# Patient Record
Sex: FEMALE | Race: BLACK OR AFRICAN AMERICAN | Employment: FULL TIME | URBAN - METROPOLITAN AREA
[De-identification: names, ages, dates, MRNs, and addresses within clinical notes are randomized per-mention and may not be internally consistent; named-entity substitution may affect disease eponyms.]

---

## 2021-11-22 ENCOUNTER — HOSPITAL ENCOUNTER (OUTPATIENT)
Dept: LAB | Age: 51
Discharge: HOME | End: 2021-11-22
Payer: COMMERCIAL

## 2021-11-22 ENCOUNTER — PATIENT OUTREACH (OUTPATIENT)
Dept: CASE MANAGEMENT | Age: 51
End: 2021-11-22

## 2021-11-22 DIAGNOSIS — C90.00 MULTIPLE MYELOMA NOT HAVING ACHIEVED REMISSION (HCC): ICD-10-CM

## 2021-11-22 LAB
ALBUMIN SERPL-MCNC: 2.9 G/DL (ref 3.5–5)
ALBUMIN/GLOB SERPL: 0.6 {RATIO} (ref 1.2–3.5)
ALP SERPL-CCNC: 92 U/L (ref 50–136)
ALT SERPL-CCNC: 21 U/L (ref 12–65)
ANION GAP SERPL CALC-SCNC: 4 MMOL/L (ref 7–16)
AST SERPL-CCNC: 14 U/L (ref 15–37)
BASOPHILS # BLD: 0 K/UL (ref 0–0.2)
BASOPHILS NFR BLD: 0 % (ref 0–2)
BILIRUB SERPL-MCNC: 0.3 MG/DL (ref 0.2–1.1)
BUN SERPL-MCNC: 8 MG/DL (ref 6–23)
CALCIUM SERPL-MCNC: 9.5 MG/DL (ref 8.3–10.4)
CHLORIDE SERPL-SCNC: 105 MMOL/L (ref 98–107)
CO2 SERPL-SCNC: 31 MMOL/L (ref 21–32)
CREAT SERPL-MCNC: 0.7 MG/DL (ref 0.6–1)
DIFFERENTIAL METHOD BLD: ABNORMAL
EOSINOPHIL # BLD: 0.1 K/UL (ref 0–0.8)
EOSINOPHIL NFR BLD: 2 % (ref 0.5–7.8)
ERYTHROCYTE [DISTWIDTH] IN BLOOD BY AUTOMATED COUNT: 15.1 % (ref 11.9–14.6)
GLOBULIN SER CALC-MCNC: 4.5 G/DL (ref 2.3–3.5)
GLUCOSE SERPL-MCNC: 93 MG/DL (ref 65–100)
HCT VFR BLD AUTO: 30.4 % (ref 35.8–46.3)
HGB BLD-MCNC: 9.3 G/DL (ref 11.7–15.4)
IMM GRANULOCYTES # BLD AUTO: 0.3 K/UL (ref 0–0.5)
IMM GRANULOCYTES NFR BLD AUTO: 5 % (ref 0–5)
LYMPHOCYTES # BLD: 2.2 K/UL (ref 0.5–4.6)
LYMPHOCYTES NFR BLD: 29 % (ref 13–44)
MAGNESIUM SERPL-MCNC: 2.3 MG/DL (ref 1.8–2.4)
MCH RBC QN AUTO: 29.3 PG (ref 26.1–32.9)
MCHC RBC AUTO-ENTMCNC: 30.6 G/DL (ref 31.4–35)
MCV RBC AUTO: 95.9 FL (ref 79.6–97.8)
MONOCYTES # BLD: 0.7 K/UL (ref 0.1–1.3)
MONOCYTES NFR BLD: 9 % (ref 4–12)
NEUTS SEG # BLD: 4.2 K/UL (ref 1.7–8.2)
NEUTS SEG NFR BLD: 56 % (ref 43–78)
NRBC # BLD: 0.13 K/UL (ref 0–0.2)
PLATELET # BLD AUTO: 304 K/UL (ref 150–450)
PMV BLD AUTO: 9.3 FL (ref 9.4–12.3)
POTASSIUM SERPL-SCNC: 4 MMOL/L (ref 3.5–5.1)
PROT SERPL-MCNC: 7.4 G/DL (ref 6.3–8.2)
RBC # BLD AUTO: 3.17 M/UL (ref 4.05–5.2)
SODIUM SERPL-SCNC: 140 MMOL/L (ref 136–145)
URATE SERPL-MCNC: 5.1 MG/DL (ref 2.6–6)
WBC # BLD AUTO: 7.6 K/UL (ref 4.3–11.1)

## 2021-11-22 PROCEDURE — 86334 IMMUNOFIX E-PHORESIS SERUM: CPT

## 2021-11-22 PROCEDURE — 80053 COMPREHEN METABOLIC PANEL: CPT

## 2021-11-22 PROCEDURE — 85810 BLOOD VISCOSITY EXAMINATION: CPT

## 2021-11-22 PROCEDURE — 83883 ASSAY NEPHELOMETRY NOT SPEC: CPT

## 2021-11-22 PROCEDURE — 82784 ASSAY IGA/IGD/IGG/IGM EACH: CPT

## 2021-11-22 PROCEDURE — 84550 ASSAY OF BLOOD/URIC ACID: CPT

## 2021-11-22 PROCEDURE — 85025 COMPLETE CBC W/AUTO DIFF WBC: CPT

## 2021-11-22 PROCEDURE — 36415 COLL VENOUS BLD VENIPUNCTURE: CPT

## 2021-11-22 PROCEDURE — 83735 ASSAY OF MAGNESIUM: CPT

## 2021-11-23 LAB
KAPPA LC FREE SER-MCNC: 9.54 MG/L (ref 3.3–19.4)
KAPPA LC FREE/LAMBDA FREE SER: 0 {RATIO} (ref 0.26–1.65)
LAMBDA LC FREE SERPL-MCNC: ABNORMAL MG/L (ref 5.71–26.3)

## 2021-11-23 NOTE — PROGRESS NOTES
11/22/21 - Patient here for New Patient visit and transplant discussion with Dr. Pauline Fragoso. Patient set to begin chemotherapy for MM in [place] on 11/23 and will return in 3 months to discuss planning transplant. Patient glad to have this discussion and will let us know if she has any questions prior to next visit. No other needs noted.

## 2021-11-24 LAB
ALBUMIN SERPL ELPH-MCNC: 3.02 G/DL (ref 3.2–5.6)
ALBUMIN/GLOB SERPL: 0.8 {RATIO} (ref 1.2–3.5)
ALPHA1 GLOB SERPL ELPH-MCNC: 0.29 G/DL (ref 0.1–0.4)
ALPHA2 GLOB SERPL ELPH-MCNC: 1.13 G/DL (ref 0.4–1.2)
B-GLOBULIN SERPL QL ELPH: 1.82 G/DL (ref 0.6–1.3)
GAMMA GLOB MFR SERPL ELPH: 0.65 G/DL (ref 0.5–1.6)
IGA SERPL-MCNC: 56 MG/DL (ref 85–499)
IGG SERPL-MCNC: 516 MG/DL (ref 610–1616)
IGM SERPL-MCNC: 35 MG/DL (ref 35–242)
M PROTEIN SERPL ELPH-MCNC: 0.94 G/DL
PROT PATTERN SERPL ELPH-IMP: ABNORMAL
PROT PATTERN SPEC IFE-IMP: ABNORMAL
PROT SERPL-MCNC: 6.9 G/DL (ref 6.3–8.2)
VISC SER: 1.6 REL.SALINE (ref 1.4–2)

## 2022-02-25 ENCOUNTER — HOSPITAL ENCOUNTER (OUTPATIENT)
Dept: LAB | Age: 52
Discharge: HOME | End: 2022-02-25
Payer: COMMERCIAL

## 2022-02-25 DIAGNOSIS — C90.00 MULTIPLE MYELOMA NOT HAVING ACHIEVED REMISSION (HCC): ICD-10-CM

## 2022-02-25 LAB
ALBUMIN SERPL-MCNC: 3.6 G/DL (ref 3.5–5)
ALBUMIN/GLOB SERPL: 1.4 {RATIO} (ref 1.2–3.5)
ALP SERPL-CCNC: 53 U/L (ref 50–136)
ALT SERPL-CCNC: 24 U/L (ref 12–65)
ANION GAP SERPL CALC-SCNC: 6 MMOL/L (ref 7–16)
AST SERPL-CCNC: 34 U/L (ref 15–37)
BASOPHILS # BLD: 0 K/UL (ref 0–0.2)
BASOPHILS NFR BLD: 0 % (ref 0–2)
BILIRUB SERPL-MCNC: 0.5 MG/DL (ref 0.2–1.1)
BUN SERPL-MCNC: 4 MG/DL (ref 6–23)
CALCIUM SERPL-MCNC: 8.5 MG/DL (ref 8.3–10.4)
CHLORIDE SERPL-SCNC: 105 MMOL/L (ref 98–107)
CO2 SERPL-SCNC: 30 MMOL/L (ref 21–32)
CREAT SERPL-MCNC: 0.6 MG/DL (ref 0.6–1)
DIFFERENTIAL METHOD BLD: ABNORMAL
EOSINOPHIL # BLD: 0.1 K/UL (ref 0–0.8)
EOSINOPHIL NFR BLD: 2 % (ref 0.5–7.8)
ERYTHROCYTE [DISTWIDTH] IN BLOOD BY AUTOMATED COUNT: 17.6 % (ref 11.9–14.6)
GLOBULIN SER CALC-MCNC: 2.6 G/DL (ref 2.3–3.5)
GLUCOSE SERPL-MCNC: 99 MG/DL (ref 65–100)
HCT VFR BLD AUTO: 37.9 % (ref 35.8–46.3)
HGB BLD-MCNC: 11.2 G/DL (ref 11.7–15.4)
IMM GRANULOCYTES # BLD AUTO: 0 K/UL (ref 0–0.5)
IMM GRANULOCYTES NFR BLD AUTO: 0 % (ref 0–5)
LYMPHOCYTES # BLD: 1.6 K/UL (ref 0.5–4.6)
LYMPHOCYTES NFR BLD: 41 % (ref 13–44)
MAGNESIUM SERPL-MCNC: 2.3 MG/DL (ref 1.8–2.4)
MCH RBC QN AUTO: 24.1 PG (ref 26.1–32.9)
MCHC RBC AUTO-ENTMCNC: 29.6 G/DL (ref 31.4–35)
MCV RBC AUTO: 81.7 FL (ref 79.6–97.8)
MONOCYTES # BLD: 0.6 K/UL (ref 0.1–1.3)
MONOCYTES NFR BLD: 15 % (ref 4–12)
NEUTS SEG # BLD: 1.7 K/UL (ref 1.7–8.2)
NEUTS SEG NFR BLD: 42 % (ref 43–78)
NRBC # BLD: 0 K/UL (ref 0–0.2)
PLATELET # BLD AUTO: 280 K/UL (ref 150–450)
PMV BLD AUTO: 10 FL (ref 9.4–12.3)
POTASSIUM SERPL-SCNC: 3.1 MMOL/L (ref 3.5–5.1)
PROT SERPL-MCNC: 6.2 G/DL (ref 6.3–8.2)
RBC # BLD AUTO: 4.64 M/UL (ref 4.05–5.2)
SODIUM SERPL-SCNC: 141 MMOL/L (ref 136–145)
WBC # BLD AUTO: 4 K/UL (ref 4.3–11.1)

## 2022-02-25 PROCEDURE — 83521 IG LIGHT CHAINS FREE EACH: CPT

## 2022-02-25 PROCEDURE — 82784 ASSAY IGA/IGD/IGG/IGM EACH: CPT

## 2022-02-25 PROCEDURE — 83735 ASSAY OF MAGNESIUM: CPT

## 2022-02-25 PROCEDURE — 85025 COMPLETE CBC W/AUTO DIFF WBC: CPT

## 2022-02-25 PROCEDURE — 36415 COLL VENOUS BLD VENIPUNCTURE: CPT

## 2022-02-25 PROCEDURE — 80053 COMPREHEN METABOLIC PANEL: CPT

## 2022-02-28 LAB
ALBUMIN SERPL ELPH-MCNC: 3.7 G/DL (ref 2.9–4.4)
ALBUMIN/GLOB SERPL: 1.8 {RATIO} (ref 0.7–1.7)
ALPHA1 GLOB SERPL ELPH-MCNC: 0.2 G/DL (ref 0–0.4)
ALPHA2 GLOB SERPL ELPH-MCNC: 0.6 G/DL (ref 0.4–1)
B-GLOBULIN SERPL ELPH-MCNC: 0.8 G/DL (ref 0.7–1.3)
GAMMA GLOB SERPL ELPH-MCNC: 0.4 G/DL (ref 0.4–1.8)
GLOBULIN SER-MCNC: 2.1 G/DL (ref 2.2–3.9)
IGA SERPL-MCNC: 32 MG/DL (ref 87–352)
IGG SERPL-MCNC: 548 MG/DL (ref 586–1602)
IGM SERPL-MCNC: 28 MG/DL (ref 26–217)
INTERPRETATION SERPL IEP-IMP: ABNORMAL
KAPPA LC FREE SER-MCNC: 6.2 MG/L (ref 3.3–19.4)
KAPPA LC FREE/LAMBDA FREE SER: 0.23 {RATIO} (ref 0.26–1.65)
LAMBDA LC FREE SERPL-MCNC: 26.9 MG/L (ref 5.7–26.3)
M PROTEIN SERPL ELPH-MCNC: 0.2 G/DL
PROT SERPL-MCNC: 5.8 G/DL (ref 6–8.5)

## 2022-04-18 ENCOUNTER — DOCUMENTATION ONLY (OUTPATIENT)
Dept: HEMATOLOGY | Age: 52
End: 2022-04-18

## 2022-04-18 ENCOUNTER — HOSPITAL ENCOUNTER (OUTPATIENT)
Dept: LAB | Age: 52
Discharge: HOME | End: 2022-04-18
Payer: COMMERCIAL

## 2022-04-18 ENCOUNTER — HOSPITAL ENCOUNTER (OUTPATIENT)
Dept: INFUSION THERAPY | Age: 52
Discharge: HOME | End: 2022-04-18
Payer: COMMERCIAL

## 2022-04-18 ENCOUNTER — PATIENT OUTREACH (OUTPATIENT)
Dept: CASE MANAGEMENT | Age: 52
End: 2022-04-18

## 2022-04-18 DIAGNOSIS — Z76.82 BONE MARROW TRANSPLANT CANDIDATE: ICD-10-CM

## 2022-04-18 LAB
ABO + RH BLD: NORMAL
ALBUMIN SERPL-MCNC: 3.1 G/DL (ref 3.5–5)
ALBUMIN/GLOB SERPL: 1 {RATIO} (ref 1.2–3.5)
ALP SERPL-CCNC: 70 U/L (ref 50–136)
ALT SERPL-CCNC: 12 U/L (ref 12–65)
ANION GAP SERPL CALC-SCNC: 4 MMOL/L (ref 7–16)
AST SERPL-CCNC: 17 U/L (ref 15–37)
BASOPHILS # BLD: 0 K/UL (ref 0–0.2)
BASOPHILS NFR BLD: 1 % (ref 0–2)
BILIRUB SERPL-MCNC: 0.3 MG/DL (ref 0.2–1.1)
BUN SERPL-MCNC: 6 MG/DL (ref 6–23)
CALCIUM SERPL-MCNC: 8.8 MG/DL (ref 8.3–10.4)
CHLORIDE SERPL-SCNC: 105 MMOL/L (ref 98–107)
CO2 SERPL-SCNC: 31 MMOL/L (ref 21–32)
CREAT SERPL-MCNC: 0.6 MG/DL (ref 0.6–1)
DIFFERENTIAL METHOD BLD: ABNORMAL
EOSINOPHIL # BLD: 0.1 K/UL (ref 0–0.8)
EOSINOPHIL NFR BLD: 2 % (ref 0.5–7.8)
ERYTHROCYTE [DISTWIDTH] IN BLOOD BY AUTOMATED COUNT: 17.1 % (ref 11.9–14.6)
FERRITIN SERPL-MCNC: 20 NG/ML (ref 8–388)
GLOBULIN SER CALC-MCNC: 3.2 G/DL (ref 2.3–3.5)
GLUCOSE SERPL-MCNC: 91 MG/DL (ref 65–100)
HBV SURFACE AB SERPL IA-ACNC: <3.1 MIU/ML
HCT VFR BLD AUTO: 39.9 % (ref 35.8–46.3)
HGB BLD-MCNC: 11.8 G/DL (ref 11.7–15.4)
IMM GRANULOCYTES # BLD AUTO: 0 K/UL (ref 0–0.5)
IMM GRANULOCYTES NFR BLD AUTO: 0 % (ref 0–5)
LYMPHOCYTES # BLD: 1.9 K/UL (ref 0.5–4.6)
LYMPHOCYTES NFR BLD: 32 % (ref 13–44)
MAGNESIUM SERPL-MCNC: 1.9 MG/DL (ref 1.8–2.4)
MCH RBC QN AUTO: 24.8 PG (ref 26.1–32.9)
MCHC RBC AUTO-ENTMCNC: 29.6 G/DL (ref 31.4–35)
MCV RBC AUTO: 84 FL (ref 79.6–97.8)
MONOCYTES # BLD: 0.8 K/UL (ref 0.1–1.3)
MONOCYTES NFR BLD: 13 % (ref 4–12)
NEUTS SEG # BLD: 3.2 K/UL (ref 1.7–8.2)
NEUTS SEG NFR BLD: 53 % (ref 43–78)
NRBC # BLD: 0 K/UL (ref 0–0.2)
PLATELET # BLD AUTO: 335 K/UL (ref 150–450)
PMV BLD AUTO: 10.3 FL (ref 9.4–12.3)
POTASSIUM SERPL-SCNC: 3.5 MMOL/L (ref 3.5–5.1)
PROT SERPL-MCNC: 6.3 G/DL (ref 6.3–8.2)
RBC # BLD AUTO: 4.75 M/UL (ref 4.05–5.2)
SODIUM SERPL-SCNC: 140 MMOL/L (ref 136–145)
WBC # BLD AUTO: 6 K/UL (ref 4.3–11.1)

## 2022-04-18 PROCEDURE — 80053 COMPREHEN METABOLIC PANEL: CPT

## 2022-04-18 PROCEDURE — 82728 ASSAY OF FERRITIN: CPT

## 2022-04-18 PROCEDURE — 86787 VARICELLA-ZOSTER ANTIBODY: CPT

## 2022-04-18 PROCEDURE — 86900 BLOOD TYPING SEROLOGIC ABO: CPT

## 2022-04-18 PROCEDURE — 83521 IG LIGHT CHAINS FREE EACH: CPT

## 2022-04-18 PROCEDURE — 83735 ASSAY OF MAGNESIUM: CPT

## 2022-04-18 PROCEDURE — 36415 COLL VENOUS BLD VENIPUNCTURE: CPT

## 2022-04-18 PROCEDURE — 83020 HEMOGLOBIN ELECTROPHORESIS: CPT

## 2022-04-18 PROCEDURE — 86790 VIRUS ANTIBODY NOS: CPT

## 2022-04-18 PROCEDURE — 86694 HERPES SIMPLEX NES ANTBDY: CPT

## 2022-04-18 PROCEDURE — 99212 OFFICE O/P EST SF 10 MIN: CPT

## 2022-04-18 PROCEDURE — 84165 PROTEIN E-PHORESIS SERUM: CPT

## 2022-04-18 PROCEDURE — 85025 COMPLETE CBC W/AUTO DIFF WBC: CPT

## 2022-04-18 PROCEDURE — 86706 HEP B SURFACE ANTIBODY: CPT

## 2022-04-18 PROCEDURE — 86592 SYPHILIS TEST NON-TREP QUAL: CPT

## 2022-04-18 PROCEDURE — 86665 EPSTEIN-BARR CAPSID VCA: CPT

## 2022-04-18 NOTE — PROGRESS NOTES
4/18/22 - Patient here for follow up. Will proceed with transplant at this time. Collection set for 5/10. All patient questions answered at this time. BMT Education: Navigator met with patient and sister for BMT education. Patient given BMT education notebook. All sections discussed. Patient encouraged to write down any questions or concerns. Patient is aware of opportunity to meet again for any final questions. Content gone over verbally and given in writing including drug education sheets. Granix side effects, and administration guidelines discussed. Patient aware to report any left side pain during mobilization for the possibility of spleen enlargement or rupture from Granix. Patient aware splenic rupture is extremely rare. Patient stated understanding. The possible use of Mozobil injections was also discussed. Patient aware that mozobil may  started on Monday evening before collection begins. Patient educated on administration of mozobil and the rationale for use during mobilization process. Patient is aware of being NPO stating midnight on Sunday for tunnelled apheresis CVC placement on Monday 5/9 and stated understanding. Apheresis consult completed today by Dinora Rios. Patient aware of the possibility of collection being Tuesday through Friday. Mobilzation schedule given and gone over. Patient aware tunnelled CVC will  be removed after engraftment. Patient stated understanding. High dose education gone over and written information given on Melphalan. Patient aware of possible side effects from chemo including but not limited oral mucositis, diarrhea, n/v, and hair loss. Patient aware of stem cell reinfusion will occur at least 24 hours after the chemo was completed (the next day). Patient aware to prepare for daily visits at least 3 weeks during high dose and the possibility of a 4th week. Patient aware of the need for a 24 hr caregiver during high dose through recovery.  A copy of treatment consents given to patient for review. Patient is aware to review consent and sign prior to high dose therapy being initiated. Patient aware of the risks involved with BMT including but not limited to infection, bleeding, engraftment failure, and death. Questions were answered and again encouraged patient to read material and write down any questions.  Patient aware of next appointment with Dr. Arreola Drafts for pre-study results and mobilization eval.

## 2022-04-18 NOTE — PROGRESS NOTES
Pt arrived ambulating accompanied by sister. Apheresis consult completed. 30 minutes spent on education. Verbal and written information was given on process of stem cell collection, including potential side effects from process as well as medicines such as Mozobil and Granix. Instructed pt to take po Claritin for pain prevention. Handouts given on Claritin for pain, Central line Care, and hand hygiene. Donor questionnaire was completed. Any questions answered yes beyond reading material:yes, pt taking prophylactic Bactrim (no/yes). Physician was notified of any yes responses yes. All questions were answered, pt verbalized understanding.

## 2022-04-18 NOTE — PROGRESS NOTES
I spoke with Bette Schuster regarding her current [employer], potential oral medication authorizations, enrollment in the [employer] ([employer]), and assistance organization resource sheet. The patient will review the cancer programs. Next, I spoke with Bette Schuster regarding her insurance questions. I answered questions about medical treatments and services. Next, I spoke with Bette Schuster regarding the financial assistance application. Next, I spoke with Bette Schuster about deductibles, copayments, and out of pocket maximums regarding her [employer]. Next, I spoke with Bette Schuster regarding the Limited Power of [employer] document. After reading the form, Bette Mariely signed the [employer]. Next, the patient was approved for the Patient Aid and Urgent Needs [place] through the [facility]. Next, I spoke with Bette Schuster regarding potential oral medication authorizations. I told her that if she ever had any problems getting her oral medications filled to give the dedicated [facility]  a call. Most of the time, it is simply an authorization that needs to be done with the insurance company. Lastly, I gave Bette Schuster a form with various resource organizations that could assist with specific needs (example:  transportation, lodging, preparing meals, home cleaning)     Bette Schuster expressed understanding of the information above and all questions were answered to her satisfaction.

## 2022-04-19 LAB
ALBUMIN SERPL ELPH-MCNC: 3.2 G/DL (ref 2.9–4.4)
ALBUMIN/GLOB SERPL: 1.3 {RATIO} (ref 0.7–1.7)
ALPHA1 GLOB SERPL ELPH-MCNC: 0.2 G/DL (ref 0–0.4)
ALPHA2 GLOB SERPL ELPH-MCNC: 0.8 G/DL (ref 0.4–1)
B-GLOBULIN SERPL ELPH-MCNC: 0.9 G/DL (ref 0.7–1.3)
EBV NA IGG SER-ACNC: <18 U/ML (ref 0–17.9)
EBV VCA IGG SER-ACNC: 515 U/ML (ref 0–17.9)
EBV VCA IGM SER-ACNC: <36 U/ML (ref 0–35.9)
GAMMA GLOB SERPL ELPH-MCNC: 0.4 G/DL (ref 0.4–1.8)
GLOBULIN SER CALC-MCNC: 2.4 G/DL (ref 2.2–3.9)
HSV1+2 IGM SER IA-ACNC: <0.91 RATIO (ref 0–0.9)
INTERPRETATION, 169995: ABNORMAL
KAPPA LC FREE SER-MCNC: 6.8 MG/L (ref 3.3–19.4)
KAPPA LC FREE/LAMBDA FREE SER: 0.25 {RATIO} (ref 0.26–1.65)
LAMBDA LC FREE SERPL-MCNC: 26.7 MG/L (ref 5.7–26.3)
M PROTEIN SERPL ELPH-MCNC: 0.2 G/DL
PROT SERPL-MCNC: 5.6 G/DL (ref 6–8.5)
VZV IGG SER IA-ACNC: 324 INDEX
VZV IGM SER IA-ACNC: <0.91 INDEX (ref 0–0.9)

## 2022-04-19 NOTE — PROGRESS NOTES
Problem: Infection - Risk of, Central Venous Catheter-Associated Bloodstream Infection  Goal: *Absence of infection signs and symptoms  Outcome: Progressing Towards Goal     Problem: Pain  Goal: *Control of Pain  Outcome: Progressing Towards Goal     Problem: Anemia Care Plan (Adult and Pediatric)  Goal: *Labs within defined limits  Outcome: Progressing Towards Goal  Goal: *Tolerates increased activity  Outcome: Progressing Towards Goal     Problem: Anemia Care Plan (Adult and Pediatric)  Goal: *Tolerates increased activity  Outcome: Progressing Towards Goal     Problem: Diarrhea (Adult and Pediatrics)  Goal: *Absence of diarrhea  Outcome: Progressing Towards Goal     Problem: Knowledge Deficit  Goal: *Verbalizes understanding of procedures and medications  Outcome: Progressing Towards Goal     Problem: Patient Education:  Go to Education Activity  Goal: Patient/Family Education  Outcome: Progressing Towards Goal

## 2022-04-20 ENCOUNTER — HOSPITAL ENCOUNTER (OUTPATIENT)
Dept: NON INVASIVE DIAGNOSTICS | Age: 52
Discharge: HOME | End: 2022-04-20
Attending: INTERNAL MEDICINE
Payer: COMMERCIAL

## 2022-04-20 ENCOUNTER — APPOINTMENT (OUTPATIENT)
Dept: NON INVASIVE DIAGNOSTICS | Age: 52
End: 2022-04-20
Attending: INTERNAL MEDICINE
Payer: COMMERCIAL

## 2022-04-20 ENCOUNTER — HOSPITAL ENCOUNTER (OUTPATIENT)
Dept: PET IMAGING | Age: 52
Discharge: HOME | End: 2022-04-20
Payer: COMMERCIAL

## 2022-04-20 ENCOUNTER — HOSPITAL ENCOUNTER (OUTPATIENT)
Dept: LAB | Age: 52
Discharge: HOME | End: 2022-04-20
Payer: COMMERCIAL

## 2022-04-20 ENCOUNTER — HOSPITAL ENCOUNTER (OUTPATIENT)
Dept: GENERAL RADIOLOGY | Age: 52
Discharge: HOME | End: 2022-04-20
Attending: INTERNAL MEDICINE
Payer: COMMERCIAL

## 2022-04-20 VITALS — HEIGHT: 63 IN | WEIGHT: 228 LBS | BODY MASS INDEX: 40.4 KG/M2

## 2022-04-20 DIAGNOSIS — Z76.82 BONE MARROW TRANSPLANT CANDIDATE: ICD-10-CM

## 2022-04-20 DIAGNOSIS — C90.00 MULTIPLE MYELOMA NOT HAVING ACHIEVED REMISSION (HCC): ICD-10-CM

## 2022-04-20 LAB
ATRIAL RATE: 96 BPM
CALCULATED P AXIS, ECG09: 53 DEGREES
CALCULATED R AXIS, ECG10: 41 DEGREES
CALCULATED T AXIS, ECG11: 59 DEGREES
DIAGNOSIS, 93000: NORMAL
ECHO AO ASC DIAM: 3 CM
ECHO AO ASCENDING AORTA INDEX: 1.48 CM/M2
ECHO AO ROOT DIAM: 2.6 CM
ECHO AO ROOT INDEX: 1.28 CM/M2
ECHO AV AREA PEAK VELOCITY: 2 CM2
ECHO AV AREA VTI: 2.1 CM2
ECHO AV AREA/BSA PEAK VELOCITY: 1 CM2/M2
ECHO AV AREA/BSA VTI: 1 CM2/M2
ECHO AV MEAN GRADIENT: 6 MMHG
ECHO AV MEAN VELOCITY: 1.1 M/S
ECHO AV PEAK GRADIENT: 11 MMHG
ECHO AV PEAK VELOCITY: 1.7 M/S
ECHO AV VELOCITY RATIO: 0.65
ECHO AV VTI: 31.1 CM
ECHO LA AREA 2C: 13.5 CM2
ECHO LA AREA 4C: 15.6 CM2
ECHO LA DIAMETER INDEX: 1.67 CM/M2
ECHO LA DIAMETER: 3.4 CM
ECHO LA MAJOR AXIS: 5 CM
ECHO LA MINOR AXIS: 5.4 CM
ECHO LA TO AORTIC ROOT RATIO: 1.31
ECHO LA VOL BP: 33 ML (ref 22–52)
ECHO LA VOL/BSA BIPLANE: 16 ML/M2 (ref 16–34)
ECHO LV E' LATERAL VELOCITY: 10 CM/S
ECHO LV E' SEPTAL VELOCITY: 7 CM/S
ECHO LV EDV A2C: 37 ML
ECHO LV EDV A4C: 84 ML
ECHO LV EDV INDEX A4C: 41 ML/M2
ECHO LV EDV NDEX A2C: 18 ML/M2
ECHO LV EJECTION FRACTION A2C: 60 %
ECHO LV EJECTION FRACTION A4C: 65 %
ECHO LV EJECTION FRACTION BIPLANE: 64 % (ref 55–100)
ECHO LV ESV A2C: 15 ML
ECHO LV ESV A4C: 30 ML
ECHO LV ESV INDEX A2C: 7 ML/M2
ECHO LV ESV INDEX A4C: 15 ML/M2
ECHO LV FRACTIONAL SHORTENING: 35 % (ref 28–44)
ECHO LV INTERNAL DIMENSION DIASTOLE INDEX: 1.82 CM/M2
ECHO LV INTERNAL DIMENSION DIASTOLIC: 3.7 CM (ref 3.9–5.3)
ECHO LV INTERNAL DIMENSION SYSTOLIC INDEX: 1.18 CM/M2
ECHO LV INTERNAL DIMENSION SYSTOLIC: 2.4 CM
ECHO LV IVSD: 1 CM (ref 0.6–0.9)
ECHO LV MASS 2D: 129.3 G (ref 67–162)
ECHO LV MASS INDEX 2D: 63.7 G/M2 (ref 43–95)
ECHO LV POSTERIOR WALL DIASTOLIC: 1.2 CM (ref 0.6–0.9)
ECHO LV RELATIVE WALL THICKNESS RATIO: 0.65
ECHO LVOT AREA: 3.1 CM2
ECHO LVOT AV VTI INDEX: 0.67
ECHO LVOT DIAM: 2 CM
ECHO LVOT MEAN GRADIENT: 2 MMHG
ECHO LVOT PEAK GRADIENT: 4 MMHG
ECHO LVOT PEAK VELOCITY: 1.1 M/S
ECHO LVOT STROKE VOLUME INDEX: 32.2 ML/M2
ECHO LVOT SV: 65.3 ML
ECHO LVOT VTI: 20.8 CM
ECHO MV A VELOCITY: 0.94 M/S
ECHO MV E DECELERATION TIME (DT): 190 MS
ECHO MV E VELOCITY: 0.89 M/S
ECHO MV E/A RATIO: 0.95
ECHO MV E/E' LATERAL: 8.9
ECHO MV E/E' RATIO (AVERAGED): 10.81
ECHO MV E/E' SEPTAL: 12.71
ECHO PV ACCELERATION TIME (AT): 129 MS
ECHO PV MAX VELOCITY: 1.5 M/S
ECHO PV PEAK GRADIENT: 9 MMHG
ECHO RV BASAL DIMENSION: 3.6 CM
ECHO RV INTERNAL DIMENSION: 3 CM
ECHO RV TAPSE: 2.4 CM (ref 1.7–?)
GLUCOSE BLD STRIP.AUTO-MCNC: 96 MG/DL (ref 65–100)
HGB A MFR BLD: 97.6 % (ref 96.4–98.8)
HGB A2 MFR BLD COLUMN CHROM: 2.4 % (ref 1.8–3.2)
HGB F MFR BLD: 0 % (ref 0–2)
HGB FRACT BLD-IMP: NORMAL
HGB S MFR BLD: 0 %
HSV1 IGG SER QL: NORMAL
P-R INTERVAL, ECG05: 154 MS
Q-T INTERVAL, ECG07: 358 MS
QRS DURATION, ECG06: 80 MS
QTC CALCULATION (BEZET), ECG08: 452 MS
SERVICE CMNT-IMP: NORMAL
VENTRICULAR RATE, ECG03: 96 BPM

## 2022-04-20 PROCEDURE — 78815 PET IMAGE W/CT SKULL-THIGH: CPT

## 2022-04-20 PROCEDURE — 93306 TTE W/DOPPLER COMPLETE: CPT

## 2022-04-20 PROCEDURE — 77074 RADEX OSSEOUS SURVEY LMTD: CPT

## 2022-04-20 PROCEDURE — 71046 X-RAY EXAM CHEST 2 VIEWS: CPT

## 2022-04-20 PROCEDURE — 82962 GLUCOSE BLOOD TEST: CPT

## 2022-04-20 PROCEDURE — 93005 ELECTROCARDIOGRAM TRACING: CPT | Performed by: INTERNAL MEDICINE

## 2022-04-20 PROCEDURE — 93010 ELECTROCARDIOGRAM REPORT: CPT | Performed by: INTERNAL MEDICINE

## 2022-04-20 PROCEDURE — 86335 IMMUNFIX E-PHORSIS/URINE/CSF: CPT

## 2022-04-20 PROCEDURE — 74011000636 HC RX REV CODE- 636: Performed by: INTERNAL MEDICINE

## 2022-04-20 RX ORDER — FLUDEOXYGLUCOSE F-18 200 MCI/ML
10 INJECTION INTRAVENOUS ONCE
Status: COMPLETED | OUTPATIENT
Start: 2022-04-20 | End: 2022-04-20

## 2022-04-20 RX ORDER — SODIUM CHLORIDE 0.9 % (FLUSH) 0.9 %
10 SYRINGE (ML) INJECTION
Status: COMPLETED | OUTPATIENT
Start: 2022-04-20 | End: 2022-04-20

## 2022-04-20 RX ADMIN — DIATRIZOATE MEGLUMINE AND DIATRIZOATE SODIUM 10 ML: 660; 100 LIQUID ORAL; RECTAL at 08:41

## 2022-04-20 RX ADMIN — FLUDEOXYGLUCOSE F-18 11.16 MILLICURIE: 200 INJECTION INTRAVENOUS at 08:41

## 2022-04-20 RX ADMIN — Medication 10 ML: at 08:41

## 2022-04-21 ENCOUNTER — HOSPITAL ENCOUNTER (OUTPATIENT)
Dept: CT IMAGING | Age: 52
Discharge: HOME | End: 2022-04-21
Attending: INTERNAL MEDICINE
Payer: COMMERCIAL

## 2022-04-21 ENCOUNTER — HOSPITAL ENCOUNTER (OUTPATIENT)
Dept: ULTRASOUND IMAGING | Age: 52
Discharge: HOME | End: 2022-04-21
Attending: INTERNAL MEDICINE
Payer: COMMERCIAL

## 2022-04-21 ENCOUNTER — HOSPITAL ENCOUNTER (OUTPATIENT)
Dept: RESPIRATORY THERAPY | Age: 52
Discharge: HOME | End: 2022-04-21
Attending: INTERNAL MEDICINE
Payer: COMMERCIAL

## 2022-04-21 VITALS
TEMPERATURE: 98 F | SYSTOLIC BLOOD PRESSURE: 132 MMHG | OXYGEN SATURATION: 95 % | DIASTOLIC BLOOD PRESSURE: 64 MMHG | WEIGHT: 220 LBS | RESPIRATION RATE: 16 BRPM | BODY MASS INDEX: 40.48 KG/M2 | HEART RATE: 79 BPM | HEIGHT: 62 IN

## 2022-04-21 DIAGNOSIS — C90.00 MULTIPLE MYELOMA NOT HAVING ACHIEVED REMISSION (HCC): ICD-10-CM

## 2022-04-21 DIAGNOSIS — E04.1 THYROID NODULE GREATER THAN OR EQUAL TO 1 CM IN DIAMETER INCIDENTALLY NOTED ON IMAGING STUDY: ICD-10-CM

## 2022-04-21 DIAGNOSIS — Z76.82 BONE MARROW TRANSPLANT CANDIDATE: ICD-10-CM

## 2022-04-21 LAB
BASOPHILS # BLD: 0 K/UL (ref 0–0.2)
BASOPHILS NFR BLD: 1 % (ref 0–2)
BONE MARROW PREP & W,BMA: NORMAL
DIFFERENTIAL METHOD BLD: ABNORMAL
EOSINOPHIL # BLD: 0.1 K/UL (ref 0–0.8)
EOSINOPHIL NFR BLD: 1 % (ref 0.5–7.8)
ERYTHROCYTE [DISTWIDTH] IN BLOOD BY AUTOMATED COUNT: 17.2 % (ref 11.9–14.6)
HCT VFR BLD AUTO: 36.8 % (ref 35.8–46.3)
HGB BLD-MCNC: 11.1 G/DL (ref 11.7–15.4)
IMM GRANULOCYTES # BLD AUTO: 0 K/UL (ref 0–0.5)
IMM GRANULOCYTES NFR BLD AUTO: 0 % (ref 0–5)
LYMPHOCYTES # BLD: 2.5 K/UL (ref 0.5–4.6)
LYMPHOCYTES NFR BLD: 40 % (ref 13–44)
MCH RBC QN AUTO: 24.8 PG (ref 26.1–32.9)
MCHC RBC AUTO-ENTMCNC: 30.2 G/DL (ref 31.4–35)
MCV RBC AUTO: 82.1 FL (ref 79.6–97.8)
MONOCYTES # BLD: 0.7 K/UL (ref 0.1–1.3)
MONOCYTES NFR BLD: 11 % (ref 4–12)
NEUTS SEG # BLD: 3 K/UL (ref 1.7–8.2)
NEUTS SEG NFR BLD: 48 % (ref 43–78)
NRBC # BLD: 0 K/UL (ref 0–0.2)
PLATELET # BLD AUTO: 367 K/UL (ref 150–450)
PMV BLD AUTO: 10.5 FL (ref 9.4–12.3)
RBC # BLD AUTO: 4.48 M/UL (ref 4.05–5.2)
WBC # BLD AUTO: 6.3 K/UL (ref 4.3–11.1)

## 2022-04-21 PROCEDURE — 99153 MOD SED SAME PHYS/QHP EA: CPT

## 2022-04-21 PROCEDURE — 74011250636 HC RX REV CODE- 250/636: Performed by: RADIOLOGY

## 2022-04-21 PROCEDURE — 94010 BREATHING CAPACITY TEST: CPT

## 2022-04-21 PROCEDURE — 88305 TISSUE EXAM BY PATHOLOGIST: CPT

## 2022-04-21 PROCEDURE — 99152 MOD SED SAME PHYS/QHP 5/>YRS: CPT

## 2022-04-21 PROCEDURE — 76536 US EXAM OF HEAD AND NECK: CPT

## 2022-04-21 PROCEDURE — 88311 DECALCIFY TISSUE: CPT

## 2022-04-21 PROCEDURE — 74011000250 HC RX REV CODE- 250: Performed by: RADIOLOGY

## 2022-04-21 PROCEDURE — 77012 CT SCAN FOR NEEDLE BIOPSY: CPT

## 2022-04-21 PROCEDURE — 88313 SPECIAL STAINS GROUP 2: CPT

## 2022-04-21 PROCEDURE — 85025 COMPLETE CBC W/AUTO DIFF WBC: CPT

## 2022-04-21 PROCEDURE — 94729 DIFFUSING CAPACITY: CPT

## 2022-04-21 PROCEDURE — 94727 GAS DIL/WSHOT DETER LNG VOL: CPT

## 2022-04-21 RX ORDER — SODIUM CHLORIDE 9 MG/ML
25 INJECTION, SOLUTION INTRAVENOUS CONTINUOUS
Status: DISCONTINUED | OUTPATIENT
Start: 2022-04-21 | End: 2022-04-21

## 2022-04-21 RX ORDER — MIDAZOLAM HYDROCHLORIDE 1 MG/ML
.5-2 INJECTION, SOLUTION INTRAMUSCULAR; INTRAVENOUS
Status: DISCONTINUED | OUTPATIENT
Start: 2022-04-21 | End: 2022-04-21

## 2022-04-21 RX ORDER — LIDOCAINE HYDROCHLORIDE 20 MG/ML
2-20 INJECTION, SOLUTION INFILTRATION; PERINEURAL ONCE
Status: COMPLETED | OUTPATIENT
Start: 2022-04-21 | End: 2022-04-21

## 2022-04-21 RX ORDER — FENTANYL CITRATE 50 UG/ML
25-50 INJECTION, SOLUTION INTRAMUSCULAR; INTRAVENOUS
Status: DISCONTINUED | OUTPATIENT
Start: 2022-04-21 | End: 2022-04-21

## 2022-04-21 RX ADMIN — MIDAZOLAM 1 MG: 1 INJECTION INTRAMUSCULAR; INTRAVENOUS at 14:39

## 2022-04-21 RX ADMIN — MIDAZOLAM 1 MG: 1 INJECTION INTRAMUSCULAR; INTRAVENOUS at 14:34

## 2022-04-21 RX ADMIN — FENTANYL CITRATE 50 MCG: 50 INJECTION, SOLUTION INTRAMUSCULAR; INTRAVENOUS at 14:34

## 2022-04-21 RX ADMIN — FENTANYL CITRATE 25 MCG: 50 INJECTION, SOLUTION INTRAMUSCULAR; INTRAVENOUS at 14:48

## 2022-04-21 RX ADMIN — SODIUM CHLORIDE 25 ML/HR: 900 INJECTION, SOLUTION INTRAVENOUS at 14:30

## 2022-04-21 RX ADMIN — FENTANYL CITRATE 50 MCG: 50 INJECTION, SOLUTION INTRAMUSCULAR; INTRAVENOUS at 14:39

## 2022-04-21 RX ADMIN — MIDAZOLAM 0.5 MG: 1 INJECTION INTRAMUSCULAR; INTRAVENOUS at 14:48

## 2022-04-21 RX ADMIN — LIDOCAINE HYDROCHLORIDE 200 MG: 20 INJECTION, SOLUTION INFILTRATION; PERINEURAL at 14:49

## 2022-04-21 NOTE — PROCEDURES
Department of Interventional Radiology  [phone]        Interventional Radiology Brief Procedure Note    Patient: Britt Alvarez MRN: [id number]  SSN: [id number]    YOB: 1970  Age: 54 y.o. Sex: female      Date of Procedure: 4/21/2022    Pre-Procedure Diagnosis: multiple myeloma    Post-Procedure Diagnosis: SAME    Procedure(s): Image Guided Biopsy    Brief Description of Procedure: CT guided BMBX    Performed By: Tima Cordova PA-C     Assistants: None    Anesthesia:Moderate Sedation per LIYA Zamora MD    Estimated Blood Loss: Less than 10ml    Specimens:  aspirate and core    Implants:  None    Findings: no post bx bleeding    Complications: None    Recommendations: routine wound care     Follow Up: prn    Signed By: Tima Cordova PA-C     April 21, 2022

## 2022-04-21 NOTE — H&P
Department of Interventional Radiology  [phone]    History and Physical    Patient:  Jose Manuel Winters MRN:  [id number]  SSN:  [id number]    YOB: 1970  Age:  54 y.o. Sex:  female      Primary Care Provider:  Francisca Escobar MD  Referring Physician:  Darren Galvan MD    Subjective:     Chief Complaint: biopsy    History of the Present Illness: The patient is a 54 y.o. femalewith multiple myeloma who presents for bone marrow biopsy. NPO. Past Medical History:   Diagnosis Date    Obesity     Prolactin increased      Past Surgical History:   Procedure Laterality Date    IR BX BONE MARROW DIAGNOSTIC          Review of Systems:    Pertinent items are noted in the History of Present Illness. Prior to Admission medications    Medication Sig Start Date End Date Taking? Authorizing Provider   DULoxetine (CYMBALTA) 30 mg capsule Take 30 mg by mouth daily. 3/28/22   Provider, Historical   gabapentin (NEURONTIN) 300 mg capsule Take 300 mg by mouth. 1/31/22   Provider, Historical   lenalidomide 25 mg cap Take 25 mg by mouth daily. Patient not taking: Reported on 4/18/2022 2/21/22   Provider, Historical   aspirin delayed-release 325 mg tablet Take 325 mg by mouth daily. 11/23/21   Provider, Historical   dicyclomine (BENTYL) 20 mg tablet Take 20 mg by mouth. 2/14/22   Provider, Historical   acyclovir (ZOVIRAX) 400 mg tablet Take 400 mg by mouth two (2) times a day. 11/16/21   Provider, Historical   HYDROcodone-acetaminophen (NORCO) 7.5-325 mg per tablet Take 1 Tablet by mouth every six (6) hours as needed for Pain. Provider, Historical   HYDROcodone-acetaminophen (NORCO) 5-325 mg per tablet Take 1 Tablet by mouth every four (4) hours as needed for Pain. Patient not taking: Reported on 2/25/2022    Provider, Historical   cyclobenzaprine (FLEXERIL) 5 mg tablet Take 5 mg by mouth three (3) times daily as needed for Muscle Spasm(s).   Patient not taking: Reported on 2/25/2022 Provider, Historical   pregabalin (Lyrica) 75 mg capsule Take 75 mg by mouth three (3) times daily. Patient not taking: Reported on 2/25/2022    Provider, Historical        Allergies   Allergen Reactions    Amoxicillin-Pot Clavulanate Other (comments)     Yeast infection  Yeast infection         History reviewed. No pertinent family history.   Social History     Tobacco Use    Smoking status: Never Smoker    Smokeless tobacco: Never Used   Substance Use Topics    Alcohol use: Never        Objective:       Physical Examination:    Vitals:    04/21/22 1339   BP: (!) 138/59   Pulse: 91   Resp: 18   Temp: 98 °F (36.7 °C)   SpO2: 100%   Weight: 99.8 kg (220 lb)   Height: 5' 2\" (1.575 m)       Pain Assessment  Pain Intensity 1: 0 (04/21/22 1339)               HEART: regular rate and rhythm  LUNG: clear to auscultation bilaterally  ABDOMEN: normal findings: soft, non-tender  EXTREMITIES: warm, no edema    Laboratory:     Lab Results   Component Value Date/Time    Sodium 140 04/18/2022 11:29 AM    Sodium 141 02/25/2022 09:52 AM    Potassium 3.5 04/18/2022 11:29 AM    Potassium 3.1 (L) 02/25/2022 09:52 AM    Chloride 105 04/18/2022 11:29 AM    Chloride 105 02/25/2022 09:52 AM    CO2 31 04/18/2022 11:29 AM    CO2 30 02/25/2022 09:52 AM    Anion gap 4 (L) 04/18/2022 11:29 AM    Anion gap 6 (L) 02/25/2022 09:52 AM    Glucose 91 04/18/2022 11:29 AM    Glucose 99 02/25/2022 09:52 AM    BUN 6 04/18/2022 11:29 AM    BUN 4 (L) 02/25/2022 09:52 AM    Creatinine 0.60 04/18/2022 11:29 AM    Creatinine 0.60 02/25/2022 09:52 AM    GFR est AA >60 04/18/2022 11:29 AM    GFR est AA >60 02/25/2022 09:52 AM    GFR est non-AA >60 04/18/2022 11:29 AM    GFR est non-AA >60 02/25/2022 09:52 AM    Calcium 8.8 04/18/2022 11:29 AM    Calcium 8.5 02/25/2022 09:52 AM    Magnesium 1.9 04/18/2022 11:29 AM    Magnesium 2.3 02/25/2022 09:52 AM    Albumin 3.1 (L) 04/18/2022 11:29 AM    Albumin 3.6 02/25/2022 09:52 AM    Protein, total 6.3 04/18/2022 11:29 AM    Protein, total 5.6 (L) 04/18/2022 11:29 AM    Globulin 3.2 04/18/2022 11:29 AM    Globulin 2.6 02/25/2022 09:52 AM    A-G Ratio 1.0 (L) 04/18/2022 11:29 AM    A-G Ratio 1.4 02/25/2022 09:52 AM    ALT (SGPT) 12 04/18/2022 11:29 AM    ALT (SGPT) 24 02/25/2022 09:52 AM     Lab Results   Component Value Date/Time    WBC 6.3 04/21/2022 01:48 PM    WBC 6.0 04/18/2022 11:29 AM    HGB 11.1 (L) 04/21/2022 01:48 PM    HGB 11.8 04/18/2022 11:29 AM    HCT 36.8 04/21/2022 01:48 PM    HCT 39.9 04/18/2022 11:29 AM    PLATELET 828 65/49/1841 01:48 PM    PLATELET 741 07/93/7430 11:29 AM     No results found for: APTT, PTP, INR, INREXT    Assessment:     Multiple myeloma        Plan:     Planned Procedure:  Bone marrow biopsy    Risks, benefits, and alternatives reviewed with patient and she agrees to proceed with the procedure.       Signed By: Esme Sales PA-C     April 21, 2022

## 2022-04-21 NOTE — DISCHARGE INSTRUCTIONS
[address]  Department of Interventional Radiology  [facility]  [phone] Office  [phone] Fax    BIOPSY DISCHARGE INSTRUCTIONS    General Instructions:     A biopsy is the removal of a small piece of tissue for microscopic examination or testing. Healthy tissue can be obtained for the purpose of tissue-type matching for transplants. Unhealthy tissues are more commonly biopsied to diagnose disease. Lung Biopsy:     A needle lung biopsy is performed when there is a mass discovered in the lung area. The most serious risk is infection, bleeding, and pneumothorax (a collapsed lung). Signs of pneumothorax include shortness of breath, rapid heart rate, and blueness of the skin. If any of these occur, call 911. Liver Biopsy: This test helps detect cancer, infections, and the cause of an enlargement of the liver or elevated liver enzymes. It also helps to diagnose a number of liver diseases. The pain associated with the procedure may be felt in the shoulder. The risks include internal bleeding, pneumothorax, and injury to the surrounding organs. Renal Biopsy: This procedure is sometimes done to evaluate a transplanted kidney. It is also used to evaluate unexplained decrease in kidney function, blood, or protein in the urine. You may see bright red blood in the urine the first 24 hours after the test. If the bleeding lasts longer, you need to call your doctor. There is a risk of infection and bleeding into the muscle, which may cause soreness. Spinal Biopsy: This test is sometimes done in conjunction with other procedures. Your back will be sore, as we are taking a small sample of bone, which is slightly more difficult to sample than tissue. General Biopsy:     A mass can grow in any area of the body, and we are taking a specimen as ordered by your doctor. The risks are the same.  They include bleeding, pain, and infection. Home Care Instructions: You may resume your regular diet and medication regimen. Do not drink alcohol, drive, or make any important legal decisions in the next 24 hours. Do not lift anything heavier than a gallon of milk until the soreness goes away. You may use over the counter acetaminophen or ibuprofen for the soreness. You may apply an ice pack to the affected area for 20-30 minutes at time for the first 24 hours. After that, you may apply a heat pack. Call If: You should call your Physician and/or the Radiology Nurse if you have any questions or concerns about the biopsy site. Call if you should have increased pain, fever, redness, drainage, or bleeding more than a small spot on the bandage. Follow-Up Instructions: Please see your ordering doctor as he/she has requested. To Reach Us: If you have any questions about your procedure, please call the Interventional Radiology department at [phone]. After business hours (5pm) and weekends, call the answering service at [phone] and ask for the Radiologist on call to be paged. Si tiene Preguntas acerca del procedimiento, por favor llame al departamento de Radiología Intervencional al [phone]. Después de horas de oficina (5 pm) y los fines de [place], llamar al Johnathon Cardona al [phone] y pregunte por el Radiologo de [facility]. Interventional Radiology General Nurse Discharge    After general anesthesia or intravenous sedation, for 24 hours or while taking prescription Narcotics:  · Limit your activities  · Do not drive and operate hazardous machinery  · Do not make important personal or business decisions  · Do  not drink alcoholic beverages  · If you have not urinated within 8 hours after discharge, please contact your surgeon on call. * Please give a list of your current medications to your Primary Care Provider.   * Please update this list whenever your medications are discontinued, doses are changed, or new medications (including over-the-counter products) are added. * Please carry medication information at all times in case of emergency situations. These are general instructions for a healthy lifestyle:    No smoking/ No tobacco products/ Avoid exposure to second hand smoke  Surgeon General's Warning:  Quitting smoking now greatly reduces serious risk to your health. Obesity, smoking, and sedentary lifestyle greatly increases your risk for illness  A healthy diet, regular physical exercise & weight monitoring are important for maintaining a healthy lifestyle    You may be retaining fluid if you have a history of heart failure or if you experience any of the following symptoms:  Weight gain of 3 pounds or more overnight or 5 pounds in a week, increased swelling in our hands or feet or shortness of breath while lying flat in bed. Please call your doctor as soon as you notice any of these symptoms; do not wait until your next office visit. Recognize signs and symptoms of STROKE:  F-face looks uneven    A-arms unable to move or move unevenly    S-speech slurred or non-existent    T-time-call 911 as soon as signs and symptoms begin-DO NOT go       Back to bed or wait to see if you get better-TIME IS BRAIN.

## 2022-04-21 NOTE — PROGRESS NOTES
TRANSFER - OUT REPORT:    Verbal report given to Franck Dhaliwal on [employer]  being transferred to IR prep room 5 for routine post - op       Report consisted of patients Situation, Background, Assessment and   Recommendations(SBAR). Information from the following report(s) SBAR, Kardex, Procedure Summary and MAR was reviewed with the receiving nurse. Lines:   Saline Lock 04/21/22 Right Antecubital (Active)        Opportunity for questions and clarification was provided.

## 2022-04-22 LAB
ALBUMIN 24H MFR UR ELPH: 40 %
ALPHA1 GLOB 24H MFR UR ELPH: 6 %
ALPHA2 GLOB 24H MFR UR ELPH: 14.6 %
B-GLOBULIN MFR UR ELPH: 27.1 %
COLLECT DURATION TIME UR: NORMAL HR
GAMMA GLOB 24H MFR UR ELPH: 12.3 %
INTERPRETATION UR IFE-IMP: NORMAL
M PROTEIN 24H MFR UR ELPH: NORMAL %
NOTE, 149533: NORMAL
PROT 24H UR-MRATE: 94 MG/24 HR (ref 30–150)
PROT UR-MCNC: 11.8 MG/DL
SPECIMEN VOL ?TM UR: NORMAL ML

## 2022-04-27 LAB — STEM CELL PANEL, XSTEMT: NORMAL

## 2022-04-29 LAB
FLOW CYTOMETRY, FBTC1: NORMAL
SPECIMEN SOURCE: NORMAL
TEST ORDERED:: NORMAL

## 2022-05-05 ENCOUNTER — HOSPITAL ENCOUNTER (OUTPATIENT)
Dept: LAB | Age: 52
Discharge: HOME | End: 2022-05-05
Payer: COMMERCIAL

## 2022-05-05 DIAGNOSIS — C90.00 MULTIPLE MYELOMA NOT HAVING ACHIEVED REMISSION (HCC): ICD-10-CM

## 2022-05-05 LAB
ALBUMIN SERPL-MCNC: 3.5 G/DL (ref 3.5–5)
ALBUMIN/GLOB SERPL: 1.1 {RATIO} (ref 1.2–3.5)
ALP SERPL-CCNC: 76 U/L (ref 50–136)
ALT SERPL-CCNC: 14 U/L (ref 12–65)
ANION GAP SERPL CALC-SCNC: 4 MMOL/L (ref 7–16)
AST SERPL-CCNC: 19 U/L (ref 15–37)
BASOPHILS # BLD: 0 K/UL (ref 0–0.2)
BASOPHILS NFR BLD: 1 % (ref 0–2)
BILIRUB SERPL-MCNC: 0.2 MG/DL (ref 0.2–1.1)
BUN SERPL-MCNC: 6 MG/DL (ref 6–23)
CALCIUM SERPL-MCNC: 9.4 MG/DL (ref 8.3–10.4)
CHLORIDE SERPL-SCNC: 105 MMOL/L (ref 98–107)
CO2 SERPL-SCNC: 31 MMOL/L (ref 21–32)
CREAT SERPL-MCNC: 0.7 MG/DL (ref 0.6–1)
DIFFERENTIAL METHOD BLD: ABNORMAL
EOSINOPHIL # BLD: 0.1 K/UL (ref 0–0.8)
EOSINOPHIL NFR BLD: 1 % (ref 0.5–7.8)
ERYTHROCYTE [DISTWIDTH] IN BLOOD BY AUTOMATED COUNT: 16.5 % (ref 11.9–14.6)
GLOBULIN SER CALC-MCNC: 3.1 G/DL (ref 2.3–3.5)
GLUCOSE SERPL-MCNC: 81 MG/DL (ref 65–100)
HCG SERPL QL: NEGATIVE
HCT VFR BLD AUTO: 39.3 % (ref 35.8–46.3)
HGB BLD-MCNC: 12 G/DL (ref 11.7–15.4)
IMM GRANULOCYTES # BLD AUTO: 0 K/UL (ref 0–0.5)
IMM GRANULOCYTES NFR BLD AUTO: 0 % (ref 0–5)
LYMPHOCYTES # BLD: 3.2 K/UL (ref 0.5–4.6)
LYMPHOCYTES NFR BLD: 42 % (ref 13–44)
MAGNESIUM SERPL-MCNC: 2 MG/DL (ref 1.8–2.4)
MCH RBC QN AUTO: 25.4 PG (ref 26.1–32.9)
MCHC RBC AUTO-ENTMCNC: 30.5 G/DL (ref 31.4–35)
MCV RBC AUTO: 83.3 FL (ref 79.6–97.8)
MONOCYTES # BLD: 0.7 K/UL (ref 0.1–1.3)
MONOCYTES NFR BLD: 9 % (ref 4–12)
NEUTS SEG # BLD: 3.7 K/UL (ref 1.7–8.2)
NEUTS SEG NFR BLD: 47 % (ref 43–78)
NRBC # BLD: 0 K/UL (ref 0–0.2)
PLATELET # BLD AUTO: 348 K/UL (ref 150–450)
PMV BLD AUTO: 10.9 FL (ref 9.4–12.3)
POTASSIUM SERPL-SCNC: 3.2 MMOL/L (ref 3.5–5.1)
PROT SERPL-MCNC: 6.6 G/DL (ref 6.3–8.2)
RBC # BLD AUTO: 4.72 M/UL (ref 4.05–5.2)
SODIUM SERPL-SCNC: 140 MMOL/L (ref 136–145)
WBC # BLD AUTO: 7.8 K/UL (ref 4.3–11.1)

## 2022-05-05 PROCEDURE — 85025 COMPLETE CBC W/AUTO DIFF WBC: CPT

## 2022-05-05 PROCEDURE — 83521 IG LIGHT CHAINS FREE EACH: CPT

## 2022-05-05 PROCEDURE — 83735 ASSAY OF MAGNESIUM: CPT

## 2022-05-05 PROCEDURE — 84703 CHORIONIC GONADOTROPIN ASSAY: CPT

## 2022-05-05 PROCEDURE — 36415 COLL VENOUS BLD VENIPUNCTURE: CPT

## 2022-05-05 PROCEDURE — 80053 COMPREHEN METABOLIC PANEL: CPT

## 2022-05-05 RX ORDER — HEPARIN 100 UNIT/ML
300-500 SYRINGE INTRAVENOUS AS NEEDED
Status: CANCELLED
Start: 2022-05-16

## 2022-05-05 RX ORDER — SODIUM CHLORIDE 0.9 % (FLUSH) 0.9 %
10 SYRINGE (ML) INJECTION AS NEEDED
Status: CANCELLED | OUTPATIENT
Start: 2022-05-16

## 2022-05-05 RX ORDER — EPINEPHRINE 1 MG/ML
0.3 INJECTION, SOLUTION, CONCENTRATE INTRAVENOUS AS NEEDED
Status: CANCELLED | OUTPATIENT
Start: 2022-05-16

## 2022-05-05 RX ORDER — ALBUTEROL SULFATE 0.83 MG/ML
2.5 SOLUTION RESPIRATORY (INHALATION) AS NEEDED
Status: CANCELLED
Start: 2022-05-16

## 2022-05-05 RX ORDER — DIPHENHYDRAMINE HYDROCHLORIDE 50 MG/ML
50 INJECTION, SOLUTION INTRAMUSCULAR; INTRAVENOUS AS NEEDED
Status: CANCELLED
Start: 2022-05-16

## 2022-05-05 RX ORDER — ONDANSETRON 2 MG/ML
8 INJECTION INTRAMUSCULAR; INTRAVENOUS AS NEEDED
Status: CANCELLED | OUTPATIENT
Start: 2022-05-16

## 2022-05-05 RX ORDER — SODIUM CHLORIDE 9 MG/ML
10 INJECTION INTRAMUSCULAR; INTRAVENOUS; SUBCUTANEOUS AS NEEDED
Status: CANCELLED | OUTPATIENT
Start: 2022-05-16

## 2022-05-05 RX ORDER — DIPHENHYDRAMINE HYDROCHLORIDE 50 MG/ML
25 INJECTION, SOLUTION INTRAMUSCULAR; INTRAVENOUS AS NEEDED
Status: CANCELLED
Start: 2022-05-16

## 2022-05-05 RX ORDER — HYDROCORTISONE SODIUM SUCCINATE 100 MG/2ML
100 INJECTION, POWDER, FOR SOLUTION INTRAMUSCULAR; INTRAVENOUS AS NEEDED
Status: CANCELLED | OUTPATIENT
Start: 2022-05-16

## 2022-05-05 RX ORDER — SODIUM CHLORIDE 9 MG/ML
25 INJECTION, SOLUTION INTRAVENOUS CONTINUOUS
Status: CANCELLED | OUTPATIENT
Start: 2022-05-16

## 2022-05-05 RX ORDER — ACETAMINOPHEN 325 MG/1
650 TABLET ORAL AS NEEDED
Status: CANCELLED
Start: 2022-05-16

## 2022-05-06 ENCOUNTER — HOSPITAL ENCOUNTER (OUTPATIENT)
Dept: ULTRASOUND IMAGING | Age: 52
Discharge: HOME | End: 2022-05-06
Attending: INTERNAL MEDICINE

## 2022-05-06 ENCOUNTER — HOSPITAL ENCOUNTER (OUTPATIENT)
Dept: INFUSION THERAPY | Age: 52
Discharge: HOME | End: 2022-05-06
Payer: COMMERCIAL

## 2022-05-06 VITALS
TEMPERATURE: 98.4 F | OXYGEN SATURATION: 98 % | SYSTOLIC BLOOD PRESSURE: 149 MMHG | DIASTOLIC BLOOD PRESSURE: 84 MMHG | RESPIRATION RATE: 18 BRPM | HEART RATE: 92 BPM

## 2022-05-06 DIAGNOSIS — Z76.82 BONE MARROW TRANSPLANT CANDIDATE: Primary | ICD-10-CM

## 2022-05-06 DIAGNOSIS — C90.00 MULTIPLE MYELOMA NOT HAVING ACHIEVED REMISSION (HCC): ICD-10-CM

## 2022-05-06 LAB
KAPPA LC FREE SER-MCNC: 6.8 MG/L (ref 3.3–19.4)
KAPPA LC FREE/LAMBDA FREE SER: 0.25 {RATIO} (ref 0.26–1.65)
LAMBDA LC FREE SERPL-MCNC: 26.9 MG/L (ref 5.7–26.3)

## 2022-05-06 PROCEDURE — 96372 THER/PROPH/DIAG INJ SC/IM: CPT

## 2022-05-06 PROCEDURE — 74011250637 HC RX REV CODE- 250/637: Performed by: INTERNAL MEDICINE

## 2022-05-06 PROCEDURE — 74011250636 HC RX REV CODE- 250/636: Performed by: INTERNAL MEDICINE

## 2022-05-06 RX ORDER — SODIUM CHLORIDE 9 MG/ML
INJECTION, SOLUTION INTRAVENOUS CONTINUOUS
Status: CANCELLED | OUTPATIENT
Start: 2022-05-23

## 2022-05-06 RX ORDER — SODIUM CHLORIDE 9 MG/ML
5-250 INJECTION, SOLUTION INTRAVENOUS PRN
Status: CANCELLED | OUTPATIENT
Start: 2022-05-23

## 2022-05-06 RX ORDER — ONDANSETRON 2 MG/ML
8 INJECTION INTRAMUSCULAR; INTRAVENOUS
Status: CANCELLED | OUTPATIENT
Start: 2022-05-23

## 2022-05-06 RX ORDER — POTASSIUM CHLORIDE 750 MG/1
20 TABLET, EXTENDED RELEASE ORAL
Status: COMPLETED | OUTPATIENT
Start: 2022-05-06 | End: 2022-05-06

## 2022-05-06 RX ORDER — ACETAMINOPHEN 325 MG/1
650 TABLET ORAL
Status: CANCELLED | OUTPATIENT
Start: 2022-05-23

## 2022-05-06 RX ORDER — SODIUM CHLORIDE 0.9 % (FLUSH) 0.9 %
5-40 SYRINGE (ML) INJECTION PRN
Status: CANCELLED | OUTPATIENT
Start: 2022-05-23

## 2022-05-06 RX ORDER — ALBUTEROL SULFATE 90 UG/1
4 AEROSOL, METERED RESPIRATORY (INHALATION) PRN
Status: CANCELLED | OUTPATIENT
Start: 2022-05-23

## 2022-05-06 RX ORDER — HEPARIN SODIUM (PORCINE) LOCK FLUSH IV SOLN 100 UNIT/ML 100 UNIT/ML
500 SOLUTION INTRAVENOUS PRN
Status: CANCELLED | OUTPATIENT
Start: 2022-05-23

## 2022-05-06 RX ORDER — EPINEPHRINE 1 MG/ML
0.3 INJECTION, SOLUTION, CONCENTRATE INTRAVENOUS PRN
Status: CANCELLED | OUTPATIENT
Start: 2022-05-23

## 2022-05-06 RX ORDER — DIPHENHYDRAMINE HYDROCHLORIDE 50 MG/ML
50 INJECTION INTRAMUSCULAR; INTRAVENOUS
Status: CANCELLED | OUTPATIENT
Start: 2022-05-23

## 2022-05-06 RX ADMIN — FILGRASTIM-AAFI 1080 MCG: 480 INJECTION, SOLUTION SUBCUTANEOUS at 09:08

## 2022-05-06 RX ADMIN — POTASSIUM CHLORIDE 20 MEQ: 10 TABLET, EXTENDED RELEASE ORAL at 09:46

## 2022-05-06 NOTE — PROGRESS NOTES
Mobilization Day 1  Labs needed at next visit- see orders  Next Appointment scheduled for 5-7-22 at 0800  WBC/ANC= 7.8/3.7  Apheresis catheter placement scheduled for 5-9-22  Any issues or concerns during appointment: Pt's KCL level 3.2. Notified MD office of results. Office to call in prescription for KCL PO. Arrived to the [facility]. Nivestym completed. Patient tolerated well.

## 2022-05-07 ENCOUNTER — HOSPITAL ENCOUNTER (OUTPATIENT)
Dept: INFUSION THERAPY | Age: 52
Discharge: HOME | End: 2022-05-07
Payer: COMMERCIAL

## 2022-05-07 VITALS
RESPIRATION RATE: 18 BRPM | HEART RATE: 105 BPM | DIASTOLIC BLOOD PRESSURE: 91 MMHG | TEMPERATURE: 97.9 F | OXYGEN SATURATION: 98 % | SYSTOLIC BLOOD PRESSURE: 131 MMHG

## 2022-05-07 DIAGNOSIS — Z76.82 BONE MARROW TRANSPLANT CANDIDATE: Primary | ICD-10-CM

## 2022-05-07 LAB
ALBUMIN SERPL-MCNC: 3.4 G/DL (ref 3.5–5)
ALBUMIN/GLOB SERPL: 1.2 {RATIO} (ref 1.2–3.5)
ALP SERPL-CCNC: 77 U/L (ref 50–136)
ALT SERPL-CCNC: 14 U/L (ref 12–65)
ANION GAP SERPL CALC-SCNC: 5 MMOL/L (ref 7–16)
AST SERPL-CCNC: 17 U/L (ref 15–37)
BASOPHILS # BLD: 0 K/UL (ref 0–0.2)
BASOPHILS NFR BLD: 0 % (ref 0–2)
BILIRUB SERPL-MCNC: 0.2 MG/DL (ref 0.2–1.1)
BUN SERPL-MCNC: 8 MG/DL (ref 6–23)
CALCIUM SERPL-MCNC: 8.8 MG/DL (ref 8.3–10.4)
CHLORIDE SERPL-SCNC: 109 MMOL/L (ref 98–107)
CO2 SERPL-SCNC: 27 MMOL/L (ref 21–32)
CREAT SERPL-MCNC: 0.6 MG/DL (ref 0.6–1)
DIFFERENTIAL METHOD BLD: ABNORMAL
EOSINOPHIL # BLD: 0 K/UL (ref 0–0.8)
EOSINOPHIL NFR BLD: 0 % (ref 0.5–7.8)
ERYTHROCYTE [DISTWIDTH] IN BLOOD BY AUTOMATED COUNT: 16.7 % (ref 11.9–14.6)
GLOBULIN SER CALC-MCNC: 2.8 G/DL (ref 2.3–3.5)
GLUCOSE SERPL-MCNC: 95 MG/DL (ref 65–100)
HCT VFR BLD AUTO: 36.7 % (ref 35.8–46.3)
HGB BLD-MCNC: 11.3 G/DL (ref 11.7–15.4)
IMM GRANULOCYTES # BLD AUTO: 1.6 K/UL (ref 0–0.5)
IMM GRANULOCYTES NFR BLD AUTO: 5 % (ref 0–5)
LYMPHOCYTES # BLD: 3.7 K/UL (ref 0.5–4.6)
LYMPHOCYTES NFR BLD: 12 % (ref 13–44)
MAGNESIUM SERPL-MCNC: 1.9 MG/DL (ref 1.8–2.4)
MCH RBC QN AUTO: 25.6 PG (ref 26.1–32.9)
MCHC RBC AUTO-ENTMCNC: 30.8 G/DL (ref 31.4–35)
MCV RBC AUTO: 83 FL (ref 79.6–97.8)
MONOCYTES # BLD: 1.9 K/UL (ref 0.1–1.3)
MONOCYTES NFR BLD: 6 % (ref 4–12)
NEUTS SEG # BLD: 24 K/UL (ref 1.7–8.2)
NEUTS SEG NFR BLD: 77 % (ref 43–78)
NRBC # BLD: 0.02 K/UL (ref 0–0.2)
PLATELET # BLD AUTO: 301 K/UL (ref 150–450)
PLATELET COMMENTS,PCOM: ADEQUATE
PMV BLD AUTO: 11.1 FL (ref 9.4–12.3)
POTASSIUM SERPL-SCNC: 3.6 MMOL/L (ref 3.5–5.1)
PROT SERPL-MCNC: 6.2 G/DL (ref 6.3–8.2)
RBC # BLD AUTO: 4.42 M/UL (ref 4.05–5.2)
RBC MORPH BLD: ABNORMAL
SODIUM SERPL-SCNC: 141 MMOL/L (ref 136–145)
WBC # BLD AUTO: 31.2 K/UL (ref 4.3–11.1)
WBC MORPH BLD: ABNORMAL

## 2022-05-07 PROCEDURE — 74011250636 HC RX REV CODE- 250/636: Performed by: INTERNAL MEDICINE

## 2022-05-07 PROCEDURE — 80053 COMPREHEN METABOLIC PANEL: CPT

## 2022-05-07 PROCEDURE — 96372 THER/PROPH/DIAG INJ SC/IM: CPT

## 2022-05-07 PROCEDURE — 83735 ASSAY OF MAGNESIUM: CPT

## 2022-05-07 PROCEDURE — 85025 COMPLETE CBC W/AUTO DIFF WBC: CPT

## 2022-05-07 RX ADMIN — FILGRASTIM-AAFI 1080 MCG: 480 INJECTION, SOLUTION SUBCUTANEOUS at 08:51

## 2022-05-07 NOTE — PROGRESS NOTES
Mobilization Day 2. Labs needed at next visit CBC BMP. Next Appointment scheduled 5/7/22 @ 0800. WBC/ANC= 31.2/24.0. Apheresis catheter placement scheduled for 5/9/22. New orders received None. Issues:  None.

## 2022-05-08 ENCOUNTER — HOSPITAL ENCOUNTER (OUTPATIENT)
Dept: INFUSION THERAPY | Age: 52
Discharge: HOME | End: 2022-05-08
Payer: COMMERCIAL

## 2022-05-08 VITALS
DIASTOLIC BLOOD PRESSURE: 70 MMHG | HEART RATE: 94 BPM | OXYGEN SATURATION: 98 % | RESPIRATION RATE: 18 BRPM | SYSTOLIC BLOOD PRESSURE: 124 MMHG | TEMPERATURE: 97.8 F

## 2022-05-08 DIAGNOSIS — Z76.82 BONE MARROW TRANSPLANT CANDIDATE: Primary | ICD-10-CM

## 2022-05-08 LAB
ALBUMIN SERPL-MCNC: 3.4 G/DL (ref 3.5–5)
ALBUMIN/GLOB SERPL: 1.2 {RATIO} (ref 1.2–3.5)
ALP SERPL-CCNC: 113 U/L (ref 50–136)
ALT SERPL-CCNC: 14 U/L (ref 12–65)
ANION GAP SERPL CALC-SCNC: 5 MMOL/L (ref 7–16)
APTT PPP: 27.5 SEC (ref 24.1–35.1)
AST SERPL-CCNC: 20 U/L (ref 15–37)
BASOPHILS # BLD: 0 K/UL (ref 0–0.2)
BASOPHILS NFR BLD: 0 % (ref 0–2)
BILIRUB SERPL-MCNC: 0.2 MG/DL (ref 0.2–1.1)
BUN SERPL-MCNC: 4 MG/DL (ref 6–23)
CALCIUM SERPL-MCNC: 9 MG/DL (ref 8.3–10.4)
CHLORIDE SERPL-SCNC: 108 MMOL/L (ref 98–107)
CO2 SERPL-SCNC: 28 MMOL/L (ref 21–32)
CREAT SERPL-MCNC: 0.7 MG/DL (ref 0.6–1)
DIFFERENTIAL METHOD BLD: ABNORMAL
EOSINOPHIL # BLD: 0.4 K/UL (ref 0–0.8)
EOSINOPHIL NFR BLD: 1 % (ref 0.5–7.8)
ERYTHROCYTE [DISTWIDTH] IN BLOOD BY AUTOMATED COUNT: 17.2 % (ref 11.9–14.6)
GLOBULIN SER CALC-MCNC: 2.9 G/DL (ref 2.3–3.5)
GLUCOSE SERPL-MCNC: 90 MG/DL (ref 65–100)
HCT VFR BLD AUTO: 36.7 % (ref 35.8–46.3)
HGB BLD-MCNC: 11.2 G/DL (ref 11.7–15.4)
IMM GRANULOCYTES # BLD AUTO: 6.1 K/UL (ref 0–0.5)
IMM GRANULOCYTES NFR BLD AUTO: 16 % (ref 0–5)
INR PPP: 1.1
LYMPHOCYTES # BLD: 3.8 K/UL (ref 0.5–4.6)
LYMPHOCYTES NFR BLD: 10 % (ref 13–44)
MAGNESIUM SERPL-MCNC: 1.9 MG/DL (ref 1.8–2.4)
MCH RBC QN AUTO: 25.6 PG (ref 26.1–32.9)
MCHC RBC AUTO-ENTMCNC: 30.5 G/DL (ref 31.4–35)
MCV RBC AUTO: 84 FL (ref 79.6–97.8)
MONOCYTES # BLD: 1.9 K/UL (ref 0.1–1.3)
MONOCYTES NFR BLD: 5 % (ref 4–12)
NEUTS SEG # BLD: 26.1 K/UL (ref 1.7–8.2)
NEUTS SEG NFR BLD: 68 % (ref 43–78)
NRBC # BLD: 0.12 K/UL (ref 0–0.2)
PLATELET # BLD AUTO: 286 K/UL (ref 150–450)
PLATELET COMMENTS,PCOM: ADEQUATE
PMV BLD AUTO: 10.9 FL (ref 9.4–12.3)
POTASSIUM SERPL-SCNC: 3.6 MMOL/L (ref 3.5–5.1)
PROT SERPL-MCNC: 6.3 G/DL (ref 6.3–8.2)
PROTHROMBIN TIME: 14 SEC (ref 12.6–14.5)
RBC # BLD AUTO: 4.37 M/UL (ref 4.05–5.2)
RBC MORPH BLD: ABNORMAL
SODIUM SERPL-SCNC: 141 MMOL/L (ref 136–145)
WBC # BLD AUTO: 38.3 K/UL (ref 4.3–11.1)
WBC MORPH BLD: ABNORMAL

## 2022-05-08 PROCEDURE — 80053 COMPREHEN METABOLIC PANEL: CPT

## 2022-05-08 PROCEDURE — 85025 COMPLETE CBC W/AUTO DIFF WBC: CPT

## 2022-05-08 PROCEDURE — 96372 THER/PROPH/DIAG INJ SC/IM: CPT

## 2022-05-08 PROCEDURE — 85730 THROMBOPLASTIN TIME PARTIAL: CPT

## 2022-05-08 PROCEDURE — 74011250636 HC RX REV CODE- 250/636: Performed by: INTERNAL MEDICINE

## 2022-05-08 PROCEDURE — 83735 ASSAY OF MAGNESIUM: CPT

## 2022-05-08 PROCEDURE — 85610 PROTHROMBIN TIME: CPT

## 2022-05-08 RX ADMIN — FILGRASTIM-AAFI 1080 MCG: 480 INJECTION, SOLUTION SUBCUTANEOUS at 13:31

## 2022-05-08 NOTE — PROGRESS NOTES
Problem: Knowledge Deficit  Goal: *Participate in the learning process  Outcome: Progressing Towards Goal  Goal: *Verbalize description of procedure  Outcome: Progressing Towards Goal  Goal: *Verbalizes understanding and describes medication purposes and frequencies  Outcome: Progressing Towards Goal  Goal: *Knowledge of discharge instructions  Outcome: Progressing Towards Goal     Problem: Knowledge Deficit  Goal: *Verbalizes understanding of procedures and medications  Outcome: Progressing Towards Goal     Problem: Patient Education:  Go to Education Activity  Goal: Patient/Family Education  Outcome: Progressing Towards Goal

## 2022-05-08 NOTE — PROGRESS NOTES
Mobilization Day 3. Labs needed at next visit see orders. Next Appointment scheduled 05/09/2022 @ 0800. WBC/ANC= 38.3/26. 1. Apheresis catheter placement scheduled for 05/09/2022 @ 1300. New orders received none.   Issues:none

## 2022-05-09 ENCOUNTER — HOSPITAL ENCOUNTER (OUTPATIENT)
Dept: INFUSION THERAPY | Age: 52
Discharge: HOME | End: 2022-05-09
Payer: COMMERCIAL

## 2022-05-09 ENCOUNTER — HOSPITAL ENCOUNTER (OUTPATIENT)
Dept: INFUSION THERAPY | Age: 52
End: 2022-05-09

## 2022-05-09 ENCOUNTER — HOSPITAL ENCOUNTER (OUTPATIENT)
Dept: INTERVENTIONAL RADIOLOGY/VASCULAR | Age: 52
Discharge: HOME | End: 2022-05-09
Attending: INTERNAL MEDICINE
Payer: COMMERCIAL

## 2022-05-09 VITALS
DIASTOLIC BLOOD PRESSURE: 60 MMHG | RESPIRATION RATE: 18 BRPM | SYSTOLIC BLOOD PRESSURE: 126 MMHG | HEIGHT: 63 IN | TEMPERATURE: 98 F | OXYGEN SATURATION: 93 % | WEIGHT: 233 LBS | BODY MASS INDEX: 41.29 KG/M2 | HEART RATE: 105 BPM

## 2022-05-09 VITALS
OXYGEN SATURATION: 95 % | WEIGHT: 233.6 LBS | TEMPERATURE: 98.2 F | SYSTOLIC BLOOD PRESSURE: 108 MMHG | RESPIRATION RATE: 18 BRPM | DIASTOLIC BLOOD PRESSURE: 76 MMHG | BODY MASS INDEX: 42.05 KG/M2 | HEART RATE: 93 BPM

## 2022-05-09 DIAGNOSIS — Z76.82 BONE MARROW TRANSPLANT CANDIDATE: Primary | ICD-10-CM

## 2022-05-09 DIAGNOSIS — Z76.82 BONE MARROW TRANSPLANT CANDIDATE: ICD-10-CM

## 2022-05-09 DIAGNOSIS — C90.00 MULTIPLE MYELOMA NOT HAVING ACHIEVED REMISSION (HCC): Primary | ICD-10-CM

## 2022-05-09 LAB
ALBUMIN SERPL-MCNC: 3.3 G/DL (ref 3.5–5)
ALBUMIN/GLOB SERPL: 1.1 {RATIO} (ref 1.2–3.5)
ALP SERPL-CCNC: 179 U/L (ref 50–136)
ALT SERPL-CCNC: 13 U/L (ref 12–65)
ANION GAP SERPL CALC-SCNC: 5 MMOL/L (ref 7–16)
AST SERPL-CCNC: 24 U/L (ref 15–37)
BASOPHILS # BLD: 0 K/UL (ref 0–0.2)
BASOPHILS NFR BLD: 0 % (ref 0–2)
BILIRUB SERPL-MCNC: 0.2 MG/DL (ref 0.2–1.1)
BUN SERPL-MCNC: 6 MG/DL (ref 6–23)
CALCIUM SERPL-MCNC: 9.1 MG/DL (ref 8.3–10.4)
CHLORIDE SERPL-SCNC: 108 MMOL/L (ref 98–107)
CO2 SERPL-SCNC: 28 MMOL/L (ref 21–32)
CREAT SERPL-MCNC: 0.6 MG/DL (ref 0.6–1)
DIFFERENTIAL METHOD BLD: ABNORMAL
EOSINOPHIL # BLD: 0 K/UL (ref 0–0.8)
EOSINOPHIL NFR BLD: 0 % (ref 0.5–7.8)
ERYTHROCYTE [DISTWIDTH] IN BLOOD BY AUTOMATED COUNT: 17.4 % (ref 11.9–14.6)
GLOBULIN SER CALC-MCNC: 3 G/DL (ref 2.3–3.5)
GLUCOSE SERPL-MCNC: 90 MG/DL (ref 65–100)
HCT VFR BLD AUTO: 36.2 % (ref 35.8–46.3)
HGB BLD-MCNC: 11 G/DL (ref 11.7–15.4)
IMM GRANULOCYTES # BLD AUTO: 4.9 K/UL (ref 0–0.5)
IMM GRANULOCYTES NFR BLD AUTO: 10 % (ref 0–5)
LYMPHOCYTES # BLD: 5.4 K/UL (ref 0.5–4.6)
LYMPHOCYTES NFR BLD: 11 % (ref 13–44)
MAGNESIUM SERPL-MCNC: 2.1 MG/DL (ref 1.8–2.4)
MCH RBC QN AUTO: 25.5 PG (ref 26.1–32.9)
MCHC RBC AUTO-ENTMCNC: 30.4 G/DL (ref 31.4–35)
MCV RBC AUTO: 83.8 FL (ref 79.6–97.8)
MONOCYTES # BLD: 3.9 K/UL (ref 0.1–1.3)
MONOCYTES NFR BLD: 8 % (ref 4–12)
NEUTS SEG # BLD: 35 K/UL (ref 1.7–8.2)
NEUTS SEG NFR BLD: 71 % (ref 43–78)
NRBC # BLD: 0.22 K/UL (ref 0–0.2)
PHOSPHATE SERPL-MCNC: 2.2 MG/DL (ref 2.5–4.5)
PLATELET # BLD AUTO: 269 K/UL (ref 150–450)
PLATELET COMMENTS,PCOM: ADEQUATE
PMV BLD AUTO: 11.2 FL (ref 9.4–12.3)
POTASSIUM SERPL-SCNC: 3.5 MMOL/L (ref 3.5–5.1)
PROT SERPL-MCNC: 6.3 G/DL (ref 6.3–8.2)
RBC # BLD AUTO: 4.32 M/UL (ref 4.05–5.2)
RBC MORPH BLD: ABNORMAL
SODIUM SERPL-SCNC: 141 MMOL/L (ref 136–145)
WBC # BLD AUTO: 49.2 K/UL (ref 4.3–11.1)
WBC MORPH BLD: ABNORMAL

## 2022-05-09 PROCEDURE — 77030002916 HC SUT ETHLN J&J -A

## 2022-05-09 PROCEDURE — 74011000250 HC RX REV CODE- 250: Performed by: PHYSICIAN ASSISTANT

## 2022-05-09 PROCEDURE — 80053 COMPREHEN METABOLIC PANEL: CPT

## 2022-05-09 PROCEDURE — 74011250636 HC RX REV CODE- 250/636: Performed by: PHYSICIAN ASSISTANT

## 2022-05-09 PROCEDURE — 77030010507 HC ADH SKN DERMBND J&J -B

## 2022-05-09 PROCEDURE — 74011250636 HC RX REV CODE- 250/636: Performed by: INTERNAL MEDICINE

## 2022-05-09 PROCEDURE — C1750 CATH, HEMODIALYSIS,LONG-TERM: HCPCS

## 2022-05-09 PROCEDURE — 77001 FLUOROGUIDE FOR VEIN DEVICE: CPT

## 2022-05-09 PROCEDURE — 96372 THER/PROPH/DIAG INJ SC/IM: CPT

## 2022-05-09 PROCEDURE — 77030018719 HC DRSG PTCH ANTIMIC J&J -A

## 2022-05-09 PROCEDURE — 77030031131 HC SUT MXN P COVD -B

## 2022-05-09 PROCEDURE — 85025 COMPLETE CBC W/AUTO DIFF WBC: CPT

## 2022-05-09 PROCEDURE — C1894 INTRO/SHEATH, NON-LASER: HCPCS

## 2022-05-09 PROCEDURE — 86367 STEM CELLS TOTAL COUNT: CPT

## 2022-05-09 PROCEDURE — 84100 ASSAY OF PHOSPHORUS: CPT

## 2022-05-09 PROCEDURE — 83735 ASSAY OF MAGNESIUM: CPT

## 2022-05-09 RX ORDER — MIDAZOLAM HYDROCHLORIDE 1 MG/ML
.5-2 INJECTION, SOLUTION INTRAMUSCULAR; INTRAVENOUS
Status: DISCONTINUED | OUTPATIENT
Start: 2022-05-09 | End: 2022-05-09

## 2022-05-09 RX ORDER — HEPARIN SODIUM 1000 [USP'U]/ML
10-10000 INJECTION, SOLUTION INTRAVENOUS; SUBCUTANEOUS
Status: DISCONTINUED | OUTPATIENT
Start: 2022-05-09 | End: 2022-05-09

## 2022-05-09 RX ORDER — SODIUM CHLORIDE 9 MG/ML
25 INJECTION, SOLUTION INTRAVENOUS ONCE
Status: COMPLETED | OUTPATIENT
Start: 2022-05-09 | End: 2022-05-09

## 2022-05-09 RX ORDER — SODIUM CHLORIDE 0.9 % (FLUSH) 0.9 %
10-40 SYRINGE (ML) INJECTION AS NEEDED
Status: ACTIVE | OUTPATIENT
Start: 2022-05-09 | End: 2022-05-09

## 2022-05-09 RX ORDER — FENTANYL CITRATE 50 UG/ML
25-100 INJECTION, SOLUTION INTRAMUSCULAR; INTRAVENOUS
Status: DISCONTINUED | OUTPATIENT
Start: 2022-05-09 | End: 2022-05-09

## 2022-05-09 RX ORDER — LIDOCAINE HYDROCHLORIDE AND EPINEPHRINE 10; 10 MG/ML; UG/ML
1-20 INJECTION, SOLUTION INFILTRATION; PERINEURAL
Status: DISCONTINUED | OUTPATIENT
Start: 2022-05-09 | End: 2022-05-09

## 2022-05-09 RX ADMIN — FILGRASTIM-AAFI 1080 MCG: 480 INJECTION, SOLUTION SUBCUTANEOUS at 09:50

## 2022-05-09 RX ADMIN — SODIUM CHLORIDE 25 ML/HR: 900 INJECTION, SOLUTION INTRAVENOUS at 14:09

## 2022-05-09 RX ADMIN — MIDAZOLAM 1 MG: 1 INJECTION INTRAMUSCULAR; INTRAVENOUS at 14:09

## 2022-05-09 RX ADMIN — MIDAZOLAM 1 MG: 1 INJECTION INTRAMUSCULAR; INTRAVENOUS at 14:19

## 2022-05-09 RX ADMIN — MIDAZOLAM 0.5 MG: 1 INJECTION INTRAMUSCULAR; INTRAVENOUS at 14:27

## 2022-05-09 RX ADMIN — FENTANYL CITRATE 25 MCG: 50 INJECTION, SOLUTION INTRAMUSCULAR; INTRAVENOUS at 14:27

## 2022-05-09 RX ADMIN — FENTANYL CITRATE 50 MCG: 50 INJECTION, SOLUTION INTRAMUSCULAR; INTRAVENOUS at 14:09

## 2022-05-09 RX ADMIN — HEPARIN SODIUM 2100 UNITS: 1000 INJECTION INTRAVENOUS; SUBCUTANEOUS at 14:36

## 2022-05-09 RX ADMIN — LIDOCAINE HYDROCHLORIDE,EPINEPHRINE BITARTRATE 100 MG: 10; .01 INJECTION, SOLUTION INFILTRATION; PERINEURAL at 14:24

## 2022-05-09 RX ADMIN — HEPARIN SODIUM 2100 UNITS: 1000 INJECTION INTRAVENOUS; SUBCUTANEOUS at 14:35

## 2022-05-09 RX ADMIN — FENTANYL CITRATE 50 MCG: 50 INJECTION, SOLUTION INTRAMUSCULAR; INTRAVENOUS at 14:19

## 2022-05-09 NOTE — PROGRESS NOTES
Pt to IR Suite 2 via stretcher with RN.       IR Nurse Pre-Procedure Checklist Part 2          Consent signed: Yes    H&P complete:  Yes    Antibiotics: Not applicable    Airway/Mallampati Done: Yes    Shaved: Not applicable    Pregnancy Form:Yes    Patient Position: Yes    MD Side: Yes     Biopsy Worksheet: Not applicable    Specimen Medium: Not applicable

## 2022-05-09 NOTE — DISCHARGE INSTRUCTIONS
[address]  Department of Interventional Radiology  [facility]  [phone] Office  [phone] Fax    Tunneled or Non Tunneled Catheter Discharge Instructions    General Information:   A catheter, either tunneled (permanent) or non-tunneled (temporary) catheter was inserted into your neck today for the purpose of Cancer treatment (apheresis) or dialysis. Your catheter will be used for the length of your apheresis, or until you get a fistula placed in surgery for dialysis. After this time, your catheter may be removed. You may return to our department for the catheter removal.  A non-tunneled catheter will exit at the neck. There will be three ports, two of which will be used for dialysis or apheresis. The one smaller port in the middle can be used like a regular IV. It ideally is used only for about two weeks. A tunneled catheter will exit lower down on the chest wall, and can be used for a longer period of time. There is no IV port on these. The tunneled catheter is used only for dialysis. In case of emergencies only can drugs be given through these catheters and only with written permission from your doctor. Home Care Instructions: You can resume your regular diet. Do not drink alcohol, drive, or make any important legal decisions in the next 24 hours. Watch the site carefully for signs of infection, like fever, drainage, redness, and/or swelling. Your catheter should be \"packed\" with heparin after each use or at least once a week if it is not being used. This \"packing\" should only be done by nurses experienced with caring for this type of catheter. You may shower in 24 hours, but you need to cover the catheter with plastic wrap and tape to keep it dry while you are showering. Keep the site clean, dry, and protected. Do not immerse yourself in water like in the case of tub baths or swimming as long as you have the catheter. Call If:   You should call your Physician and/or the Radiology Nurse if you have any signs of infection, shortness of breath, or if the dressing should come off or become moist.  You will be instructed on where to go for a new dressing. You should not have to change the dressings yourself, as that will be done by the nurses who access the catheter. Follow-Up Instructions:  Please see your ordering doctor as he/she has requested. To Reach Us: If you have any questions about your procedure, please call the Interventional Radiology department at [phone]. After business hours (5pm) and weekends, call the answering service at [phone] and ask for the Radiologist on call to be paged. Si tiene Preguntas acerca del procedimiento, por favor llame al departamento de Radiología Intervencional al [phone]. Después de horas de oficina (5 pm) y los fines de [place], llamar al Ned Cardona al [phone] y pregunte por el Radiologo de [facility]. Interventional Radiology General Nurse Discharge    After general anesthesia or intravenous sedation, for 24 hours or while taking prescription Narcotics:  · Limit your activities  · Do not drive and operate hazardous machinery  · Do not make important personal or business decisions  · Do  not drink alcoholic beverages  · If you have not urinated within 8 hours after discharge, please contact your surgeon on call. * Please give a list of your current medications to your Primary Care Provider. * Please update this list whenever your medications are discontinued, doses are     changed, or new medications (including over-the-counter products) are added. * Please carry medication information at all times in case of emergency situations.     These are general instructions for a healthy lifestyle:    No smoking/ No tobacco products/ Avoid exposure to second hand smoke  Surgeon General's Warning:  Quitting smoking now greatly reduces serious risk to your health. Obesity, smoking, and sedentary lifestyle greatly increases your risk for illness  A healthy diet, regular physical exercise & weight monitoring are important for maintaining a healthy lifestyle    You may be retaining fluid if you have a history of heart failure or if you experience any of the following symptoms:  Weight gain of 3 pounds or more overnight or 5 pounds in a week, increased swelling in our hands or feet or shortness of breath while lying flat in bed. Please call your doctor as soon as you notice any of these symptoms; do not wait until your next office visit. Recognize signs and symptoms of STROKE:  F-face looks uneven    A-arms unable to move or move unevenly    S-speech slurred or non-existent    T-time-call 911 as soon as signs and symptoms begin-DO NOT go       Back to bed or wait to see if you get better-TIME IS BRAIN.

## 2022-05-09 NOTE — PROGRESS NOTES
Mobilization Day 4. Labs needed at next visit: see orders  Next Appointment scheduled 5/9/2022 @ 6 pm  WBC/ANC= 49.2/35  Apheresis catheter placement scheduled for 5/9/2022 @ 1 pm.  New orders received: none  Issues: Phosphorus 2.2 Marcus Bedoya nurse navigator to send prescription to pharmacy. Patient verbalized understanding. Nurse navigator also notified of increased alk phos and patient's request for more lomotil to be sent to the pharmacy, in anticipation of receiving Mozobil. Arrived to the [facility]. Peripheral labs and Nivestym injection completed. Patient tolerated well. Discharged ambulatory with self.

## 2022-05-09 NOTE — H&P
Department of Interventional Radiology  [phone]    History and Physical    Patient:  Isacc Tang MRN:  [id number]  SSN:  [id number]    YOB: 1970  Age:  54 y.o. Sex:  female      Primary Care Provider:  Maryellen Zhang MD  Referring Physician:  Philipp Moffett MD    Subjective:     Chief Complaint: multiple myeloma    History of the Present Illness: The patient is a 54 y.o. female who presents for placement of a tunneled pheresis catheter. NPO x meds. No acute complaints. Past Medical History:   Diagnosis Date    Obesity     Prolactin increased      Past Surgical History:   Procedure Laterality Date    IR BX BONE MARROW DIAGNOSTIC          Review of Systems:    Pertinent items are noted in the History of Present Illness. Prior to Admission medications    Medication Sig Start Date End Date Taking? Authorizing Provider   potassium chloride (K-DUR, KLOR-CON M20) 20 mEq tablet Take 1 Tablet by mouth two (2) times a day. 5/6/22   Philipp Moffett MD   trimethoprim-sulfamethoxazole (BACTRIM DS, SEPTRA DS) 160-800 mg per tablet  5/4/22   Provider, Historical   DULoxetine (CYMBALTA) 30 mg capsule Take 30 mg by mouth daily. 3/28/22   Provider, Historical   gabapentin (NEURONTIN) 300 mg capsule Take 300 mg by mouth. 1/31/22   Provider, Historical   lenalidomide 25 mg cap Take 25 mg by mouth daily. Patient not taking: Reported on 4/18/2022 2/21/22   Provider, Historical   aspirin delayed-release 325 mg tablet Take 325 mg by mouth daily. Patient not taking: Reported on 5/5/2022 11/23/21   Provider, Historical   dicyclomine (BENTYL) 20 mg tablet Take 20 mg by mouth. Patient not taking: Reported on 5/5/2022 2/14/22   Provider, Historical   acyclovir (ZOVIRAX) 400 mg tablet Take 400 mg by mouth two (2) times a day. 11/16/21   Provider, Historical   HYDROcodone-acetaminophen (NORCO) 7.5-325 mg per tablet Take 1 Tablet by mouth every six (6) hours as needed for Pain.     Provider, Historical   HYDROcodone-acetaminophen (NORCO) 5-325 mg per tablet Take 1 Tablet by mouth every four (4) hours as needed for Pain. Provider, Historical   cyclobenzaprine (FLEXERIL) 5 mg tablet Take 5 mg by mouth three (3) times daily as needed for Muscle Spasm(s). Patient not taking: Reported on 2/25/2022    Provider, Historical   pregabalin (Lyrica) 75 mg capsule Take 75 mg by mouth three (3) times daily. Patient not taking: Reported on 2/25/2022    Provider, Historical        Allergies   Allergen Reactions    Amoxicillin-Pot Clavulanate Other (comments)     Yeast infection  Yeast infection         History reviewed. No pertinent family history.   Social History     Tobacco Use    Smoking status: Never Smoker    Smokeless tobacco: Never Used   Substance Use Topics    Alcohol use: Never        Objective:       Physical Examination:    Vitals:    05/09/22 1301   BP: 131/68   Pulse: 81   Resp: 18   Temp: 98 °F (36.7 °C)   SpO2: 98%   Weight: 105.7 kg (233 lb)   Height: 5' 2.5\" (1.588 m)       Pain Assessment  Pain Intensity 1: 0 (05/09/22 1301)               HEART: regular rate and rhythm  LUNG: clear to auscultation bilaterally  ABDOMEN: normal findings: soft, non-tender  EXTREMITIES: warm, no edema    Laboratory:     Lab Results   Component Value Date/Time    Sodium 141 05/09/2022 08:38 AM    Sodium 141 05/08/2022 12:36 PM    Potassium 3.5 05/09/2022 08:38 AM    Potassium 3.6 05/08/2022 12:36 PM    Chloride 108 (H) 05/09/2022 08:38 AM    Chloride 108 (H) 05/08/2022 12:36 PM    CO2 28 05/09/2022 08:38 AM    CO2 28 05/08/2022 12:36 PM    Anion gap 5 (L) 05/09/2022 08:38 AM    Anion gap 5 (L) 05/08/2022 12:36 PM    Glucose 90 05/09/2022 08:38 AM    Glucose 90 05/08/2022 12:36 PM    BUN 6 05/09/2022 08:38 AM    BUN 4 (L) 05/08/2022 12:36 PM    Creatinine 0.60 05/09/2022 08:38 AM    Creatinine 0.70 05/08/2022 12:36 PM    GFR est AA >60 05/09/2022 08:38 AM    GFR est AA >60 05/08/2022 12:36 PM    GFR est non-AA >60 05/09/2022 08:38 AM    GFR est non-AA >60 05/08/2022 12:36 PM    Calcium 9.1 05/09/2022 08:38 AM    Calcium 9.0 05/08/2022 12:36 PM    Magnesium 2.1 05/09/2022 08:38 AM    Magnesium 1.9 05/08/2022 12:36 PM    Phosphorus 2.2 (L) 05/09/2022 08:38 AM    Albumin 3.3 (L) 05/09/2022 08:38 AM    Albumin 3.4 (L) 05/08/2022 12:36 PM    Protein, total 6.3 05/09/2022 08:38 AM    Protein, total 6.3 05/08/2022 12:36 PM    Globulin 3.0 05/09/2022 08:38 AM    Globulin 2.9 05/08/2022 12:36 PM    A-G Ratio 1.1 (L) 05/09/2022 08:38 AM    A-G Ratio 1.2 05/08/2022 12:36 PM    ALT (SGPT) 13 05/09/2022 08:38 AM    ALT (SGPT) 14 05/08/2022 12:36 PM     Lab Results   Component Value Date/Time    WBC 49.2 (H) 05/09/2022 08:38 AM    WBC 38.3 (H) 05/08/2022 12:36 PM    HGB 11.0 (L) 05/09/2022 08:38 AM    HGB 11.2 (L) 05/08/2022 12:36 PM    HCT 36.2 05/09/2022 08:38 AM    HCT 36.7 05/08/2022 12:36 PM    PLATELET 136 92/25/6922 08:38 AM    PLATELET 766 03/45/2223 12:36 PM     Lab Results   Component Value Date/Time    aPTT 27.5 05/08/2022 12:36 PM    Prothrombin time 14.0 05/08/2022 12:36 PM    INR 1.1 05/08/2022 12:36 PM       Assessment:     Multiple myeloma        Plan:     Planned Procedure: Tunneled pheresis catheter placement    Risks, benefits, and alternatives reviewed with patient and she agrees to proceed with the procedure.       Signed By: Aziza Trevino PA-C     May 9, 2022

## 2022-05-09 NOTE — PROGRESS NOTES
TRANSFER - OUT REPORT:    Verbal report given to Booker Jimenez on [employer]  being transferred to IR room 2 for routine post - op       Report consisted of patients Situation, Background, Assessment and   Recommendations(SBAR). Information from the following report(s) SBAR, Procedure Summary and MAR was reviewed with the receiving nurse. Opportunity for questions and clarification was provided. Conscious Sedation:   125 Mcg of Fentanyl administered  2.5 Mg of Versed administered    Pt tolerated procedure well.      Visit Vitals  /62   Pulse (!) 110   Temp 98 °F (36.7 °C)   Resp 18   Ht 5' 2.5\" (1.588 m)   Wt 105.7 kg (233 lb)   SpO2 96%   Breastfeeding No   BMI 41.94 kg/m²     Past Medical History:   Diagnosis Date    Obesity     Prolactin increased      Saline Lock 05/09/22 Posterior;Right Hand (Active)

## 2022-05-10 ENCOUNTER — HOSPITAL ENCOUNTER (OUTPATIENT)
Dept: INFUSION THERAPY | Age: 52
Discharge: HOME | End: 2022-05-10
Payer: COMMERCIAL

## 2022-05-10 ENCOUNTER — HOSPITAL ENCOUNTER (OUTPATIENT)
Dept: INFUSION THERAPY | Age: 52
End: 2022-05-10

## 2022-05-10 VITALS
BODY MASS INDEX: 41.22 KG/M2 | DIASTOLIC BLOOD PRESSURE: 82 MMHG | WEIGHT: 229 LBS | TEMPERATURE: 98.2 F | SYSTOLIC BLOOD PRESSURE: 157 MMHG | RESPIRATION RATE: 18 BRPM | HEART RATE: 92 BPM | OXYGEN SATURATION: 99 %

## 2022-05-10 DIAGNOSIS — Z76.82 BONE MARROW TRANSPLANT CANDIDATE: Primary | ICD-10-CM

## 2022-05-10 DIAGNOSIS — C90.00 MULTIPLE MYELOMA NOT HAVING ACHIEVED REMISSION (HCC): ICD-10-CM

## 2022-05-10 LAB
ABO + RH BLD: NORMAL
ANION GAP SERPL CALC-SCNC: 6 MMOL/L (ref 7–16)
BASOPHILS # BLD: 0 K/UL (ref 0–0.2)
BASOPHILS # BLD: 0 K/UL (ref 0–0.2)
BASOPHILS NFR BLD: 0 % (ref 0–2)
BASOPHILS NFR BLD: 0 % (ref 0–2)
BUN SERPL-MCNC: 6 MG/DL (ref 6–23)
CALCIUM SERPL-MCNC: 9.1 MG/DL (ref 8.3–10.4)
CD34,XCD34T: NORMAL
CHLORIDE SERPL-SCNC: 107 MMOL/L (ref 98–107)
CO2 SERPL-SCNC: 27 MMOL/L (ref 21–32)
CREAT SERPL-MCNC: 0.9 MG/DL (ref 0.6–1)
DIFFERENTIAL METHOD BLD: ABNORMAL
DIFFERENTIAL METHOD BLD: ABNORMAL
EOSINOPHIL # BLD: 0 K/UL (ref 0–0.8)
EOSINOPHIL # BLD: 0 K/UL (ref 0–0.8)
EOSINOPHIL NFR BLD: 0 % (ref 0.5–7.8)
EOSINOPHIL NFR BLD: 0 % (ref 0.5–7.8)
ERYTHROCYTE [DISTWIDTH] IN BLOOD BY AUTOMATED COUNT: 17.5 % (ref 11.9–14.6)
ERYTHROCYTE [DISTWIDTH] IN BLOOD BY AUTOMATED COUNT: 17.7 % (ref 11.9–14.6)
GLUCOSE SERPL-MCNC: 157 MG/DL (ref 65–100)
HCT VFR BLD AUTO: 34.3 % (ref 35.8–46.3)
HCT VFR BLD AUTO: 34.9 % (ref 35.8–46.3)
HGB BLD-MCNC: 10.4 G/DL (ref 11.7–15.4)
HGB BLD-MCNC: 10.7 G/DL (ref 11.7–15.4)
IMM GRANULOCYTES # BLD AUTO: 14.7 K/UL (ref 0–0.5)
IMM GRANULOCYTES # BLD AUTO: 9.4 K/UL (ref 0–0.5)
IMM GRANULOCYTES NFR BLD AUTO: 20 % (ref 0–5)
IMM GRANULOCYTES NFR BLD AUTO: 24 % (ref 0–5)
LYMPHOCYTES # BLD: 2.3 K/UL (ref 0.5–4.6)
LYMPHOCYTES # BLD: 6.1 K/UL (ref 0.5–4.6)
LYMPHOCYTES NFR BLD: 10 % (ref 13–44)
LYMPHOCYTES NFR BLD: 5 % (ref 13–44)
MAGNESIUM SERPL-MCNC: 1.7 MG/DL (ref 1.8–2.4)
MCH RBC QN AUTO: 25.2 PG (ref 26.1–32.9)
MCH RBC QN AUTO: 25.6 PG (ref 26.1–32.9)
MCHC RBC AUTO-ENTMCNC: 30.3 G/DL (ref 31.4–35)
MCHC RBC AUTO-ENTMCNC: 30.7 G/DL (ref 31.4–35)
MCV RBC AUTO: 83.3 FL (ref 79.6–97.8)
MCV RBC AUTO: 83.5 FL (ref 79.6–97.8)
MONOCYTES # BLD: 1.9 K/UL (ref 0.1–1.3)
MONOCYTES # BLD: 4.3 K/UL (ref 0.1–1.3)
MONOCYTES NFR BLD: 4 % (ref 4–12)
MONOCYTES NFR BLD: 7 % (ref 4–12)
NEUTS SEG # BLD: 33.3 K/UL (ref 1.7–8.2)
NEUTS SEG # BLD: 36.3 K/UL (ref 1.7–8.2)
NEUTS SEG NFR BLD: 59 % (ref 43–78)
NEUTS SEG NFR BLD: 71 % (ref 43–78)
NRBC # BLD: 0.4 K/UL (ref 0–0.2)
NRBC # BLD: 0.47 K/UL (ref 0–0.2)
PLATELET # BLD AUTO: 248 K/UL (ref 150–450)
PLATELET # BLD AUTO: 85 K/UL (ref 150–450)
PLATELET COMMENTS,PCOM: ABNORMAL
PLATELET COMMENTS,PCOM: ADEQUATE
PMV BLD AUTO: 10.1 FL (ref 9.4–12.3)
PMV BLD AUTO: 11.6 FL (ref 9.4–12.3)
POTASSIUM SERPL-SCNC: 3.7 MMOL/L (ref 3.5–5.1)
RBC # BLD AUTO: 4.12 M/UL (ref 4.05–5.2)
RBC # BLD AUTO: 4.18 M/UL (ref 4.05–5.2)
RBC MORPH BLD: ABNORMAL
SODIUM SERPL-SCNC: 140 MMOL/L (ref 136–145)
WBC # BLD AUTO: 46.9 K/UL (ref 4.3–11.1)
WBC # BLD AUTO: 61.4 K/UL (ref 4.3–11.1)
WBC MORPH BLD: ABNORMAL
WBC MORPH BLD: ABNORMAL

## 2022-05-10 PROCEDURE — 74011250636 HC RX REV CODE- 250/636: Performed by: INTERNAL MEDICINE

## 2022-05-10 PROCEDURE — 85025 COMPLETE CBC W/AUTO DIFF WBC: CPT

## 2022-05-10 PROCEDURE — 38206 HARVEST AUTO STEM CELLS: CPT

## 2022-05-10 PROCEDURE — 86367 STEM CELLS TOTAL COUNT: CPT

## 2022-05-10 PROCEDURE — 74011000250 HC RX REV CODE- 250: Performed by: INTERNAL MEDICINE

## 2022-05-10 PROCEDURE — 80048 BASIC METABOLIC PNL TOTAL CA: CPT

## 2022-05-10 PROCEDURE — 86900 BLOOD TYPING SEROLOGIC ABO: CPT

## 2022-05-10 PROCEDURE — 83735 ASSAY OF MAGNESIUM: CPT

## 2022-05-10 RX ORDER — SODIUM CHLORIDE 9 MG/ML
1000 INJECTION, SOLUTION INTRAVENOUS CONTINUOUS
Status: ACTIVE | OUTPATIENT
Start: 2022-05-10 | End: 2022-05-10

## 2022-05-10 RX ORDER — SODIUM CHLORIDE 0.9 % (FLUSH) 0.9 %
10-40 SYRINGE (ML) INJECTION AS NEEDED
Status: ACTIVE | OUTPATIENT
Start: 2022-05-10 | End: 2022-05-10

## 2022-05-10 RX ADMIN — FILGRASTIM-AAFI 1080 MCG: 480 INJECTION, SOLUTION SUBCUTANEOUS at 08:05

## 2022-05-10 RX ADMIN — CALCIUM GLUCONATE: 98 INJECTION, SOLUTION INTRAVENOUS at 12:49

## 2022-05-10 RX ADMIN — CALCIUM GLUCONATE 6 G: 98 INJECTION, SOLUTION INTRAVENOUS at 09:05

## 2022-05-10 RX ADMIN — SODIUM CHLORIDE, PRESERVATIVE FREE 20 ML: 5 INJECTION INTRAVENOUS at 16:07

## 2022-05-10 RX ADMIN — SODIUM CHLORIDE 1000 ML: 900 INJECTION, SOLUTION INTRAVENOUS at 09:05

## 2022-05-10 NOTE — PROGRESS NOTES
Apheresis Day: 1  Dx: Multiple Myeloma  Pre-collection/mobilization form was reviewed prior to initiating collection. Nivestym was given per policy. Mozobil used: No  Type of catheter (temp or tunneled): Tunneled  Calcium 6 g used during procedure. CD34: 70  Predictive for 20 liters: 4 x 106/kg    30 liters: 6 x 106/kg  Pre WBC 61.4 (critical value does not fall outside of expected limits as documented in the medical protocol)  Pre-hgb: 10.7  /  Plt: 248  Post WBC 46.9  Post-hgb: 10.4  /  Plt: 85  Blood/electrolyte replacements: none  Whole blood processed: 42809  Collection results received at 1648. Patient collected 4.42 x 106/kg for cumulative 4.42 x 106/kg. Collection goal: 6x 106/kg   Hematocrit in collection bag- result [facility]  Pt seen by: Arnoldo Brito NP  Issues: Pt c/o tingling to hands/arms and feet. Calcium rate increased x 2, pt tolerated remainder of procedure well  Line issues: Blood oozing from insertion site (mild). IR notified, but providers were leaving at 4 pm today, so appt for evaluation for possible suture placement not possible today. Dressing changed, surgiseal and gauze applied. Pt instructed to call with uncontrolled oozing from site overnight or any other concerns. Next procedure date:  Tomorrow at 0715      Target CD34+/Kg 6           Kg recipient 103.9   CD34+ pre-count 70     WB liters to process 26.9

## 2022-05-10 NOTE — PROGRESS NOTES
Hematocrit in collection bag = 4.2. Luis Felipe Dave per Crystal at [employer]. Reported at 1728.

## 2022-05-11 ENCOUNTER — HOSPITAL ENCOUNTER (OUTPATIENT)
Dept: INFUSION THERAPY | Age: 52
End: 2022-05-11

## 2022-05-11 ENCOUNTER — HOSPITAL ENCOUNTER (OUTPATIENT)
Dept: INFUSION THERAPY | Age: 52
Discharge: HOME | End: 2022-05-11
Payer: COMMERCIAL

## 2022-05-11 VITALS
TEMPERATURE: 98.1 F | HEART RATE: 95 BPM | OXYGEN SATURATION: 98 % | BODY MASS INDEX: 42.91 KG/M2 | DIASTOLIC BLOOD PRESSURE: 77 MMHG | RESPIRATION RATE: 20 BRPM | WEIGHT: 238.4 LBS | SYSTOLIC BLOOD PRESSURE: 134 MMHG

## 2022-05-11 DIAGNOSIS — Z76.82 BONE MARROW TRANSPLANT CANDIDATE: Primary | ICD-10-CM

## 2022-05-11 LAB
ALBUMIN SERPL-MCNC: 3.1 G/DL (ref 3.5–5)
ALBUMIN/GLOB SERPL: 1.1 {RATIO} (ref 1.2–3.5)
ALP SERPL-CCNC: 291 U/L (ref 50–136)
ALT SERPL-CCNC: 19 U/L (ref 12–65)
ANION GAP SERPL CALC-SCNC: 7 MMOL/L (ref 7–16)
AST SERPL-CCNC: 36 U/L (ref 15–37)
BASOPHILS # BLD: 0 K/UL (ref 0–0.2)
BASOPHILS # BLD: 0 K/UL (ref 0–0.2)
BASOPHILS NFR BLD: 0 % (ref 0–2)
BASOPHILS NFR BLD: 0 % (ref 0–2)
BILIRUB SERPL-MCNC: 0.2 MG/DL (ref 0.2–1.1)
BUN SERPL-MCNC: 9 MG/DL (ref 6–23)
CALCIUM SERPL-MCNC: 8.9 MG/DL (ref 8.3–10.4)
CD34,XCD34T: NORMAL
CD34,XCD34T: NORMAL
CHLORIDE SERPL-SCNC: 100 MMOL/L (ref 98–107)
CO2 SERPL-SCNC: 32 MMOL/L (ref 21–32)
CREAT SERPL-MCNC: 1.1 MG/DL (ref 0.6–1)
DIFFERENTIAL METHOD BLD: ABNORMAL
DIFFERENTIAL METHOD BLD: ABNORMAL
EOSINOPHIL # BLD: 0 K/UL (ref 0–0.8)
EOSINOPHIL # BLD: 0 K/UL (ref 0–0.8)
EOSINOPHIL NFR BLD: 0 % (ref 0.5–7.8)
EOSINOPHIL NFR BLD: 0 % (ref 0.5–7.8)
ERYTHROCYTE [DISTWIDTH] IN BLOOD BY AUTOMATED COUNT: 17.3 % (ref 11.9–14.6)
ERYTHROCYTE [DISTWIDTH] IN BLOOD BY AUTOMATED COUNT: 17.4 % (ref 11.9–14.6)
GLOBULIN SER CALC-MCNC: 2.9 G/DL (ref 2.3–3.5)
GLUCOSE SERPL-MCNC: 163 MG/DL (ref 65–100)
HCT VFR BLD AUTO: 33.4 % (ref 35.8–46.3)
HCT VFR BLD AUTO: 35.1 % (ref 35.8–46.3)
HGB BLD-MCNC: 10.3 G/DL (ref 11.7–15.4)
HGB BLD-MCNC: 10.7 G/DL (ref 11.7–15.4)
IMM GRANULOCYTES # BLD AUTO: 4.8 K/UL (ref 0–0.5)
IMM GRANULOCYTES # BLD AUTO: 4.9 K/UL (ref 0–0.5)
IMM GRANULOCYTES NFR BLD AUTO: 8 % (ref 0–5)
IMM GRANULOCYTES NFR BLD AUTO: 9 % (ref 0–5)
LYMPHOCYTES # BLD: 1.6 K/UL (ref 0.5–4.6)
LYMPHOCYTES # BLD: 3.6 K/UL (ref 0.5–4.6)
LYMPHOCYTES NFR BLD: 3 % (ref 13–44)
LYMPHOCYTES NFR BLD: 6 % (ref 13–44)
MAGNESIUM SERPL-MCNC: 1.2 MG/DL (ref 1.8–2.4)
MCH RBC QN AUTO: 25.4 PG (ref 26.1–32.9)
MCH RBC QN AUTO: 25.5 PG (ref 26.1–32.9)
MCHC RBC AUTO-ENTMCNC: 30.5 G/DL (ref 31.4–35)
MCHC RBC AUTO-ENTMCNC: 30.8 G/DL (ref 31.4–35)
MCV RBC AUTO: 82.7 FL (ref 79.6–97.8)
MCV RBC AUTO: 83.2 FL (ref 79.6–97.8)
MONOCYTES # BLD: 1.6 K/UL (ref 0.1–1.3)
MONOCYTES # BLD: 2.4 K/UL (ref 0.1–1.3)
MONOCYTES NFR BLD: 3 % (ref 4–12)
MONOCYTES NFR BLD: 4 % (ref 4–12)
NEUTS SEG # BLD: 46.5 K/UL (ref 1.7–8.2)
NEUTS SEG # BLD: 49.2 K/UL (ref 1.7–8.2)
NEUTS SEG NFR BLD: 82 % (ref 43–78)
NEUTS SEG NFR BLD: 85 % (ref 43–78)
NRBC # BLD: 0.34 K/UL (ref 0–0.2)
NRBC # BLD: 0.42 K/UL (ref 0–0.2)
PHOSPHATE SERPL-MCNC: 4.5 MG/DL (ref 2.5–4.5)
PLATELET # BLD AUTO: 40 K/UL (ref 150–450)
PLATELET # BLD AUTO: 73 K/UL (ref 150–450)
PLATELET COMMENTS,PCOM: ABNORMAL
PLATELET COMMENTS,PCOM: ABNORMAL
PMV BLD AUTO: 8.2 FL (ref 9.4–12.3)
PMV BLD AUTO: 9 FL (ref 9.4–12.3)
POTASSIUM SERPL-SCNC: 3.8 MMOL/L (ref 3.5–5.1)
PROT SERPL-MCNC: 6 G/DL (ref 6.3–8.2)
RBC # BLD AUTO: 4.04 M/UL (ref 4.05–5.2)
RBC # BLD AUTO: 4.22 M/UL (ref 4.05–5.2)
RBC MORPH BLD: ABNORMAL
SODIUM SERPL-SCNC: 139 MMOL/L (ref 136–145)
WBC # BLD AUTO: 54.6 K/UL (ref 4.3–11.1)
WBC # BLD AUTO: 60 K/UL (ref 4.3–11.1)
WBC MORPH BLD: ABNORMAL
WBC MORPH BLD: ABNORMAL

## 2022-05-11 PROCEDURE — 80053 COMPREHEN METABOLIC PANEL: CPT

## 2022-05-11 PROCEDURE — 83735 ASSAY OF MAGNESIUM: CPT

## 2022-05-11 PROCEDURE — 74011250637 HC RX REV CODE- 250/637: Performed by: INTERNAL MEDICINE

## 2022-05-11 PROCEDURE — 74011250636 HC RX REV CODE- 250/636: Performed by: INTERNAL MEDICINE

## 2022-05-11 PROCEDURE — 86367 STEM CELLS TOTAL COUNT: CPT

## 2022-05-11 PROCEDURE — 38206 HARVEST AUTO STEM CELLS: CPT

## 2022-05-11 PROCEDURE — 96365 THER/PROPH/DIAG IV INF INIT: CPT

## 2022-05-11 PROCEDURE — 84100 ASSAY OF PHOSPHORUS: CPT

## 2022-05-11 PROCEDURE — 96366 THER/PROPH/DIAG IV INF ADDON: CPT

## 2022-05-11 PROCEDURE — 85025 COMPLETE CBC W/AUTO DIFF WBC: CPT

## 2022-05-11 RX ORDER — LANOLIN ALCOHOL/MO/W.PET/CERES
400 CREAM (GRAM) TOPICAL ONCE
Status: DISCONTINUED | OUTPATIENT
Start: 2022-05-11 | End: 2022-05-11 | Stop reason: CLARIF

## 2022-05-11 RX ORDER — CALCIUM CARBONATE 200(500)MG
200 TABLET,CHEWABLE ORAL ONCE
Status: COMPLETED | OUTPATIENT
Start: 2022-05-11 | End: 2022-05-11

## 2022-05-11 RX ORDER — SODIUM CHLORIDE 0.9 % (FLUSH) 0.9 %
10 SYRINGE (ML) INJECTION AS NEEDED
Status: DISCONTINUED | OUTPATIENT
Start: 2022-05-11 | End: 2022-05-13 | Stop reason: HOSPADM

## 2022-05-11 RX ORDER — SODIUM CHLORIDE 9 MG/ML
1000 INJECTION, SOLUTION INTRAVENOUS CONTINUOUS
Status: ACTIVE | OUTPATIENT
Start: 2022-05-11 | End: 2022-05-11

## 2022-05-11 RX ORDER — MAGNESIUM SULFATE HEPTAHYDRATE 40 MG/ML
4 INJECTION, SOLUTION INTRAVENOUS ONCE
Status: COMPLETED | OUTPATIENT
Start: 2022-05-11 | End: 2022-05-11

## 2022-05-11 RX ADMIN — FILGRASTIM-AAFI 1080 MCG: 480 INJECTION, SOLUTION SUBCUTANEOUS at 08:15

## 2022-05-11 RX ADMIN — Medication 10 ML: at 09:15

## 2022-05-11 RX ADMIN — Medication 10 ML: at 09:16

## 2022-05-11 RX ADMIN — CALCIUM CARBONATE 200 MG: 500 TABLET, CHEWABLE ORAL at 09:25

## 2022-05-11 RX ADMIN — Medication 10 ML: at 13:06

## 2022-05-11 RX ADMIN — Medication 10 ML: at 13:05

## 2022-05-11 RX ADMIN — SODIUM CHLORIDE 1000 ML: 900 INJECTION, SOLUTION INTRAVENOUS at 09:19

## 2022-05-11 RX ADMIN — Medication 10 ML: at 07:30

## 2022-05-11 RX ADMIN — CALCIUM GLUCONATE 6 G: 98 INJECTION, SOLUTION INTRAVENOUS at 09:20

## 2022-05-11 RX ADMIN — MAGNESIUM SULFATE HEPTAHYDRATE 4 G: 40 INJECTION, SOLUTION INTRAVENOUS at 10:13

## 2022-05-11 NOTE — PROGRESS NOTES
Apheresis Day: 2  Dx: Multiple Myeloma  Pre-collection/mobilization form was reviewed prior to initiating collection. Nivestym was given per policy. Mozobil used: No  Type of catheter (temp or tunneled): Tunneled  Calcium 6 g used during procedure. CD34: 40  Predictive for 20 liters:  2.2x 106/kg    30 liters: 3.3 x 106/kg  Pre WBC 60 (critical value does not fall outside of expected limits as documented in the medical protocol)  Pre-hgb:   10.7/  Plt: 73  Post WBC 54.6  Post-hgb:10.3 /  Plt: 40  Blood/electrolyte replacements: Mag 4gm IV  Whole blood processed: 90536  Collection results received at 1510. Patient collected  1.82x 106/kg for cumulative  6.24x 106/kg.   Collection goal: 1.58x 106/kg   Hematocrit in collection bag-2.5  Pt seen by: Hubert Burks NP  Issues:   Line issues:  Next appointment 5/16 at 1130           Target CD34+/Kg 1.58           Kg recipient 108.1   CD34+ pre-count   40      WB liters to process 12.5

## 2022-05-12 ENCOUNTER — HOSPITAL ENCOUNTER (OUTPATIENT)
Dept: INFUSION THERAPY | Age: 52
End: 2022-05-12

## 2022-05-16 ENCOUNTER — HOSPITAL ENCOUNTER (OUTPATIENT)
Dept: INFUSION THERAPY | Age: 52
Discharge: HOME | End: 2022-05-16
Payer: COMMERCIAL

## 2022-05-16 VITALS
DIASTOLIC BLOOD PRESSURE: 91 MMHG | OXYGEN SATURATION: 98 % | RESPIRATION RATE: 18 BRPM | HEART RATE: 101 BPM | TEMPERATURE: 97.6 F | SYSTOLIC BLOOD PRESSURE: 144 MMHG

## 2022-05-16 DIAGNOSIS — Z76.82 BONE MARROW TRANSPLANT CANDIDATE: ICD-10-CM

## 2022-05-16 DIAGNOSIS — C90.00 MULTIPLE MYELOMA NOT HAVING ACHIEVED REMISSION (HCC): Primary | ICD-10-CM

## 2022-05-16 PROCEDURE — 74011000258 HC RX REV CODE- 258: Performed by: INTERNAL MEDICINE

## 2022-05-16 PROCEDURE — 74011250636 HC RX REV CODE- 250/636: Performed by: INTERNAL MEDICINE

## 2022-05-16 PROCEDURE — 96374 THER/PROPH/DIAG INJ IV PUSH: CPT

## 2022-05-16 RX ORDER — ONDANSETRON 2 MG/ML
8 INJECTION INTRAMUSCULAR; INTRAVENOUS AS NEEDED
OUTPATIENT
Start: 2022-05-23

## 2022-05-16 RX ORDER — HYDROCORTISONE SODIUM SUCCINATE 100 MG/2ML
100 INJECTION, POWDER, FOR SOLUTION INTRAMUSCULAR; INTRAVENOUS AS NEEDED
Status: DISCONTINUED | OUTPATIENT
Start: 2022-05-16 | End: 2022-05-17 | Stop reason: HOSPADM

## 2022-05-16 RX ORDER — EPINEPHRINE 1 MG/ML
0.3 INJECTION, SOLUTION, CONCENTRATE INTRAVENOUS AS NEEDED
OUTPATIENT
Start: 2022-05-23

## 2022-05-16 RX ORDER — SODIUM CHLORIDE 9 MG/ML
25 INJECTION, SOLUTION INTRAVENOUS CONTINUOUS
Status: DISCONTINUED | OUTPATIENT
Start: 2022-05-16 | End: 2022-05-17 | Stop reason: HOSPADM

## 2022-05-16 RX ORDER — SODIUM CHLORIDE 0.9 % (FLUSH) 0.9 %
10 SYRINGE (ML) INJECTION AS NEEDED
OUTPATIENT
Start: 2022-05-23

## 2022-05-16 RX ORDER — DIPHENHYDRAMINE HYDROCHLORIDE 50 MG/ML
25 INJECTION, SOLUTION INTRAMUSCULAR; INTRAVENOUS AS NEEDED
Start: 2022-05-23

## 2022-05-16 RX ORDER — HEPARIN 100 UNIT/ML
300-500 SYRINGE INTRAVENOUS AS NEEDED
Start: 2022-05-23

## 2022-05-16 RX ORDER — ONDANSETRON 2 MG/ML
8 INJECTION INTRAMUSCULAR; INTRAVENOUS AS NEEDED
Status: DISCONTINUED | OUTPATIENT
Start: 2022-05-16 | End: 2022-05-17 | Stop reason: HOSPADM

## 2022-05-16 RX ORDER — HYDROCORTISONE SODIUM SUCCINATE 100 MG/2ML
100 INJECTION, POWDER, FOR SOLUTION INTRAMUSCULAR; INTRAVENOUS AS NEEDED
OUTPATIENT
Start: 2022-05-23

## 2022-05-16 RX ORDER — DIPHENHYDRAMINE HYDROCHLORIDE 50 MG/ML
25 INJECTION, SOLUTION INTRAMUSCULAR; INTRAVENOUS AS NEEDED
Status: DISCONTINUED | OUTPATIENT
Start: 2022-05-16 | End: 2022-05-17 | Stop reason: HOSPADM

## 2022-05-16 RX ORDER — SODIUM CHLORIDE 9 MG/ML
10 INJECTION INTRAMUSCULAR; INTRAVENOUS; SUBCUTANEOUS AS NEEDED
OUTPATIENT
Start: 2022-05-23

## 2022-05-16 RX ORDER — ACETAMINOPHEN 325 MG/1
650 TABLET ORAL AS NEEDED
Start: 2022-05-23

## 2022-05-16 RX ORDER — SODIUM CHLORIDE 9 MG/ML
25 INJECTION, SOLUTION INTRAVENOUS CONTINUOUS
OUTPATIENT
Start: 2022-05-23

## 2022-05-16 RX ORDER — ALBUTEROL SULFATE 0.83 MG/ML
2.5 SOLUTION RESPIRATORY (INHALATION) AS NEEDED
Start: 2022-05-23

## 2022-05-16 RX ORDER — DIPHENHYDRAMINE HYDROCHLORIDE 50 MG/ML
50 INJECTION, SOLUTION INTRAMUSCULAR; INTRAVENOUS AS NEEDED
Start: 2022-05-23

## 2022-05-16 RX ADMIN — SODIUM CHLORIDE 25 ML/HR: 900 INJECTION, SOLUTION INTRAVENOUS at 12:15

## 2022-05-16 RX ADMIN — FERUMOXYTOL 510 MG: 510 INJECTION INTRAVENOUS at 12:25

## 2022-05-16 NOTE — PROGRESS NOTES
Arrived to the [facility]. Assessment completed, labs reviewed. Feraheme completed. Patient tolerated without problems. Patient stayed for 30 min observation post infusion with no problems noted  Dressing changed to apheresis catheter during visit  Any issues or concerns during appointment: None  Instructed to call Dr Colby Rice with any side effects or concerns  Patient aware of next infusion appointment on 5/23/22(date) at 8 30 PM (time).   Discharged ambulatory

## 2022-05-16 NOTE — PROCEDURES
Department of Interventional Radiology  [phone]        Interventional Radiology Brief Procedure Note    Patient: Shoshana Arguelles MRN: [id number]  SSN: [id number]    YOB: 1970  Age: 54 y.o.   Sex: female      Date of Procedure: 5/9/22    Pre-Procedure Diagnosis: multiple myeloma    Post-Procedure Diagnosis: SAME    Procedure(s): Tunneled Central Venous Catheter    Brief Description of Procedure: as above    Performed By: Maryana Morales PA-C     Assistants: None    Anesthesia:Moderate Sedation per Jim Verde MD    Estimated Blood Loss: Less than 10ml    Specimens:  None    Implants:  Tunnelled Apheresis Catheter    Findings: catheter tip in right atrium     Complications: None    Recommendations: ok to use catheter     Follow Up: prn    Signed By: Maryana Morales PA-C     May 16, 2022

## 2022-05-23 ENCOUNTER — HOSPITAL ENCOUNTER (OUTPATIENT)
Dept: INFUSION THERAPY | Age: 52
End: 2022-05-23

## 2022-05-23 ENCOUNTER — HOSPITAL ENCOUNTER (OUTPATIENT)
Dept: INFUSION THERAPY | Age: 52
Discharge: HOME | End: 2022-05-23
Payer: COMMERCIAL

## 2022-05-23 VITALS
TEMPERATURE: 98 F | DIASTOLIC BLOOD PRESSURE: 88 MMHG | SYSTOLIC BLOOD PRESSURE: 134 MMHG | OXYGEN SATURATION: 98 % | RESPIRATION RATE: 16 BRPM | HEART RATE: 97 BPM

## 2022-05-23 DIAGNOSIS — Z76.82 BONE MARROW TRANSPLANT CANDIDATE: ICD-10-CM

## 2022-05-23 DIAGNOSIS — E04.1 THYROID NODULE GREATER THAN OR EQUAL TO 1 CM IN DIAMETER INCIDENTALLY NOTED ON IMAGING STUDY: ICD-10-CM

## 2022-05-23 DIAGNOSIS — Z76.82 BONE MARROW TRANSPLANT CANDIDATE: Primary | ICD-10-CM

## 2022-05-23 DIAGNOSIS — C90.00 MULTIPLE MYELOMA NOT HAVING ACHIEVED REMISSION (HCC): Primary | ICD-10-CM

## 2022-05-23 PROCEDURE — 6360000002 HC RX W HCPCS: Performed by: INTERNAL MEDICINE

## 2022-05-23 PROCEDURE — 2580000003 HC RX 258: Performed by: INTERNAL MEDICINE

## 2022-05-23 PROCEDURE — 96365 THER/PROPH/DIAG IV INF INIT: CPT

## 2022-05-23 RX ORDER — SODIUM CHLORIDE 0.9 % (FLUSH) 0.9 %
5-40 SYRINGE (ML) INJECTION PRN
Status: CANCELLED | OUTPATIENT
Start: 2022-05-23

## 2022-05-23 RX ORDER — ACETAMINOPHEN 325 MG/1
650 TABLET ORAL
Status: CANCELLED | OUTPATIENT
Start: 2022-05-23

## 2022-05-23 RX ORDER — DIPHENHYDRAMINE HYDROCHLORIDE 50 MG/ML
50 INJECTION INTRAMUSCULAR; INTRAVENOUS
Status: ACTIVE | OUTPATIENT
Start: 2022-05-23 | End: 2022-05-23

## 2022-05-23 RX ORDER — ONDANSETRON 2 MG/ML
8 INJECTION INTRAMUSCULAR; INTRAVENOUS
Status: ACTIVE | OUTPATIENT
Start: 2022-05-23 | End: 2022-05-23

## 2022-05-23 RX ORDER — SODIUM CHLORIDE 9 MG/ML
5-250 INJECTION, SOLUTION INTRAVENOUS PRN
Status: CANCELLED | OUTPATIENT
Start: 2022-05-23

## 2022-05-23 RX ORDER — SODIUM CHLORIDE 9 MG/ML
5-250 INJECTION, SOLUTION INTRAVENOUS PRN
Status: DISCONTINUED | OUTPATIENT
Start: 2022-05-23 | End: 2022-05-24 | Stop reason: HOSPADM

## 2022-05-23 RX ORDER — ALBUTEROL SULFATE 90 UG/1
4 AEROSOL, METERED RESPIRATORY (INHALATION) PRN
Status: DISCONTINUED | OUTPATIENT
Start: 2022-05-23 | End: 2022-05-25 | Stop reason: HOSPADM

## 2022-05-23 RX ORDER — ALBUTEROL SULFATE 90 UG/1
4 AEROSOL, METERED RESPIRATORY (INHALATION) PRN
Status: CANCELLED | OUTPATIENT
Start: 2022-05-23

## 2022-05-23 RX ORDER — ACETAMINOPHEN 325 MG/1
650 TABLET ORAL
Status: ACTIVE | OUTPATIENT
Start: 2022-05-23 | End: 2022-05-23

## 2022-05-23 RX ORDER — HEPARIN SODIUM (PORCINE) LOCK FLUSH IV SOLN 100 UNIT/ML 100 UNIT/ML
500 SOLUTION INTRAVENOUS PRN
Status: CANCELLED | OUTPATIENT
Start: 2022-05-23

## 2022-05-23 RX ORDER — SODIUM CHLORIDE 0.9 % (FLUSH) 0.9 %
5-40 SYRINGE (ML) INJECTION PRN
Status: DISCONTINUED | OUTPATIENT
Start: 2022-05-23 | End: 2022-05-24 | Stop reason: HOSPADM

## 2022-05-23 RX ORDER — DIPHENHYDRAMINE HYDROCHLORIDE 50 MG/ML
50 INJECTION INTRAMUSCULAR; INTRAVENOUS
Status: CANCELLED | OUTPATIENT
Start: 2022-05-23

## 2022-05-23 RX ORDER — HEPARIN SODIUM (PORCINE) LOCK FLUSH IV SOLN 100 UNIT/ML 100 UNIT/ML
500 SOLUTION INTRAVENOUS PRN
Status: DISCONTINUED | OUTPATIENT
Start: 2022-05-23 | End: 2022-05-24 | Stop reason: HOSPADM

## 2022-05-23 RX ORDER — SODIUM CHLORIDE 9 MG/ML
INJECTION, SOLUTION INTRAVENOUS CONTINUOUS
Status: DISCONTINUED | OUTPATIENT
Start: 2022-05-23 | End: 2022-05-25 | Stop reason: HOSPADM

## 2022-05-23 RX ORDER — ONDANSETRON 2 MG/ML
8 INJECTION INTRAMUSCULAR; INTRAVENOUS
Status: CANCELLED | OUTPATIENT
Start: 2022-05-23

## 2022-05-23 RX ORDER — EPINEPHRINE 1 MG/ML
0.3 INJECTION, SOLUTION, CONCENTRATE INTRAVENOUS PRN
Status: DISCONTINUED | OUTPATIENT
Start: 2022-05-23 | End: 2022-05-25 | Stop reason: HOSPADM

## 2022-05-23 RX ORDER — SODIUM CHLORIDE 9 MG/ML
INJECTION, SOLUTION INTRAVENOUS CONTINUOUS
Status: CANCELLED | OUTPATIENT
Start: 2022-05-23

## 2022-05-23 RX ORDER — EPINEPHRINE 1 MG/ML
0.3 INJECTION, SOLUTION, CONCENTRATE INTRAVENOUS PRN
Status: CANCELLED | OUTPATIENT
Start: 2022-05-23

## 2022-05-23 RX ADMIN — SODIUM CHLORIDE, PRESERVATIVE FREE 10 ML: 5 INJECTION INTRAVENOUS at 10:55

## 2022-05-23 RX ADMIN — FERUMOXYTOL 510 MG: 510 INJECTION INTRAVENOUS at 09:55

## 2022-05-23 RX ADMIN — SODIUM CHLORIDE 25 ML/HR: 900 INJECTION, SOLUTION INTRAVENOUS at 09:50

## 2022-05-23 NOTE — PLAN OF CARE
Problem: Neurosensory - Adult  Goal: Achieves stable or improved neurological status  Outcome: Progressing     Problem: Gastrointestinal - Adult  Goal: Maintains or returns to baseline bowel function  Outcome: Progressing

## 2022-05-23 NOTE — PROGRESS NOTES
Arrived to the infusion center. Feraheme completted without signs of adverse reaction. Follow up will be hospital admission for bone marow transplant. Discharged ambulatory in satisfactory condition.

## 2022-05-30 DIAGNOSIS — C90.00 MULTIPLE MYELOMA NOT HAVING ACHIEVED REMISSION (HCC): Primary | ICD-10-CM

## 2022-05-31 ENCOUNTER — CLINICAL DOCUMENTATION (OUTPATIENT)
Dept: CASE MANAGEMENT | Age: 52
End: 2022-05-31

## 2022-05-31 ENCOUNTER — HOSPITAL ENCOUNTER (OUTPATIENT)
Dept: LAB | Age: 52
Discharge: HOME | DRG: 016 | End: 2022-06-03
Payer: COMMERCIAL

## 2022-05-31 ENCOUNTER — OFFICE VISIT (OUTPATIENT)
Dept: ONCOLOGY | Age: 52
End: 2022-05-31

## 2022-05-31 ENCOUNTER — HOSPITAL ENCOUNTER (INPATIENT)
Age: 52
LOS: 14 days | Discharge: HOME | DRG: 016 | End: 2022-06-14
Attending: INTERNAL MEDICINE | Admitting: INTERNAL MEDICINE
Payer: COMMERCIAL

## 2022-05-31 VITALS
BODY MASS INDEX: 41.11 KG/M2 | SYSTOLIC BLOOD PRESSURE: 125 MMHG | WEIGHT: 232 LBS | RESPIRATION RATE: 21 BRPM | DIASTOLIC BLOOD PRESSURE: 83 MMHG | TEMPERATURE: 98 F | OXYGEN SATURATION: 95 % | HEART RATE: 101 BPM | HEIGHT: 63 IN

## 2022-05-31 DIAGNOSIS — Z79.899 HIGH RISK MEDICATION USE: ICD-10-CM

## 2022-05-31 DIAGNOSIS — C90.00 MULTIPLE MYELOMA NOT HAVING ACHIEVED REMISSION (HCC): ICD-10-CM

## 2022-05-31 DIAGNOSIS — C90.00 MULTIPLE MYELOMA NOT HAVING ACHIEVED REMISSION (HCC): Primary | ICD-10-CM

## 2022-05-31 DIAGNOSIS — Z76.82 BONE MARROW TRANSPLANT CANDIDATE: ICD-10-CM

## 2022-05-31 DIAGNOSIS — Z76.82 BONE MARROW TRANSPLANT CANDIDATE: Primary | ICD-10-CM

## 2022-05-31 LAB
ALBUMIN SERPL-MCNC: 3.7 G/DL (ref 3.5–5)
ALBUMIN/GLOB SERPL: 1.4 {RATIO} (ref 1.2–3.5)
ALP SERPL-CCNC: 83 U/L (ref 50–136)
ALT SERPL-CCNC: 15 U/L (ref 12–65)
ANION GAP SERPL CALC-SCNC: 5 MMOL/L (ref 7–16)
APPEARANCE UR: CLEAR
AST SERPL-CCNC: 19 U/L (ref 15–37)
BASOPHILS # BLD: 0 K/UL (ref 0–0.2)
BASOPHILS NFR BLD: 0 % (ref 0–2)
BILIRUB SERPL-MCNC: 0.2 MG/DL (ref 0.2–1.1)
BILIRUB UR QL: NEGATIVE
BUN SERPL-MCNC: 12 MG/DL (ref 6–23)
CALCIUM SERPL-MCNC: 9 MG/DL (ref 8.3–10.4)
CHLORIDE SERPL-SCNC: 108 MMOL/L (ref 98–107)
CO2 SERPL-SCNC: 29 MMOL/L (ref 21–32)
COLOR UR: YELLOW
CREAT SERPL-MCNC: 0.7 MG/DL (ref 0.6–1)
DIFFERENTIAL METHOD BLD: ABNORMAL
EOSINOPHIL # BLD: 0.1 K/UL (ref 0–0.8)
EOSINOPHIL NFR BLD: 2 % (ref 0.5–7.8)
ERYTHROCYTE [DISTWIDTH] IN BLOOD BY AUTOMATED COUNT: 19.3 % (ref 11.9–14.6)
FLUAV AG NPH QL IA: NEGATIVE
FLUBV AG NPH QL IA: NEGATIVE
GLOBULIN SER CALC-MCNC: 2.6 G/DL (ref 2.3–3.5)
GLUCOSE SERPL-MCNC: 93 MG/DL (ref 65–100)
GLUCOSE UR STRIP.AUTO-MCNC: NEGATIVE MG/DL
HCG SERPL QL: NEGATIVE
HCT VFR BLD AUTO: 34.7 % (ref 35.8–46.3)
HGB BLD-MCNC: 11 G/DL (ref 11.7–15.4)
HGB UR QL STRIP: NEGATIVE
IMM GRANULOCYTES # BLD AUTO: 0 K/UL (ref 0–0.5)
IMM GRANULOCYTES NFR BLD AUTO: 0 % (ref 0–5)
KETONES UR QL STRIP.AUTO: ABNORMAL MG/DL
LEUKOCYTE ESTERASE UR QL STRIP.AUTO: NEGATIVE
LYMPHOCYTES # BLD: 1.2 K/UL (ref 0.5–4.6)
LYMPHOCYTES NFR BLD: 28 % (ref 13–44)
MAGNESIUM SERPL-MCNC: 2.3 MG/DL (ref 1.8–2.4)
MCH RBC QN AUTO: 27.2 PG (ref 26.1–32.9)
MCHC RBC AUTO-ENTMCNC: 31.7 G/DL (ref 31.4–35)
MCV RBC AUTO: 85.9 FL (ref 79.6–97.8)
MONOCYTES # BLD: 0.7 K/UL (ref 0.1–1.3)
MONOCYTES NFR BLD: 17 % (ref 4–12)
NEUTS SEG # BLD: 2.2 K/UL (ref 1.7–8.2)
NEUTS SEG NFR BLD: 53 % (ref 43–78)
NITRITE UR QL STRIP.AUTO: NEGATIVE
NRBC # BLD: 0 K/UL (ref 0–0.2)
PH UR STRIP: 5.5 [PH] (ref 5–9)
PLATELET # BLD AUTO: 237 K/UL (ref 150–450)
PMV BLD AUTO: 10 FL (ref 9.4–12.3)
POTASSIUM SERPL-SCNC: 3.7 MMOL/L (ref 3.5–5.1)
PROT SERPL-MCNC: 6.3 G/DL (ref 6.3–8.2)
PROT UR STRIP-MCNC: NEGATIVE MG/DL
RBC # BLD AUTO: 4.04 M/UL (ref 4.05–5.2)
SARS-COV-2 RDRP RESP QL NAA+PROBE: NOT DETECTED
SODIUM SERPL-SCNC: 142 MMOL/L (ref 136–145)
SOURCE: NORMAL
SP GR UR REFRACTOMETRY: ABNORMAL (ref 1–1.02)
SPECIMEN SOURCE: NORMAL
UROBILINOGEN UR QL STRIP.AUTO: 1 EU/DL (ref 0.2–1)
WBC # BLD AUTO: 4.1 K/UL (ref 4.3–11.1)

## 2022-05-31 PROCEDURE — 36415 COLL VENOUS BLD VENIPUNCTURE: CPT

## 2022-05-31 PROCEDURE — 6370000000 HC RX 637 (ALT 250 FOR IP): Performed by: NURSE PRACTITIONER

## 2022-05-31 PROCEDURE — 87635 SARS-COV-2 COVID-19 AMP PRB: CPT

## 2022-05-31 PROCEDURE — 85025 COMPLETE CBC W/AUTO DIFF WBC: CPT

## 2022-05-31 PROCEDURE — 81003 URINALYSIS AUTO W/O SCOPE: CPT

## 2022-05-31 PROCEDURE — 6370000000 HC RX 637 (ALT 250 FOR IP): Performed by: INTERNAL MEDICINE

## 2022-05-31 PROCEDURE — 2580000003 HC RX 258: Performed by: NURSE PRACTITIONER

## 2022-05-31 PROCEDURE — 84703 CHORIONIC GONADOTROPIN ASSAY: CPT

## 2022-05-31 PROCEDURE — 87804 INFLUENZA ASSAY W/OPTIC: CPT

## 2022-05-31 PROCEDURE — 1170000000 HC RM PRIVATE ONCOLOGY

## 2022-05-31 PROCEDURE — 83521 IG LIGHT CHAINS FREE EACH: CPT

## 2022-05-31 PROCEDURE — 99223 1ST HOSP IP/OBS HIGH 75: CPT | Performed by: INTERNAL MEDICINE

## 2022-05-31 PROCEDURE — 83735 ASSAY OF MAGNESIUM: CPT

## 2022-05-31 PROCEDURE — 84165 PROTEIN E-PHORESIS SERUM: CPT

## 2022-05-31 PROCEDURE — U0003 INFECTIOUS AGENT DETECTION BY NUCLEIC ACID (DNA OR RNA); SEVERE ACUTE RESPIRATORY SYNDROME CORONAVIRUS 2 (SARS-COV-2) (CORONAVIRUS DISEASE [COVID-19]), AMPLIFIED PROBE TECHNIQUE, MAKING USE OF HIGH THROUGHPUT TECHNOLOGIES AS DESCRIBED BY CMS-2020-01-R: HCPCS

## 2022-05-31 RX ORDER — HYDROCODONE BITARTRATE AND ACETAMINOPHEN 5; 325 MG/1; MG/1
1 TABLET ORAL EVERY 4 HOURS PRN
Status: DISCONTINUED | OUTPATIENT
Start: 2022-05-31 | End: 2022-06-14 | Stop reason: HOSPADM

## 2022-05-31 RX ORDER — DULOXETIN HYDROCHLORIDE 30 MG/1
30 CAPSULE, DELAYED RELEASE ORAL DAILY
Status: DISCONTINUED | OUTPATIENT
Start: 2022-06-01 | End: 2022-06-14 | Stop reason: HOSPADM

## 2022-05-31 RX ORDER — POLYETHYLENE GLYCOL 3350 17 G/17G
17 POWDER, FOR SOLUTION ORAL DAILY PRN
Status: DISCONTINUED | OUTPATIENT
Start: 2022-05-31 | End: 2022-06-14 | Stop reason: HOSPADM

## 2022-05-31 RX ORDER — GABAPENTIN 300 MG/1
600 CAPSULE ORAL 3 TIMES DAILY
Status: DISCONTINUED | OUTPATIENT
Start: 2022-05-31 | End: 2022-06-14 | Stop reason: HOSPADM

## 2022-05-31 RX ORDER — DIPHENOXYLATE HYDROCHLORIDE AND ATROPINE SULFATE 2.5; .025 MG/1; MG/1
2 TABLET ORAL 4 TIMES DAILY PRN
COMMUNITY

## 2022-05-31 RX ORDER — LEVOFLOXACIN 500 MG/1
500 TABLET, FILM COATED ORAL DAILY
Status: DISCONTINUED | OUTPATIENT
Start: 2022-06-03 | End: 2022-06-14

## 2022-05-31 RX ORDER — FLUCONAZOLE 100 MG/1
200 TABLET ORAL DAILY
Status: DISCONTINUED | OUTPATIENT
Start: 2022-06-03 | End: 2022-06-14

## 2022-05-31 RX ORDER — CYCLOBENZAPRINE HCL 10 MG
5 TABLET ORAL 3 TIMES DAILY PRN
Status: DISCONTINUED | OUTPATIENT
Start: 2022-05-31 | End: 2022-05-31

## 2022-05-31 RX ORDER — ACYCLOVIR 800 MG/1
400 TABLET ORAL 2 TIMES DAILY
Status: DISCONTINUED | OUTPATIENT
Start: 2022-05-31 | End: 2022-06-14 | Stop reason: HOSPADM

## 2022-05-31 RX ORDER — SODIUM CHLORIDE 9 MG/ML
INJECTION, SOLUTION INTRAVENOUS CONTINUOUS
Status: DISCONTINUED | OUTPATIENT
Start: 2022-05-31 | End: 2022-06-13

## 2022-05-31 RX ORDER — PREGABALIN 25 MG/1
75 CAPSULE ORAL 3 TIMES DAILY
Status: DISCONTINUED | OUTPATIENT
Start: 2022-05-31 | End: 2022-05-31

## 2022-05-31 RX ORDER — HYDROCODONE BITARTRATE AND ACETAMINOPHEN 7.5; 325 MG/1; MG/1
1 TABLET ORAL EVERY 6 HOURS PRN
Status: DISCONTINUED | OUTPATIENT
Start: 2022-05-31 | End: 2022-06-14 | Stop reason: HOSPADM

## 2022-05-31 RX ADMIN — HYDROCODONE BITARTRATE AND ACETAMINOPHEN 1 TABLET: 7.5; 325 TABLET ORAL at 19:05

## 2022-05-31 RX ADMIN — GABAPENTIN 600 MG: 300 CAPSULE ORAL at 19:08

## 2022-05-31 RX ADMIN — SODIUM CHLORIDE: 900 INJECTION, SOLUTION INTRAVENOUS at 21:17

## 2022-05-31 RX ADMIN — ACYCLOVIR 400 MG: 800 TABLET ORAL at 21:17

## 2022-05-31 ASSESSMENT — PATIENT HEALTH QUESTIONNAIRE - PHQ9
SUM OF ALL RESPONSES TO PHQ QUESTIONS 1-9: 0
1. LITTLE INTEREST OR PLEASURE IN DOING THINGS: 0
2. FEELING DOWN, DEPRESSED OR HOPELESS: 0
SUM OF ALL RESPONSES TO PHQ9 QUESTIONS 1 & 2: 0

## 2022-05-31 ASSESSMENT — PAIN SCALES - GENERAL
PAINLEVEL_OUTOF10: 3
PAINLEVEL_OUTOF10: 7

## 2022-05-31 ASSESSMENT — PAIN DESCRIPTION - DESCRIPTORS: DESCRIPTORS: BURNING;TINGLING

## 2022-05-31 ASSESSMENT — PAIN DESCRIPTION - LOCATION: LOCATION: HAND;FOOT

## 2022-05-31 ASSESSMENT — PAIN DESCRIPTION - ORIENTATION: ORIENTATION: RIGHT;LEFT

## 2022-05-31 NOTE — PROGRESS NOTES
5/31/22 - Patient here for high dose eval.  No issues to note. Patient will be admitted today and receive Melphalan tomorrow (6/1/22) and stem cells on 6/2/22. Patient questions answered. Chemo consent, and [facility]. Melphalan dose verified and first signature provided by this RN.

## 2022-05-31 NOTE — H&P
Inpatient Hematology / Oncology History and Physical    Reason for Asmission:  multiple myeloma    History of Present Illness:  Ms. Viry Lipscomb is a 54 y.o. female admitted on 5/31/2022 with a primary diagnosis of There were no encounter diagnoses. .      82-year-old -American female history of prolactinemia, thyroid Hurthle cell neoplasm, lambda light chain multiple myeloma, ISS stage I, high risk disease (gain of 1 q.), bortezomib related neuropathy, iron deficiency in CR 1 after daratumumab based regimen. Patient seen in oncology office 5/31/2022. Patient collected CD34 at 6.24 mil/kg w granix alone. Reports feeling reasonably. Extensive ROS unremarkable. Review of Systems:  As mentioned above. All other systems reviewed in full and are unremarkable. Allergies   Allergen Reactions    Amoxicillin-Pot Clavulanate Other (See Comments)     Yeast infection  Yeast infection     Past Medical History:   Diagnosis Date    Obesity     Prolactin increased      Past Surgical History:   Procedure Laterality Date    IR BIOPSY PERC SUPERF BONE      IR TUNNELED CATHETER PLACEMENT GREATER THAN 5 YEARS  5/9/2022    IR TUNNELED CATHETER PLACEMENT GREATER THAN 5 YEARS  5/9/2022    IR TUNNELED CATHETER PLACEMENT GREATER THAN 5 YEARS 5/9/2022 [facility]     No family history on file.   Social History     Socioeconomic History    Marital status:      Spouse name: Not on file    Number of children: Not on file    Years of education: Not on file    Highest education level: Not on file   Occupational History    Not on file   Tobacco Use    Smoking status: Never Smoker    Smokeless tobacco: Never Used   Substance and Sexual Activity    Alcohol use: Never    Drug use: Never    Sexual activity: Not on file   Other Topics Concern    Not on file   Social History Narrative    Not on file     Social Determinants of Health     Financial Resource Strain:     Difficulty of Paying Living Expenses: Not on file   Food Insecurity:     Worried About Running Out of Food in the Last Year: Not on file    Jimi of Food in the Last Year: Not on file   Transportation Needs:     Lack of Transportation (Medical): Not on file    Lack of Transportation (Non-Medical): Not on file   Physical Activity:     Days of Exercise per Week: Not on file    Minutes of Exercise per Session: Not on file   Stress:     Feeling of Stress : Not on file   Social Connections:     Frequency of Communication with Friends and Family: Not on file    Frequency of Social Gatherings with Friends and Family: Not on file    Attends Protestant Services: Not on file    Active Member of [address] [place] [address] [employer] or Organizations: Not on file    Attends Club or Organization Meetings: Not on file    Marital Status: Not on file   Intimate Partner Violence:     Fear of Current or Ex-Partner: Not on file    Emotionally Abused: Not on file    Physically Abused: Not on file    Sexually Abused: Not on file   Housing Stability:     Unable to Pay for Housing in the Last Year: Not on file    Number of [place] in the Last Year: Not on file    Unstable Housing in the Last Year: Not on file     No current facility-administered medications for this encounter. OBJECTIVE:  Patient Vitals for the past 8 hrs:   BP Temp Temp src Pulse Resp   22 1715 139/85 98.1 °F (36.7 °C) Oral 87 20     Temp (24hrs), Av.1 °F (36.7 °C), Min:98 °F (36.7 °C), Max:98.1 °F (36.7 °C)    No intake/output data recorded. Physical Exam:  Constitutional: Well developed, well nourished female in no acute distress, sitting comfortably in the hospital bed. HEENT: Normocephalic and atraumatic. Oropharynx is clear, mucous membranes are moist.  Pupils are equal, round, and reactive to light. Extraocular muscles are intact. Sclerae anicteric. Neck supple without JVD. No thyromegaly present.     Lymph node   No palpable submandibular, cervical, supraclavicular, axillary or inguinal lymph nodes. Skin Warm and dry. No bruising and no rash noted. No erythema. No pallor. Respiratory Lungs are clear to auscultation bilaterally without wheezes, rales or rhonchi, normal air exchange without accessory muscle use. CVS Normal rate, regular rhythm and normal S1 and S2. No murmurs, gallops, or rubs. Abdomen Soft, nontender and nondistended, normoactive bowel sounds. No palpable mass. No hepatosplenomegaly. Neuro Grossly nonfocal with no obvious sensory or motor deficits. MSK Normal range of motion in general.  No edema and no tenderness. Psych Appropriate mood and affect.         Labs:    Recent Results (from the past 24 hour(s))   CBC with Auto Differential    Collection Time: 05/31/22  3:03 PM   Result Value Ref Range    WBC 4.1 (L) 4.3 - 11.1 K/uL    RBC 4.04 (L) 4.05 - 5.2 M/uL    Hemoglobin 11.0 (L) 11.7 - 15.4 g/dL    Hematocrit 34.7 (L) 35.8 - 46.3 %    MCV 85.9 79.6 - 97.8 FL    MCH 27.2 26.1 - 32.9 PG    MCHC 31.7 31.4 - 35.0 g/dL    RDW 19.3 (H) 11.9 - 14.6 %    Platelets 645 869 - 035 K/uL    MPV 10.0 9.4 - 12.3 FL    nRBC 0.00 0.0 - 0.2 K/uL    Seg Neutrophils 53 43 - 78 %    Lymphocytes 28 13 - 44 %    Monocytes 17 (H) 4.0 - 12.0 %    Eosinophils % 2 0.5 - 7.8 %    Basophils 0 0.0 - 2.0 %    Immature Granulocytes 0 0.0 - 5.0 %    Segs Absolute 2.2 1.7 - 8.2 K/UL    Absolute Lymph # 1.2 0.5 - 4.6 K/UL    Absolute Mono # 0.7 0.1 - 1.3 K/UL    Absolute Eos # 0.1 0.0 - 0.8 K/UL    Basophils Absolute 0.0 0.0 - 0.2 K/UL    Absolute Immature Granulocyte 0.0 0.0 - 0.5 K/UL    Differential Type AUTOMATED     Comprehensive Metabolic Panel    Collection Time: 05/31/22  3:03 PM   Result Value Ref Range    Sodium 142 136 - 145 mmol/L    Potassium 3.7 3.5 - 5.1 mmol/L    Chloride 108 (H) 98 - 107 mmol/L    CO2 29 21 - 32 mmol/L    Anion Gap 5 (L) 7 - 16 mmol/L    Glucose 93 65 - 100 mg/dL    BUN 12 6 - 23 MG/DL    CREATININE 0.70 0.6 - 1.0 MG/DL    GFR  >60 >60 ml/min/1.73m2    GFR Non- >60 >60 ml/min/1.73m2    Calcium 9.0 8.3 - 10.4 MG/DL    Total Bilirubin 0.2 0.2 - 1.1 MG/DL    ALT 15 12 - 65 U/L    AST 19 15 - 37 U/L    Alk Phosphatase 83 50 - 136 U/L    Total Protein 6.3 6.3 - 8.2 g/dL    Albumin 3.7 3.5 - 5.0 g/dL    Globulin 2.6 2.3 - 3.5 g/dL    Albumin/Globulin Ratio 1.4 1.2 - 3.5     Magnesium    Collection Time: 05/31/22  3:03 PM   Result Value Ref Range    Magnesium 2.3 1.8 - 2.4 mg/dL   Urinalysis    Collection Time: 05/31/22  3:03 PM   Result Value Ref Range    Color, UA YELLOW      Appearance CLEAR      Specific Gravity, UA >/= 1.030 1.001 - 1.023    pH, Urine 5.5 5.0 - 9.0      Protein, UA Negative NEG mg/dL    Glucose, UA Negative NEG mg/dL    Ketones, Urine TRACE (A) NEG mg/dL    Bilirubin Urine Negative NEG      Blood, Urine Negative NEG      Urobilinogen, Urine 1.0 0.2 - 1.0 EU/dL    Nitrite, Urine Negative NEG      Leukocyte Esterase, Urine Negative NEG     HCG Qualitative, Serum    Collection Time: 05/31/22  3:03 PM   Result Value Ref Range    HCG, Ql. Negative NEG         Imaging:  reviewed    ASSESSMENT:  51-year-old -American female history of prolactinemia, thyroid Hurthle cell neoplasm, lambda light chain multiple myeloma, ISS stage I, high risk disease (gain of 1 q.), bortezomib related neuropathy, iron deficiency in CR 1 after daratumumab based regimen. Patient seen in oncology office 5/31/2022. Patient collected CD34 at 6.24 mil/kg w granix alone. Reports feeling reasonably. Extensive ROS unremarkable. Await results of COVID-19 and flu screen. If negative, we will admit patient to oncology floor. PLAN:  - Okay to proceed with melphalan 200 mg per metered squared followed by stem cell reinfusion following day (CD34 3.12 mil/kg back). - Hydration and IV electrolyte replacement as needed. - Antiemetic as well as antidiarrheal support as needed. - Blood transfusion support transfusion as needed.     - Prophylactic antibiotics with Levaquin, Augmentin, acyclovir and Diflucan starting day +1.    - G-CSF support with Granix daily starting day +6. She will be hospitalized through engraftment. Lab studies and imaging studies (CT/CXR) were personally reviewed. Pertinent old records were reviewed. Thank you for allowing us to participate in the care of Ms. Prabhu Gong.               Vale Miller MD  [address] [place] [address]  Hematology Oncology  [address] [place] [address]  Office : [phone]  Fax : [phone]

## 2022-05-31 NOTE — PATIENT INSTRUCTIONS
Patient Instructions from Today's Visit    Reason for Visit:  High Dose Eval    Plan:  -We plan to start your high dose chemotherapy tomorrow. You will get 1 day of chemo and your transplant will be on the following day. -You will begin taking your oral antibiotics on Day +1 after transplant. These medications will be given to you in the hospital.  (levaquin, augmentin, diflucan and acyclovir)   -You will begin getting Granix injections on Day +6.    -Make sure you are doing mouth care (rinsing at least 4-6 times a day as well as brushing with soft bristle brush at least twice a day). It is also very important to make sure you take a shower or bath every single day. -You will be seen daily in the hospital daily.  -Your appointments will be spaced out a little once you engraft.    -Dr. Sandra Church or a nurse practitioner will see you daily in infusion, but we will see you back in the clinic around day +30. Your Chemotherapy Plan      Diagnosis: Multiple Myeloma    Goal of Therapy: ___ Palliative      _X_Curative         Name of Chemotherapy medications: Melphalan    Number of Cycles Planned: 1                  Length of Cycle:  1 day      Chemotherapy plan is subject to change at your provider's discretion    A copy of this plan has been discussed and given to you by Daisy Gallagher RN. Follow Up:   Follow up with Dr. Sandra Church after discharge    Recent Lab Results:  Hospital Outpatient Visit on 05/31/2022   Component Date Value Ref Range Status    WBC 05/31/2022 4.1* 4.3 - 11.1 K/uL Final    RBC 05/31/2022 4.04* 4.05 - 5.2 M/uL Final    Hemoglobin 05/31/2022 11.0* 11.7 - 15.4 g/dL Final    Hematocrit 05/31/2022 34.7* 35.8 - 46.3 % Final    MCV 05/31/2022 85.9  79.6 - 97.8 FL Final    MCH 05/31/2022 27.2  26.1 - 32.9 PG Final    MCHC 05/31/2022 31.7  31.4 - 35.0 g/dL Final    RDW 05/31/2022 19.3* 11.9 - 14.6 % Final    Platelets 65/67/9384 237  150 - 450 K/uL Final    MPV 05/31/2022 10.0  9.4 - 12.3 FL Final    nRBC 05/31/2022 0.00  0.0 - 0.2 K/uL Final    **Note: Absolute NRBC parameter is now reported with Hemogram**    Seg Neutrophils 05/31/2022 53  43 - 78 % Final    Lymphocytes 05/31/2022 28  13 - 44 % Final    Monocytes 05/31/2022 17* 4.0 - 12.0 % Final    Eosinophils % 05/31/2022 2  0.5 - 7.8 % Final    Basophils 05/31/2022 0  0.0 - 2.0 % Final    Immature Granulocytes 05/31/2022 0  0.0 - 5.0 % Final    Segs Absolute 05/31/2022 2.2  1.7 - 8.2 K/UL Final    Absolute Lymph # 05/31/2022 1.2  0.5 - 4.6 K/UL Final    Absolute Mono # 05/31/2022 0.7  0.1 - 1.3 K/UL Final    Absolute Eos # 05/31/2022 0.1  0.0 - 0.8 K/UL Final    Basophils Absolute 05/31/2022 0.0  0.0 - 0.2 K/UL Final    Absolute Immature Granulocyte 05/31/2022 0.0  0.0 - 0.5 K/UL Final    Differential Type 05/31/2022 AUTOMATED    Final    Sodium 05/31/2022 142  136 - 145 mmol/L Final    Potassium 05/31/2022 3.7  3.5 - 5.1 mmol/L Final    Chloride 05/31/2022 108* 98 - 107 mmol/L Final    CO2 05/31/2022 29  21 - 32 mmol/L Final    Anion Gap 05/31/2022 5* 7 - 16 mmol/L Final    Glucose 05/31/2022 93  65 - 100 mg/dL Final    BUN 05/31/2022 12  6 - 23 MG/DL Final    CREATININE 05/31/2022 0.70  0.6 - 1.0 MG/DL Final    GFR  05/31/2022 >60  >60 ml/min/1.73m2 Final    GFR Non- 05/31/2022 >60  >60 ml/min/1.73m2 Final    Comment:      Estimated GFR is calculated using the Modification of Diet in Renal Disease (MDRD) Study equation, reported for both  Americans (GFRAA) and non- Americans (GFRNA), and normalized to 1.73m2 body surface area. The physician must decide which value applies to the patient. The MDRD study equation should only be used in individuals age 25 or older. It has not been validated for the following: pregnant women, patients with serious comorbid conditions,or on certain medications, or persons with extremes of body size, muscle mass, or nutritional status.       Calcium 05/31/2022 9.0  8.3 - 10.4 MG/DL Final    Total Bilirubin 05/31/2022 0.2  0.2 - 1.1 MG/DL Final    ALT 05/31/2022 15  12 - 65 U/L Final    AST 05/31/2022 19  15 - 37 U/L Final    Alk Phosphatase 05/31/2022 83  50 - 136 U/L Final    Total Protein 05/31/2022 6.3  6.3 - 8.2 g/dL Final    Albumin 05/31/2022 3.7  3.5 - 5.0 g/dL Final    Globulin 05/31/2022 2.6  2.3 - 3.5 g/dL Final    Albumin/Globulin Ratio 05/31/2022 1.4  1.2 - 3.5   Final    Magnesium 05/31/2022 2.3  1.8 - 2.4 mg/dL Final    Color, UA 05/31/2022 YELLOW    Final    Appearance 05/31/2022 CLEAR    Final    Specific [place], UA 05/31/2022 >/= 1.030  1.001 - 1.023 Final    pH, Urine 05/31/2022 5.5  5.0 - 9.0   Final    Protein, UA 05/31/2022 Negative  NEG mg/dL Final    Glucose, UA 05/31/2022 Negative  NEG mg/dL Final    Ketones, Urine 05/31/2022 TRACE* NEG mg/dL Final    Bilirubin Urine 05/31/2022 Negative  NEG   Final    Blood, Urine 05/31/2022 Negative  NEG   Final    Urobilinogen, Urine 05/31/2022 1.0  0.2 - 1.0 EU/dL Final    Nitrite, Urine 05/31/2022 Negative  NEG   Final    Leukocyte Esterase, Urine 05/31/2022 Negative  NEG   Final           Treatment Summary has been discussed and given to patient: n/a        -------------------------------------------------------------------------------------------------------------------  Please call our office at [phone] if you have any  of the following symptoms:   · Fever of 100.5 or greater  · Chills  · Shortness of breath  · Swelling or pain in one leg    After office hours an answering service is available and will contact a provider for emergencies or if you are experiencing any of the above symptoms.  Patient did express an interest in My Chart. My Chart log in information explained on the after visit summary printout at the [facility] [address] desk.     Harmony Mullins RN  Hematology Navigator  [address] [place] [address]  Cell:  [phone]  Office: [phone] Fax: [phone]  Email:  [email or web address]     Navigator is available by phone Monday through Thursday 8 am to 5 pm.  If you need assistance after 5pm, on the weekend or on a Friday, please call [phone]. The answering service is available 24 hours a day, 7 days a week.

## 2022-06-01 LAB
ALBUMIN SERPL-MCNC: 3.3 G/DL (ref 3.5–5)
ALBUMIN/GLOB SERPL: 1.3 {RATIO} (ref 1.2–3.5)
ALP SERPL-CCNC: 78 U/L (ref 50–136)
ALT SERPL-CCNC: 13 U/L (ref 12–65)
ANION GAP SERPL CALC-SCNC: 5 MMOL/L (ref 7–16)
AST SERPL-CCNC: 13 U/L (ref 15–37)
BASOPHILS # BLD: 0 K/UL (ref 0–0.2)
BASOPHILS NFR BLD: 0 % (ref 0–2)
BILIRUB SERPL-MCNC: 0.4 MG/DL (ref 0.2–1.1)
BUN SERPL-MCNC: 10 MG/DL (ref 6–23)
CALCIUM SERPL-MCNC: 8.7 MG/DL (ref 8.3–10.4)
CHLORIDE SERPL-SCNC: 109 MMOL/L (ref 98–107)
CO2 SERPL-SCNC: 28 MMOL/L (ref 21–32)
CREAT SERPL-MCNC: 0.5 MG/DL (ref 0.6–1)
DIFFERENTIAL METHOD BLD: ABNORMAL
EOSINOPHIL # BLD: 0.1 K/UL (ref 0–0.8)
EOSINOPHIL NFR BLD: 3 % (ref 0.5–7.8)
ERYTHROCYTE [DISTWIDTH] IN BLOOD BY AUTOMATED COUNT: 19.3 % (ref 11.9–14.6)
GLOBULIN SER CALC-MCNC: 2.6 G/DL (ref 2.3–3.5)
GLUCOSE SERPL-MCNC: 87 MG/DL (ref 65–100)
HCT VFR BLD AUTO: 32.4 % (ref 35.8–46.3)
HGB BLD-MCNC: 10.1 G/DL (ref 11.7–15.4)
IMM GRANULOCYTES # BLD AUTO: 0 K/UL (ref 0–0.5)
IMM GRANULOCYTES NFR BLD AUTO: 0 % (ref 0–5)
KAPPA LC FREE SER-MCNC: 6.5 MG/L (ref 3.3–19.4)
KAPPA LC FREE/LAMBDA FREE SER: 0.29 {RATIO} (ref 0.26–1.65)
LAMBDA LC FREE SERPL-MCNC: 22.4 MG/L (ref 5.7–26.3)
LYMPHOCYTES # BLD: 1.3 K/UL (ref 0.5–4.6)
LYMPHOCYTES NFR BLD: 38 % (ref 13–44)
MCH RBC QN AUTO: 26.9 PG (ref 26.1–32.9)
MCHC RBC AUTO-ENTMCNC: 31.2 G/DL (ref 31.4–35)
MCV RBC AUTO: 86.4 FL (ref 79.6–97.8)
MONOCYTES # BLD: 0.6 K/UL (ref 0.1–1.3)
MONOCYTES NFR BLD: 16 % (ref 4–12)
NEUTS SEG # BLD: 1.5 K/UL (ref 1.7–8.2)
NEUTS SEG NFR BLD: 43 % (ref 43–78)
NRBC # BLD: 0 K/UL (ref 0–0.2)
PLATELET # BLD AUTO: 214 K/UL (ref 150–450)
PMV BLD AUTO: 9.9 FL (ref 9.4–12.3)
POTASSIUM SERPL-SCNC: 3.8 MMOL/L (ref 3.5–5.1)
PROT SERPL-MCNC: 5.9 G/DL (ref 6.3–8.2)
RBC # BLD AUTO: 3.75 M/UL (ref 4.05–5.2)
SARS-COV-2 RNA RESP QL NAA+PROBE: NOT DETECTED
SODIUM SERPL-SCNC: 142 MMOL/L (ref 136–145)
SOURCE: NORMAL
WBC # BLD AUTO: 3.5 K/UL (ref 4.3–11.1)

## 2022-06-01 PROCEDURE — 36592 COLLECT BLOOD FROM PICC: CPT

## 2022-06-01 PROCEDURE — APPSS60 APP SPLIT SHARED TIME 46-60 MINUTES: Performed by: NURSE PRACTITIONER

## 2022-06-01 PROCEDURE — 6370000000 HC RX 637 (ALT 250 FOR IP): Performed by: NURSE PRACTITIONER

## 2022-06-01 PROCEDURE — 6370000000 HC RX 637 (ALT 250 FOR IP): Performed by: INTERNAL MEDICINE

## 2022-06-01 PROCEDURE — 85025 COMPLETE CBC W/AUTO DIFF WBC: CPT

## 2022-06-01 PROCEDURE — 2580000003 HC RX 258: Performed by: NURSE PRACTITIONER

## 2022-06-01 PROCEDURE — 6360000002 HC RX W HCPCS: Performed by: INTERNAL MEDICINE

## 2022-06-01 PROCEDURE — 99233 SBSQ HOSP IP/OBS HIGH 50: CPT | Performed by: INTERNAL MEDICINE

## 2022-06-01 PROCEDURE — 1170000000 HC RM PRIVATE ONCOLOGY

## 2022-06-01 PROCEDURE — 2580000003 HC RX 258: Performed by: INTERNAL MEDICINE

## 2022-06-01 PROCEDURE — 80053 COMPREHEN METABOLIC PANEL: CPT

## 2022-06-01 PROCEDURE — APPNB15 APP NON BILLABLE TIME 0-15 MINS: Performed by: NURSE PRACTITIONER

## 2022-06-01 RX ORDER — DIPHENHYDRAMINE HYDROCHLORIDE 50 MG/ML
50 INJECTION INTRAMUSCULAR; INTRAVENOUS
Status: ACTIVE | OUTPATIENT
Start: 2022-06-01 | End: 2022-06-01

## 2022-06-01 RX ORDER — MEPERIDINE HYDROCHLORIDE 25 MG/ML
12.5 INJECTION INTRAMUSCULAR; INTRAVENOUS; SUBCUTANEOUS PRN
Status: DISCONTINUED | OUTPATIENT
Start: 2022-06-01 | End: 2022-06-03 | Stop reason: SDUPTHER

## 2022-06-01 RX ORDER — ONDANSETRON 2 MG/ML
8 INJECTION INTRAMUSCULAR; INTRAVENOUS ONCE
Status: COMPLETED | OUTPATIENT
Start: 2022-06-01 | End: 2022-06-01

## 2022-06-01 RX ADMIN — ACYCLOVIR 400 MG: 800 TABLET ORAL at 21:47

## 2022-06-01 RX ADMIN — GABAPENTIN 600 MG: 300 CAPSULE ORAL at 14:40

## 2022-06-01 RX ADMIN — HYDROCODONE BITARTRATE AND ACETAMINOPHEN 1 TABLET: 7.5; 325 TABLET ORAL at 22:42

## 2022-06-01 RX ADMIN — MELPHALAN HYDROCHLORIDE 340 MG: KIT INTRAVENOUS at 11:20

## 2022-06-01 RX ADMIN — DULOXETINE HYDROCHLORIDE 30 MG: 30 CAPSULE, DELAYED RELEASE ORAL at 08:26

## 2022-06-01 RX ADMIN — SODIUM CHLORIDE: 900 INJECTION, SOLUTION INTRAVENOUS at 10:18

## 2022-06-01 RX ADMIN — HYDROCODONE BITARTRATE AND ACETAMINOPHEN 1 TABLET: 7.5; 325 TABLET ORAL at 10:18

## 2022-06-01 RX ADMIN — GABAPENTIN 600 MG: 300 CAPSULE ORAL at 08:26

## 2022-06-01 RX ADMIN — HYDROCODONE BITARTRATE AND ACETAMINOPHEN 1 TABLET: 7.5; 325 TABLET ORAL at 16:29

## 2022-06-01 RX ADMIN — HYDROCODONE BITARTRATE AND ACETAMINOPHEN 1 TABLET: 7.5; 325 TABLET ORAL at 04:28

## 2022-06-01 RX ADMIN — FOSAPREPITANT 150 MG: 150 INJECTION, POWDER, LYOPHILIZED, FOR SOLUTION INTRAVENOUS at 10:37

## 2022-06-01 RX ADMIN — ONDANSETRON 8 MG: 2 INJECTION INTRAMUSCULAR; INTRAVENOUS at 10:40

## 2022-06-01 RX ADMIN — GABAPENTIN 600 MG: 300 CAPSULE ORAL at 21:47

## 2022-06-01 RX ADMIN — ACYCLOVIR 400 MG: 800 TABLET ORAL at 08:26

## 2022-06-01 RX ADMIN — DEXAMETHASONE SODIUM PHOSPHATE 12 MG: 4 INJECTION, SOLUTION INTRAMUSCULAR; INTRAVENOUS at 10:37

## 2022-06-01 ASSESSMENT — PAIN DESCRIPTION - ONSET
ONSET: ON-GOING
ONSET: ON-GOING

## 2022-06-01 ASSESSMENT — PAIN DESCRIPTION - LOCATION
LOCATION: FOOT
LOCATION_2: HAND
LOCATION: HAND
LOCATION: FOOT
LOCATION_2: FOOT
LOCATION: FOOT

## 2022-06-01 ASSESSMENT — PAIN - FUNCTIONAL ASSESSMENT
PAIN_FUNCTIONAL_ASSESSMENT: ACTIVITIES ARE NOT PREVENTED
PAIN_FUNCTIONAL_ASSESSMENT: ACTIVITIES ARE NOT PREVENTED

## 2022-06-01 ASSESSMENT — PAIN SCALES - GENERAL
PAINLEVEL_OUTOF10: 0
PAINLEVEL_OUTOF10: 7
PAINLEVEL_OUTOF10: 0
PAINLEVEL_OUTOF10: 7
PAINLEVEL_OUTOF10: 2
PAINLEVEL_OUTOF10: 8
PAINLEVEL_OUTOF10: 0
PAINLEVEL_OUTOF10: 7
PAINLEVEL_OUTOF10: 0
PAINLEVEL_OUTOF10: 2
PAINLEVEL_OUTOF10: 3

## 2022-06-01 ASSESSMENT — PAIN DESCRIPTION - ORIENTATION
ORIENTATION: RIGHT;LEFT
ORIENTATION: RIGHT;LEFT
ORIENTATION_2: RIGHT;LEFT
ORIENTATION: RIGHT;LEFT
ORIENTATION_2: RIGHT;LEFT
ORIENTATION: RIGHT;LEFT

## 2022-06-01 ASSESSMENT — PAIN DESCRIPTION - DESCRIPTORS
DESCRIPTORS: NUMBNESS;ACHING;TINGLING
DESCRIPTORS: PINS AND NEEDLES;NUMBNESS;TINGLING
DESCRIPTORS: ACHING

## 2022-06-01 ASSESSMENT — PAIN DESCRIPTION - FREQUENCY
FREQUENCY: INTERMITTENT
FREQUENCY: INTERMITTENT

## 2022-06-01 ASSESSMENT — PAIN DESCRIPTION - PAIN TYPE
TYPE: CHRONIC PAIN
TYPE: CHRONIC PAIN

## 2022-06-01 ASSESSMENT — PAIN SCALES - WONG BAKER: WONGBAKER_NUMERICALRESPONSE: 0

## 2022-06-01 NOTE — PLAN OF CARE
Problem: Pain  Goal: Verbalizes/displays adequate comfort level or baseline comfort level  Recent Flowsheet Documentation  Taken 6/1/2022 0428 by Jae Manuel RN  Verbalizes/displays adequate comfort level or baseline comfort level:   Encourage patient to monitor pain and request assistance   Assess pain using appropriate pain scale   Administer analgesics based on type and severity of pain and evaluate response   Implement non-pharmacological measures as appropriate and evaluate response   Consider cultural and social influences on pain and pain management   Notify Licensed Independent Practitioner if interventions unsuccessful or patient reports new pain

## 2022-06-01 NOTE — PROGRESS NOTES
[facility] Hematology & Oncology        Inpatient Hematology / Oncology Progress Note    Reason for Admission:  Stem cell transplant candidate [Z76.82]    24 Hour Events:  Afebrile, VSS  Day -1 Melphalan/Auto  Feeling well, no complaints    ROS:  Constitutional: Negative for fever, chills, weakness, malaise, fatigue. CV: Negative for chest pain, palpitations, edema. Respiratory: Negative for dyspnea, cough, wheezing. GI: Negative for nausea, abdominal pain, diarrhea. 10 point review of systems is otherwise negative with the exception of the elements mentioned above in the HPI. Allergies   Allergen Reactions    Amoxicillin-Pot Clavulanate Other (See Comments)     Yeast infection  Yeast infection     Past Medical History:   Diagnosis Date    Obesity     Prolactin increased      Past Surgical History:   Procedure Laterality Date    IR BIOPSY PERC SUPERF BONE      IR TUNNELED CATHETER PLACEMENT GREATER THAN 5 YEARS  5/9/2022    IR TUNNELED CATHETER PLACEMENT GREATER THAN 5 YEARS  5/9/2022    IR TUNNELED CATHETER PLACEMENT GREATER THAN 5 YEARS 5/9/2022 [facility]IOLOG[facility] SPECIALS     No family history on file.   Social History     Socioeconomic History    Marital status:      Spouse name: Not on file    Number of children: Not on file    Years of education: Not on file    Highest education level: Not on file   Occupational History    Not on file   Tobacco Use    Smoking status: Never Smoker    Smokeless tobacco: Never Used   Substance and Sexual Activity    Alcohol use: Never    Drug use: Never    Sexual activity: Not on file   Other Topics Concern    Not on file   Social History Narrative    Not on file     Social Determinants of Health     Financial Resource Strain:     Difficulty of Paying Living Expenses: Not on file   Food Insecurity:     Worried About Running Out of Food in the Last Year: Not on file    Jimi of Food in the Last Year: Not on file   Transportation Needs:     Lack of Transportation (Medical): Not on file    Lack of Transportation (Non-Medical):  Not on file   Physical Activity:     Days of Exercise per Week: Not on file    Minutes of Exercise per Session: Not on file   Stress:     Feeling of Stress : Not on file   Social Connections:     Frequency of Communication with Friends and Family: Not on file    Frequency of Social Gatherings with Friends and Family: Not on file    Attends Shinto Services: Not on file    Active Member of [address] [place] [address] or Organizations: Not on file    Attends Club or Organization Meetings: Not on file    Marital Status: Not on file   Intimate Partner Violence:     Fear of Current or Ex-Partner: Not on file    Emotionally Abused: Not on file    Physically Abused: Not on file    Sexually Abused: Not on file   Housing Stability:     Unable to Pay for Housing in the Last Year: Not on file    Number of [place] in the Last Year: Not on file    Unstable Housing in the Last Year: Not on file     Current Facility-Administered Medications   Medication Dose Route Frequency Provider Last Rate Last Admin    famotidine (PEPCID) 20 mg in sodium chloride (PF) 10 mL injection  20 mg IntraVENous Once PRN Nathanael Hurley MD        hydrocortisone sodium succinate PF (SOLU-CORTEF) injection 100 mg  100 mg IntraVENous Once PRN Nathanael Hurley MD        meperidine (DEMEROL) injection 12.5 mg  12.5 mg IntraVENous PRN Nathanael Hurley MD        diphenhydrAMINE (BENADRYL) injection 50 mg  50 mg IntraVENous Once PRN Nathanael Hurley MD        DULoxetine (CYMBALTA) extended release capsule 30 mg  30 mg Oral Daily BEE Ricks NP   30 mg at 06/01/22 0826    HYDROcodone-acetaminophen (NORCO) 5-325 MG per tablet 1 tablet  1 tablet Oral Q4H PRN BEE Ricks - NP        HYDROcodone-acetaminophen (NORCO) 7.5-325 MG per tablet 1 tablet  1 tablet Oral Q6H PRN BEE Ricks NP   1 tablet at 06/01/22 1018    polyethylene glycol (GLYCOLAX) packet 17 g  17 g Oral Daily PRN Srinivas BEE Buenrostro NP        0.9 % sodium chloride infusion   IntraVENous Continuous Srinivas NolandBEE smalls NP 75 mL/hr at 22 1018 New Bag at 22 1018    acyclovir (ZOVIRAX) tablet 400 mg  400 mg Oral BID Srinivas NievesBEE pierre NP   400 mg at 22 0826    [Held by provider] levoFLOXacin (LEVAQUIN) tablet 500 mg  500 mg Oral Daily MaBEE Burton NP        [Held by provider] fluconazole (DIFLUCAN) tablet 200 mg  200 mg Oral Daily Srinivas BEE Buenrostro NP        gabapentin (NEURONTIN) capsule 600 mg  600 mg Oral TID Jennifer Kelly MD   600 mg at 22 8161       OBJECTIVE:  Patient Vitals for the past 8 hrs:   BP Temp Temp src Pulse Resp SpO2 Height Weight   22 1115 (!) 138/58 98.8 °F (37.1 °C) Axillary 87 18 99 % -- --   22 1018 -- -- -- -- 18 -- -- --   22 0830 -- -- -- -- -- -- 5' 2\" (1.575 m) 232 lb 4.8 oz (105.4 kg)   22 0730 117/74 97.9 °F (36.6 °C) Oral 96 18 98 % -- --     Temp (24hrs), Av.4 °F (36.9 °C), Min:97.9 °F (36.6 °C), Max:99 °F (37.2 °C)    701 -  1900  In: 0 [P.O.:360; I.V.:189]  Out: 1550 [Urine:1550]    Physical Exam:  Constitutional: Well developed, well nourished female in no acute distress, sitting comfortably in the hospital bed. HEENT: Normocephalic and atraumatic. Oropharynx is clear, mucous membranes are moist.  Pupils are equal, round, and reactive to light. Extraocular muscles are intact. Sclerae anicteric. Neck supple without JVD. No thyromegaly present. Lymph node   No palpable submandibular, cervical, supraclavicular, axillary or inguinal lymph nodes. Skin Warm and dry. No bruising and no rash noted. No erythema. No pallor. Respiratory Lungs are clear to auscultation bilaterally without wheezes, rales or rhonchi, normal air exchange without accessory muscle use. CVS Normal rate, regular rhythm and normal S1 and S2. No murmurs, gallops, or rubs.    Abdomen Soft, nontender and nondistended, normoactive bowel sounds. No palpable mass. No hepatosplenomegaly. Neuro Grossly nonfocal with no obvious sensory or motor deficits. MSK Normal range of motion in general.  No edema and no tenderness. Psych Appropriate mood and affect.         Labs:    Recent Results (from the past 24 hour(s))   CBC with Auto Differential    Collection Time: 05/31/22  3:03 PM   Result Value Ref Range    WBC 4.1 (L) 4.3 - 11.1 K/uL    RBC 4.04 (L) 4.05 - 5.2 M/uL    Hemoglobin 11.0 (L) 11.7 - 15.4 g/dL    Hematocrit 34.7 (L) 35.8 - 46.3 %    MCV 85.9 79.6 - 97.8 FL    MCH 27.2 26.1 - 32.9 PG    MCHC 31.7 31.4 - 35.0 g/dL    RDW 19.3 (H) 11.9 - 14.6 %    Platelets 767 727 - 252 K/uL    MPV 10.0 9.4 - 12.3 FL    nRBC 0.00 0.0 - 0.2 K/uL    Seg Neutrophils 53 43 - 78 %    Lymphocytes 28 13 - 44 %    Monocytes 17 (H) 4.0 - 12.0 %    Eosinophils % 2 0.5 - 7.8 %    Basophils 0 0.0 - 2.0 %    Immature Granulocytes 0 0.0 - 5.0 %    Segs Absolute 2.2 1.7 - 8.2 K/UL    Absolute Lymph # 1.2 0.5 - 4.6 K/UL    Absolute Mono # 0.7 0.1 - 1.3 K/UL    Absolute Eos # 0.1 0.0 - 0.8 K/UL    Basophils Absolute 0.0 0.0 - 0.2 K/UL    Absolute Immature Granulocyte 0.0 0.0 - 0.5 K/UL    Differential Type AUTOMATED     Comprehensive Metabolic Panel    Collection Time: 05/31/22  3:03 PM   Result Value Ref Range    Sodium 142 136 - 145 mmol/L    Potassium 3.7 3.5 - 5.1 mmol/L    Chloride 108 (H) 98 - 107 mmol/L    CO2 29 21 - 32 mmol/L    Anion Gap 5 (L) 7 - 16 mmol/L    Glucose 93 65 - 100 mg/dL    BUN 12 6 - 23 MG/DL    CREATININE 0.70 0.6 - 1.0 MG/DL    GFR African American >60 >60 ml/min/1.73m2    GFR Non- >60 >60 ml/min/1.73m2    Calcium 9.0 8.3 - 10.4 MG/DL    Total Bilirubin 0.2 0.2 - 1.1 MG/DL    ALT 15 12 - 65 U/L    AST 19 15 - 37 U/L    Alk Phosphatase 83 50 - 136 U/L    Total Protein 6.3 6.3 - 8.2 g/dL    Albumin 3.7 3.5 - 5.0 g/dL    Globulin 2.6 2.3 - 3.5 g/dL    Albumin/Globulin Ratio 1.4 1.2 - 3.5     Magnesium Collection Time: 05/31/22  3:03 PM   Result Value Ref Range    Magnesium 2.3 1.8 - 2.4 mg/dL   Urinalysis    Collection Time: 05/31/22  3:03 PM   Result Value Ref Range    Color, UA YELLOW      Appearance CLEAR      Specific Gravity, UA >/= 1.030 1.001 - 1.023    pH, Urine 5.5 5.0 - 9.0      Protein, UA Negative NEG mg/dL    Glucose, UA Negative NEG mg/dL    Ketones, Urine TRACE (A) NEG mg/dL    Bilirubin Urine Negative NEG      Blood, Urine Negative NEG      Urobilinogen, Urine 1.0 0.2 - 1.0 EU/dL    Nitrite, Urine Negative NEG      Leukocyte Esterase, Urine Negative NEG     HCG Qualitative, Serum    Collection Time: 05/31/22  3:03 PM   Result Value Ref Range    HCG, Ql. Negative NEG     Rapid influenza A/B antigens    Collection Time: 05/31/22  3:50 PM    Specimen: Nasal Washing   Result Value Ref Range    Influenza A Ag Negative NEG      Influenza B Ag Negative NEG      Source Nasopharyngeal     COVID-19, Rapid    Collection Time: 05/31/22  3:52 PM    Specimen: Nasopharyngeal   Result Value Ref Range    Source Nasopharyngeal      SARS-CoV-2, Rapid Not detected NOTD     COVID-19    Collection Time: 05/31/22  3:52 PM    Specimen: Nasopharyngeal   Result Value Ref Range    Source Nasopharyngeal      SARS-CoV-2, PCR Not detected NOTD     Comprehensive Metabolic Panel w/ Reflex to MG    Collection Time: 06/01/22  2:54 AM   Result Value Ref Range    Sodium 142 136 - 145 mmol/L    Potassium 3.8 3.5 - 5.1 mmol/L    Chloride 109 (H) 98 - 107 mmol/L    CO2 28 21 - 32 mmol/L    Anion Gap 5 (L) 7 - 16 mmol/L    Glucose 87 65 - 100 mg/dL    BUN 10 6 - 23 MG/DL    CREATININE 0.50 (L) 0.6 - 1.0 MG/DL    GFR African American >60 >60 ml/min/1.73m2    GFR Non- >60 >60 ml/min/1.73m2    Calcium 8.7 8.3 - 10.4 MG/DL    Total Bilirubin 0.4 0.2 - 1.1 MG/DL    ALT 13 12 - 65 U/L    AST 13 (L) 15 - 37 U/L    Alk Phosphatase 78 50 - 136 U/L    Total Protein 5.9 (L) 6.3 - 8.2 g/dL    Albumin 3.3 (L) 3.5 - 5.0 g/dL Globulin 2.6 2.3 - 3.5 g/dL    Albumin/Globulin Ratio 1.3 1.2 - 3.5     CBC with Auto Differential    Collection Time: 06/01/22  2:54 AM   Result Value Ref Range    WBC 3.5 (L) 4.3 - 11.1 K/uL    RBC 3.75 (L) 4.05 - 5.2 M/uL    Hemoglobin 10.1 (L) 11.7 - 15.4 g/dL    Hematocrit 32.4 (L) 35.8 - 46.3 %    MCV 86.4 79.6 - 97.8 FL    MCH 26.9 26.1 - 32.9 PG    MCHC 31.2 (L) 31.4 - 35.0 g/dL    RDW 19.3 (H) 11.9 - 14.6 %    Platelets 988 727 - 127 K/uL    MPV 9.9 9.4 - 12.3 FL    nRBC 0.00 0.0 - 0.2 K/uL    Differential Type AUTOMATED      Seg Neutrophils 43 43 - 78 %    Lymphocytes 38 13 - 44 %    Monocytes 16 (H) 4.0 - 12.0 %    Eosinophils % 3 0.5 - 7.8 %    Basophils 0 0.0 - 2.0 %    Immature Granulocytes 0 0.0 - 5.0 %    Segs Absolute 1.5 (L) 1.7 - 8.2 K/UL    Absolute Lymph # 1.3 0.5 - 4.6 K/UL    Absolute Mono # 0.6 0.1 - 1.3 K/UL    Absolute Eos # 0.1 0.0 - 0.8 K/UL    Basophils Absolute 0.0 0.0 - 0.2 K/UL    Absolute Immature Granulocyte 0.0 0.0 - 0.5 K/UL       Imaging:  n/a    ASSESSMENT:  Patient Active Problem List   Diagnosis    Bone marrow transplant candidate    Multiple myeloma not having achieved remission ([facility] [address]    Stem cell transplant candidate     Ms. Madilyn Brittle is a 54 y.o. female admitted on 5/31/2022 with a primary diagnosis of There were no encounter diagnoses. .       29-year-old -American female history of prolactinemia, thyroid Hurthle cell neoplasm, lambda light chain multiple myeloma, ISS stage I, high risk disease (gain of 1 q.), bortezomib related neuropathy, iron deficiency in CR 1 after daratumumab based regimen. Patient seen in oncology office 5/31/2022. Patient collected CD34 at 6.24 mil/kg w granix alone. Reports feeling reasonably. Extensive ROS unremarkable. PLAN:  Multiple myeloma  - Okay to proceed with melphalan 200 mg per metered squared followed by stem cell reinfusion following day (CD34 3.12 mil/kg back). - Hydration and IV electrolyte replacement as needed.   - Antiemetic as well as antidiarrheal support as needed. - Blood transfusion support transfusion as needed. - Prophylactic antibiotics with Levaquin, Augmentin, acyclovir and Diflucan starting day +1.    - G-CSF support with Nivestym daily starting day +6.    6/1 Day -1. Melphalan today, stem cells tomorrow. Continue home meds  Vamshi SOPs  Lovenox for DVT ppx (hold for plt <50k)    Goals and plan of care reviewed with the patient. All questions answered to the best of our ability. Disposition:  Anticipate discharge home after engraftment. Oneida Guerrero, BEE - [address] Hematology & Oncology  [address] [address]  Office : [phone]  Fax : [phone]       Attending Addendum:  I have personally performed a face to face diagnostic evaluation on this patient. I have reviewed and agree with the care plan as documented above by  Christian Key N.P.  My findings are as follows: Patient appears stable, heart rate regular without murmurs, abdomen is non-tender, bowel sounds are positive. 57-year-old -American female history of prolactinemia, thyroid Hurthle cell neoplasm, lambda light chain multiple myeloma, ISS stage I, high risk disease (gain of 1 q.), bortezomib related neuropathy, iron deficiency in CR 1 after daratumumab based regimen. Patient seen in oncology office 5/31/2022. Patient collected CD34 at 6.24 mil/kg w granix alone. Hospitalized for melphalan 200 mg per metered squared followed by stem cell reinfusion following day (CD34 3.12 mil/kg back). D-1 today Hydration and IV electrolyte replacement as needed. Antiemetic as well as antidiarrheal support as needed. Blood transfusion support transfusion as needed. Prophylactic antibiotics with Levaquin, Augmentin, acyclovir and Diflucan starting day +1.  G-CSF support with Granix daily starting day +6. She will be hospitalized through engraftment.                 Jb Mcneal MD  [facility] Hematology/Oncology  [address] [place] [address] Avenue  Office : [phone]  Fax : [phone]

## 2022-06-01 NOTE — PROGRESS NOTES
's initial visit to explore spiritual needs and convey care and concern. Mrs. Joesph Spivey immediately engaged in energetic conversation. She welcomed the visit and I offered spiritual interventions including active listening, affirmation of maricel & emotions, exploration of coping skills & support system, and prayer as requested. Mrs. Joesph Spivey is looking forward to her bone marrow transplant. She has tremendous family/community support and her maricel in God is a significant resource for coping. She expressed gratitude for the visit and welcomed follow-up. Chaplains remain available for follow-up care.     Alex 86, Bishop [address]  Board Certified

## 2022-06-01 NOTE — PROGRESS NOTES
Diagnosis: Multiple Myeloma  Transplant Date: on 6/2/22  Day: D-1  Chemo regimen used: Melphalan  Labs not resulted that need follow-up: Kappa-lambda free chains done 5/31/22  BMT assessments and toxicities completed. 5/31/22  WBC/ANC= 3.5/  Prophylactic medications start on D+1  Toxicities greater than 2 : none  Patient seen by MD/NP daily while inpt & until engraftment. New orders received: no new orders  Issues: no new issues    END OF SHIFT:    Shift Summary:    Mouth care instructed;compliant. IVF infusing. Norco x1 for BUE/BLE pain. No BM overnight;pending c diff collection. For chemo Melphalan today. I/Os:    I/O this shift:  In: 1083 [P.O.:480; I.V.:603]  Out: 400 [Urine:400]  No intake/output data recorded.     Nino Duke RN

## 2022-06-01 NOTE — PROGRESS NOTES
Diagnosis: MM  Transplant Date:6/2/22  Day: (+/-) -1  Chemo regimen used: Melphalan  BMT assessments and toxicities completed. WBC/ANC= 3.5/1.5  Toxicities greater than 2 :0  Patient seen by MD/NPuntil engraftment.   New orders received: None  Issues:None

## 2022-06-02 LAB
ALBUMIN SERPL-MCNC: 3.4 G/DL (ref 3.5–5)
ALBUMIN/GLOB SERPL: 1.2 {RATIO} (ref 1.2–3.5)
ALP SERPL-CCNC: 79 U/L (ref 50–136)
ALT SERPL-CCNC: 13 U/L (ref 12–65)
ANION GAP SERPL CALC-SCNC: 7 MMOL/L (ref 7–16)
AST SERPL-CCNC: 16 U/L (ref 15–37)
BASOPHILS # BLD: 0 K/UL (ref 0–0.2)
BASOPHILS NFR BLD: 0 % (ref 0–2)
BILIRUB SERPL-MCNC: 0.3 MG/DL (ref 0.2–1.1)
BUN SERPL-MCNC: 9 MG/DL (ref 6–23)
CALCIUM SERPL-MCNC: 8.6 MG/DL (ref 8.3–10.4)
CHLORIDE SERPL-SCNC: 108 MMOL/L (ref 98–107)
CO2 SERPL-SCNC: 25 MMOL/L (ref 21–32)
CREAT SERPL-MCNC: 0.6 MG/DL (ref 0.6–1)
DIFFERENTIAL METHOD BLD: ABNORMAL
EOSINOPHIL # BLD: 0 K/UL (ref 0–0.8)
EOSINOPHIL NFR BLD: 0 % (ref 0.5–7.8)
ERYTHROCYTE [DISTWIDTH] IN BLOOD BY AUTOMATED COUNT: 19.4 % (ref 11.9–14.6)
GLOBULIN SER CALC-MCNC: 2.9 G/DL (ref 2.3–3.5)
GLUCOSE SERPL-MCNC: 127 MG/DL (ref 65–100)
HCT VFR BLD AUTO: 33.5 % (ref 35.8–46.3)
HGB BLD-MCNC: 10.4 G/DL (ref 11.7–15.4)
IMM GRANULOCYTES # BLD AUTO: 0 K/UL (ref 0–0.5)
IMM GRANULOCYTES NFR BLD AUTO: 1 % (ref 0–5)
LYMPHOCYTES # BLD: 0.4 K/UL (ref 0.5–4.6)
LYMPHOCYTES NFR BLD: 9 % (ref 13–44)
MCH RBC QN AUTO: 26.7 PG (ref 26.1–32.9)
MCHC RBC AUTO-ENTMCNC: 31 G/DL (ref 31.4–35)
MCV RBC AUTO: 85.9 FL (ref 79.6–97.8)
MONOCYTES # BLD: 0.2 K/UL (ref 0.1–1.3)
MONOCYTES NFR BLD: 4 % (ref 4–12)
NEUTS SEG # BLD: 3.7 K/UL (ref 1.7–8.2)
NEUTS SEG NFR BLD: 86 % (ref 43–78)
NRBC # BLD: 0 K/UL (ref 0–0.2)
PLATELET # BLD AUTO: 230 K/UL (ref 150–450)
PMV BLD AUTO: 10.6 FL (ref 9.4–12.3)
POTASSIUM SERPL-SCNC: 4.4 MMOL/L (ref 3.5–5.1)
PROT SERPL-MCNC: 6.3 G/DL (ref 6.3–8.2)
RBC # BLD AUTO: 3.9 M/UL (ref 4.05–5.2)
SODIUM SERPL-SCNC: 140 MMOL/L (ref 136–145)
WBC # BLD AUTO: 4.3 K/UL (ref 4.3–11.1)

## 2022-06-02 PROCEDURE — 85025 COMPLETE CBC W/AUTO DIFF WBC: CPT

## 2022-06-02 PROCEDURE — 6370000000 HC RX 637 (ALT 250 FOR IP): Performed by: NURSE PRACTITIONER

## 2022-06-02 PROCEDURE — 36592 COLLECT BLOOD FROM PICC: CPT

## 2022-06-02 PROCEDURE — 1170000000 HC RM PRIVATE ONCOLOGY

## 2022-06-02 PROCEDURE — 6370000000 HC RX 637 (ALT 250 FOR IP): Performed by: INTERNAL MEDICINE

## 2022-06-02 PROCEDURE — 6360000002 HC RX W HCPCS: Performed by: INTERNAL MEDICINE

## 2022-06-02 PROCEDURE — 30233Y0 TRANSFUSION OF AUTOLOGOUS HEMATOPOIETIC STEM CELLS INTO PERIPHERAL VEIN, PERCUTANEOUS APPROACH: ICD-10-PCS | Performed by: INTERNAL MEDICINE

## 2022-06-02 PROCEDURE — 99233 SBSQ HOSP IP/OBS HIGH 50: CPT | Performed by: INTERNAL MEDICINE

## 2022-06-02 PROCEDURE — 2580000003 HC RX 258: Performed by: NURSE PRACTITIONER

## 2022-06-02 PROCEDURE — 80053 COMPREHEN METABOLIC PANEL: CPT

## 2022-06-02 PROCEDURE — APPSS60 APP SPLIT SHARED TIME 46-60 MINUTES: Performed by: NURSE PRACTITIONER

## 2022-06-02 RX ORDER — LORAZEPAM 2 MG/ML
0.5 INJECTION INTRAMUSCULAR ONCE
Status: COMPLETED | OUTPATIENT
Start: 2022-06-02 | End: 2022-06-02

## 2022-06-02 RX ORDER — DIPHENHYDRAMINE HYDROCHLORIDE 50 MG/ML
50 INJECTION INTRAMUSCULAR; INTRAVENOUS
Status: ACTIVE | OUTPATIENT
Start: 2022-06-02 | End: 2022-06-02

## 2022-06-02 RX ORDER — ACETAMINOPHEN 325 MG/1
650 TABLET ORAL
Status: ACTIVE | OUTPATIENT
Start: 2022-06-02 | End: 2022-06-02

## 2022-06-02 RX ORDER — ONDANSETRON 2 MG/ML
4 INJECTION INTRAMUSCULAR; INTRAVENOUS EVERY 4 HOURS PRN
Status: DISCONTINUED | OUTPATIENT
Start: 2022-06-02 | End: 2022-06-14 | Stop reason: HOSPADM

## 2022-06-02 RX ORDER — ACETAMINOPHEN 325 MG/1
650 TABLET ORAL ONCE
Status: COMPLETED | OUTPATIENT
Start: 2022-06-02 | End: 2022-06-02

## 2022-06-02 RX ORDER — NEOMYCIN SULFATE, POLYMYXIN B SULFATE AND BACITRACIN ZINC 3.5; 10000; 4 MG/G; [USP'U]/G; [USP'U]/G
OINTMENT OPHTHALMIC 3 TIMES DAILY
Status: DISCONTINUED | OUTPATIENT
Start: 2022-06-02 | End: 2022-06-14 | Stop reason: HOSPADM

## 2022-06-02 RX ORDER — AMOXICILLIN AND CLAVULANATE POTASSIUM 875; 125 MG/1; MG/1
1 TABLET, FILM COATED ORAL EVERY 12 HOURS SCHEDULED
Status: DISCONTINUED | OUTPATIENT
Start: 2022-06-03 | End: 2022-06-14

## 2022-06-02 RX ORDER — MEPERIDINE HYDROCHLORIDE 25 MG/ML
12.5 INJECTION INTRAMUSCULAR; INTRAVENOUS; SUBCUTANEOUS PRN
Status: DISCONTINUED | OUTPATIENT
Start: 2022-06-02 | End: 2022-06-14 | Stop reason: HOSPADM

## 2022-06-02 RX ORDER — SODIUM CHLORIDE 9 MG/ML
INJECTION, SOLUTION INTRAVENOUS CONTINUOUS
Status: ACTIVE | OUTPATIENT
Start: 2022-06-02 | End: 2022-06-03

## 2022-06-02 RX ORDER — DIPHENHYDRAMINE HYDROCHLORIDE 50 MG/ML
25 INJECTION INTRAMUSCULAR; INTRAVENOUS ONCE
Status: COMPLETED | OUTPATIENT
Start: 2022-06-02 | End: 2022-06-02

## 2022-06-02 RX ORDER — PROCHLORPERAZINE EDISYLATE 5 MG/ML
5 INJECTION INTRAMUSCULAR; INTRAVENOUS EVERY 6 HOURS PRN
Status: DISCONTINUED | OUTPATIENT
Start: 2022-06-02 | End: 2022-06-14 | Stop reason: HOSPADM

## 2022-06-02 RX ORDER — ONDANSETRON 2 MG/ML
8 INJECTION INTRAMUSCULAR; INTRAVENOUS
Status: ACTIVE | OUTPATIENT
Start: 2022-06-02 | End: 2022-06-02

## 2022-06-02 RX ORDER — ALBUTEROL SULFATE 90 UG/1
4 AEROSOL, METERED RESPIRATORY (INHALATION) PRN
Status: ACTIVE | OUTPATIENT
Start: 2022-06-02 | End: 2022-06-03

## 2022-06-02 RX ORDER — MORPHINE SULFATE 2 MG/ML
2 INJECTION, SOLUTION INTRAMUSCULAR; INTRAVENOUS EVERY 4 HOURS PRN
Status: DISCONTINUED | OUTPATIENT
Start: 2022-06-02 | End: 2022-06-14 | Stop reason: HOSPADM

## 2022-06-02 RX ORDER — EPINEPHRINE 1 MG/ML
0.3 INJECTION, SOLUTION, CONCENTRATE INTRAVENOUS PRN
Status: DISCONTINUED | OUTPATIENT
Start: 2022-06-02 | End: 2022-06-14 | Stop reason: HOSPADM

## 2022-06-02 RX ADMIN — NEOMYCIN SULFATE, POLYMYXIN B SULFATE AND BACITRACIN ZINC: 3.5; 10000; 4 OINTMENT OPHTHALMIC at 12:51

## 2022-06-02 RX ADMIN — GABAPENTIN 600 MG: 300 CAPSULE ORAL at 08:19

## 2022-06-02 RX ADMIN — ACYCLOVIR 400 MG: 800 TABLET ORAL at 20:38

## 2022-06-02 RX ADMIN — SODIUM CHLORIDE: 900 INJECTION, SOLUTION INTRAVENOUS at 20:40

## 2022-06-02 RX ADMIN — HYDROCODONE BITARTRATE AND ACETAMINOPHEN 1 TABLET: 7.5; 325 TABLET ORAL at 20:37

## 2022-06-02 RX ADMIN — GABAPENTIN 600 MG: 300 CAPSULE ORAL at 13:24

## 2022-06-02 RX ADMIN — HYDROCODONE BITARTRATE AND ACETAMINOPHEN 1 TABLET: 7.5; 325 TABLET ORAL at 05:45

## 2022-06-02 RX ADMIN — DULOXETINE HYDROCHLORIDE 30 MG: 30 CAPSULE, DELAYED RELEASE ORAL at 08:19

## 2022-06-02 RX ADMIN — HYDROCODONE BITARTRATE AND ACETAMINOPHEN 1 TABLET: 7.5; 325 TABLET ORAL at 13:24

## 2022-06-02 RX ADMIN — SODIUM CHLORIDE: 900 INJECTION, SOLUTION INTRAVENOUS at 05:42

## 2022-06-02 RX ADMIN — ACETAMINOPHEN 650 MG: 325 TABLET ORAL at 12:51

## 2022-06-02 RX ADMIN — GABAPENTIN 600 MG: 300 CAPSULE ORAL at 20:38

## 2022-06-02 RX ADMIN — LORAZEPAM 0.5 MG: 2 INJECTION INTRAMUSCULAR; INTRAVENOUS at 12:52

## 2022-06-02 RX ADMIN — NEOMYCIN SULFATE, POLYMYXIN B SULFATE AND BACITRACIN ZINC: 3.5; 10000; 4 OINTMENT OPHTHALMIC at 21:14

## 2022-06-02 RX ADMIN — ACYCLOVIR 400 MG: 800 TABLET ORAL at 08:19

## 2022-06-02 RX ADMIN — DIPHENHYDRAMINE HYDROCHLORIDE 25 MG: 50 INJECTION, SOLUTION INTRAMUSCULAR; INTRAVENOUS at 12:51

## 2022-06-02 ASSESSMENT — PAIN DESCRIPTION - LOCATION
LOCATION: FOOT
LOCATION_2: HAND
LOCATION: HAND;FOOT
LOCATION: FOOT

## 2022-06-02 ASSESSMENT — PAIN DESCRIPTION - PAIN TYPE
TYPE: CHRONIC PAIN

## 2022-06-02 ASSESSMENT — PAIN SCALES - GENERAL
PAINLEVEL_OUTOF10: 2
PAINLEVEL_OUTOF10: 7
PAINLEVEL_OUTOF10: 5
PAINLEVEL_OUTOF10: 6
PAINLEVEL_OUTOF10: 2
PAINLEVEL_OUTOF10: 0

## 2022-06-02 ASSESSMENT — PAIN DESCRIPTION - ORIENTATION
ORIENTATION: RIGHT;LEFT
ORIENTATION: RIGHT;LEFT
ORIENTATION_2: RIGHT;LEFT
ORIENTATION: RIGHT;LEFT

## 2022-06-02 ASSESSMENT — PAIN DESCRIPTION - DESCRIPTORS
DESCRIPTORS: ACHING;TINGLING;NUMBNESS
DESCRIPTORS: ACHING;NUMBNESS;TINGLING

## 2022-06-02 ASSESSMENT — PAIN DESCRIPTION - FREQUENCY: FREQUENCY: INTERMITTENT

## 2022-06-02 ASSESSMENT — PAIN DESCRIPTION - ONSET: ONSET: AWAKENED FROM SLEEP

## 2022-06-02 NOTE — FLOWSHEET NOTE
Diagnosis: MM  Transplant Date: 6/2/22  Chemo regimen used: Melphalan  Day: (+/-) 0. BMT assessments and toxicities completed. Mouth Care completed 3 times this shift. WBC/ANC= 4.3/3.7. Prophylactic medications started ACV D-1, LVQ, Augmentin, and Diflucan to start D+1 (6/3). Toxicities greater than 2 :none. Patient seen by MD/NP daily until engraftment or while IP. Labs not resulted that need follow-up: none.   Additional Shift Information:  Pt tolerated cell infusion- 5 bags total    Intake/Output  06/02 0701 - 06/02 1900  In: 0081 [P.O.:640; I.V.:1693]  Out: 900 [Urine:900]            06/02/22 0815   Toxicity Assessment   Motor Weakness Grade 0   Neuropathy Grade 1    Loss of Appetite Grade 0   Cough None   Oral Assessment Score   Voice 1   Swallow 1   Lips 1   Tongue 1   Saliva 1   Mucous Membranes 1   Gingiva 1   Teeth or Dentures 1   Oral Assessment Total 8

## 2022-06-02 NOTE — PROGRESS NOTES
Patient is day 0 for autologous stem cell transplant. Prior to reinfusion of stem cells, patient identifiers including name,  verified with patient. Medical record number reviewed with both nurses. Arm band with this information was placed on patient. Education regarding reinfusion done with patient and family member. Education included potential side effects from reinfusion, symptoms to report during reinfusion as well as process for reinfusion. Patient/family allowed time for questions, all questions answered. Patient was premedication with benadryl, tylenol, and ativan. Stem cells reinfused per policy and as ordered. The following reactions/side effects were noted and reported: none. Vital signs throughout reinfusion are documented in the VS flowsheet.

## 2022-06-02 NOTE — PROGRESS NOTES
Diagnosis: Multiple Myeloma  Transplant Date: on 6/2/22  Day: D0  Chemo regimen used: Melphalan  Labs not resulted that need follow-up: none  BMT assessments and toxicities completed. 6/1/22  WBC/ANC= 4.3/3.7  Prophylactic medications start on D+1  Toxicities greater than 2 : none  Patient seen by MD/NP daily while inpt & until engraftment. New orders received: no new orders  Issues: c/o stye R eye    END OF SHIFT:    Shift Summary:  Walked downstairs to the garden w/ family member last night. Mouth care instructed;compliant. IVF infusing. Norco x2 for neuropathy pain. For stem cell tx today. I/Os:    I/O this shift:  In: 5393 [P.O.:480; I.V.:823]  Out: 900 [Urine:900]  I/O last 3 completed shifts: In: 4712 [P.O.:1320;  I.V.:2066]  Out: [address] Teja Mayer RN

## 2022-06-02 NOTE — PROGRESS NOTES
 Ran Out of Food in the Last Year: Not on file   Transportation Needs:     Lack of Transportation (Medical): Not on file    Lack of Transportation (Non-Medical):  Not on file   Physical Activity:     Days of Exercise per Week: Not on file    Minutes of Exercise per Session: Not on file   Stress:     Feeling of Stress : Not on file   Social Connections:     Frequency of Communication with Friends and Family: Not on file    Frequency of Social Gatherings with Friends and Family: Not on file    Attends Yazidism Services: Not on file    Active Member of [address] [place] [address] or Organizations: Not on file    Attends Club or Organization Meetings: Not on file    Marital Status: Not on file   Intimate Partner Violence:     Fear of Current or Ex-Partner: Not on file    Emotionally Abused: Not on file    Physically Abused: Not on file    Sexually Abused: Not on file   Housing Stability:     Unable to Pay for Housing in the Last Year: Not on file    Number of [place] in the Last Year: Not on file    Unstable Housing in the Last Year: Not on file     Current Facility-Administered Medications   Medication Dose Route Frequency Provider Last Rate Last Admin    meperidine (DEMEROL) injection 12.5 mg  12.5 mg IntraVENous PRN Vale Miller MD        DULoxetine (CYMBALTA) extended release capsule 30 mg  30 mg Oral Daily Fredo Mills APRN - NP   30 mg at 06/02/22 0819    HYDROcodone-acetaminophen (NORCO) 5-325 MG per tablet 1 tablet  1 tablet Oral Q4H PRN Fredo Mills, APRN - NP        HYDROcodone-acetaminophen (NORCO) 7.5-325 MG per tablet 1 tablet  1 tablet Oral Q6H PRN Fredo Mills APRN - NP   1 tablet at 06/02/22 0545    polyethylene glycol (GLYCOLAX) packet 17 g  17 g Oral Daily PRN Fredo Mills, APRN - NP        0.9 % sodium chloride infusion   IntraVENous Continuous Fredo Mills APRN - NP 75 mL/hr at 06/02/22 0542 New Bag at 06/02/22 0542    acyclovir (ZOVIRAX) tablet 400 mg  400 mg Oral BID Fredo Mills APRN - NP 400 mg at 22 0819    [Held by provider] levoFLOXacin (LEVAQUIN) tablet 500 mg  500 mg Oral Daily Elayne Dubin, APRN - NP        [Held by provider] fluconazole (DIFLUCAN) tablet 200 mg  200 mg Oral Daily Elayne Dubin, APRN - NP        gabapentin (NEURONTIN) capsule 600 mg  600 mg Oral TID Shakeel Munroe MD   600 mg at 22 1831       OBJECTIVE:  Patient Vitals for the past 8 hrs:   BP Temp Temp src Pulse Resp SpO2   22 0815 135/75 98.7 °F (37.1 °C) Oral 100 17 --   22 0315 113/62 97.6 °F (36.4 °C) Oral 95 16 95 %     Temp (24hrs), Av.2 °F (36.8 °C), Min:97.3 °F (36.3 °C), Max:98.8 °F (37.1 °C)    No intake/output data recorded. Physical Exam:  Constitutional: Well developed, well nourished female in no acute distress, sitting comfortably in the hospital bed. HEENT: Normocephalic and atraumatic. Oropharynx is clear, mucous membranes are moist.  Extraocular muscles are intact. Sclerae anicteric. Neck supple without JVD. No thyromegaly present. Skin Warm and dry. No bruising and no rash noted. No erythema. No pallor. Respiratory Lungs are clear to auscultation bilaterally without wheezes, rales or rhonchi, normal air exchange without accessory muscle use. CVS Normal rate, regular rhythm and normal S1 and S2. No murmurs, gallops, or rubs. Abdomen Soft, nontender and nondistended, normoactive bowel sounds. No palpable mass. No hepatosplenomegaly. Neuro Grossly nonfocal with no obvious sensory or motor deficits. MSK Normal range of motion in general.  No edema and no tenderness. Psych Appropriate mood and affect.         Labs:    Recent Results (from the past 24 hour(s))   Comprehensive Metabolic Panel w/ Reflex to MG    Collection Time: 22  3:18 AM   Result Value Ref Range    Sodium 140 136 - 145 mmol/L    Potassium 4.4 3.5 - 5.1 mmol/L    Chloride 108 (H) 98 - 107 mmol/L    CO2 25 21 - 32 mmol/L    Anion Gap 7 7 - 16 mmol/L    Glucose 127 (H) 65 - 100 mg/dL BUN 9 6 - 23 MG/DL    CREATININE 0.60 0.6 - 1.0 MG/DL    GFR African American >60 >60 ml/min/1.73m2    GFR Non- >60 >60 ml/min/1.73m2    Calcium 8.6 8.3 - 10.4 MG/DL    Total Bilirubin 0.3 0.2 - 1.1 MG/DL    ALT 13 12 - 65 U/L    AST 16 15 - 37 U/L    Alk Phosphatase 79 50 - 136 U/L    Total Protein 6.3 6.3 - 8.2 g/dL    Albumin 3.4 (L) 3.5 - 5.0 g/dL    Globulin 2.9 2.3 - 3.5 g/dL    Albumin/Globulin Ratio 1.2 1.2 - 3.5     CBC with Auto Differential    Collection Time: 06/02/22  3:18 AM   Result Value Ref Range    WBC 4.3 4.3 - 11.1 K/uL    RBC 3.90 (L) 4.05 - 5.2 M/uL    Hemoglobin 10.4 (L) 11.7 - 15.4 g/dL    Hematocrit 33.5 (L) 35.8 - 46.3 %    MCV 85.9 79.6 - 97.8 FL    MCH 26.7 26.1 - 32.9 PG    MCHC 31.0 (L) 31.4 - 35.0 g/dL    RDW 19.4 (H) 11.9 - 14.6 %    Platelets 042 464 - 756 K/uL    MPV 10.6 9.4 - 12.3 FL    nRBC 0.00 0.0 - 0.2 K/uL    Differential Type AUTOMATED      Seg Neutrophils 86 (H) 43 - 78 %    Lymphocytes 9 (L) 13 - 44 %    Monocytes 4 4.0 - 12.0 %    Eosinophils % 0 (L) 0.5 - 7.8 %    Basophils 0 0.0 - 2.0 %    Immature Granulocytes 1 0.0 - 5.0 %    Segs Absolute 3.7 1.7 - 8.2 K/UL    Absolute Lymph # 0.4 (L) 0.5 - 4.6 K/UL    Absolute Mono # 0.2 0.1 - 1.3 K/UL    Absolute Eos # 0.0 0.0 - 0.8 K/UL    Basophils Absolute 0.0 0.0 - 0.2 K/UL    Absolute Immature Granulocyte 0.0 0.0 - 0.5 K/UL       Imaging:  n/a    ASSESSMENT:  Patient Active Problem List   Diagnosis    Bone marrow transplant candidate    Multiple myeloma not having achieved remission ([facility]    Stem cell transplant candidate    [facility]    [facility] marrow transplant status ([facility]    Admission for antineoplastic chemotherapy     Ms. Antoni Castellanos is a 54 y.o. female admitted on 5/31/2022 with a primary diagnosis of There were no encounter diagnoses. .       79-year-old -American female history of prolactinemia, thyroid Hurthle cell neoplasm, lambda light chain multiple myeloma, ISS stage I, high risk disease (gain of 1 q.), bortezomib related neuropathy, iron deficiency in CR 1 after daratumumab based regimen. Patient seen in oncology office 5/31/2022. Patient collected CD34 at 6.24 mil/kg w granix alone. Reports feeling reasonably. Extensive ROS unremarkable. PLAN:  Multiple myeloma  - Okay to proceed with melphalan 200 mg per metered squared followed by stem cell reinfusion following day (CD34 3.12 mil/kg back). - Hydration and IV electrolyte replacement as needed. - Antiemetic as well as antidiarrheal support as needed. - Blood transfusion support transfusion as needed. - Prophylactic antibiotics with Levaquin, Augmentin, acyclovir and Diflucan starting day +1.    - G-CSF support with Nivestym daily starting day +6.    6/1 Day -1. Melphalan today, stem cells tomorrow. 6/2 Day 0. Stem cells today. R hordeolum  6/2 Has R eye stye. Antibx ointment ordered. Can use warm compresses prn. Continue home meds  Vamshi SOPs  Lovenox for DVT ppx (hold for plt <50k)    Goals and plan of care reviewed with the patient. All questions answered to the best of our ability. Disposition:  Anticipate discharge home after engraftment. [facility]ematology [facility] On[facility]  [address] [place] [address]  Office : [phone]  Fax : [phone]       Attending Addendum:  I have personally performed a face to face diagnostic evaluation on this patient. I have reviewed and agree with the care plan as documented above by  MARICRUZ [email or web address].  My findings are as follows: Patient appears stable, heart rate regular without murmurs, abdomen is non-tender, bowel sounds are positive.  74-year-old -American female history of prolactinemia, thyroid Hurthle cell neoplasm, lambda light chain multiple myeloma, ISS stage I, high risk disease (gain of 1 q.), bortezomib related neuropathy, iron deficiency in CR 1 after daratumumab based regimen. Patient seen in oncology office 5/31/2022. Patient collected CD34 at 6.24 mil/kg w granix alone. Hospitalized for melphalan 200 mg per metered squared followed by stem cell reinfusion following day (CD34 3.12 mil/kg back). Tolerated melphalan reasonably. D0 today. Hydration and IV electrolyte replacement as needed.  Antiemetic as well as antidiarrheal support as needed. Blood transfusion support transfusion as needed. Prophylactic antibiotics with Levaquin, Augmentin, acyclovir and Diflucan starting day +1.  G-CSF support with Granix daily starting day +6.  She will be hospitalized through engraftment.                   Susan Church MD  [facility] Hematology/Oncology  [address] [place] [address]  Office : [phone]  Fax : [phone] HIV (human immunodeficiency virus infection)

## 2022-06-03 LAB
ALBUMIN SERPL ELPH-MCNC: 3.8 G/DL (ref 2.9–4.4)
ALBUMIN SERPL-MCNC: 3.1 G/DL (ref 3.5–5)
ALBUMIN/GLOB SERPL: 1.2 {RATIO} (ref 1.2–3.5)
ALBUMIN/GLOB SERPL: 1.8 {RATIO} (ref 0.7–1.7)
ALP SERPL-CCNC: 73 U/L (ref 50–136)
ALPHA1 GLOB SERPL ELPH-MCNC: 0.2 G/DL (ref 0–0.4)
ALPHA2 GLOB SERPL ELPH-MCNC: 0.7 G/DL (ref 0.4–1)
ALT SERPL-CCNC: 12 U/L (ref 12–65)
ANION GAP SERPL CALC-SCNC: 6 MMOL/L (ref 7–16)
AST SERPL-CCNC: 21 U/L (ref 15–37)
B-GLOBULIN SERPL ELPH-MCNC: 0.9 G/DL (ref 0.7–1.3)
BASOPHILS # BLD: 0 K/UL (ref 0–0.2)
BASOPHILS NFR BLD: 0 % (ref 0–2)
BILIRUB SERPL-MCNC: 0.4 MG/DL (ref 0.2–1.1)
BUN SERPL-MCNC: 11 MG/DL (ref 6–23)
CALCIUM SERPL-MCNC: 8.7 MG/DL (ref 8.3–10.4)
CHLORIDE SERPL-SCNC: 109 MMOL/L (ref 98–107)
CO2 SERPL-SCNC: 27 MMOL/L (ref 21–32)
CREAT SERPL-MCNC: 0.5 MG/DL (ref 0.6–1)
DIFFERENTIAL METHOD BLD: ABNORMAL
EOSINOPHIL # BLD: 0 K/UL (ref 0–0.8)
EOSINOPHIL NFR BLD: 0 % (ref 0.5–7.8)
ERYTHROCYTE [DISTWIDTH] IN BLOOD BY AUTOMATED COUNT: 19.7 % (ref 11.9–14.6)
GAMMA GLOB SERPL ELPH-MCNC: 0.4 G/DL (ref 0.4–1.8)
GGT SERPL-CCNC: 42 U/L (ref 5–55)
GLOBULIN SER CALC-MCNC: 2.5 G/DL (ref 2.3–3.5)
GLOBULIN SER-MCNC: 2.2 G/DL (ref 2.2–3.9)
GLUCOSE SERPL-MCNC: 102 MG/DL (ref 65–100)
HCT VFR BLD AUTO: 30.7 % (ref 35.8–46.3)
HGB BLD-MCNC: 9.7 G/DL (ref 11.7–15.4)
IGA SERPL-MCNC: 23 MG/DL (ref 87–352)
IGG SERPL-MCNC: 527 MG/DL (ref 586–1602)
IGM SERPL-MCNC: 29 MG/DL (ref 26–217)
IMM GRANULOCYTES # BLD AUTO: 0 K/UL (ref 0–0.5)
IMM GRANULOCYTES NFR BLD AUTO: 0 % (ref 0–5)
INTERPRETATION SERPL IEP-IMP: ABNORMAL
LDH SERPL L TO P-CCNC: 568 U/L (ref 100–190)
LYMPHOCYTES # BLD: 0.5 K/UL (ref 0.5–4.6)
LYMPHOCYTES NFR BLD: 7 % (ref 13–44)
M PROTEIN SERPL ELPH-MCNC: ABNORMAL G/DL
MCH RBC QN AUTO: 27.3 PG (ref 26.1–32.9)
MCHC RBC AUTO-ENTMCNC: 31.6 G/DL (ref 31.4–35)
MCV RBC AUTO: 86.5 FL (ref 79.6–97.8)
MONOCYTES # BLD: 0.3 K/UL (ref 0.1–1.3)
MONOCYTES NFR BLD: 5 % (ref 4–12)
NEUTS SEG # BLD: 6.2 K/UL (ref 1.7–8.2)
NEUTS SEG NFR BLD: 88 % (ref 43–78)
NRBC # BLD: 0 K/UL (ref 0–0.2)
PHOSPHATE SERPL-MCNC: 2.9 MG/DL (ref 2.5–4.5)
PLATELET # BLD AUTO: 201 K/UL (ref 150–450)
PMV BLD AUTO: 10.2 FL (ref 9.4–12.3)
POTASSIUM SERPL-SCNC: 4 MMOL/L (ref 3.5–5.1)
PROT SERPL-MCNC: 5.6 G/DL (ref 6.3–8.2)
PROT SERPL-MCNC: 6 G/DL (ref 6–8.5)
RBC # BLD AUTO: 3.55 M/UL (ref 4.05–5.2)
SODIUM SERPL-SCNC: 142 MMOL/L (ref 136–145)
WBC # BLD AUTO: 7.1 K/UL (ref 4.3–11.1)

## 2022-06-03 PROCEDURE — 83615 LACTATE (LD) (LDH) ENZYME: CPT

## 2022-06-03 PROCEDURE — APPSS60 APP SPLIT SHARED TIME 46-60 MINUTES: Performed by: NURSE PRACTITIONER

## 2022-06-03 PROCEDURE — 99233 SBSQ HOSP IP/OBS HIGH 50: CPT | Performed by: INTERNAL MEDICINE

## 2022-06-03 PROCEDURE — 85025 COMPLETE CBC W/AUTO DIFF WBC: CPT

## 2022-06-03 PROCEDURE — 6370000000 HC RX 637 (ALT 250 FOR IP): Performed by: INTERNAL MEDICINE

## 2022-06-03 PROCEDURE — 36592 COLLECT BLOOD FROM PICC: CPT

## 2022-06-03 PROCEDURE — 6370000000 HC RX 637 (ALT 250 FOR IP): Performed by: NURSE PRACTITIONER

## 2022-06-03 PROCEDURE — 82977 ASSAY OF GGT: CPT

## 2022-06-03 PROCEDURE — 1170000000 HC RM PRIVATE ONCOLOGY

## 2022-06-03 PROCEDURE — 84100 ASSAY OF PHOSPHORUS: CPT

## 2022-06-03 PROCEDURE — 80053 COMPREHEN METABOLIC PANEL: CPT

## 2022-06-03 PROCEDURE — 6360000002 HC RX W HCPCS: Performed by: NURSE PRACTITIONER

## 2022-06-03 RX ORDER — FUROSEMIDE 10 MG/ML
40 INJECTION INTRAMUSCULAR; INTRAVENOUS ONCE
Status: COMPLETED | OUTPATIENT
Start: 2022-06-03 | End: 2022-06-03

## 2022-06-03 RX ADMIN — HYDROCODONE BITARTRATE AND ACETAMINOPHEN 1 TABLET: 7.5; 325 TABLET ORAL at 06:09

## 2022-06-03 RX ADMIN — GABAPENTIN 600 MG: 300 CAPSULE ORAL at 14:34

## 2022-06-03 RX ADMIN — AMOXICILLIN AND CLAVULANATE POTASSIUM 1 TABLET: 875; 125 TABLET, FILM COATED ORAL at 21:10

## 2022-06-03 RX ADMIN — NEOMYCIN SULFATE, POLYMYXIN B SULFATE AND BACITRACIN ZINC: 3.5; 10000; 4 OINTMENT OPHTHALMIC at 08:38

## 2022-06-03 RX ADMIN — FUROSEMIDE 40 MG: 10 INJECTION, SOLUTION INTRAMUSCULAR; INTRAVENOUS at 08:37

## 2022-06-03 RX ADMIN — FLUCONAZOLE 200 MG: 100 TABLET ORAL at 08:37

## 2022-06-03 RX ADMIN — NEOMYCIN SULFATE, POLYMYXIN B SULFATE AND BACITRACIN ZINC: 3.5; 10000; 4 OINTMENT OPHTHALMIC at 14:34

## 2022-06-03 RX ADMIN — NEOMYCIN SULFATE, POLYMYXIN B SULFATE AND BACITRACIN ZINC: 3.5; 10000; 4 OINTMENT OPHTHALMIC at 21:16

## 2022-06-03 RX ADMIN — AMOXICILLIN AND CLAVULANATE POTASSIUM 1 TABLET: 875; 125 TABLET, FILM COATED ORAL at 08:37

## 2022-06-03 RX ADMIN — GABAPENTIN 600 MG: 300 CAPSULE ORAL at 21:10

## 2022-06-03 RX ADMIN — HYDROCODONE BITARTRATE AND ACETAMINOPHEN 1 TABLET: 7.5; 325 TABLET ORAL at 14:34

## 2022-06-03 RX ADMIN — LEVOFLOXACIN 500 MG: 500 TABLET, FILM COATED ORAL at 08:37

## 2022-06-03 RX ADMIN — ACYCLOVIR 400 MG: 800 TABLET ORAL at 08:38

## 2022-06-03 RX ADMIN — HYDROCODONE BITARTRATE AND ACETAMINOPHEN 1 TABLET: 7.5; 325 TABLET ORAL at 21:11

## 2022-06-03 RX ADMIN — DULOXETINE HYDROCHLORIDE 30 MG: 30 CAPSULE, DELAYED RELEASE ORAL at 08:38

## 2022-06-03 RX ADMIN — GABAPENTIN 600 MG: 300 CAPSULE ORAL at 08:38

## 2022-06-03 RX ADMIN — ACYCLOVIR 400 MG: 800 TABLET ORAL at 21:11

## 2022-06-03 ASSESSMENT — PAIN DESCRIPTION - PAIN TYPE
TYPE: CHRONIC PAIN
TYPE: CHRONIC PAIN

## 2022-06-03 ASSESSMENT — PAIN DESCRIPTION - LOCATION
LOCATION: HAND;FOOT
LOCATION: FOOT;HAND
LOCATION: FOOT;HAND

## 2022-06-03 ASSESSMENT — PAIN SCALES - GENERAL
PAINLEVEL_OUTOF10: 0
PAINLEVEL_OUTOF10: 7
PAINLEVEL_OUTOF10: 6
PAINLEVEL_OUTOF10: 7

## 2022-06-03 ASSESSMENT — PAIN DESCRIPTION - DESCRIPTORS
DESCRIPTORS: ACHING;BURNING;NUMBNESS;TINGLING
DESCRIPTORS: BURNING;NUMBNESS;TINGLING
DESCRIPTORS: ACHING;BURNING;NUMBNESS;TINGLING

## 2022-06-03 ASSESSMENT — PAIN DESCRIPTION - ONSET
ONSET: ON-GOING
ONSET: ON-GOING

## 2022-06-03 ASSESSMENT — PAIN DESCRIPTION - ORIENTATION
ORIENTATION: RIGHT;LEFT

## 2022-06-03 NOTE — PROGRESS NOTES
Diagnosis: MM  Transplant Date: 6/2/22  Chemo regimen used: Melphalan  Day: (+/-) 1. BMT assessments and toxicities completed. Mouth Care completed 2 times this shift. WBC/ANC= 7.1/6.2  Prophylactic medications started ACV D-1, LVQ, Augmentin, and Diflucan to start D+1 (6/3). Toxicities greater than 2 :none. Patient seen by MD/NP daily until engraftment or while IP. Labs not resulted that need follow-up: none.   Additional Shift Information:    Bedside report will be given to oncoming RN

## 2022-06-03 NOTE — PROGRESS NOTES
[facility] Hematology & Oncology        Inpatient Hematology / Oncology Progress Note    Reason for Admission:  Stem cell transplant candidate [Z76.82]    24 Hour Events:  Afebrile, VSS  Day +1 Auto PBSCT  S/p stem cells yesterday  Feeling well, no complaints    Transfusions: None  Replacements: None    ROS:  Constitutional: Negative for fever, chills, weakness, malaise, fatigue. CV: +edema. Negative for chest pain, palpitations. Respiratory: Negative for dyspnea, cough, wheezing. GI: Negative for nausea, abdominal pain, diarrhea. 10 point review of systems is otherwise negative with the exception of the elements mentioned above in the HPI. Allergies   Allergen Reactions    Amoxicillin-Pot Clavulanate Other (See Comments)     Yeast infection  Yeast infection     Past Medical History:   Diagnosis Date    Obesity     Prolactin increased      Past Surgical History:   Procedure Laterality Date    IR BIOPSY PERC SUPERF BONE      IR TUNNELED CATHETER PLACEMENT GREATER THAN 5 YEARS  5/9/2022    IR TUNNELED CATHETER PLACEMENT GREATER THAN 5 YEARS  5/9/2022    IR TUNNELED CATHETER PLACEMENT GREATER THAN 5 YEARS 5/9/2022 [facility]     No family history on file.   Social History     Socioeconomic History    Marital status:      Spouse name: Not on file    Number of children: Not on file    Years of education: Not on file    Highest education level: Not on file   Occupational History    Not on file   Tobacco Use    Smoking status: Never Smoker    Smokeless tobacco: Never Used   Substance and Sexual Activity    Alcohol use: Never    Drug use: Never    Sexual activity: Not on file   Other Topics Concern    Not on file   Social History Narrative    Not on file     Social Determinants of Health     Financial Resource Strain:     Difficulty of Paying Living Expenses: Not on file   Food Insecurity:     Worried About Running Out of Food in the Last Year: Not on file    Jimi of Food in the Last Year: Not on file   Transportation Needs:     Lack of Transportation (Medical): Not on file    Lack of Transportation (Non-Medical):  Not on file   Physical Activity:     Days of Exercise per Week: Not on file    Minutes of Exercise per Session: Not on file   Stress:     Feeling of Stress : Not on file   Social Connections:     Frequency of Communication with Friends and Family: Not on file    Frequency of Social Gatherings with Friends and Family: Not on file    Attends [employer] Services: Not on file    Active Member of [address] [place] [address] or Organizations: Not on file    Attends Club or Organization Meetings: Not on file    Marital Status: Not on file   Intimate Partner Violence:     Fear of Current or Ex-Partner: Not on file    Emotionally Abused: Not on file    Physically Abused: Not on file    Sexually Abused: Not on file   Housing Stability:     Unable to Pay for Housing in the Last Year: Not on file    Number of [place] in the Last Year: Not on file    Unstable Housing in the Last Year: Not on file     Current Facility-Administered Medications   Medication Dose Route Frequency Provider Last Rate Last Admin    amoxicillin-clavulanate (AUGMENTIN) 875-125 MG per tablet 1 tablet  1 tablet Oral 2 times per day Maria De Jesus Feathers, APRN - CNP   1 tablet at 06/03/22 0837    ondansetron (ZOFRAN) injection 4 mg  4 mg IntraVENous Q4H PRN Maria De Jesus Feathers, APRN - CNP        prochlorperazine (COMPAZINE) injection 5 mg  5 mg IntraVENous Q6H PRN Maria De Jesus Feathers, APRN - CNP        morphine injection 2 mg  2 mg IntraVENous Q4H PRN Maria De Jesus Feathers, APRN - CNP        [START ON 6/8/2022] filgrastim-aafi (NIVESTYM) injection 300 mcg  300 mcg SubCUTAneous Daily Maria De Jesus Feathers, APRN - CNP        meperidine (DEMEROL) injection 12.5 mg  12.5 mg IntraVENous PRN Lex Weeks MD        EPINEPHrine PF 1 MG/ML injection 0.3 mg  0.3 mg IntraMUSCular PRN Lex Weeks MD        neomycin-bacitracin-polymyxin (NEOSPORIN) ophthalmic ointment   Right Eye TID Sarah Sharma, APRMARICRUZ - CNP   Given at 22 0341    DULoxetine (CYMBALTA) extended release capsule 30 mg  30 mg Oral Daily Lorrane Robert, APRN - NP   30 mg at 22 0838    HYDROcodone-acetaminophen (NORCO) 5-325 MG per tablet 1 tablet  1 tablet Oral Q4H PRN Lorrane Robert, APRN - NP        HYDROcodone-acetaminophen (NORCO) 7.5-325 MG per tablet 1 tablet  1 tablet Oral Q6H PRN Lorrane Robert, APRN - NP   1 tablet at 22 0609    polyethylene glycol (GLYCOLAX) packet 17 g  17 g Oral Daily PRN Lorrane Robert, APRN - NP        0.9 % sodium chloride infusion   IntraVENous Continuous Lorrane Robert, APRN - NP 75 mL/hr at 22 2040 New Bag at 22 204    acyclovir (ZOVIRAX) tablet 400 mg  400 mg Oral BID Lorrane Robert, APRN - NP   400 mg at 22 0838    levoFLOXacin (LEVAQUIN) tablet 500 mg  500 mg Oral Daily Lorrane Robert, APRN - NP   500 mg at 22 0837    fluconazole (DIFLUCAN) tablet 200 mg  200 mg Oral Daily Lorrane Robert, APRN - NP   200 mg at 22 0837    gabapentin (NEURONTIN) capsule 600 mg  600 mg Oral TID Robina Pemberton MD   600 mg at 22 1130       OBJECTIVE:  Patient Vitals for the past 8 hrs:   BP Temp Temp src Pulse Resp SpO2   22 0830 138/88 97.9 °F (36.6 °C) Oral 96 18 100 %   22 0609 -- -- -- -- 19 --   22 0314 130/81 98.1 °F (36.7 °C) Oral 91 16 100 %     Temp (24hrs), Av °F (36.7 °C), Min:97.6 °F (36.4 °C), Max:98.3 °F (36.8 °C)    No intake/output data recorded. Physical Exam:  Constitutional: Well developed, well nourished female in no acute distress, sitting comfortably on the bedside chair. HEENT: Normocephalic and atraumatic. Oropharynx is clear, mucous membranes are moist.  Extraocular muscles are intact. Sclerae anicteric. Neck supple without JVD. No thyromegaly present. Skin Warm and dry. No bruising and no rash noted. No erythema. No pallor.     Respiratory Lungs are clear to auscultation bilaterally without wheezes, rales or rhonchi, normal air exchange without accessory muscle use. CVS Normal rate, regular rhythm and normal S1 and S2. No murmurs, gallops, or rubs. Abdomen Soft, nontender and nondistended, normoactive bowel sounds. No palpable mass. No hepatosplenomegaly. Neuro Grossly nonfocal with no obvious sensory or motor deficits. MSK Normal range of motion in general.  Trace BLE edema and no tenderness. Psych Appropriate mood and affect.         Labs:    Recent Results (from the past 24 hour(s))   Phosphorus    Collection Time: 06/03/22  3:28 AM   Result Value Ref Range    Phosphorus 2.9 2.5 - 4.5 MG/DL   Lactate Dehydrogenase    Collection Time: 06/03/22  3:28 AM   Result Value Ref Range     (H) 100 - 190 U/L   Gamma GT    Collection Time: 06/03/22  3:28 AM   Result Value Ref Range    GGT 42 5 - 55 U/L   Comprehensive Metabolic Panel w/ Reflex to MG    Collection Time: 06/03/22  3:28 AM   Result Value Ref Range    Sodium 142 136 - 145 mmol/L    Potassium 4.0 3.5 - 5.1 mmol/L    Chloride 109 (H) 98 - 107 mmol/L    CO2 27 21 - 32 mmol/L    Anion Gap 6 (L) 7 - 16 mmol/L    Glucose 102 (H) 65 - 100 mg/dL    BUN 11 6 - 23 MG/DL    CREATININE 0.50 (L) 0.6 - 1.0 MG/DL    GFR African American >60 >60 ml/min/1.73m2    GFR Non- >60 >60 ml/min/1.73m2    Calcium 8.7 8.3 - 10.4 MG/DL    Total Bilirubin 0.4 0.2 - 1.1 MG/DL    ALT 12 12 - 65 U/L    AST 21 15 - 37 U/L    Alk Phosphatase 73 50 - 136 U/L    Total Protein 5.6 (L) 6.3 - 8.2 g/dL    Albumin 3.1 (L) 3.5 - 5.0 g/dL    Globulin 2.5 2.3 - 3.5 g/dL    Albumin/Globulin Ratio 1.2 1.2 - 3.5     CBC with Auto Differential    Collection Time: 06/03/22  3:28 AM   Result Value Ref Range    WBC 7.1 4.3 - 11.1 K/uL    RBC 3.55 (L) 4.05 - 5.2 M/uL    Hemoglobin 9.7 (L) 11.7 - 15.4 g/dL    Hematocrit 30.7 (L) 35.8 - 46.3 %    MCV 86.5 79.6 - 97.8 FL    MCH 27.3 26.1 - 32.9 PG    MCHC 31.6 31.4 - 35.0 g/dL    RDW 19.7 (H) 11.9 - 14.6 %    Platelets 322 702 - 450 K/uL    MPV 10.2 9.4 - 12.3 FL    nRBC 0.00 0.0 - 0.2 K/uL    Differential Type AUTOMATED      Seg Neutrophils 88 (H) 43 - 78 %    Lymphocytes 7 (L) 13 - 44 %    Monocytes 5 4.0 - 12.0 %    Eosinophils % 0 (L) 0.5 - 7.8 %    Basophils 0 0.0 - 2.0 %    Immature Granulocytes 0 0.0 - 5.0 %    Segs Absolute 6.2 1.7 - 8.2 K/UL    Absolute Lymph # 0.5 0.5 - 4.6 K/UL    Absolute Mono # 0.3 0.1 - 1.3 K/UL    Absolute Eos # 0.0 0.0 - 0.8 K/UL    Basophils Absolute 0.0 0.0 - 0.2 K/UL    Absolute Immature Granulocyte 0.0 0.0 - 0.5 K/UL       Imaging:  n/a    ASSESSMENT:  Patient Active Problem List   Diagnosis    Bone marrow transplant candidate    [facility] myeloma not having achieved remission ([facility]    Stem cell transplant candidate    [facility]    Bone marrow transplant status ([facility]    Admission for antineoplastic chemotherapy     Ms. Judson Marroquin is a 54 y.o. female admitted on 5/31/2022 with a primary diagnosis of There were no encounter diagnoses. .       68-year-old -American female history of prolactinemia, thyroid Hurthle cell neoplasm, lambda light chain multiple myeloma, ISS stage I, high risk disease (gain of 1 q.), bortezomib related neuropathy, iron deficiency in CR 1 after daratumumab based regimen. Patient seen in oncology office 5/31/2022. Patient collected CD34 at 6.24 mil/kg w granix alone. Reports feeling reasonably. Extensive ROS unremarkable. PLAN:  Multiple myeloma  - Okay to proceed with melphalan 200 mg per metered squared followed by stem cell reinfusion following day (CD34 3.12 mil/kg back). - Hydration and IV electrolyte replacement as needed. - Antiemetic as well as antidiarrheal support as needed. - Blood transfusion support transfusion as needed. - Prophylactic antibiotics with Levaquin, Augmentin, acyclovir and Diflucan starting day +1.    - G-CSF support with Nivestym daily starting day +6.    6/1 Day -1. Melphalan today, stem cells tomorrow.   6/2 Day 0. Stem cells today. 6/3 Day +1. Ppx antibx start today. Wt up 5# with +I/Os. Lasix x 1 ordered. R silvestre  6/2 Has R eye stye. Antibx ointment ordered. Can use warm compresses prn. Continue home meds  PPx Meds: Acyclovir, Diflucan, Augmentin, Levaquin  Vamshi SOPs  Lovenox for DVT ppx (hold for plt <50k)    Goals and plan of care reviewed with the patient. All questions answered to the best of our ability. Disposition:  Anticipate discharge home after engraftment. [facility] [address] [address] Hematology & Oncology  [address] [address]  Office : [phone]  Fax : [phone]           Attending Addendum:  I have personally performed a face to face diagnostic evaluation on this patient. I have reviewed and agree with the care plan as documented above Real KENAN Mason  My findings are as follows: Patient appears stable, heart rate regular without murmurs, abdomen is non-tender, bowel sounds are positive. 54year-old -American female history of prolactinemia, thyroid Hurthle cell neoplasm, lambda light chain multiple myeloma, ISS stage I, high risk disease (gain of 1 q.), bortezomib related neuropathy, iron deficiency in CR 1 after daratumumab based regimen. Patient seen in oncology office 5/31/2022. Patient collected CD34 at 6.24 mil/kg w granix alone. Hospitalized for melphalan 200 mg per metered squared followed by stem cell reinfusion following day (CD34 3.12 mil/kg back). Tolerated melphalan and stem cell re-infusion well. D+1 today. Hydration and IV electrolyte replacement as needed.  Antiemetic as well as antidiarrheal support as needed. Blood transfusion support transfusion as needed. Start prophylactic antibiotics with Levaquin, Augmentin, acyclovir and Diflucan.  G-CSF support with Granix daily starting day +6.  She will be hospitalized through engraftment.                   Raymond Hazel MD  [facility] Hematology/Oncology  [address]  Office : [phone]  Fax : [phone]

## 2022-06-03 NOTE — FLOWSHEET NOTE
Diagnosis: MM  Transplant Date: 6/2/22  Chemo regimen used: Melphalan  Day: (+/-) +1. BMT assessments and toxicities completed. Mouth Care completed 3 times this shift. WBC/ANC= 7.1/6.2. Prophylactic medications started ACV- 6/1, Augmentin, LVQ, Difllucan-6/3 . Toxicities greater than 2 :none. Patient seen by MD/NP daily until engraftment or while IP. Labs not resulted that need follow-up: none. Additional Shift Information:  Pt walked with family to the [employer], C/O fatigue today, diuresed well    Intake/Output  I/O this shift:  In: 200 [P.O.:100; I.V.:182]  Out: -   I/O last 3 completed shifts: In: 1411 [P.O.:1410;  I.V.:3188]  Out: [facility] Milli Solorio RN       06/03/22 0830   Toxicity Assessment   Motor Weakness Grade 0   Neuropathy Grade 1   (baseline)   Loss of Appetite Grade 0   Cough None   Swelling Grade 1    Oral Assessment Score   Voice 1   Swallow 1   Lips 1   Tongue 1   Saliva 1   Mucous Membranes 1   Gingiva 1   Teeth or Dentures 1   Oral Assessment Total 8

## 2022-06-04 LAB
ALBUMIN SERPL-MCNC: 3.3 G/DL (ref 3.5–5)
ALBUMIN/GLOB SERPL: 1.3 {RATIO} (ref 1.2–3.5)
ALP SERPL-CCNC: 83 U/L (ref 50–136)
ALT SERPL-CCNC: 11 U/L (ref 12–65)
ANION GAP SERPL CALC-SCNC: 7 MMOL/L (ref 7–16)
AST SERPL-CCNC: 13 U/L (ref 15–37)
BASOPHILS # BLD: 0 K/UL (ref 0–0.2)
BASOPHILS NFR BLD: 0 % (ref 0–2)
BILIRUB SERPL-MCNC: 0.2 MG/DL (ref 0.2–1.1)
BUN SERPL-MCNC: 7 MG/DL (ref 6–23)
CALCIUM SERPL-MCNC: 8.7 MG/DL (ref 8.3–10.4)
CHLORIDE SERPL-SCNC: 105 MMOL/L (ref 98–107)
CO2 SERPL-SCNC: 29 MMOL/L (ref 21–32)
CREAT SERPL-MCNC: 0.5 MG/DL (ref 0.6–1)
DIFFERENTIAL METHOD BLD: ABNORMAL
EOSINOPHIL # BLD: 0 K/UL (ref 0–0.8)
EOSINOPHIL NFR BLD: 0 % (ref 0.5–7.8)
ERYTHROCYTE [DISTWIDTH] IN BLOOD BY AUTOMATED COUNT: 19.3 % (ref 11.9–14.6)
GLOBULIN SER CALC-MCNC: 2.6 G/DL (ref 2.3–3.5)
GLUCOSE SERPL-MCNC: 140 MG/DL (ref 65–100)
HCT VFR BLD AUTO: 32.2 % (ref 35.8–46.3)
HGB BLD-MCNC: 10 G/DL (ref 11.7–15.4)
IMM GRANULOCYTES # BLD AUTO: 0 K/UL (ref 0–0.5)
IMM GRANULOCYTES NFR BLD AUTO: 1 % (ref 0–5)
LYMPHOCYTES # BLD: 0.3 K/UL (ref 0.5–4.6)
LYMPHOCYTES NFR BLD: 10 % (ref 13–44)
MAGNESIUM SERPL-MCNC: 2.3 MG/DL (ref 1.8–2.4)
MCH RBC QN AUTO: 27 PG (ref 26.1–32.9)
MCHC RBC AUTO-ENTMCNC: 31.1 G/DL (ref 31.4–35)
MCV RBC AUTO: 86.8 FL (ref 79.6–97.8)
MONOCYTES # BLD: 0 K/UL (ref 0.1–1.3)
MONOCYTES NFR BLD: 1 % (ref 4–12)
NEUTS SEG # BLD: 2.4 K/UL (ref 1.7–8.2)
NEUTS SEG NFR BLD: 88 % (ref 43–78)
NRBC # BLD: 0 K/UL (ref 0–0.2)
PLATELET # BLD AUTO: 188 K/UL (ref 150–450)
PMV BLD AUTO: 10.3 FL (ref 9.4–12.3)
POTASSIUM SERPL-SCNC: 3.2 MMOL/L (ref 3.5–5.1)
PROT SERPL-MCNC: 5.9 G/DL (ref 6.3–8.2)
RBC # BLD AUTO: 3.71 M/UL (ref 4.05–5.2)
SODIUM SERPL-SCNC: 141 MMOL/L (ref 136–145)
WBC # BLD AUTO: 2.8 K/UL (ref 4.3–11.1)

## 2022-06-04 PROCEDURE — 99233 SBSQ HOSP IP/OBS HIGH 50: CPT | Performed by: INTERNAL MEDICINE

## 2022-06-04 PROCEDURE — 36591 DRAW BLOOD OFF VENOUS DEVICE: CPT

## 2022-06-04 PROCEDURE — 85025 COMPLETE CBC W/AUTO DIFF WBC: CPT

## 2022-06-04 PROCEDURE — 6370000000 HC RX 637 (ALT 250 FOR IP): Performed by: NURSE PRACTITIONER

## 2022-06-04 PROCEDURE — 83735 ASSAY OF MAGNESIUM: CPT

## 2022-06-04 PROCEDURE — 1170000000 HC RM PRIVATE ONCOLOGY

## 2022-06-04 PROCEDURE — 6360000002 HC RX W HCPCS: Performed by: NURSE PRACTITIONER

## 2022-06-04 PROCEDURE — 2580000003 HC RX 258: Performed by: NURSE PRACTITIONER

## 2022-06-04 PROCEDURE — 80053 COMPREHEN METABOLIC PANEL: CPT

## 2022-06-04 PROCEDURE — 6370000000 HC RX 637 (ALT 250 FOR IP): Performed by: INTERNAL MEDICINE

## 2022-06-04 PROCEDURE — APPSS45 APP SPLIT SHARED TIME 31-45 MINUTES: Performed by: NURSE PRACTITIONER

## 2022-06-04 RX ORDER — LOPERAMIDE HYDROCHLORIDE 2 MG/1
2 CAPSULE ORAL 4 TIMES DAILY PRN
Status: DISCONTINUED | OUTPATIENT
Start: 2022-06-04 | End: 2022-06-14 | Stop reason: HOSPADM

## 2022-06-04 RX ORDER — HYDRALAZINE HYDROCHLORIDE 20 MG/ML
10 INJECTION INTRAMUSCULAR; INTRAVENOUS EVERY 6 HOURS PRN
Status: DISCONTINUED | OUTPATIENT
Start: 2022-06-04 | End: 2022-06-14 | Stop reason: HOSPADM

## 2022-06-04 RX ORDER — POTASSIUM CHLORIDE 20 MEQ/1
20 TABLET, EXTENDED RELEASE ORAL 2 TIMES DAILY WITH MEALS
Status: DISCONTINUED | OUTPATIENT
Start: 2022-06-04 | End: 2022-06-14

## 2022-06-04 RX ADMIN — AMOXICILLIN AND CLAVULANATE POTASSIUM 1 TABLET: 875; 125 TABLET, FILM COATED ORAL at 08:16

## 2022-06-04 RX ADMIN — NEOMYCIN SULFATE, POLYMYXIN B SULFATE AND BACITRACIN ZINC: 3.5; 10000; 4 OINTMENT OPHTHALMIC at 08:16

## 2022-06-04 RX ADMIN — LEVOFLOXACIN 500 MG: 500 TABLET, FILM COATED ORAL at 08:16

## 2022-06-04 RX ADMIN — HYDROCODONE BITARTRATE AND ACETAMINOPHEN 1 TABLET: 7.5; 325 TABLET ORAL at 15:19

## 2022-06-04 RX ADMIN — AMOXICILLIN AND CLAVULANATE POTASSIUM 1 TABLET: 875; 125 TABLET, FILM COATED ORAL at 21:11

## 2022-06-04 RX ADMIN — POTASSIUM CHLORIDE 20 MEQ: 20 TABLET, EXTENDED RELEASE ORAL at 16:56

## 2022-06-04 RX ADMIN — FLUCONAZOLE 200 MG: 100 TABLET ORAL at 08:16

## 2022-06-04 RX ADMIN — POTASSIUM CHLORIDE 20 MEQ: 20 TABLET, EXTENDED RELEASE ORAL at 08:16

## 2022-06-04 RX ADMIN — DULOXETINE HYDROCHLORIDE 30 MG: 30 CAPSULE, DELAYED RELEASE ORAL at 08:16

## 2022-06-04 RX ADMIN — PROCHLORPERAZINE EDISYLATE 5 MG: 5 INJECTION INTRAMUSCULAR; INTRAVENOUS at 09:08

## 2022-06-04 RX ADMIN — ACYCLOVIR 400 MG: 800 TABLET ORAL at 08:16

## 2022-06-04 RX ADMIN — GABAPENTIN 600 MG: 300 CAPSULE ORAL at 08:15

## 2022-06-04 RX ADMIN — HYDROCODONE BITARTRATE AND ACETAMINOPHEN 1 TABLET: 7.5; 325 TABLET ORAL at 21:11

## 2022-06-04 RX ADMIN — ONDANSETRON 4 MG: 2 INJECTION INTRAMUSCULAR; INTRAVENOUS at 23:14

## 2022-06-04 RX ADMIN — LOPERAMIDE HYDROCHLORIDE 2 MG: 2 CAPSULE ORAL at 15:21

## 2022-06-04 RX ADMIN — SODIUM CHLORIDE: 900 INJECTION, SOLUTION INTRAVENOUS at 16:56

## 2022-06-04 RX ADMIN — HYDROCODONE BITARTRATE AND ACETAMINOPHEN 1 TABLET: 7.5; 325 TABLET ORAL at 03:19

## 2022-06-04 RX ADMIN — NEOMYCIN SULFATE, POLYMYXIN B SULFATE AND BACITRACIN ZINC: 3.5; 10000; 4 OINTMENT OPHTHALMIC at 22:45

## 2022-06-04 RX ADMIN — GABAPENTIN 600 MG: 300 CAPSULE ORAL at 15:19

## 2022-06-04 RX ADMIN — SODIUM CHLORIDE: 900 INJECTION, SOLUTION INTRAVENOUS at 03:20

## 2022-06-04 RX ADMIN — ACYCLOVIR 400 MG: 800 TABLET ORAL at 21:12

## 2022-06-04 RX ADMIN — GABAPENTIN 600 MG: 300 CAPSULE ORAL at 21:11

## 2022-06-04 RX ADMIN — NEOMYCIN SULFATE, POLYMYXIN B SULFATE AND BACITRACIN ZINC: 3.5; 10000; 4 OINTMENT OPHTHALMIC at 15:20

## 2022-06-04 ASSESSMENT — PAIN SCALES - GENERAL
PAINLEVEL_OUTOF10: 7

## 2022-06-04 ASSESSMENT — PAIN DESCRIPTION - ONSET
ONSET: ON-GOING
ONSET: ON-GOING

## 2022-06-04 ASSESSMENT — PAIN DESCRIPTION - PAIN TYPE
TYPE: CHRONIC PAIN
TYPE: CHRONIC PAIN

## 2022-06-04 ASSESSMENT — PAIN DESCRIPTION - LOCATION
LOCATION: FOOT;HAND

## 2022-06-04 ASSESSMENT — PAIN DESCRIPTION - DESCRIPTORS
DESCRIPTORS: ACHING;BURNING;NUMBNESS;TINGLING
DESCRIPTORS: ACHING;BURNING;TINGLING
DESCRIPTORS: ACHING;BURNING;TINGLING

## 2022-06-04 ASSESSMENT — PAIN DESCRIPTION - ORIENTATION
ORIENTATION: RIGHT;LEFT

## 2022-06-04 NOTE — PROGRESS NOTES
[facility] Hematology & Oncology        Inpatient Hematology / Oncology Progress Note    Reason for Admission:  Stem cell transplant candidate [Z76.82]    24 Hour Events:  Afebrile, VSS  Day +2 Auto PBSCT  C/o fatigue, vomiting, and diarrhea    Transfusions: None  Replacements: K+    ROS:  Constitutional: +fatigue. Negative for fever, chills. CV: +edema. Negative for chest pain, palpitations. Respiratory: Negative for dyspnea, cough, wheezing. GI: +N/V/D. Negative for abdominal pain. 10 point review of systems is otherwise negative with the exception of the elements mentioned above in the HPI. Allergies   Allergen Reactions    Amoxicillin-Pot Clavulanate Other (See Comments)     Yeast infection  Yeast infection     Past Medical History:   Diagnosis Date    Obesity     Prolactin increased      Past Surgical History:   Procedure Laterality Date    IR BIOPSY PERC SUPERF BONE      IR TUNNELED CATHETER PLACEMENT GREATER THAN 5 YEARS  5/9/2022    IR TUNNELED CATHETER PLACEMENT GREATER THAN 5 YEARS  5/9/2022    IR TUNNELED CATHETER PLACEMENT GREATER THAN 5 YEARS 5/9/2022 [facility]     No family history on file.   Social History     Socioeconomic History    Marital status:      Spouse name: Not on file    Number of children: Not on file    Years of education: Not on file    Highest education level: Not on file   Occupational History    Not on file   Tobacco Use    Smoking status: Never Smoker    Smokeless tobacco: Never Used   Substance and Sexual Activity    Alcohol use: Never    Drug use: Never    Sexual activity: Not on file   Other Topics Concern    Not on file   Social History Narrative    Not on file     Social Determinants of Health     Financial Resource Strain:     Difficulty of Paying Living Expenses: Not on file   Food Insecurity:     Worried About Running Out of Food in the Last Year: Not on file    Jimi of Food in the Last Year: Not on file Transportation Needs:     Lack of Transportation (Medical): Not on file    Lack of Transportation (Non-Medical):  Not on file   Physical Activity:     Days of Exercise per Week: Not on file    Minutes of Exercise per Session: Not on file   Stress:     Feeling of Stress : Not on file   Social Connections:     Frequency of Communication with Friends and Family: Not on file    Frequency of Social Gatherings with Friends and Family: Not on file    Attends Tenriism Services: Not on file    Active Member of [address] [place] [address] or Organizations: Not on file    Attends Club or Organization Meetings: Not on file    Marital Status: Not on file   Intimate Partner Violence:     Fear of Current or Ex-Partner: Not on file    Emotionally Abused: Not on file    Physically Abused: Not on file    Sexually Abused: Not on file   Housing Stability:     Unable to Pay for Housing in the Last Year: Not on file    Number of [place] in the Last Year: Not on file    Unstable Housing in the Last Year: Not on file     Current Facility-Administered Medications   Medication Dose Route Frequency Provider Last Rate Last Admin    potassium chloride (KLOR-CON M) extended release tablet 20 mEq  20 mEq Oral BID  Magen Berrios MD   20 mEq at 06/04/22 0816    hydrALAZINE (APRESOLINE) injection 10 mg  10 mg IntraVENous Q6H PRN Jacqueline Flower, APRN - CNP        amoxicillin-clavulanate (AUGMENTIN) 875-125 MG per tablet 1 tablet  1 tablet Oral 2 times per day Jacqueline Flower, APRN - CNP   1 tablet at 06/04/22 0816    ondansetron (ZOFRAN) injection 4 mg  4 mg IntraVENous Q4H PRN Jacqueline Flower, APRN - CNP        prochlorperazine (COMPAZINE) injection 5 mg  5 mg IntraVENous Q6H PRN Jacqueline Flower, APRN - CNP   5 mg at 06/04/22 0908    morphine injection 2 mg  2 mg IntraVENous Q4H PRN Jacqueline Flower, APRN - CNP        [START ON 6/8/2022] filgrastim-aafi (NIVESTYM) injection 300 mcg  300 mcg SubCUTAneous Daily Jacqueline Flower, APRN - CNP        meperidine (DEMEROL) injection 12.5 mg  12.5 mg IntraVENous PRN Kyle Long MD        EPINEPHrine PF 1 MG/ML injection 0.3 mg  0.3 mg IntraMUSCular PRN Kyle Long MD        neomycin-bacitracin-polymyxin (NEOSPORIN) ophthalmic ointment   Right Eye TID BEE Hastings CNP   Given at 22 0816    DULoxetine (CYMBALTA) extended release capsule 30 mg  30 mg Oral Daily Ashwin Baldy, APRN - NP   30 mg at 22 0816    HYDROcodone-acetaminophen (NORCO) 5-325 MG per tablet 1 tablet  1 tablet Oral Q4H PRN Ashwin Baldy, APRN - NP        HYDROcodone-acetaminophen (NORCO) 7.5-325 MG per tablet 1 tablet  1 tablet Oral Q6H PRN Ashwin Baldy, APRN - NP   1 tablet at 22 0319    polyethylene glycol (GLYCOLAX) packet 17 g  17 g Oral Daily PRN Ashwin Baldy, APRN - NP        0.9 % sodium chloride infusion   IntraVENous Continuous Ashwin Balkonstantin, APRN - NP 75 mL/hr at 22 0320 New Bag at 22 0320    acyclovir (ZOVIRAX) tablet 400 mg  400 mg Oral BID Ashwin Baldy, APRN - NP   400 mg at 22 0816    levoFLOXacin (LEVAQUIN) tablet 500 mg  500 mg Oral Daily Ashwin Baldy, APRN - NP   500 mg at 22 0816    fluconazole (DIFLUCAN) tablet 200 mg  200 mg Oral Daily Ashwin Baldy, APRN - NP   200 mg at 22 0816    gabapentin (NEURONTIN) capsule 600 mg  600 mg Oral TID Kyle Long MD   600 mg at 22 0815       OBJECTIVE:  Patient Vitals for the past 8 hrs:   BP Temp Temp src Pulse Resp SpO2   22 0815 138/89 98.2 °F (36.8 °C) Oral 93 18 99 %     Temp (24hrs), Av.1 °F (36.7 °C), Min:97.7 °F (36.5 °C), Max:98.4 °F (36.9 °C)    701 -  1900  In: 849 [P.O.:200; I.V.:381]  Out: 400 [Urine:400]    Physical Exam:  Constitutional: Well developed, well nourished female in no acute distress, sitting comfortably on the hospital bed. HEENT: Normocephalic and atraumatic. Oropharynx is clear, mucous membranes are moist.  Extraocular muscles are intact. Sclerae anicteric. Neck supple without JVD.  No thyromegaly present. Skin Warm and dry. No bruising and no rash noted. No erythema. No pallor. Respiratory Lungs are clear to auscultation bilaterally without wheezes, rales or rhonchi, normal air exchange without accessory muscle use. CVS Normal rate, regular rhythm and normal S1 and S2. No murmurs, gallops, or rubs. Abdomen Soft, nontender and nondistended, normoactive bowel sounds. No palpable mass. No hepatosplenomegaly. Neuro Grossly nonfocal with no obvious sensory or motor deficits. MSK Normal range of motion in general.  Trace BLE edema and no tenderness. Psych Appropriate mood and affect.         Labs:    Recent Results (from the past 24 hour(s))   Comprehensive Metabolic Panel w/ Reflex to MG    Collection Time: 06/04/22  3:12 AM   Result Value Ref Range    Sodium 141 136 - 145 mmol/L    Potassium 3.2 (L) 3.5 - 5.1 mmol/L    Chloride 105 98 - 107 mmol/L    CO2 29 21 - 32 mmol/L    Anion Gap 7 7 - 16 mmol/L    Glucose 140 (H) 65 - 100 mg/dL    BUN 7 6 - 23 MG/DL    CREATININE 0.50 (L) 0.6 - 1.0 MG/DL    GFR African American >60 >60 ml/min/1.73m2    GFR Non- >60 >60 ml/min/1.73m2    Calcium 8.7 8.3 - 10.4 MG/DL    Total Bilirubin 0.2 0.2 - 1.1 MG/DL    ALT 11 (L) 12 - 65 U/L    AST 13 (L) 15 - 37 U/L    Alk Phosphatase 83 50 - 136 U/L    Total Protein 5.9 (L) 6.3 - 8.2 g/dL    Albumin 3.3 (L) 3.5 - 5.0 g/dL    Globulin 2.6 2.3 - 3.5 g/dL    Albumin/Globulin Ratio 1.3 1.2 - 3.5     CBC with Auto Differential    Collection Time: 06/04/22  3:12 AM   Result Value Ref Range    WBC 2.8 (L) 4.3 - 11.1 K/uL    RBC 3.71 (L) 4.05 - 5.2 M/uL    Hemoglobin 10.0 (L) 11.7 - 15.4 g/dL    Hematocrit 32.2 (L) 35.8 - 46.3 %    MCV 86.8 79.6 - 97.8 FL    MCH 27.0 26.1 - 32.9 PG    MCHC 31.1 (L) 31.4 - 35.0 g/dL    RDW 19.3 (H) 11.9 - 14.6 %    Platelets 372 436 - 193 K/uL    MPV 10.3 9.4 - 12.3 FL    nRBC 0.00 0.0 - 0.2 K/uL    Differential Type AUTOMATED      Seg Neutrophils 88 (H) 43 - 78 % today.  6/3 Day +1. Ppx antibx start today. Wt up 5# with +I/Os. Lasix x 1 ordered. 6/4 Day +2. C/o fatigue, vomiting, and diarrhea. Diuresed well. R jacklynolum  6/2 Has R eye stye. Antibx ointment ordered. Can use warm compresses prn. N/V/D  6/4 Con't antiemetics and anitdiarrheals prn    Continue home meds  PPx Meds: Acyclovir, Diflucan, Augmentin, Levaquin  Vamshi SOPs  Lovenox for DVT ppx (hold for plt <50k)    Goals and plan of care reviewed with the patient. All questions answered to the best of our ability. Disposition:  Anticipate discharge home after engraftment. Gerry Osler, APRN - [address] Hematology & Oncology  [address] [place] [address]  Office : [phone]  Fax : [phone]       Attending Addendum:  I have personally performed a face to face diagnostic evaluation on this patient. I have reviewed and agree with the care plan as documented above Terrence Miller N.P.  My findings are as follows: Patient appears stable, heart rate regular without murmurs, abdomen is non-tender, bowel sounds are positive. 54year-old -American female history of prolactinemia, thyroid Hurthle cell neoplasm, lambda light chain multiple myeloma, ISS stage I, high risk disease (gain of 1 q.), bortezomib related neuropathy, iron deficiency in CR 1 after daratumumab based regimen. Patient seen in oncology office 5/31/2022. Patient collected CD34 at 6.24 mil/kg w granix alone. Hospitalized for melphalan 200 mg per metered squared followed by stem cell reinfusion following day (CD34 3.12 mil/kg back). Tolerated melphalan and stem cell re-infusion well. D+2 today. Hydration and IV electrolyte replacement as needed. Some fatigue and loose stools today, monitor.  Antiemetic as well as antidiarrheal support as needed. Blood transfusion support transfusion as needed. Start prophylactic antibiotics with Levaquin, Augmentin, acyclovir and Diflucan.  G-CSF support with Granix daily starting day +6.  She will be hospitalized through engraftment.             Guillermo Chi MD  [facility] Hematology/Oncology  [address] [place] [address]  Office : [phone]  Fax : [phone]

## 2022-06-04 NOTE — FLOWSHEET NOTE
Diagnosis: MM  Transplant Date: 6/2/22  Chemo regimen used: Melphalan  Day: (+/-) +2. BMT assessments and toxicities completed. Mouth Care completed 3 times this shift. WBC/ANC= 2.8/2.4. Prophylactic medications started ACV- 6/1, Augmentin, LVQ, Difllucan-6/3 . Toxicities greater than 2 :none. Patient seen by MD/NP daily until engraftment or while IP. Labs not resulted that need follow-up: none. Additional Shift Information:  Vomiting episode x 1, imodium given x 1 for diarrhea, fatigued    Intake/Output  No intake/output data recorded.       06/04/22 0815   Toxicity Assessment   Fatigue Grade 1    Neuropathy   (baseline)   Loss of Appetite Grade 1   Cough None   Nausea Grade 1    Vomiting Grade 1

## 2022-06-05 LAB
ALBUMIN SERPL-MCNC: 3.2 G/DL (ref 3.5–5)
ALBUMIN/GLOB SERPL: 1.3 {RATIO} (ref 1.2–3.5)
ALP SERPL-CCNC: 67 U/L (ref 50–136)
ALT SERPL-CCNC: 10 U/L (ref 12–65)
ANION GAP SERPL CALC-SCNC: 6 MMOL/L (ref 7–16)
AST SERPL-CCNC: 12 U/L (ref 15–37)
BASOPHILS # BLD: 0 K/UL (ref 0–0.2)
BASOPHILS NFR BLD: 0 % (ref 0–2)
BILIRUB SERPL-MCNC: 0.6 MG/DL (ref 0.2–1.1)
BUN SERPL-MCNC: 7 MG/DL (ref 6–23)
CALCIUM SERPL-MCNC: 8.8 MG/DL (ref 8.3–10.4)
CHLORIDE SERPL-SCNC: 106 MMOL/L (ref 98–107)
CO2 SERPL-SCNC: 29 MMOL/L (ref 21–32)
CREAT SERPL-MCNC: 0.3 MG/DL (ref 0.6–1)
DIFFERENTIAL METHOD BLD: ABNORMAL
EOSINOPHIL # BLD: 0 K/UL (ref 0–0.8)
EOSINOPHIL NFR BLD: 2 % (ref 0.5–7.8)
ERYTHROCYTE [DISTWIDTH] IN BLOOD BY AUTOMATED COUNT: 18.6 % (ref 11.9–14.6)
GLOBULIN SER CALC-MCNC: 2.5 G/DL (ref 2.3–3.5)
GLUCOSE SERPL-MCNC: 87 MG/DL (ref 65–100)
HCT VFR BLD AUTO: 31.6 % (ref 35.8–46.3)
HGB BLD-MCNC: 10 G/DL (ref 11.7–15.4)
IMM GRANULOCYTES # BLD AUTO: 0 K/UL (ref 0–0.5)
IMM GRANULOCYTES NFR BLD AUTO: 0 % (ref 0–5)
LYMPHOCYTES # BLD: 0.3 K/UL (ref 0.5–4.6)
LYMPHOCYTES NFR BLD: 11 % (ref 13–44)
MCH RBC QN AUTO: 27.1 PG (ref 26.1–32.9)
MCHC RBC AUTO-ENTMCNC: 31.6 G/DL (ref 31.4–35)
MCV RBC AUTO: 85.6 FL (ref 79.6–97.8)
MONOCYTES # BLD: 0 K/UL (ref 0.1–1.3)
MONOCYTES NFR BLD: 1 % (ref 4–12)
NEUTS SEG # BLD: 2.2 K/UL (ref 1.7–8.2)
NEUTS SEG NFR BLD: 86 % (ref 43–78)
NRBC # BLD: 0 K/UL (ref 0–0.2)
PLATELET # BLD AUTO: 164 K/UL (ref 150–450)
PMV BLD AUTO: 10.2 FL (ref 9.4–12.3)
POTASSIUM SERPL-SCNC: 3.6 MMOL/L (ref 3.5–5.1)
PROT SERPL-MCNC: 5.7 G/DL (ref 6.3–8.2)
RBC # BLD AUTO: 3.69 M/UL (ref 4.05–5.2)
SODIUM SERPL-SCNC: 141 MMOL/L (ref 136–145)
WBC # BLD AUTO: 2.5 K/UL (ref 4.3–11.1)

## 2022-06-05 PROCEDURE — 6370000000 HC RX 637 (ALT 250 FOR IP): Performed by: NURSE PRACTITIONER

## 2022-06-05 PROCEDURE — 1170000000 HC RM PRIVATE ONCOLOGY

## 2022-06-05 PROCEDURE — 6360000002 HC RX W HCPCS: Performed by: NURSE PRACTITIONER

## 2022-06-05 PROCEDURE — 2580000003 HC RX 258: Performed by: NURSE PRACTITIONER

## 2022-06-05 PROCEDURE — 6370000000 HC RX 637 (ALT 250 FOR IP): Performed by: INTERNAL MEDICINE

## 2022-06-05 PROCEDURE — 36591 DRAW BLOOD OFF VENOUS DEVICE: CPT

## 2022-06-05 PROCEDURE — 85025 COMPLETE CBC W/AUTO DIFF WBC: CPT

## 2022-06-05 PROCEDURE — 99233 SBSQ HOSP IP/OBS HIGH 50: CPT | Performed by: INTERNAL MEDICINE

## 2022-06-05 PROCEDURE — APPSS30 APP SPLIT SHARED TIME 16-30 MINUTES: Performed by: NURSE PRACTITIONER

## 2022-06-05 PROCEDURE — 80053 COMPREHEN METABOLIC PANEL: CPT

## 2022-06-05 RX ADMIN — AMOXICILLIN AND CLAVULANATE POTASSIUM 1 TABLET: 875; 125 TABLET, FILM COATED ORAL at 21:32

## 2022-06-05 RX ADMIN — SODIUM CHLORIDE: 900 INJECTION, SOLUTION INTRAVENOUS at 23:33

## 2022-06-05 RX ADMIN — HYDROCODONE BITARTRATE AND ACETAMINOPHEN 1 TABLET: 7.5; 325 TABLET ORAL at 15:44

## 2022-06-05 RX ADMIN — LOPERAMIDE HYDROCHLORIDE 2 MG: 2 CAPSULE ORAL at 08:52

## 2022-06-05 RX ADMIN — GABAPENTIN 600 MG: 300 CAPSULE ORAL at 21:32

## 2022-06-05 RX ADMIN — HYDROCODONE BITARTRATE AND ACETAMINOPHEN 1 TABLET: 7.5; 325 TABLET ORAL at 21:33

## 2022-06-05 RX ADMIN — ACYCLOVIR 400 MG: 800 TABLET ORAL at 21:33

## 2022-06-05 RX ADMIN — NEOMYCIN SULFATE, POLYMYXIN B SULFATE AND BACITRACIN ZINC: 3.5; 10000; 4 OINTMENT OPHTHALMIC at 08:53

## 2022-06-05 RX ADMIN — PROCHLORPERAZINE EDISYLATE 5 MG: 5 INJECTION INTRAMUSCULAR; INTRAVENOUS at 21:05

## 2022-06-05 RX ADMIN — POTASSIUM CHLORIDE 20 MEQ: 20 TABLET, EXTENDED RELEASE ORAL at 15:44

## 2022-06-05 RX ADMIN — GABAPENTIN 600 MG: 300 CAPSULE ORAL at 08:53

## 2022-06-05 RX ADMIN — AMOXICILLIN AND CLAVULANATE POTASSIUM 1 TABLET: 875; 125 TABLET, FILM COATED ORAL at 08:53

## 2022-06-05 RX ADMIN — POTASSIUM CHLORIDE 20 MEQ: 20 TABLET, EXTENDED RELEASE ORAL at 08:52

## 2022-06-05 RX ADMIN — FLUCONAZOLE 200 MG: 100 TABLET ORAL at 08:52

## 2022-06-05 RX ADMIN — NEOMYCIN SULFATE, POLYMYXIN B SULFATE AND BACITRACIN ZINC: 3.5; 10000; 4 OINTMENT OPHTHALMIC at 21:05

## 2022-06-05 RX ADMIN — PROCHLORPERAZINE EDISYLATE 5 MG: 5 INJECTION INTRAMUSCULAR; INTRAVENOUS at 09:24

## 2022-06-05 RX ADMIN — DULOXETINE HYDROCHLORIDE 30 MG: 30 CAPSULE, DELAYED RELEASE ORAL at 08:53

## 2022-06-05 RX ADMIN — ACYCLOVIR 400 MG: 800 TABLET ORAL at 08:53

## 2022-06-05 RX ADMIN — NEOMYCIN SULFATE, POLYMYXIN B SULFATE AND BACITRACIN ZINC: 3.5; 10000; 4 OINTMENT OPHTHALMIC at 15:44

## 2022-06-05 RX ADMIN — LOPERAMIDE HYDROCHLORIDE 2 MG: 2 CAPSULE ORAL at 15:44

## 2022-06-05 RX ADMIN — HYDROCODONE BITARTRATE AND ACETAMINOPHEN 1 TABLET: 7.5; 325 TABLET ORAL at 03:29

## 2022-06-05 RX ADMIN — LEVOFLOXACIN 500 MG: 500 TABLET, FILM COATED ORAL at 08:53

## 2022-06-05 RX ADMIN — GABAPENTIN 600 MG: 300 CAPSULE ORAL at 15:44

## 2022-06-05 RX ADMIN — HYDROCODONE BITARTRATE AND ACETAMINOPHEN 1 TABLET: 7.5; 325 TABLET ORAL at 08:53

## 2022-06-05 ASSESSMENT — PAIN SCALES - GENERAL
PAINLEVEL_OUTOF10: 7
PAINLEVEL_OUTOF10: 3
PAINLEVEL_OUTOF10: 0
PAINLEVEL_OUTOF10: 7
PAINLEVEL_OUTOF10: 4

## 2022-06-05 ASSESSMENT — PAIN - FUNCTIONAL ASSESSMENT
PAIN_FUNCTIONAL_ASSESSMENT: PREVENTS OR INTERFERES SOME ACTIVE ACTIVITIES AND ADLS
PAIN_FUNCTIONAL_ASSESSMENT: ACTIVITIES ARE NOT PREVENTED
PAIN_FUNCTIONAL_ASSESSMENT: PREVENTS OR INTERFERES SOME ACTIVE ACTIVITIES AND ADLS

## 2022-06-05 ASSESSMENT — PAIN DESCRIPTION - LOCATION
LOCATION: FOOT;HAND
LOCATION: FOOT;HAND
LOCATION: HAND;FOOT
LOCATION: HAND;FOOT
LOCATION: FOOT;HAND

## 2022-06-05 ASSESSMENT — PAIN DESCRIPTION - DESCRIPTORS
DESCRIPTORS: ACHING;BURNING;TINGLING
DESCRIPTORS: ACHING;BURNING;TINGLING;NUMBNESS
DESCRIPTORS: NUMBNESS;SHOOTING;TINGLING
DESCRIPTORS: ACHING;BURNING;NUMBNESS;TINGLING
DESCRIPTORS: ACHING;BURNING;TINGLING

## 2022-06-05 ASSESSMENT — PAIN DESCRIPTION - PAIN TYPE
TYPE: CHRONIC PAIN
TYPE: NEUROPATHIC PAIN
TYPE: CHRONIC PAIN
TYPE: NEUROPATHIC PAIN

## 2022-06-05 ASSESSMENT — PAIN SCALES - WONG BAKER: WONGBAKER_NUMERICALRESPONSE: 0

## 2022-06-05 ASSESSMENT — PAIN DESCRIPTION - ONSET
ONSET: ON-GOING

## 2022-06-05 ASSESSMENT — PAIN DESCRIPTION - FREQUENCY
FREQUENCY: CONTINUOUS
FREQUENCY: CONTINUOUS

## 2022-06-05 ASSESSMENT — PAIN DESCRIPTION - ORIENTATION
ORIENTATION: RIGHT;LEFT

## 2022-06-05 NOTE — PROGRESS NOTES
Diagnosis: MM  Transplant Date: 6/2/22  Chemo regimen used: Melphalan  Day: (+/-) +3. BMT assessments and toxicities completed. Mouth Care completed 1 time this shift. WBC/ANC= 2.5/2.2. Prophylactic medications started ACV- 6/1, Augmentin, LVQ, Difllucan-6/3 . Toxicities greater than 2 :none. Patient seen by MD/NP daily until engraftment or while IP. Labs not resulted that need follow-up: none. Additional Shift Information:  Nausea improved, zofran given once. Norco given x2 for neuropathy pain.  C/o fatigue but walked the halls last night w/no issue.

## 2022-06-05 NOTE — FLOWSHEET NOTE
Diagnosis: MM  Transplant Date: 6/2/22  Chemo regimen used: Melphalan  Day: (+/-) +3. BMT assessments and toxicities completed. Mouth Care completed 3 times this shift. WBC/ANC= 2.5/2.2. Prophylactic medications started ACV- 6/1, Augmentin, LVQ, Difllucan-6/3 . Toxicities greater than 2 :none. Patient seen by MD/NP daily until engraftment or while IP. Labs not resulted that need follow-up: none  Additional Shift Information:  Multiple loose BMs- imodium given x 2, neuropathy pain x 2- relieved by norco 7.5, nausea this AM- compazine given- no vomiting, appetite decreased    Intake/Output  06/05 0701 - 06/05 1900  In: 9531 [P.O.:340; I.V.:992]  Out: 2325 [Urine:2325]   I/O last 3 completed shifts: In: 1086 [P.O.:1600;  I.V.:2523]  Out: 4350 [Urine:4350]        06/05/22 0900   Toxicity Assessment   Fatigue Grade 1    Neuropathy   (baseline)   Loss of Appetite Grade 1   Cough None   Nausea Grade 1    Diarrhea Grade 1    Oral Assessment Score   Voice 1   Swallow 1   Lips 1   Tongue 1   Saliva 1   Mucous Membranes 1   Gingiva 1   Teeth or Dentures 1   Oral Assessment Total 8

## 2022-06-05 NOTE — PROGRESS NOTES
[facility] Hematology & Oncology        Inpatient Hematology / Oncology Progress Note    Reason for Admission:  Stem cell transplant candidate [Z76.82]    24 Hour Events:  Afebrile, VSS  Day +3 Auto PBSCT  C/o fatigue, nausea improved    Transfusions: None  Replacements: None    ROS:  Constitutional: +fatigue. Negative for fever, chills. CV: +edema. Negative for chest pain, palpitations. Respiratory: Negative for dyspnea, cough, wheezing. GI: +N/D. Negative for abdominal pain. 10 point review of systems is otherwise negative with the exception of the elements mentioned above in the HPI. Allergies   Allergen Reactions    Amoxicillin-Pot Clavulanate Other (See Comments)     Yeast infection  Yeast infection     Past Medical History:   Diagnosis Date    Obesity     Prolactin increased      Past Surgical History:   Procedure Laterality Date    IR BIOPSY PERC SUPERF BONE      IR TUNNELED CATHETER PLACEMENT GREATER THAN 5 YEARS  5/9/2022    IR TUNNELED CATHETER PLACEMENT GREATER THAN 5 YEARS  5/9/2022    IR TUNNELED CATHETER PLACEMENT GREATER THAN 5 YEARS 5/9/2022 [facility] SPECI[facility]     No family history on file.   Social History     Socioeconomic History    Marital status:      Spouse name: Not on file    Number of children: Not on file    Years of education: Not on file    Highest education level: Not on file   Occupational History    Not on file   Tobacco Use    Smoking status: Never Smoker    Smokeless tobacco: Never Used   Substance and Sexual Activity    Alcohol use: Never    Drug use: Never    Sexual activity: Not on file   Other Topics Concern    Not on file   Social History Narrative    Not on file     Social Determinants of Health     Financial Resource Strain:     Difficulty of Paying Living Expenses: Not on file   Food Insecurity:     Worried About Running Out of Food in the Last Year: Not on file    Jimi of Food in the Last Year: Not on ANDREY Hernandez Needs:     Lack of Transportation (Medical): Not on file    Lack of Transportation (Non-Medical):  Not on file   Physical Activity:     Days of Exercise per Week: Not on file    Minutes of Exercise per Session: Not on file   Stress:     Feeling of Stress : Not on file   Social Connections:     Frequency of Communication with Friends and Family: Not on file    Frequency of Social Gatherings with Friends and Family: Not on file    Attends Mandaen Services: Not on file    Active Member of [address] [place] [address] or Organizations: Not on file    Attends Club or Organization Meetings: Not on file    Marital Status: Not on file   Intimate Partner Violence:     Fear of Current or Ex-Partner: Not on file    Emotionally Abused: Not on file    Physically Abused: Not on file    Sexually Abused: Not on file   Housing Stability:     Unable to Pay for Housing in the Last Year: Not on file    Number of [place] in the Last Year: Not on file    Unstable Housing in the Last Year: Not on file     Current Facility-Administered Medications   Medication Dose Route Frequency Provider Last Rate Last Admin    potassium chloride (KLOR-CON M) extended release tablet 20 mEq  20 mEq Oral BID  Lex Weeks MD   20 mEq at 06/05/22 0852    hydrALAZINE (APRESOLINE) injection 10 mg  10 mg IntraVENous Q6H PRN Maria De Jesus Feathers, APRN - CNP        loperamide (IMODIUM) capsule 2 mg  2 mg Oral 4x Daily PRN Maria De Jesus Feathers, APRN - CNP   2 mg at 06/05/22 0852    amoxicillin-clavulanate (AUGMENTIN) 875-125 MG per tablet 1 tablet  1 tablet Oral 2 times per day Maria De Jesus Feathers, APRN - CNP   1 tablet at 06/05/22 0853    ondansetron (ZOFRAN) injection 4 mg  4 mg IntraVENous Q4H PRN Maria De Jesus Feathers, APRN - CNP   4 mg at 06/04/22 2314    prochlorperazine (COMPAZINE) injection 5 mg  5 mg IntraVENous Q6H PRN Maria De Jesus Feathers, APRN - CNP   5 mg at 06/05/22 0924    morphine injection 2 mg  2 mg IntraVENous Q4H PRN Maria De Jesus Feathers, APRN - CNP        [START ON 6/8/2022] filgrastim-aafi (NIVESTYM) injection 300 mcg  300 mcg SubCUTAneous Daily Floressudeep Marie, APRN - CNP        meperidine (DEMEROL) injection 12.5 mg  12.5 mg IntraVENous PRN Wilder Donovan MD        EPINEPHrine PF 1 MG/ML injection 0.3 mg  0.3 mg IntraMUSCular PRN Wilder Donovan MD        neomycin-bacitracin-polymyxin (NEOSPORIN) ophthalmic ointment   Right Eye TID BEE Martin - CNP   Given at 22 0853    DULoxetine (CYMBALTA) extended release capsule 30 mg  30 mg Oral Daily Suman Zahida, APRN - NP   30 mg at 22 0853    HYDROcodone-acetaminophen (NORCO) 5-325 MG per tablet 1 tablet  1 tablet Oral Q4H PRN Suman Zahida, APRN - NP        HYDROcodone-acetaminophen (NORCO) 7.5-325 MG per tablet 1 tablet  1 tablet Oral Q6H PRN Suman Zahida, APRN - NP   1 tablet at 22 0853    polyethylene glycol (GLYCOLAX) packet 17 g  17 g Oral Daily PRN Suman Zahida, APRN - NP        0.9 % sodium chloride infusion   IntraVENous Continuous Suman Zahida, APRN - NP 75 mL/hr at 22 1656 New Bag at 22 1656    acyclovir (ZOVIRAX) tablet 400 mg  400 mg Oral BID Suman Zahida, APRN - NP   400 mg at 22 0853    levoFLOXacin (LEVAQUIN) tablet 500 mg  500 mg Oral Daily Suman Zahida, APRN - NP   500 mg at 22 0853    fluconazole (DIFLUCAN) tablet 200 mg  200 mg Oral Daily Suman Zahida, APRN - NP   200 mg at 22 5138    gabapentin (NEURONTIN) capsule 600 mg  600 mg Oral TID Wilder Donovan MD   600 mg at 22 0853       OBJECTIVE:  Patient Vitals for the past 8 hrs:   BP Temp Temp src Pulse Resp SpO2   22 0742 129/65 97.8 °F (36.6 °C) Oral 94 18 97 %   22 0329 127/73 97.9 °F (36.6 °C) Oral 92 16 --     Temp (24hrs), Av.9 °F (36.6 °C), Min:97.6 °F (36.4 °C), Max:98.2 °F (36.8 °C)    701 -  1900  In: 396 [I.V.:396]  Out: 1075 [Urine:1075]    Physical Exam:  Constitutional: Well developed, well nourished female in no acute distress, sitting comfortably on the bedside couch.    HEENT: Normocephalic and 150 - 450 K/uL    MPV 10.2 9.4 - 12.3 FL    nRBC 0.00 0.0 - 0.2 K/uL    Differential Type AUTOMATED      Seg Neutrophils 86 (H) 43 - 78 %    Lymphocytes 11 (L) 13 - 44 %    Monocytes 1 (L) 4.0 - 12.0 %    Eosinophils % 2 0.5 - 7.8 %    Basophils 0 0.0 - 2.0 %    Immature Granulocytes 0 0.0 - 5.0 %    Segs Absolute 2.2 1.7 - 8.2 K/UL    Absolute Lymph # 0.3 (L) 0.5 - 4.6 K/UL    Absolute Mono # 0.0 (L) 0.1 - 1.3 K/UL    Absolute Eos # 0.0 0.0 - 0.8 K/UL    Basophils Absolute 0.0 0.0 - 0.2 K/UL    Absolute Immature Granulocyte 0.0 0.0 - 0.5 K/UL       Imaging:  n/a    ASSESSMENT:  Patient Active Problem List   Diagnosis    Bone marrow transplant candidate    Multiple myeloma not having achieved remission (HCC)    Stem cell transplant candidate    [facility]promised ([facility]    Bone marrow transplant status ([facility]    Admission for antineoplastic chemotherapy    Loose stools     Ms. Ralf Adams is a 54 y.o. female admitted on 5/31/2022 with a primary diagnosis of There were no encounter diagnoses. .       24-year-old -American female history of prolactinemia, thyroid Hurthle cell neoplasm, lambda light chain multiple myeloma, ISS stage I, high risk disease (gain of 1 q.), bortezomib related neuropathy, iron deficiency in CR 1 after daratumumab based regimen. Patient seen in oncology office 5/31/2022. Patient collected CD34 at 6.24 mil/kg w granix alone. Reports feeling reasonably. Extensive ROS unremarkable. PLAN:  Multiple myeloma  - Okay to proceed with melphalan 200 mg per metered squared followed by stem cell reinfusion following day (CD34 3.12 mil/kg back). - Hydration and IV electrolyte replacement as needed. - Antiemetic as well as antidiarrheal support as needed. - Blood transfusion support transfusion as needed. - Prophylactic antibiotics with Levaquin, Augmentin, acyclovir and Diflucan starting day +1.    - G-CSF support with Nivestym daily starting day +6.    6/1 Day -1.   Melphalan today, stem cells tomorrow. 6/2 Day 0. Stem cells today. 6/3 Day +1. Ppx antibx start today. Wt up 5# with +I/Os. Lasix x 1 ordered. 6/4 Day +2. C/o fatigue, vomiting, and diarrhea. Diuresed well. 6/5 Day +3. Feeling better today. G-CSF to start D+6. R hordeolum  6/2 Has R eye stye. Antibx ointment ordered. Can use warm compresses prn. N/V/D  6/4 Con't antiemetics and anitdiarrheals prn    Continue home meds  PPx Meds: Acyclovir, Diflucan, Augmentin, Levaquin  Vamshi SOPs  Lovenox for DVT ppx (hold for plt <50k)    Goals and plan of care reviewed with the patient. All questions answered to the best of our ability. Disposition:  Anticipate discharge home after engraftment. Hernandez BEE Graf - [address] Hematology & Oncology  [address] [place] [address]  Office : [phone]  Fax : [phone]       Attending Addendum:  I have personally performed a face to face diagnostic evaluation on this patient. I have reviewed and agree with the care plan as documented above Rupali Martell N.P.  My findings are as follows: Patient appears stable, heart rate regular without murmurs, abdomen is non-tender, bowel sounds are positive. 54year-old -American female history of prolactinemia, thyroid Hurthle cell neoplasm, lambda light chain multiple myeloma, ISS stage I, high risk disease (gain of 1 q.), bortezomib related neuropathy, iron deficiency in CR 1 after daratumumab based regimen. Patient seen in oncology office 5/31/2022. Patient collected CD34 at 6.24 mil/kg w granix alone. Hospitalized for melphalan 200 mg per metered squared followed by stem cell reinfusion following day (CD34 3.12 mil/kg back). Tolerated melphalan and stem cell re-infusion well. D+3 today. Hydration and IV electrolyte replacement as needed. Some fatigue and loose stools, monitor. Anti-diarrheals helping.  Antiemetic as well as antidiarrheal support as needed. Blood transfusion support transfusion as needed. On  prophylactic antibiotics with Levaquin, Augmentin, acyclovir and Diflucan.  G-CSF support with Granix daily starting day +6.  She will be hospitalized through engraftment.             Nate Lopez MD  [address] [place] [address] Hematology/Oncology  [address] [place] [address]  Office : [phone]  Fax : [phone]

## 2022-06-06 LAB
ALBUMIN SERPL-MCNC: 3.2 G/DL (ref 3.5–5)
ALBUMIN/GLOB SERPL: 1.3 {RATIO} (ref 1.2–3.5)
ALP SERPL-CCNC: 68 U/L (ref 50–136)
ALT SERPL-CCNC: 12 U/L (ref 12–65)
ANION GAP SERPL CALC-SCNC: 6 MMOL/L (ref 7–16)
AST SERPL-CCNC: 15 U/L (ref 15–37)
BASOPHILS # BLD: 0 K/UL (ref 0–0.2)
BASOPHILS NFR BLD: 0 % (ref 0–2)
BILIRUB SERPL-MCNC: 0.3 MG/DL (ref 0.2–1.1)
BUN SERPL-MCNC: 7 MG/DL (ref 6–23)
CALCIUM SERPL-MCNC: 8.8 MG/DL (ref 8.3–10.4)
CHLORIDE SERPL-SCNC: 108 MMOL/L (ref 98–107)
CO2 SERPL-SCNC: 28 MMOL/L (ref 21–32)
CREAT SERPL-MCNC: 0.5 MG/DL (ref 0.6–1)
DIFFERENTIAL METHOD BLD: ABNORMAL
EOSINOPHIL # BLD: 0 K/UL (ref 0–0.8)
EOSINOPHIL NFR BLD: 1 % (ref 0.5–7.8)
ERYTHROCYTE [DISTWIDTH] IN BLOOD BY AUTOMATED COUNT: 18.4 % (ref 11.9–14.6)
GGT SERPL-CCNC: 46 U/L (ref 5–55)
GLOBULIN SER CALC-MCNC: 2.5 G/DL (ref 2.3–3.5)
GLUCOSE SERPL-MCNC: 97 MG/DL (ref 65–100)
HCT VFR BLD AUTO: 30.7 % (ref 35.8–46.3)
HGB BLD-MCNC: 9.7 G/DL (ref 11.7–15.4)
IMM GRANULOCYTES # BLD AUTO: 0 K/UL (ref 0–0.5)
IMM GRANULOCYTES NFR BLD AUTO: 1 % (ref 0–5)
LDH SERPL L TO P-CCNC: 239 U/L (ref 100–190)
LYMPHOCYTES # BLD: 0.2 K/UL (ref 0.5–4.6)
LYMPHOCYTES NFR BLD: 8 % (ref 13–44)
MCH RBC QN AUTO: 27.4 PG (ref 26.1–32.9)
MCHC RBC AUTO-ENTMCNC: 31.6 G/DL (ref 31.4–35)
MCV RBC AUTO: 86.7 FL (ref 79.6–97.8)
MONOCYTES # BLD: 0 K/UL (ref 0.1–1.3)
MONOCYTES NFR BLD: 1 % (ref 4–12)
NEUTS SEG # BLD: 2 K/UL (ref 1.7–8.2)
NEUTS SEG NFR BLD: 90 % (ref 43–78)
NRBC # BLD: 0 K/UL (ref 0–0.2)
PHOSPHATE SERPL-MCNC: 3.2 MG/DL (ref 2.5–4.5)
PLATELET # BLD AUTO: 134 K/UL (ref 150–450)
PMV BLD AUTO: 9.8 FL (ref 9.4–12.3)
POTASSIUM SERPL-SCNC: 3.7 MMOL/L (ref 3.5–5.1)
PROT SERPL-MCNC: 5.7 G/DL (ref 6.3–8.2)
RBC # BLD AUTO: 3.54 M/UL (ref 4.05–5.2)
SODIUM SERPL-SCNC: 142 MMOL/L (ref 136–145)
WBC # BLD AUTO: 2.2 K/UL (ref 4.3–11.1)

## 2022-06-06 PROCEDURE — 36592 COLLECT BLOOD FROM PICC: CPT

## 2022-06-06 PROCEDURE — 6370000000 HC RX 637 (ALT 250 FOR IP): Performed by: INTERNAL MEDICINE

## 2022-06-06 PROCEDURE — 6370000000 HC RX 637 (ALT 250 FOR IP): Performed by: NURSE PRACTITIONER

## 2022-06-06 PROCEDURE — 2580000003 HC RX 258: Performed by: NURSE PRACTITIONER

## 2022-06-06 PROCEDURE — 84100 ASSAY OF PHOSPHORUS: CPT

## 2022-06-06 PROCEDURE — 6360000002 HC RX W HCPCS: Performed by: NURSE PRACTITIONER

## 2022-06-06 PROCEDURE — 80053 COMPREHEN METABOLIC PANEL: CPT

## 2022-06-06 PROCEDURE — APPSS60 APP SPLIT SHARED TIME 46-60 MINUTES: Performed by: NURSE PRACTITIONER

## 2022-06-06 PROCEDURE — 85025 COMPLETE CBC W/AUTO DIFF WBC: CPT

## 2022-06-06 PROCEDURE — 82977 ASSAY OF GGT: CPT

## 2022-06-06 PROCEDURE — 83615 LACTATE (LD) (LDH) ENZYME: CPT

## 2022-06-06 PROCEDURE — 99233 SBSQ HOSP IP/OBS HIGH 50: CPT | Performed by: INTERNAL MEDICINE

## 2022-06-06 PROCEDURE — 1170000000 HC RM PRIVATE ONCOLOGY

## 2022-06-06 RX ADMIN — ONDANSETRON 4 MG: 2 INJECTION INTRAMUSCULAR; INTRAVENOUS at 12:30

## 2022-06-06 RX ADMIN — NEOMYCIN SULFATE, POLYMYXIN B SULFATE AND BACITRACIN ZINC: 3.5; 10000; 4 OINTMENT OPHTHALMIC at 17:58

## 2022-06-06 RX ADMIN — HYDROCODONE BITARTRATE AND ACETAMINOPHEN 1 TABLET: 7.5; 325 TABLET ORAL at 03:40

## 2022-06-06 RX ADMIN — POTASSIUM CHLORIDE 20 MEQ: 20 TABLET, EXTENDED RELEASE ORAL at 08:33

## 2022-06-06 RX ADMIN — GABAPENTIN 600 MG: 300 CAPSULE ORAL at 17:57

## 2022-06-06 RX ADMIN — AMOXICILLIN AND CLAVULANATE POTASSIUM 1 TABLET: 875; 125 TABLET, FILM COATED ORAL at 20:46

## 2022-06-06 RX ADMIN — LOPERAMIDE HYDROCHLORIDE 2 MG: 2 CAPSULE ORAL at 18:01

## 2022-06-06 RX ADMIN — GABAPENTIN 600 MG: 300 CAPSULE ORAL at 08:33

## 2022-06-06 RX ADMIN — POTASSIUM CHLORIDE 20 MEQ: 20 TABLET, EXTENDED RELEASE ORAL at 17:57

## 2022-06-06 RX ADMIN — ACYCLOVIR 400 MG: 800 TABLET ORAL at 20:46

## 2022-06-06 RX ADMIN — ACYCLOVIR 400 MG: 800 TABLET ORAL at 08:33

## 2022-06-06 RX ADMIN — NEOMYCIN SULFATE, POLYMYXIN B SULFATE AND BACITRACIN ZINC: 3.5; 10000; 4 OINTMENT OPHTHALMIC at 20:46

## 2022-06-06 RX ADMIN — SODIUM CHLORIDE: 900 INJECTION, SOLUTION INTRAVENOUS at 18:02

## 2022-06-06 RX ADMIN — NEOMYCIN SULFATE, POLYMYXIN B SULFATE AND BACITRACIN ZINC: 3.5; 10000; 4 OINTMENT OPHTHALMIC at 08:33

## 2022-06-06 RX ADMIN — FLUCONAZOLE 200 MG: 100 TABLET ORAL at 08:33

## 2022-06-06 RX ADMIN — ONDANSETRON 4 MG: 2 INJECTION INTRAMUSCULAR; INTRAVENOUS at 22:38

## 2022-06-06 RX ADMIN — GABAPENTIN 600 MG: 300 CAPSULE ORAL at 20:46

## 2022-06-06 RX ADMIN — AMOXICILLIN AND CLAVULANATE POTASSIUM 1 TABLET: 875; 125 TABLET, FILM COATED ORAL at 08:33

## 2022-06-06 RX ADMIN — LOPERAMIDE HYDROCHLORIDE 2 MG: 2 CAPSULE ORAL at 08:33

## 2022-06-06 RX ADMIN — PROCHLORPERAZINE EDISYLATE 5 MG: 5 INJECTION INTRAMUSCULAR; INTRAVENOUS at 05:58

## 2022-06-06 RX ADMIN — HYDROCODONE BITARTRATE AND ACETAMINOPHEN 1 TABLET: 7.5; 325 TABLET ORAL at 12:34

## 2022-06-06 RX ADMIN — LEVOFLOXACIN 500 MG: 500 TABLET, FILM COATED ORAL at 08:33

## 2022-06-06 RX ADMIN — HYDROCODONE BITARTRATE AND ACETAMINOPHEN 1 TABLET: 7.5; 325 TABLET ORAL at 17:57

## 2022-06-06 RX ADMIN — DULOXETINE HYDROCHLORIDE 30 MG: 30 CAPSULE, DELAYED RELEASE ORAL at 08:33

## 2022-06-06 ASSESSMENT — PAIN DESCRIPTION - DESCRIPTORS
DESCRIPTORS: TINGLING;NUMBNESS;SHOOTING
DESCRIPTORS: TINGLING;NUMBNESS;SHOOTING
DESCRIPTORS: NUMBNESS;SHOOTING;TINGLING

## 2022-06-06 ASSESSMENT — PAIN DESCRIPTION - PAIN TYPE
TYPE: NEUROPATHIC PAIN

## 2022-06-06 ASSESSMENT — PAIN SCALES - GENERAL
PAINLEVEL_OUTOF10: 0
PAINLEVEL_OUTOF10: 7
PAINLEVEL_OUTOF10: 4
PAINLEVEL_OUTOF10: 0
PAINLEVEL_OUTOF10: 7
PAINLEVEL_OUTOF10: 7

## 2022-06-06 ASSESSMENT — PAIN DESCRIPTION - LOCATION
LOCATION: FOOT;HAND

## 2022-06-06 ASSESSMENT — PAIN DESCRIPTION - ORIENTATION
ORIENTATION: RIGHT;LEFT

## 2022-06-06 ASSESSMENT — PAIN DESCRIPTION - ONSET
ONSET: ON-GOING

## 2022-06-06 ASSESSMENT — PAIN DESCRIPTION - FREQUENCY
FREQUENCY: CONTINUOUS

## 2022-06-06 ASSESSMENT — PAIN - FUNCTIONAL ASSESSMENT
PAIN_FUNCTIONAL_ASSESSMENT: PREVENTS OR INTERFERES SOME ACTIVE ACTIVITIES AND ADLS

## 2022-06-06 ASSESSMENT — PAIN SCALES - WONG BAKER: WONGBAKER_NUMERICALRESPONSE: 0

## 2022-06-06 NOTE — CARE COORDINATION
Chart screened by  for discharge planning. No needs identified at this time.     Will continue to follow for discharge planning needs  Please consult  if any new issues arise  Discharge plan is home with family [facility]        06/06/22 0874   Service Assessment   Patient Orientation Alert and Oriented   Cognition Alert   [facility] is: Legal Next of [address] 71   PCP Verified by CM Yes   Last Visit to PCP Within last 3 months   Prior Functional Level Independent in ADLs/IADLs   Current Functional Level Independent in ADLs/IADLs   Can patient return to prior living arrangement Yes   Ability to make needs known: Good   Family able to assist with home care needs: Yes   Social/Functional History   Lives With Spouse   Type of [address] [place] [address] Help From Family;Friend(s)   ADL Assistance Independent   Homemaking Assistance Independent   Homemaking Responsibilities Yes   Ambulation Assistance Independent   Transfer Assistance Independent   Active  Yes   Mode of Transportation Car   Discharge Planning   Type of Residence House   Follow Up Appointment: Best Day/Time  Wednesday PM

## 2022-06-06 NOTE — PROGRESS NOTES
[facility] Hematology & Oncology        Inpatient Hematology / Oncology Progress Note    Reason for Admission:  Stem cell transplant candidate [Z76.82]    24 Hour Events:  Afebrile, VSS  Day +4 Auto PBSCT  Counts dropping as expected  C/o fatigue  Increased stool frequency    Transfusions: None  Replacements: None    ROS:  Constitutional: +fatigue. Negative for fever, chills. CV: +edema. Negative for chest pain, palpitations. Respiratory: Negative for dyspnea, cough, wheezing. GI: +N/D. Negative for abdominal pain. 10 point review of systems is otherwise negative with the exception of the elements mentioned above in the HPI. Allergies   Allergen Reactions    Amoxicillin-Pot Clavulanate Other (See Comments)     Yeast infection  Yeast infection     Past Medical History:   Diagnosis Date    Obesity     Prolactin increased      Past Surgical History:   Procedure Laterality Date    IR BIOPSY PERC SUPERF BONE      IR TUNNELED CATHETER PLACEMENT GREATER THAN 5 YEARS  5/9/2022    IR TUNNELED CATHETER PLACEMENT GREATER THAN 5 YEARS  5/9/2022    IR TUNNELED CATHETER PLACEMENT GREATER THAN 5 YEARS 5/9/2022 [facility]     No family history on file.   Social History     Socioeconomic History    Marital status:      Spouse name: Not on file    Number of children: Not on file    Years of education: Not on file    Highest education level: Not on file   Occupational History    Not on file   Tobacco Use    Smoking status: Never Smoker    Smokeless tobacco: Never Used   Substance and Sexual Activity    Alcohol use: Never    Drug use: Never    Sexual activity: Not on file   Other Topics Concern    Not on file   Social History Narrative    Not on file     Social Determinants of Health     Financial Resource Strain:     Difficulty of Paying Living Expenses: Not on file   Food Insecurity:     Worried About [address] [employer] in the Last Year: Not on file    [address] in the Last Year: Not on file   Transportation Needs:     Lack of Transportation (Medical): Not on file    Lack of Transportation (Non-Medical):  Not on file   Physical Activity:     Days of Exercise per Week: Not on file    Minutes of Exercise per Session: Not on file   Stress:     Feeling of Stress : Not on file   Social Connections:     Frequency of Communication with Friends and Family: Not on file    Frequency of Social Gatherings with Friends and Family: Not on file    Attends Voodoo Services: Not on file    Active Member of [address] [place] [address] [employer] or Organizations: Not on file    Attends Club or Organization Meetings: Not on file    Marital Status: Not on file   Intimate Partner Violence:     Fear of Current or Ex-Partner: Not on file    Emotionally Abused: Not on file    Physically Abused: Not on file    Sexually Abused: Not on file   Housing Stability:     Unable to Pay for Housing in the Last Year: Not on file    Number of [place] in the Last Year: Not on file    Unstable Housing in the Last Year: Not on file     Current Facility-Administered Medications   Medication Dose Route Frequency Provider Last Rate Last Admin    potassium chloride (KLOR-CON M) extended release tablet 20 mEq  20 mEq Oral BID  Facundo Marquez MD   20 mEq at 06/06/22 3218    hydrALAZINE (APRESOLINE) injection 10 mg  10 mg IntraVENous Q6H PRN Ross Wayne, APRN - CNP        loperamide (IMODIUM) capsule 2 mg  2 mg Oral 4x Daily PRN Ross Wayne, APRN - CNP   2 mg at 06/06/22 0833    amoxicillin-clavulanate (AUGMENTIN) 875-125 MG per tablet 1 tablet  1 tablet Oral 2 times per day Ross Wayne, APRN - CNP   1 tablet at 06/06/22 0833    ondansetron (ZOFRAN) injection 4 mg  4 mg IntraVENous Q4H PRN Ross Wayne, APRN - CNP   4 mg at 06/04/22 2314    prochlorperazine (COMPAZINE) injection 5 mg  5 mg IntraVENous Q6H PRN Ross Wayne, APRN - CNP   5 mg at 06/06/22 0558    morphine injection 2 mg  2 mg IntraVENous Q4H PRN Ross Wayne, APRN - CNP  [START ON 2022] filgrastim-aafi (NIVESTYM) injection 300 mcg  300 mcg SubCUTAneous Daily Toy Jest, APRN - CNP        meperidine (DEMEROL) injection 12.5 mg  12.5 mg IntraVENous PRN Celinda Krabbe, MD        EPINEPHrine PF 1 MG/ML injection 0.3 mg  0.3 mg IntraMUSCular PRN Celinda Krabbe, MD        neomycin-bacitracin-polymyxin (NEOSPORIN) ophthalmic ointment   Right Eye TID Toy Jest, APRN - CNP   Given at 22 6240    DULoxetine (CYMBALTA) extended release capsule 30 mg  30 mg Oral Daily Lina Lair, APRN - NP   30 mg at 22 0833    HYDROcodone-acetaminophen (NORCO) 5-325 MG per tablet 1 tablet  1 tablet Oral Q4H PRN Lina Lair, APRN - NP        HYDROcodone-acetaminophen (NORCO) 7.5-325 MG per tablet 1 tablet  1 tablet Oral Q6H PRN Lina Lair, APRN - NP   1 tablet at 22 0340    polyethylene glycol (GLYCOLAX) packet 17 g  17 g Oral Daily PRN Lina Lair, APRN - NP        0.9 % sodium chloride infusion   IntraVENous Continuous Lina Lair, APRN - NP 75 mL/hr at 22 2333 New Bag at 22 2333    acyclovir (ZOVIRAX) tablet 400 mg  400 mg Oral BID Lina Lair, APRN - NP   400 mg at 22 0833    levoFLOXacin (LEVAQUIN) tablet 500 mg  500 mg Oral Daily Lina Lair, APRN - NP   500 mg at 22 1439    fluconazole (DIFLUCAN) tablet 200 mg  200 mg Oral Daily Lina Lair, APRN - NP   200 mg at 22 9656    gabapentin (NEURONTIN) capsule 600 mg  600 mg Oral TID Celinda Krabbe, MD   600 mg at 22 9025       OBJECTIVE:  Patient Vitals for the past 8 hrs:   BP Temp Temp src Pulse Resp SpO2   22 0808 136/83 98.2 °F (36.8 °C) Oral (!) 101 18 98 %   22 0340 121/70 97.9 °F (36.6 °C) Oral 93 16 --     Temp (24hrs), Av.9 °F (36.6 °C), Min:97.6 °F (36.4 °C), Max:98.2 °F (36.8 °C)    No intake/output data recorded. Physical Exam:  Constitutional: Well developed, well nourished female in no acute distress, sitting comfortably on the bedside couch.    HEENT: Normocephalic and atraumatic. Oropharynx is clear, mucous membranes are moist.  Extraocular muscles are intact. Sclerae anicteric. Neck supple without JVD. No thyromegaly present. Skin Warm and dry. No bruising and no rash noted. No erythema. No pallor. Respiratory Lungs are clear to auscultation bilaterally without wheezes, rales or rhonchi, normal air exchange without accessory muscle use. CVS Normal rate, regular rhythm and normal S1 and S2. No murmurs, gallops, or rubs. Abdomen Soft, nontender and nondistended, normoactive bowel sounds. No palpable mass. No hepatosplenomegaly. Neuro Grossly nonfocal with no obvious sensory or motor deficits. MSK Normal range of motion in general.  Trace BLE edema and no tenderness. Psych Appropriate mood and affect.         Labs:    Recent Results (from the past 24 hour(s))   Phosphorus    Collection Time: 06/06/22  3:40 AM   Result Value Ref Range    Phosphorus 3.2 2.5 - 4.5 MG/DL   Lactate Dehydrogenase    Collection Time: 06/06/22  3:40 AM   Result Value Ref Range     (H) 100 - 190 U/L   Gamma GT    Collection Time: 06/06/22  3:40 AM   Result Value Ref Range    GGT 46 5 - 55 U/L   Comprehensive Metabolic Panel w/ Reflex to MG    Collection Time: 06/06/22  3:40 AM   Result Value Ref Range    Sodium 142 136 - 145 mmol/L    Potassium 3.7 3.5 - 5.1 mmol/L    Chloride 108 (H) 98 - 107 mmol/L    CO2 28 21 - 32 mmol/L    Anion Gap 6 (L) 7 - 16 mmol/L    Glucose 97 65 - 100 mg/dL    BUN 7 6 - 23 MG/DL    CREATININE 0.50 (L) 0.6 - 1.0 MG/DL    GFR African American >60 >60 ml/min/1.73m2    GFR Non- >60 >60 ml/min/1.73m2    Calcium 8.8 8.3 - 10.4 MG/DL    Total Bilirubin 0.3 0.2 - 1.1 MG/DL    ALT 12 12 - 65 U/L    AST 15 15 - 37 U/L    Alk Phosphatase 68 50 - 136 U/L    Total Protein 5.7 (L) 6.3 - 8.2 g/dL    Albumin 3.2 (L) 3.5 - 5.0 g/dL    Globulin 2.5 2.3 - 3.5 g/dL    Albumin/Globulin Ratio 1.3 1.2 - 3.5     CBC with Auto Differential Collection Time: 06/06/22  3:40 AM   Result Value Ref Range    WBC 2.2 (L) 4.3 - 11.1 K/uL    RBC 3.54 (L) 4.05 - 5.2 M/uL    Hemoglobin 9.7 (L) 11.7 - 15.4 g/dL    Hematocrit 30.7 (L) 35.8 - 46.3 %    MCV 86.7 79.6 - 97.8 FL    MCH 27.4 26.1 - 32.9 PG    MCHC 31.6 31.4 - 35.0 g/dL    RDW 18.4 (H) 11.9 - 14.6 %    Platelets 250 (L) 408 - 450 K/uL    MPV 9.8 9.4 - 12.3 FL    nRBC 0.00 0.0 - 0.2 K/uL    Differential Type AUTOMATED      Seg Neutrophils 90 (H) 43 - 78 %    Lymphocytes 8 (L) 13 - 44 %    Monocytes 1 (L) 4.0 - 12.0 %    Eosinophils % 1 0.5 - 7.8 %    Basophils 0 0.0 - 2.0 %    Immature Granulocytes 1 0.0 - 5.0 %    Segs Absolute 2.0 1.7 - 8.2 K/UL    Absolute Lymph # 0.2 (L) 0.5 - 4.6 K/UL    Absolute Mono # 0.0 (L) 0.1 - 1.3 K/UL    Absolute Eos # 0.0 0.0 - 0.8 K/UL    Basophils Absolute 0.0 0.0 - 0.2 K/UL    Absolute Immature Granulocyte 0.0 0.0 - 0.5 K/UL       Imaging:  n/a    ASSESSMENT:  Patient Active Problem List   Diagnosis    Bone marrow transplant candidate    Multiple myeloma not having achieved remission (HCC)    Stem cell transplant candidate    [facility]prom[facility]    Bone marrow transplant status ([facility]    Admission for antineoplastic chemotherapy    Loose stools     Ms. Cyndie Holder is a 54 y.o. female admitted on 5/31/2022 with a primary diagnosis of There were no encounter diagnoses. .       44-year-old -American female history of prolactinemia, thyroid Hurthle cell neoplasm, lambda light chain multiple myeloma, ISS stage I, high risk disease (gain of 1 q.), bortezomib related neuropathy, iron deficiency in CR 1 after daratumumab based regimen. Patient seen in oncology office 5/31/2022. Patient collected CD34 at 6.24 mil/kg w granix alone. Reports feeling reasonably. Extensive ROS unremarkable. PLAN:  Multiple myeloma  - Okay to proceed with melphalan 200 mg per metered squared followed by stem cell reinfusion following day (CD34 3.12 mil/kg back).     - Hydration and IV electrolyte replacement as needed. - Antiemetic as well as antidiarrheal support as needed. - Blood transfusion support transfusion as needed. - Prophylactic antibiotics with Levaquin, Augmentin, acyclovir and Diflucan starting day +1.    - G-CSF support with Nivestym daily starting day +6.    6/1 Day -1. Melphalan today, stem cells tomorrow. 6/2 Day 0. Stem cells today. 6/3 Day +1. Ppx antibx start today. Wt up 5# with +I/Os. Lasix x 1 ordered. 6/4 Day +2. C/o fatigue, vomiting, and diarrhea. Diuresed well. 6/5 Day +3. Feeling better today. 6/6 D+4. G-CSF to start D+6. R hordeolum  6/2 Has R eye stye. Antibx ointment ordered. Can use warm compresses prn. N/V/D  6/4 Con't antiemetics and anitdiarrheals prn    Continue home meds  PPx Meds: Acyclovir, Diflucan, Augmentin, Levaquin  Vamshi SOPs  Lovenox for DVT ppx (hold for plt <50k)    Goals and plan of care reviewed with the patient. All questions answered to the best of our ability. Disposition:  Anticipate discharge home after engraftment. BEE Downey  Hematology & Oncology  [id number] [place] [address]  Office : [phone]  Fax : [phone]           Attending Addendum:  I have personally performed a face to face diagnostic evaluation on this patient. I have reviewed and agree with the care plan as documented by Tobias Nice NAPOLINAR. 36 minutes were spent on patient care, including but not limited to, reviewing the chart and time with the patient and family, more than 50% of the time documented was spent in face-to-face contact with the patient and in the care of the patient on the floor/unit where the patient is located. My findings are as follows: She has Multiple Myeloma and is s/p Autologous PBHCT Day +4, appears fatigued, heart rate regular without murmurs, abdomen is non-tender, bowel sounds are positive, we will continue IV fluids and prophylactic ABX. Sedrick Hu MD    [facility] Hematology/Oncology  [address] [address]  Office : [phone]  Fax : [phone]

## 2022-06-07 LAB
ALBUMIN SERPL-MCNC: 3.3 G/DL (ref 3.5–5)
ALBUMIN/GLOB SERPL: 1.3 {RATIO} (ref 1.2–3.5)
ALP SERPL-CCNC: 67 U/L (ref 50–136)
ALT SERPL-CCNC: 14 U/L (ref 12–65)
ANION GAP SERPL CALC-SCNC: 6 MMOL/L (ref 7–16)
AST SERPL-CCNC: 15 U/L (ref 15–37)
BILIRUB SERPL-MCNC: 0.4 MG/DL (ref 0.2–1.1)
BUN SERPL-MCNC: 6 MG/DL (ref 6–23)
CALCIUM SERPL-MCNC: 8.7 MG/DL (ref 8.3–10.4)
CHLORIDE SERPL-SCNC: 106 MMOL/L (ref 98–107)
CO2 SERPL-SCNC: 28 MMOL/L (ref 21–32)
CREAT SERPL-MCNC: 0.4 MG/DL (ref 0.6–1)
DIFFERENTIAL METHOD BLD: ABNORMAL
ERYTHROCYTE [DISTWIDTH] IN BLOOD BY AUTOMATED COUNT: 18.2 % (ref 11.9–14.6)
GLOBULIN SER CALC-MCNC: 2.6 G/DL (ref 2.3–3.5)
GLUCOSE SERPL-MCNC: 89 MG/DL (ref 65–100)
HCT VFR BLD AUTO: 30.1 % (ref 35.8–46.3)
HGB BLD-MCNC: 9.6 G/DL (ref 11.7–15.4)
IMM GRANULOCYTES # BLD AUTO: 0 K/UL (ref 0–0.5)
IMM GRANULOCYTES NFR BLD AUTO: 1 % (ref 0–5)
LYMPHOCYTES # BLD: 0.2 K/UL (ref 0.5–4.6)
LYMPHOCYTES NFR BLD MANUAL: 9 % (ref 16–44)
MCH RBC QN AUTO: 27.6 PG (ref 26.1–32.9)
MCHC RBC AUTO-ENTMCNC: 31.9 G/DL (ref 31.4–35)
MCV RBC AUTO: 86.5 FL (ref 79.6–97.8)
MONOCYTES # BLD: 0 K/UL (ref 0.1–1.3)
MONOCYTES NFR BLD MANUAL: 1 % (ref 3–9)
NEUTS SEG # BLD: 1.6 K/UL (ref 1.7–8.2)
NEUTS SEG NFR BLD MANUAL: 89 % (ref 47–75)
NRBC # BLD: 0 K/UL (ref 0–0.2)
PLATELET # BLD AUTO: 107 K/UL (ref 150–450)
PLATELET COMMENT: SLIGHT
PMV BLD AUTO: 10 FL (ref 9.4–12.3)
POTASSIUM SERPL-SCNC: 3.8 MMOL/L (ref 3.5–5.1)
PROT SERPL-MCNC: 5.9 G/DL (ref 6.3–8.2)
RBC # BLD AUTO: 3.48 M/UL (ref 4.05–5.2)
RBC MORPH BLD: ABNORMAL
SODIUM SERPL-SCNC: 140 MMOL/L (ref 136–145)
WBC # BLD AUTO: 1.8 K/UL (ref 4.3–11.1)
WBC MORPH BLD: ABNORMAL

## 2022-06-07 PROCEDURE — 6370000000 HC RX 637 (ALT 250 FOR IP): Performed by: INTERNAL MEDICINE

## 2022-06-07 PROCEDURE — APPSS60 APP SPLIT SHARED TIME 46-60 MINUTES: Performed by: NURSE PRACTITIONER

## 2022-06-07 PROCEDURE — 6370000000 HC RX 637 (ALT 250 FOR IP): Performed by: NURSE PRACTITIONER

## 2022-06-07 PROCEDURE — 1170000000 HC RM PRIVATE ONCOLOGY

## 2022-06-07 PROCEDURE — 80053 COMPREHEN METABOLIC PANEL: CPT

## 2022-06-07 PROCEDURE — 6360000002 HC RX W HCPCS: Performed by: NURSE PRACTITIONER

## 2022-06-07 PROCEDURE — 85025 COMPLETE CBC W/AUTO DIFF WBC: CPT

## 2022-06-07 PROCEDURE — 2580000003 HC RX 258: Performed by: NURSE PRACTITIONER

## 2022-06-07 PROCEDURE — 36592 COLLECT BLOOD FROM PICC: CPT

## 2022-06-07 PROCEDURE — 99233 SBSQ HOSP IP/OBS HIGH 50: CPT | Performed by: INTERNAL MEDICINE

## 2022-06-07 RX ORDER — LORAZEPAM 2 MG/ML
1 INJECTION INTRAMUSCULAR EVERY 6 HOURS PRN
Status: DISCONTINUED | OUTPATIENT
Start: 2022-06-07 | End: 2022-06-14 | Stop reason: HOSPADM

## 2022-06-07 RX ADMIN — DULOXETINE HYDROCHLORIDE 30 MG: 30 CAPSULE, DELAYED RELEASE ORAL at 08:03

## 2022-06-07 RX ADMIN — SODIUM CHLORIDE: 900 INJECTION, SOLUTION INTRAVENOUS at 15:19

## 2022-06-07 RX ADMIN — LOPERAMIDE HYDROCHLORIDE 2 MG: 2 CAPSULE ORAL at 07:18

## 2022-06-07 RX ADMIN — HYDROCODONE BITARTRATE AND ACETAMINOPHEN 1 TABLET: 7.5; 325 TABLET ORAL at 19:39

## 2022-06-07 RX ADMIN — NEOMYCIN SULFATE, POLYMYXIN B SULFATE AND BACITRACIN ZINC: 3.5; 10000; 4 OINTMENT OPHTHALMIC at 08:03

## 2022-06-07 RX ADMIN — LORAZEPAM 1 MG: 2 INJECTION INTRAMUSCULAR; INTRAVENOUS at 08:02

## 2022-06-07 RX ADMIN — LEVOFLOXACIN 500 MG: 500 TABLET, FILM COATED ORAL at 08:03

## 2022-06-07 RX ADMIN — ACYCLOVIR 400 MG: 800 TABLET ORAL at 19:38

## 2022-06-07 RX ADMIN — LORAZEPAM 1 MG: 2 INJECTION INTRAMUSCULAR; INTRAVENOUS at 21:45

## 2022-06-07 RX ADMIN — PROCHLORPERAZINE EDISYLATE 5 MG: 5 INJECTION INTRAMUSCULAR; INTRAVENOUS at 00:50

## 2022-06-07 RX ADMIN — GABAPENTIN 600 MG: 300 CAPSULE ORAL at 08:03

## 2022-06-07 RX ADMIN — ACYCLOVIR 400 MG: 800 TABLET ORAL at 08:03

## 2022-06-07 RX ADMIN — AMOXICILLIN AND CLAVULANATE POTASSIUM 1 TABLET: 875; 125 TABLET, FILM COATED ORAL at 08:03

## 2022-06-07 RX ADMIN — POTASSIUM CHLORIDE 20 MEQ: 20 TABLET, EXTENDED RELEASE ORAL at 08:03

## 2022-06-07 RX ADMIN — GABAPENTIN 600 MG: 300 CAPSULE ORAL at 15:19

## 2022-06-07 RX ADMIN — SODIUM CHLORIDE: 900 INJECTION, SOLUTION INTRAVENOUS at 06:07

## 2022-06-07 RX ADMIN — FLUCONAZOLE 200 MG: 100 TABLET ORAL at 08:03

## 2022-06-07 RX ADMIN — AMOXICILLIN AND CLAVULANATE POTASSIUM 1 TABLET: 875; 125 TABLET, FILM COATED ORAL at 19:38

## 2022-06-07 RX ADMIN — HYDROCODONE BITARTRATE AND ACETAMINOPHEN 1 TABLET: 7.5; 325 TABLET ORAL at 08:03

## 2022-06-07 RX ADMIN — NEOMYCIN SULFATE, POLYMYXIN B SULFATE AND BACITRACIN ZINC: 3.5; 10000; 4 OINTMENT OPHTHALMIC at 20:00

## 2022-06-07 RX ADMIN — POTASSIUM CHLORIDE 20 MEQ: 20 TABLET, EXTENDED RELEASE ORAL at 15:19

## 2022-06-07 RX ADMIN — ONDANSETRON 4 MG: 2 INJECTION INTRAMUSCULAR; INTRAVENOUS at 07:18

## 2022-06-07 RX ADMIN — LOPERAMIDE HYDROCHLORIDE 2 MG: 2 CAPSULE ORAL at 20:00

## 2022-06-07 RX ADMIN — GABAPENTIN 600 MG: 300 CAPSULE ORAL at 19:38

## 2022-06-07 RX ADMIN — NEOMYCIN SULFATE, POLYMYXIN B SULFATE AND BACITRACIN ZINC: 3.5; 10000; 4 OINTMENT OPHTHALMIC at 15:19

## 2022-06-07 RX ADMIN — HYDROCODONE BITARTRATE AND ACETAMINOPHEN 1 TABLET: 7.5; 325 TABLET ORAL at 00:53

## 2022-06-07 ASSESSMENT — PAIN DESCRIPTION - DESCRIPTORS
DESCRIPTORS: ACHING;NUMBNESS;TINGLING;BURNING
DESCRIPTORS: ACHING;NUMBNESS;TINGLING

## 2022-06-07 ASSESSMENT — PAIN SCALES - GENERAL
PAINLEVEL_OUTOF10: 7
PAINLEVEL_OUTOF10: 2
PAINLEVEL_OUTOF10: 0
PAINLEVEL_OUTOF10: 7
PAINLEVEL_OUTOF10: 7

## 2022-06-07 ASSESSMENT — PAIN SCALES - WONG BAKER
WONGBAKER_NUMERICALRESPONSE: 0
WONGBAKER_NUMERICALRESPONSE: 0

## 2022-06-07 ASSESSMENT — PAIN DESCRIPTION - ORIENTATION
ORIENTATION: RIGHT;LEFT
ORIENTATION_2: RIGHT;LEFT
ORIENTATION: RIGHT;LEFT

## 2022-06-07 ASSESSMENT — PAIN DESCRIPTION - PAIN TYPE: TYPE: CHRONIC PAIN;NEUROPATHIC PAIN

## 2022-06-07 ASSESSMENT — PAIN DESCRIPTION - LOCATION
LOCATION: FOOT;HAND
LOCATION_2: HAND
LOCATION: FOOT

## 2022-06-07 ASSESSMENT — PAIN - FUNCTIONAL ASSESSMENT: PAIN_FUNCTIONAL_ASSESSMENT: ACTIVITIES ARE NOT PREVENTED

## 2022-06-07 ASSESSMENT — PAIN DESCRIPTION - FREQUENCY: FREQUENCY: INTERMITTENT

## 2022-06-07 ASSESSMENT — PAIN DESCRIPTION - ONSET: ONSET: ON-GOING

## 2022-06-07 NOTE — PROGRESS NOTES
[facility] Hematology & Oncology        Inpatient Hematology / Oncology Progress Note    Reason for Admission:  Stem cell transplant candidate [Z76.82]    24 Hour Events:  Afebrile, VSS  Day +5 Auto PBSCT  Counts dropping as expected  C/o fatigue, anxiety    Transfusions: None  Replacements: None    ROS:  Constitutional: +fatigue. Negative for fever, chills. CV: +edema. Negative for chest pain, palpitations. Respiratory: Negative for dyspnea, cough, wheezing. GI: +N/D. Negative for abdominal pain. 10 point review of systems is otherwise negative with the exception of the elements mentioned above in the HPI. Allergies   Allergen Reactions    Amoxicillin-Pot Clavulanate Other (See Comments)     Yeast infection  Yeast infection     Past Medical History:   Diagnosis Date    Obesity     Prolactin increased      Past Surgical History:   Procedure Laterality Date    IR BIOPSY PERC SUPERF BONE      IR TUNNELED CATHETER PLACEMENT GREATER THAN 5 YEARS  5/9/2022    IR TUNNELED CATHETER PLACEMENT GREATER THAN 5 YEARS  5/9/2022    IR TUNNELED CATHETER PLACEMENT GREATER THAN 5 YEARS 5/9/2022 [facility]     No family history on file.   Social History     Socioeconomic History    Marital status:      Spouse name: Not on file    Number of children: Not on file    Years of education: Not on file    Highest education level: Not on file   Occupational History    Not on file   Tobacco Use    Smoking status: Never Smoker    Smokeless tobacco: Never Used   Substance and Sexual Activity    Alcohol use: Never    Drug use: Never    Sexual activity: Not on file   Other Topics Concern    Not on file   Social History Narrative    Not on file     Social Determinants of Health     Financial Resource Strain:     Difficulty of Paying Living Expenses: Not on file   Food Insecurity:     Worried About Running Out of Food in the Last Year: Not on file    [employer]i [employer] in the Last Year: Not on file   Transportation Needs:     Lack of Transportation (Medical): Not on file    Lack of Transportation (Non-Medical):  Not on file   Physical Activity:     Days of Exercise per Week: Not on file    Minutes of Exercise per Session: Not on file   Stress:     Feeling of Stress : Not on file   Social Connections:     Frequency of Communication with Friends and Family: Not on file    Frequency of Social Gatherings with Friends and Family: Not on file    Attends Yarsani Services: Not on file    Active Member of [address] [place] [address] or Organizations: Not on file    Attends Club or Organization Meetings: Not on file    Marital Status: Not on file   Intimate Partner Violence:     Fear of Current or Ex-Partner: Not on file    Emotionally Abused: Not on file    Physically Abused: Not on file    Sexually Abused: Not on file   Housing Stability:     Unable to Pay for Housing in the Last Year: Not on file    Number of [place] in the Last Year: Not on file    Unstable Housing in the Last Year: Not on file     Current Facility-Administered Medications   Medication Dose Route Frequency Provider Last Rate Last Admin    LORazepam (ATIVAN) injection 1 mg  1 mg IntraVENous Q6H PRN Pranav Rogel APRN - CNP   1 mg at 06/07/22 0802    potassium chloride (KLOR-CON M) extended release tablet 20 mEq  20 mEq Oral BID  Damaso Coughlin MD   20 mEq at 06/07/22 0803    hydrALAZINE (APRESOLINE) injection 10 mg  10 mg IntraVENous Q6H PRN Maryetta Pillion, APRN - CNP        loperamide (IMODIUM) capsule 2 mg  2 mg Oral 4x Daily PRN Maryetta Pillion, APRN - CNP   2 mg at 06/07/22 0718    amoxicillin-clavulanate (AUGMENTIN) 875-125 MG per tablet 1 tablet  1 tablet Oral 2 times per day Maryetta Pillion, APRN - CNP   1 tablet at 06/07/22 0803    ondansetron (ZOFRAN) injection 4 mg  4 mg IntraVENous Q4H PRN Maryetta Pillion, APRN - CNP   4 mg at 06/07/22 0718    prochlorperazine (COMPAZINE) injection 5 mg  5 mg IntraVENous Q6H PRN Maryetta Pillion, APRN - CNP   5 mg at 22 0050    morphine injection 2 mg  2 mg IntraVENous Q4H PRN BEE Rivero CNP        [START ON 2022] filgrastim-aafi (NIVESTYM) injection 300 mcg  300 mcg SubCUTAneous Daily BEE Rivero CNP        meperidine (DEMEROL) injection 12.5 mg  12.5 mg IntraVENous PRN Nano Sanches MD        EPINEPHrine PF 1 MG/ML injection 0.3 mg  0.3 mg IntraMUSCular PRN Nano Sanches MD        neomycin-bacitracin-polymyxin (NEOSPORIN) ophthalmic ointment   Right Eye TID BEE Rivero CNP   Given at 22 08    DULoxetine (CYMBALTA) extended release capsule 30 mg  30 mg Oral Daily BEE Iqbal NP   30 mg at 22 08    HYDROcodone-acetaminophen (NORCO) 5-325 MG per tablet 1 tablet  1 tablet Oral Q4H PRN BEE Iqbal NP        HYDROcodone-acetaminophen (NORCO) 7.5-325 MG per tablet 1 tablet  1 tablet Oral Q6H PRN BEE Iqbal NP   1 tablet at 22 08    polyethylene glycol (GLYCOLAX) packet 17 g  17 g Oral Daily PRN BEE Iqbal NP        0.9 % sodium chloride infusion   IntraVENous Continuous BEE Iqbal NP 75 mL/hr at 22 0607 New Bag at 22 06    acyclovir (ZOVIRAX) tablet 400 mg  400 mg Oral BID BEE Iqbal NP   400 mg at 22 08    levoFLOXacin (LEVAQUIN) tablet 500 mg  500 mg Oral Daily BEE Iqbal NP   500 mg at 22    fluconazole (DIFLUCAN) tablet 200 mg  200 mg Oral Daily BEE Iqbal NP   200 mg at 22    gabapentin (NEURONTIN) capsule 600 mg  600 mg Oral TID Nano Sanches MD   600 mg at 22 08       OBJECTIVE:  Patient Vitals for the past 8 hrs:   BP Temp Temp src Pulse Resp SpO2   22 0715 135/64 98.2 °F (36.8 °C) Oral (!) 104 16 98 %   22 0230 (!) 143/87 97.4 °F (36.3 °C) Oral 98 18 95 %     Temp (24hrs), Av.9 °F (36.6 °C), Min:97.4 °F (36.3 °C), Max:98.2 °F (36.8 °C)    701 -  1900  In: 120 [P.O.:120]  Out: 600 [Urine:600]    Physical Exam:  Constitutional: Well developed, well nourished female in no acute distress, sitting comfortably on the bedside couch. HEENT: Normocephalic and atraumatic. Oropharynx is clear, mucous membranes are moist.  Extraocular muscles are intact. Sclerae anicteric. Neck supple without JVD. No thyromegaly present. Skin Warm and dry. No bruising and no rash noted. No erythema. No pallor. Respiratory Lungs are clear to auscultation bilaterally without wheezes, rales or rhonchi, normal air exchange without accessory muscle use. CVS Normal rate, regular rhythm and normal S1 and S2. No murmurs, gallops, or rubs. Abdomen Soft, nontender and nondistended, normoactive bowel sounds. No palpable mass. No hepatosplenomegaly. Neuro Grossly nonfocal with no obvious sensory or motor deficits. MSK Normal range of motion in general.  Trace BLE edema and no tenderness. Psych Appropriate mood and affect.         Labs:    Recent Results (from the past 24 hour(s))   Comprehensive Metabolic Panel w/ Reflex to MG    Collection Time: 06/07/22  2:23 AM   Result Value Ref Range    Sodium 140 136 - 145 mmol/L    Potassium 3.8 3.5 - 5.1 mmol/L    Chloride 106 98 - 107 mmol/L    CO2 28 21 - 32 mmol/L    Anion Gap 6 (L) 7 - 16 mmol/L    Glucose 89 65 - 100 mg/dL    BUN 6 6 - 23 MG/DL    CREATININE 0.40 (L) 0.6 - 1.0 MG/DL    GFR African American >60 >60 ml/min/1.73m2    GFR Non- >60 >60 ml/min/1.73m2    Calcium 8.7 8.3 - 10.4 MG/DL    Total Bilirubin 0.4 0.2 - 1.1 MG/DL    ALT 14 12 - 65 U/L    AST 15 15 - 37 U/L    Alk Phosphatase 67 50 - 136 U/L    Total Protein 5.9 (L) 6.3 - 8.2 g/dL    Albumin 3.3 (L) 3.5 - 5.0 g/dL    Globulin 2.6 2.3 - 3.5 g/dL    Albumin/Globulin Ratio 1.3 1.2 - 3.5     CBC with Auto Differential    Collection Time: 06/07/22  2:23 AM   Result Value Ref Range    WBC 1.8 (LL) 4.3 - 11.1 K/uL    RBC 3.48 (L) 4.05 - 5.2 M/uL    Hemoglobin 9.6 (L) 11.7 - 15.4 g/dL    Hematocrit 30.1 (L) 35.8 - 46.3 %    MCV 86.5 79.6 - 97.8 FL    MCH 27.6 26.1 - 32.9 PG    MCHC 31.9 31.4 - 35.0 g/dL    RDW 18.2 (H) 11.9 - 14.6 %    Platelets 620 (L) 471 - 450 K/uL    MPV 10.0 9.4 - 12.3 FL    nRBC 0.00 0.0 - 0.2 K/uL    Differential Type AUTOMATED      Immature Granulocytes 1 0.0 - 5.0 %    Segs Absolute 1.6 (L) 1.7 - 8.2 K/UL    Absolute Lymph # 0.2 (L) 0.5 - 4.6 K/UL    Absolute Mono # 0.0 (L) 0.1 - 1.3 K/UL    Absolute Immature Granulocyte 0.0 0.0 - 0.5 K/UL    Seg Neutrophils 89 (H) 47 - 75 %    Lymphocytes 9 (L) 16 - 44 %    Monocytes 1 (L) 3 - 9 %    RBC Comment OCCASIONAL  ANISOCYTOSIS + POIKILOCYTOSIS        WBC Comment Result Confirmed By Smear      Platelet Comment SLIGHT         Imaging:  n/a    ASSESSMENT:  Patient Active Problem List   Diagnosis    Bone marrow transplant candidate    Multiple myeloma not having achieved remission (HCC)    Stem cell transplant candidate    [facility]prom[facility]    Bone marrow transplant status ([facility]    Admission for antineoplastic chemotherapy    Loose stools     Ms. Jaciel Hermosillo is a 54 y.o. female admitted on 5/31/2022 with a primary diagnosis of There were no encounter diagnoses. .       53-year-old -American female history of prolactinemia, thyroid Hurthle cell neoplasm, lambda light chain multiple myeloma, ISS stage I, high risk disease (gain of 1 q.), bortezomib related neuropathy, iron deficiency in CR 1 after daratumumab based regimen. Patient seen in oncology office 5/31/2022. Patient collected CD34 at 6.24 mil/kg w granix alone. Reports feeling reasonably. Extensive ROS unremarkable. PLAN:  Multiple myeloma  - Okay to proceed with melphalan 200 mg per metered squared followed by stem cell reinfusion following day (CD34 3.12 mil/kg back). - Hydration and IV electrolyte replacement as needed. - Antiemetic as well as antidiarrheal support as needed. - Blood transfusion support transfusion as needed.     - Prophylactic antibiotics with Levaquin, Augmentin, acyclovir and Diflucan starting day +1.    - G-CSF support with Nivestym daily starting day +6.    6/1 Day -1. Melphalan today, stem cells tomorrow. 6/2 Day 0. Stem cells today. 6/3 Day +1. Ppx antibx start today. Wt up 5# with +I/Os. Lasix x 1 ordered. 6/4 Day +2. C/o fatigue, vomiting, and diarrhea. Diuresed well. 6/5 Day +3. Feeling better today. 6/7 D+5. Awaiting count recovery. Complains of fatigue and anxiety. G-CSF to start D+6. R hordeolum  6/2 Has R eye stye. Antibx ointment ordered. Can use warm compresses prn. N/V/D  6/4 Con't antiemetics and anitdiarrheals prn    Continue home meds  PPx Meds: Acyclovir, Diflucan, Augmentin, Levaquin  Vamshi SOPs  Lovenox for DVT ppx (hold for plt <50k)    Goals and plan of care reviewed with the patient. All questions answered to the best of our ability. Disposition:  Anticipate discharge home after engraftment. [facility] Hematology & Oncology  [address] [place] [address]  Office : [phone]  Fax : [phone]       Attending Addendum:  I have personally performed a face to face diagnostic evaluation on this patient. I have reviewed and agree with the care plan as documented by Don Lopez N.P. 36 minutes were spent on patient care, including but not limited to, reviewing the chart and time with the patient and family, more than 50% of the time documented was spent in face-to-face contact with the patient and in the care of the patient on the floor/unit where the patient is located. My findings are as follows: She has Multiple Myeloma and is s/p Autologous PBHCT Day +5, appears weak, heart rate regular without murmurs, abdomen is non-tender, bowel sounds are positive, we will continue prophylactic ABX and start G-CSF tomorrow.               Fabby Espinosa MD    [facility] Hematology/Oncology  [address] [place] [address]  [facility]  Office : [phone]  Fax : [phone]

## 2022-06-07 NOTE — PROGRESS NOTES
Diagnosis: Multiple Myeloma  Transplant Date:  6/2/22  Day: D+5  Chemo regimen used: Melphalan  Labs not resulted that need follow-up: none  BMT assessments and toxicities completed. 6/6/22  WBC/ANC= 1.8/1.6  Prophylactic medications started on D+1 po Diflucan;Levaquin; Augmentin. (on Acyclovir PTA)  Toxicities greater than 2 : none  Patient seen by MD/NP daily while inpt & until engraftment. New orders received: no new orders  Issues: nausea    END OF SHIFT:    Shift Summary:    Compliant w/ mouth care. Walked on the hallways before bedtime. C/O intermittent nausea;IV Zofran x1;Compazine x1 w/ good result. No BM overnight. Afebrile. X1 Norco for neuropathy pain. I/Os:    I/O this shift:  In: 1350 [P.O.:720; I.V.:630]  Out: 600 [Urine:600]  I/O last 3 completed shifts: In: 3140 [P.O.:1280;  I.V.:1860]  Out: 2526 [CTNHC:6170; Stool:350]    Anayeli Reyes RN

## 2022-06-07 NOTE — FLOWSHEET NOTE
Diagnosis: MM  Transplant Date: 6/2/22  Chemo regimen used: melphalan  Day: (+/-) +5. BMT assessments and toxicities completed. Mouth Care completed 3 times this shift. WBC/ANC= 1.8/1.6. Prophylactic medications started on D+1 po Diflucan;Levaquin; Augmentin. (on Acyclovir PTA)  Toxicities greater than 2 :none. Patient seen by MD/NP daily until engraftment or while IP. Labs not resulted that need follow-up: none. Additional Shift Information:  Multiple loose BMs- imodium, anxiety this AM- relieved by PRN ativan, pain x 1 relieved by norco, went for walk with family    Intake/Output  06/07 0701 - 06/07 1900  In: 1078 [P.O.:390; I.V.:688]  Out: 1300 [Urine:1300]   I/O last 3 completed shifts: In: 8181 [P.O.:1660;  I.V.:1771]  Out: 3300 [Urine:2950; Stool:350]     06/07/22 0803   Toxicity Assessment   Fatigue Grade 1    Neuropathy   (baseline)   Loss of Appetite Grade 1   Cough None   Nausea Grade 2    Diarrhea Grade 1    Depression Grade 1    Anxiety Grade 1    Swelling Grade 1    Oral Assessment Score   Voice 1   Swallow 1   Lips 1   Tongue 1   Saliva 1   Mucous Membranes 1   Gingiva 1   Teeth or Dentures 1   Oral Assessment Total 8

## 2022-06-08 LAB
ALBUMIN SERPL-MCNC: 3.1 G/DL (ref 3.5–5)
ALBUMIN/GLOB SERPL: 1.3 {RATIO} (ref 1.2–3.5)
ALP SERPL-CCNC: 62 U/L (ref 50–136)
ALT SERPL-CCNC: 13 U/L (ref 12–65)
ANION GAP SERPL CALC-SCNC: 4 MMOL/L (ref 7–16)
AST SERPL-CCNC: 13 U/L (ref 15–37)
BASOPHILS # BLD: 0 K/UL (ref 0–0.2)
BASOPHILS NFR BLD: 0 % (ref 0–2)
BILIRUB SERPL-MCNC: 0.2 MG/DL (ref 0.2–1.1)
BUN SERPL-MCNC: 4 MG/DL (ref 6–23)
CALCIUM SERPL-MCNC: 8.2 MG/DL (ref 8.3–10.4)
CHLORIDE SERPL-SCNC: 107 MMOL/L (ref 98–107)
CO2 SERPL-SCNC: 29 MMOL/L (ref 21–32)
CREAT SERPL-MCNC: 0.5 MG/DL (ref 0.6–1)
DIFFERENTIAL METHOD BLD: ABNORMAL
EOSINOPHIL # BLD: 0 K/UL (ref 0–0.8)
EOSINOPHIL NFR BLD: 0 % (ref 0.5–7.8)
ERYTHROCYTE [DISTWIDTH] IN BLOOD BY AUTOMATED COUNT: 18.1 % (ref 11.9–14.6)
GGT SERPL-CCNC: 50 U/L (ref 5–55)
GLOBULIN SER CALC-MCNC: 2.4 G/DL (ref 2.3–3.5)
GLUCOSE SERPL-MCNC: 88 MG/DL (ref 65–100)
HCT VFR BLD AUTO: 27.7 % (ref 35.8–46.3)
HGB BLD-MCNC: 8.7 G/DL (ref 11.7–15.4)
IMM GRANULOCYTES # BLD AUTO: 0 K/UL (ref 0–0.5)
IMM GRANULOCYTES NFR BLD AUTO: 2 % (ref 0–5)
LDH SERPL L TO P-CCNC: 200 U/L (ref 100–190)
LYMPHOCYTES # BLD: 0.1 K/UL (ref 0.5–4.6)
LYMPHOCYTES NFR BLD: 16 % (ref 13–44)
MAGNESIUM SERPL-MCNC: 2.2 MG/DL (ref 1.8–2.4)
MCH RBC QN AUTO: 26.9 PG (ref 26.1–32.9)
MCHC RBC AUTO-ENTMCNC: 31.4 G/DL (ref 31.4–35)
MCV RBC AUTO: 85.5 FL (ref 79.6–97.8)
MONOCYTES # BLD: 0 K/UL (ref 0.1–1.3)
MONOCYTES NFR BLD: 2 % (ref 4–12)
NEUTS SEG # BLD: 0.5 K/UL (ref 1.7–8.2)
NEUTS SEG NFR BLD: 81 % (ref 43–78)
NRBC # BLD: 0 K/UL (ref 0–0.2)
PHOSPHATE SERPL-MCNC: 2.8 MG/DL (ref 2.5–4.5)
PLATELET # BLD AUTO: 79 K/UL (ref 150–450)
PMV BLD AUTO: 9.6 FL (ref 9.4–12.3)
POTASSIUM SERPL-SCNC: 3.7 MMOL/L (ref 3.5–5.1)
PROT SERPL-MCNC: 5.5 G/DL (ref 6.3–8.2)
RBC # BLD AUTO: 3.24 M/UL (ref 4.05–5.2)
SODIUM SERPL-SCNC: 140 MMOL/L (ref 136–145)
WBC # BLD AUTO: 0.6 K/UL (ref 4.3–11.1)

## 2022-06-08 PROCEDURE — APPSS60 APP SPLIT SHARED TIME 46-60 MINUTES: Performed by: NURSE PRACTITIONER

## 2022-06-08 PROCEDURE — 1170000000 HC RM PRIVATE ONCOLOGY

## 2022-06-08 PROCEDURE — 6370000000 HC RX 637 (ALT 250 FOR IP): Performed by: INTERNAL MEDICINE

## 2022-06-08 PROCEDURE — 99233 SBSQ HOSP IP/OBS HIGH 50: CPT | Performed by: INTERNAL MEDICINE

## 2022-06-08 PROCEDURE — 6370000000 HC RX 637 (ALT 250 FOR IP): Performed by: NURSE PRACTITIONER

## 2022-06-08 PROCEDURE — 84100 ASSAY OF PHOSPHORUS: CPT

## 2022-06-08 PROCEDURE — 85025 COMPLETE CBC W/AUTO DIFF WBC: CPT

## 2022-06-08 PROCEDURE — 36592 COLLECT BLOOD FROM PICC: CPT

## 2022-06-08 PROCEDURE — 83615 LACTATE (LD) (LDH) ENZYME: CPT

## 2022-06-08 PROCEDURE — 82977 ASSAY OF GGT: CPT

## 2022-06-08 PROCEDURE — 6360000002 HC RX W HCPCS: Performed by: NURSE PRACTITIONER

## 2022-06-08 PROCEDURE — 80053 COMPREHEN METABOLIC PANEL: CPT

## 2022-06-08 PROCEDURE — 2580000003 HC RX 258: Performed by: NURSE PRACTITIONER

## 2022-06-08 PROCEDURE — 83735 ASSAY OF MAGNESIUM: CPT

## 2022-06-08 RX ORDER — CETIRIZINE HYDROCHLORIDE 5 MG/1
10 TABLET ORAL DAILY
Status: DISCONTINUED | OUTPATIENT
Start: 2022-06-08 | End: 2022-06-08

## 2022-06-08 RX ORDER — ENOXAPARIN SODIUM 100 MG/ML
40 INJECTION SUBCUTANEOUS DAILY
Status: DISCONTINUED | OUTPATIENT
Start: 2022-06-08 | End: 2022-06-14 | Stop reason: HOSPADM

## 2022-06-08 RX ORDER — ENOXAPARIN SODIUM 100 MG/ML
30 INJECTION SUBCUTANEOUS 2 TIMES DAILY
Status: DISCONTINUED | OUTPATIENT
Start: 2022-06-08 | End: 2022-06-08

## 2022-06-08 RX ORDER — CETIRIZINE HYDROCHLORIDE 5 MG/1
10 TABLET ORAL NIGHTLY
Status: DISCONTINUED | OUTPATIENT
Start: 2022-06-09 | End: 2022-06-14 | Stop reason: HOSPADM

## 2022-06-08 RX ADMIN — DULOXETINE HYDROCHLORIDE 30 MG: 30 CAPSULE, DELAYED RELEASE ORAL at 07:44

## 2022-06-08 RX ADMIN — LOPERAMIDE HYDROCHLORIDE 2 MG: 2 CAPSULE ORAL at 07:45

## 2022-06-08 RX ADMIN — GABAPENTIN 600 MG: 300 CAPSULE ORAL at 21:17

## 2022-06-08 RX ADMIN — NEOMYCIN SULFATE, POLYMYXIN B SULFATE AND BACITRACIN ZINC: 3.5; 10000; 4 OINTMENT OPHTHALMIC at 07:44

## 2022-06-08 RX ADMIN — PROCHLORPERAZINE EDISYLATE 5 MG: 5 INJECTION INTRAMUSCULAR; INTRAVENOUS at 07:44

## 2022-06-08 RX ADMIN — ACYCLOVIR 400 MG: 800 TABLET ORAL at 07:45

## 2022-06-08 RX ADMIN — SODIUM CHLORIDE: 900 INJECTION, SOLUTION INTRAVENOUS at 02:53

## 2022-06-08 RX ADMIN — NEOMYCIN SULFATE, POLYMYXIN B SULFATE AND BACITRACIN ZINC: 3.5; 10000; 4 OINTMENT OPHTHALMIC at 21:18

## 2022-06-08 RX ADMIN — CETIRIZINE HYDROCHLORIDE 10 MG: 5 TABLET ORAL at 07:45

## 2022-06-08 RX ADMIN — ACYCLOVIR 400 MG: 800 TABLET ORAL at 21:16

## 2022-06-08 RX ADMIN — NEOMYCIN SULFATE, POLYMYXIN B SULFATE AND BACITRACIN ZINC: 3.5; 10000; 4 OINTMENT OPHTHALMIC at 16:23

## 2022-06-08 RX ADMIN — LEVOFLOXACIN 500 MG: 500 TABLET, FILM COATED ORAL at 07:45

## 2022-06-08 RX ADMIN — PROCHLORPERAZINE EDISYLATE 5 MG: 5 INJECTION INTRAMUSCULAR; INTRAVENOUS at 17:58

## 2022-06-08 RX ADMIN — POTASSIUM CHLORIDE 20 MEQ: 20 TABLET, EXTENDED RELEASE ORAL at 07:45

## 2022-06-08 RX ADMIN — GABAPENTIN 600 MG: 300 CAPSULE ORAL at 07:45

## 2022-06-08 RX ADMIN — HYDROCODONE BITARTRATE AND ACETAMINOPHEN 1 TABLET: 7.5; 325 TABLET ORAL at 07:45

## 2022-06-08 RX ADMIN — POTASSIUM CHLORIDE 20 MEQ: 20 TABLET, EXTENDED RELEASE ORAL at 16:21

## 2022-06-08 RX ADMIN — HYDROCODONE BITARTRATE AND ACETAMINOPHEN 1 TABLET: 5; 325 TABLET ORAL at 23:26

## 2022-06-08 RX ADMIN — AMOXICILLIN AND CLAVULANATE POTASSIUM 1 TABLET: 875; 125 TABLET, FILM COATED ORAL at 21:16

## 2022-06-08 RX ADMIN — AMOXICILLIN AND CLAVULANATE POTASSIUM 1 TABLET: 875; 125 TABLET, FILM COATED ORAL at 07:45

## 2022-06-08 RX ADMIN — GABAPENTIN 600 MG: 300 CAPSULE ORAL at 16:21

## 2022-06-08 RX ADMIN — FLUCONAZOLE 200 MG: 100 TABLET ORAL at 07:45

## 2022-06-08 RX ADMIN — LOPERAMIDE HYDROCHLORIDE 2 MG: 2 CAPSULE ORAL at 21:17

## 2022-06-08 RX ADMIN — FILGRASTIM-AAFI 300 MCG: 300 INJECTION, SOLUTION SUBCUTANEOUS at 07:44

## 2022-06-08 RX ADMIN — HYDROCODONE BITARTRATE AND ACETAMINOPHEN 1 TABLET: 7.5; 325 TABLET ORAL at 16:21

## 2022-06-08 ASSESSMENT — PAIN DESCRIPTION - LOCATION
LOCATION: HAND;FOOT
LOCATION: FOOT;HAND
LOCATION: HAND;FOOT

## 2022-06-08 ASSESSMENT — PAIN SCALES - WONG BAKER
WONGBAKER_NUMERICALRESPONSE: 0

## 2022-06-08 ASSESSMENT — PAIN DESCRIPTION - ONSET: ONSET: ON-GOING

## 2022-06-08 ASSESSMENT — PAIN DESCRIPTION - ORIENTATION
ORIENTATION: RIGHT;LEFT

## 2022-06-08 ASSESSMENT — PAIN SCALES - GENERAL
PAINLEVEL_OUTOF10: 7
PAINLEVEL_OUTOF10: 7
PAINLEVEL_OUTOF10: 0
PAINLEVEL_OUTOF10: 0
PAINLEVEL_OUTOF10: 6

## 2022-06-08 ASSESSMENT — PAIN - FUNCTIONAL ASSESSMENT: PAIN_FUNCTIONAL_ASSESSMENT: ACTIVITIES ARE NOT PREVENTED

## 2022-06-08 ASSESSMENT — PAIN DESCRIPTION - DESCRIPTORS: DESCRIPTORS: ACHING;NUMBNESS

## 2022-06-08 ASSESSMENT — PAIN DESCRIPTION - FREQUENCY: FREQUENCY: INTERMITTENT

## 2022-06-08 ASSESSMENT — PAIN DESCRIPTION - PAIN TYPE: TYPE: CHRONIC PAIN

## 2022-06-08 NOTE — PROGRESS NOTES
Physician Progress Note      PATIENT:               Amena Price  CSN #:                  [id number]  :                       1970  ADMIT DATE:       2022 2:17 PM  [address] [place] DATE:  RESPONDING  PROVIDER #:        Media Most APRN-CNP          QUERY TEXT:    Patient admitted for chemotherapy and stem cell transplant for her MM. noted   to have a wbc of 0.6, hgb of 8.7 and plates of 79 . If possible, please   document in progress notes and discharge summary if you are evaluating and/or   treating any of the following: The medical record reflects the following:    Risk Factors: MM, chemo, immunodeficiency  Clinical Indicators: wbc 0.6, hgb- 8.7, plates-79. .notes stating \" counts   dropping as expected. \"  Treatment: chemo for her MM. monitoring of lab counts, hydration, prophylactic   abx's, essential home meds. Options provided:  -- Antineoplastic (chemotherapy) induced pancytopenia  -- Pancytopenia due to aplastic anemia  -- Pancytopenia with aplastic anemia  -- Pancytopenia due to MM  -- Pancytopenia due to bone marrow infiltration  -- Pancytopenia due to congenital red cell aplasia  -- Pancytopenia due to both chemo and MM  -- Other - I will add my own diagnosis  -- Disagree - Not applicable / Not valid  -- Disagree - Clinically unable to determine / Unknown  -- Refer to Clinical Documentation Reviewer    PROVIDER RESPONSE TEXT:    Patient has antineoplastic (chemotherapy) induced pancytopenia.     Query created by: Gael Perez on 2022 1:48 PM      Electronically signed by:  Rick ASHER 2022 1:54 PM

## 2022-06-08 NOTE — PROGRESS NOTES
[facility] Hematology & Oncology        Inpatient Hematology / Oncology Progress Note    Reason for Admission:  Stem cell transplant candidate [Z76.82]    24 Hour Events:  Afebrile, VSS  Day +6 Auto PBSCT  Counts dropping as expected  C/o fatigue, anxiety    Transfusions: None  Replacements: None    ROS:  Constitutional: +fatigue. Negative for fever, chills. CV: +edema. Negative for chest pain, palpitations. Respiratory: Negative for dyspnea, cough, wheezing. GI: +N/D. Negative for abdominal pain. 10 point review of systems is otherwise negative with the exception of the elements mentioned above in the HPI. Allergies   Allergen Reactions    Amoxicillin-Pot Clavulanate Other (See Comments)     Yeast infection  Yeast infection     Past Medical History:   Diagnosis Date    Obesity     Prolactin increased      Past Surgical History:   Procedure Laterality Date    IR BIOPSY PERC SUPERF BONE      IR TUNNELED CATHETER PLACEMENT GREATER THAN 5 YEARS  5/9/2022    IR TUNNELED CATHETER PLACEMENT GREATER THAN 5 YEARS  5/9/2022    IR TUNNELED CATHETER PLACEMENT GREATER THAN 5 YEARS 5/9/2022 [facility]     No family history on file.   Social History     Socioeconomic History    Marital status:      Spouse name: Not on file    Number of children: Not on file    Years of education: Not on file    Highest education level: Not on file   Occupational History    Not on file   Tobacco Use    Smoking status: Never Smoker    Smokeless tobacco: Never Used   Substance and Sexual Activity    Alcohol use: Never    Drug use: Never    Sexual activity: Not on file   Other Topics Concern    Not on file   Social History Narrative    Not on file     Social Determinants of Health     Financial Resource Strain:     Difficulty of Paying Living Expenses: Not on file   Food Insecurity:     Worried About Running Out of Food in the Last Year: Not on file    Jimi of Food in the Last Year: Not on file   Transportation Needs:     Lack of Transportation (Medical): Not on file    Lack of Transportation (Non-Medical):  Not on file   Physical Activity:     Days of Exercise per Week: Not on file    Minutes of Exercise per Session: Not on file   Stress:     Feeling of Stress : Not on file   Social Connections:     Frequency of Communication with Friends and Family: Not on file    Frequency of Social Gatherings with Friends and Family: Not on file    Attends Scientology Services: Not on file    Active Member of [address] [place] [address] or Organizations: Not on file    Attends Club or Organization Meetings: Not on file    Marital Status: Not on file   Intimate Partner Violence:     Fear of Current or Ex-Partner: Not on file    Emotionally Abused: Not on file    Physically Abused: Not on file    Sexually Abused: Not on file   Housing Stability:     Unable to Pay for Housing in the Last Year: Not on file    Number of [place] in the Last Year: Not on file    Unstable Housing in the Last Year: Not on file     Current Facility-Administered Medications   Medication Dose Route Frequency Provider Last Rate Last Admin    cetirizine (ZYRTEC) tablet 10 mg  10 mg Oral Daily Poppy Miller MD   10 mg at 06/08/22 0745    enoxaparin (LOVENOX) injection 40 mg  40 mg SubCUTAneous Daily BEE Durham CNP        LORazepam (ATIVAN) injection 1 mg  1 mg IntraVENous Q6H PRN BEE Durham CNP   1 mg at 06/07/22 2145    potassium chloride (KLOR-CON M) extended release tablet 20 mEq  20 mEq Oral BID CHINTAN Long MD   20 mEq at 06/08/22 0745    hydrALAZINE (APRESOLINE) injection 10 mg  10 mg IntraVENous Q6H PRN BEE [place] CNP        loperamide (IMODIUM) capsule 2 mg  2 mg Oral 4x Daily PRN BEE [place] CNP   2 mg at 06/08/22 0745    amoxicillin-clavulanate (AUGMENTIN) 875-125 MG per tablet 1 tablet  1 tablet Oral 2 times per day BEE [place] CNP   1 tablet at 06/08/22 0745    [facility]ron [facility]) injection 4 mg  4 mg IntraVENous Q4H PRN LeandrBEE Hand - CNP   4 mg at 06/07/22 0718    prochlorperazine (COMPAZINE) injection 5 mg  5 mg IntraVENous Q6H PRN BEE [place] - CNP   5 mg at 06/08/22 0744    morphine injection 2 mg  2 mg IntraVENous Q4H PRN Leandrew BEE Greer CNP        filgrastim-aafi (NIVESTYM) injection 300 mcg  300 mcg SubCUTAneous Daily LeandrBEE Hand - CNP   300 mcg at 06/08/22 0744    meperidine (DEMEROL) injection 12.5 mg  12.5 mg IntraVENous PRN Kyle Long MD        EPINEPHrine PF 1 MG/ML injection 0.3 mg  0.3 mg IntraMUSCular PRN Kyle Long MD        neomycin-bacitracin-polymyxin (NEOSPORIN) ophthalmic ointment   Right Eye TID LeandrBEE Hand CNP   Given at 06/08/22 0744    DULoxetine (CYMBALTA) extended release capsule 30 mg  30 mg Oral Daily Ashwin Baldy, APRN - NP   30 mg at 06/08/22 0744    HYDROcodone-acetaminophen (NORCO) 5-325 MG per tablet 1 tablet  1 tablet Oral Q4H PRN Ashwin Baldy, APRN - NP        HYDROcodone-acetaminophen (NORCO) 7.5-325 MG per tablet 1 tablet  1 tablet Oral Q6H PRN Ashwin Baldy, APRN - NP   1 tablet at 06/08/22 0745    polyethylene glycol (GLYCOLAX) packet 17 g  17 g Oral Daily PRN Ashwin Baldy, APRN - NP        0.9 % sodium chloride infusion   IntraVENous Continuous Ashwin Baldy, APRN - NP 75 mL/hr at 06/08/22 0253 New Bag at 06/08/22 0253    acyclovir (ZOVIRAX) tablet 400 mg  400 mg Oral BID Ashwin Baldy, APRN - NP   400 mg at 06/08/22 0745    levoFLOXacin (LEVAQUIN) tablet 500 mg  500 mg Oral Daily Ashwin Baldy, APRN - NP   500 mg at 06/08/22 0745    fluconazole (DIFLUCAN) tablet 200 mg  200 mg Oral Daily Ashwin Baldy, APRN - NP   200 mg at 06/08/22 0745    gabapentin (NEURONTIN) capsule 600 mg  600 mg Oral TID Kyle Long MD   600 mg at 06/08/22 0745       OBJECTIVE:  Patient Vitals for the past 8 hrs:   BP Temp Temp src Pulse Resp SpO2   06/08/22 0745 (!) 141/78 97.9 °F (36.6 °C) Oral 100 18 100 %   06/08/22 0255 123/71 98.2 °F (36.8 °C) Oral 96 24 95 %     Temp (24hrs), Av.1 °F (36.7 °C), Min:97.8 °F (36.6 °C), Max:98.5 °F (36.9 °C)    No intake/output data recorded. Physical Exam:  Constitutional: Well developed, well nourished female in no acute distress, sitting comfortably on the bedside couch. HEENT: Normocephalic and atraumatic. Oropharynx is clear, mucous membranes are moist.  Extraocular muscles are intact. Sclerae anicteric. Neck supple without JVD. No thyromegaly present. Skin Warm and dry. No bruising and no rash noted. No erythema. No pallor. Respiratory Lungs are clear to auscultation bilaterally without wheezes, rales or rhonchi, normal air exchange without accessory muscle use. CVS Normal rate, regular rhythm and normal S1 and S2. No murmurs, gallops, or rubs. Abdomen Soft, nontender and nondistended, normoactive bowel sounds. No palpable mass. No hepatosplenomegaly. Neuro Grossly nonfocal with no obvious sensory or motor deficits. MSK Normal range of motion in general.  Trace BLE edema and no tenderness. Psych Appropriate mood and affect.         Labs:    Recent Results (from the past 24 hour(s))   Phosphorus    Collection Time: 22  2:51 AM   Result Value Ref Range    Phosphorus 2.8 2.5 - 4.5 MG/DL   Lactate Dehydrogenase    Collection Time: 22  2:51 AM   Result Value Ref Range     (H) 100 - 190 U/L   Gamma GT    Collection Time: 22  2:51 AM   Result Value Ref Range    GGT 50 5 - 55 U/L   Comprehensive Metabolic Panel w/ Reflex to MG    Collection Time: 22  2:51 AM   Result Value Ref Range    Sodium 140 136 - 145 mmol/L    Potassium 3.7 3.5 - 5.1 mmol/L    Chloride 107 98 - 107 mmol/L    CO2 29 21 - 32 mmol/L    Anion Gap 4 (L) 7 - 16 mmol/L    Glucose 88 65 - 100 mg/dL    BUN 4 (L) 6 - 23 MG/DL    CREATININE 0.50 (L) 0.6 - 1.0 MG/DL    GFR African American >60 >60 ml/min/1.73m2    GFR Non- >60 >60 ml/min/1.73m2    Calcium 8.2 (L) 8.3 - 10.4 MG/DL    Total Bilirubin 0.2 0.2 - 1.1 MG/DL    ALT 13 12 - 65 U/L    AST 13 (L) 15 - 37 U/L    Alk Phosphatase 62 50 - 136 U/L    Total Protein 5.5 (L) 6.3 - 8.2 g/dL    Albumin 3.1 (L) 3.5 - 5.0 g/dL    Globulin 2.4 2.3 - 3.5 g/dL    Albumin/Globulin Ratio 1.3 1.2 - 3.5     CBC with Auto Differential    Collection Time: 06/08/22  2:51 AM   Result Value Ref Range    WBC 0.6 (LL) 4.3 - 11.1 K/uL    RBC 3.24 (L) 4.05 - 5.2 M/uL    Hemoglobin 8.7 (L) 11.7 - 15.4 g/dL    Hematocrit 27.7 (L) 35.8 - 46.3 %    MCV 85.5 79.6 - 97.8 FL    MCH 26.9 26.1 - 32.9 PG    MCHC 31.4 31.4 - 35.0 g/dL    RDW 18.1 (H) 11.9 - 14.6 %    Platelets 79 (L) 736 - 450 K/uL    MPV 9.6 9.4 - 12.3 FL    nRBC 0.00 0.0 - 0.2 K/uL    Differential Type AUTOMATED      Seg Neutrophils 81 (H) 43 - 78 %    Lymphocytes 16 13 - 44 %    Monocytes 2 (L) 4.0 - 12.0 %    Eosinophils % 0 (L) 0.5 - 7.8 %    Basophils 0 0.0 - 2.0 %    Immature Granulocytes 2 0.0 - 5.0 %    Segs Absolute 0.5 (L) 1.7 - 8.2 K/UL    Absolute Lymph # 0.1 (L) 0.5 - 4.6 K/UL    Absolute Mono # 0.0 (L) 0.1 - 1.3 K/UL    Absolute Eos # 0.0 0.0 - 0.8 K/UL    Basophils Absolute 0.0 0.0 - 0.2 K/UL    Absolute Immature Granulocyte 0.0 0.0 - 0.5 K/UL       Imaging:  n/a    ASSESSMENT:  Patient Active Problem List   Diagnosis    Bone marrow transplant candidate    Multiple myeloma not having achieved remission (HCC)    Stem cell transplant candidate    [facility]prom[facility]    Bone marrow transplant status ([address]    Admission for antineoplastic chemotherapy    Loose stools     Ms. Terrell Covert is a 54 y.o. female admitted on 5/31/2022 with a primary diagnosis of There were no encounter diagnoses. .       68-year-old -American female history of prolactinemia, thyroid Hurthle cell neoplasm, lambda light chain multiple myeloma, ISS stage I, high risk disease (gain of 1 q.), bortezomib related neuropathy, iron deficiency in CR 1 after daratumumab based regimen.  Patient seen in oncology office 5/31/2022. Patient collected CD34 at 6.24 mil/kg w granix alone. Reports feeling reasonably. Extensive ROS unremarkable. PLAN:  Multiple myeloma  - Okay to proceed with melphalan 200 mg per metered squared followed by stem cell reinfusion following day (CD34 3.12 mil/kg back). - Hydration and IV electrolyte replacement as needed. - Antiemetic as well as antidiarrheal support as needed. - Blood transfusion support transfusion as needed. - Prophylactic antibiotics with Levaquin, Augmentin, acyclovir and Diflucan starting day +1.    - G-CSF support with Nivestym daily starting day +6.    6/1 Day -1. Melphalan today, stem cells tomorrow. 6/2 Day 0. Stem cells today. 6/3 Day +1. Ppx antibx start today. Wt up 5# with +I/Os. Lasix x 1 ordered. 6/4 Day +2. C/o fatigue, vomiting, and diarrhea. Diuresed well. 6/5 Day +3. Feeling better today. 6/8 D+6. Awaiting count recovery. Complains of fatigue and anxiety. G-CSF today. R hordeolum  6/2 Has R eye stye. Antibx ointment ordered. Can use warm compresses prn. N/V/D  6/4 Con't antiemetics and anitdiarrheals prn    Continue home meds  PPx Meds: Acyclovir, Diflucan, Augmentin, Levaquin  Vamshi SOPs  Lovenox for DVT ppx (hold for plt <50k)    Goals and plan of care reviewed with the patient. All questions answered to the best of our ability. Disposition:  Anticipate discharge home after engraftment. [facility] - [address] Hematology & Oncology  [address] [address]  Office : [phone]  Fax : [phone]         Attending Addendum:  I have personally performed a face to face diagnostic evaluation on this patient.  I have reviewed and agree with the care plan as documented by Faustino Li NAPOLINAR. 36 minutes were spent on patient care, including but not limited to, reviewing the chart and time with the patient and family, more than 50% of the time documented was spent in face-to-face contact with the patient and in the care of the patient on the floor/unit where the patient is located. My findings are as follows: She has Multiple Myeloma and is s/p Autologous PBHCT Day +6, appears weak, heart rate regular without murmurs, abdomen is non-tender, bowel sounds are positive, we will start G-CSF today.               Alina Mota MD    [facility] Hematology/Oncology  [address] [place] [address]  Office : [phone]  Fax : [phone]

## 2022-06-08 NOTE — PROGRESS NOTES
Diagnosis: Multiple Myeloma  Transplant Date: 6/2/22  Day: (+/-) +5\+6  Chemo regimen used: Melphalan  BMT assessments and toxicities completed. (toxicities completed day shift)  Mouth care completed twice overnight for total of 5 times in 24hrs  WBC/ANC= 0.6/0.5. Prophylactic medications started D+1 - Diflucan, Levaquin, Augmentin, Acyclovir(pta)  G-CSF to start on today, 6/8/22  Toxicities greater than 2 :None    Patient seen by MD/NP daily until engraftment. New orders received: None.   No replacements needed per [place] based on AM labs  Issues:  -4 soft/mucousy bms - pt stated improving in consistency - PRN imodium given x's 1  -PRN ativan 1mg IV given x's 1 for pt complaint of anxiety & nausea  -PRN norco 7.5-325mg 1 tablet given x's 1 for pt report of pain    I/O this shift:  In: 1008 [I.V.:1008]  Out: 1275 [Urine:1200; Stool:75]    Shift report given to parish Schilling RN    Vitals:    06/07/22 1939 06/07/22 2159 06/07/22 2200 06/08/22 0255   BP: (!) 140/76  135/72 123/71   Pulse: (!) 105  99 96   Resp:   20 24   Temp: 98.1 °F (36.7 °C)  98.5 °F (36.9 °C) 98.2 °F (36.8 °C)   TempSrc: Oral  Oral Oral   SpO2: 93%  97% 95%   Weight:  231 lb 12.8 oz (105.1 kg)     Height:

## 2022-06-09 LAB
ALBUMIN SERPL-MCNC: 3.1 G/DL (ref 3.5–5)
ALBUMIN/GLOB SERPL: 1.3 {RATIO} (ref 1.2–3.5)
ALP SERPL-CCNC: 64 U/L (ref 50–136)
ALT SERPL-CCNC: 10 U/L (ref 12–65)
ANION GAP SERPL CALC-SCNC: 8 MMOL/L (ref 7–16)
AST SERPL-CCNC: 13 U/L (ref 15–37)
BILIRUB SERPL-MCNC: 0.1 MG/DL (ref 0.2–1.1)
BUN SERPL-MCNC: 3 MG/DL (ref 6–23)
CALCIUM SERPL-MCNC: 8.7 MG/DL (ref 8.3–10.4)
CHLORIDE SERPL-SCNC: 108 MMOL/L (ref 98–107)
CO2 SERPL-SCNC: 26 MMOL/L (ref 21–32)
CREAT SERPL-MCNC: 0.5 MG/DL (ref 0.6–1)
DIFFERENTIAL METHOD BLD: ABNORMAL
ERYTHROCYTE [DISTWIDTH] IN BLOOD BY AUTOMATED COUNT: 17.6 % (ref 11.9–14.6)
GLOBULIN SER CALC-MCNC: 2.3 G/DL (ref 2.3–3.5)
GLUCOSE SERPL-MCNC: 86 MG/DL (ref 65–100)
HCT VFR BLD AUTO: 27.4 % (ref 35.8–46.3)
HGB BLD-MCNC: 8.8 G/DL (ref 11.7–15.4)
MAGNESIUM SERPL-MCNC: 2.1 MG/DL (ref 1.8–2.4)
MCH RBC QN AUTO: 27.7 PG (ref 26.1–32.9)
MCHC RBC AUTO-ENTMCNC: 32.1 G/DL (ref 31.4–35)
MCV RBC AUTO: 86.2 FL (ref 79.6–97.8)
NRBC # BLD: 0 K/UL (ref 0–0.2)
PLATELET # BLD AUTO: 54 K/UL (ref 150–450)
PMV BLD AUTO: 10.1 FL (ref 9.4–12.3)
POTASSIUM SERPL-SCNC: 3.7 MMOL/L (ref 3.5–5.1)
PROT SERPL-MCNC: 5.4 G/DL (ref 6.3–8.2)
RBC # BLD AUTO: 3.18 M/UL (ref 4.05–5.2)
SODIUM SERPL-SCNC: 142 MMOL/L (ref 136–145)
WBC # BLD AUTO: 0.3 K/UL (ref 4.3–11.1)

## 2022-06-09 PROCEDURE — 6370000000 HC RX 637 (ALT 250 FOR IP): Performed by: INTERNAL MEDICINE

## 2022-06-09 PROCEDURE — 2580000003 HC RX 258: Performed by: NURSE PRACTITIONER

## 2022-06-09 PROCEDURE — 6370000000 HC RX 637 (ALT 250 FOR IP): Performed by: NURSE PRACTITIONER

## 2022-06-09 PROCEDURE — 1170000000 HC RM PRIVATE ONCOLOGY

## 2022-06-09 PROCEDURE — 99233 SBSQ HOSP IP/OBS HIGH 50: CPT | Performed by: INTERNAL MEDICINE

## 2022-06-09 PROCEDURE — 85025 COMPLETE CBC W/AUTO DIFF WBC: CPT

## 2022-06-09 PROCEDURE — APPSS60 APP SPLIT SHARED TIME 46-60 MINUTES: Performed by: NURSE PRACTITIONER

## 2022-06-09 PROCEDURE — 83735 ASSAY OF MAGNESIUM: CPT

## 2022-06-09 PROCEDURE — 80053 COMPREHEN METABOLIC PANEL: CPT

## 2022-06-09 PROCEDURE — 6360000002 HC RX W HCPCS: Performed by: NURSE PRACTITIONER

## 2022-06-09 RX ADMIN — HYDROCODONE BITARTRATE AND ACETAMINOPHEN 1 TABLET: 7.5; 325 TABLET ORAL at 12:13

## 2022-06-09 RX ADMIN — ACYCLOVIR 400 MG: 800 TABLET ORAL at 20:47

## 2022-06-09 RX ADMIN — AMOXICILLIN AND CLAVULANATE POTASSIUM 1 TABLET: 875; 125 TABLET, FILM COATED ORAL at 09:25

## 2022-06-09 RX ADMIN — ACYCLOVIR 400 MG: 800 TABLET ORAL at 09:24

## 2022-06-09 RX ADMIN — CETIRIZINE HYDROCHLORIDE 10 MG: 5 TABLET ORAL at 20:48

## 2022-06-09 RX ADMIN — GABAPENTIN 600 MG: 300 CAPSULE ORAL at 09:24

## 2022-06-09 RX ADMIN — HYDROCODONE BITARTRATE AND ACETAMINOPHEN 1 TABLET: 7.5; 325 TABLET ORAL at 05:00

## 2022-06-09 RX ADMIN — DULOXETINE HYDROCHLORIDE 30 MG: 30 CAPSULE, DELAYED RELEASE ORAL at 09:25

## 2022-06-09 RX ADMIN — NEOMYCIN SULFATE, POLYMYXIN B SULFATE AND BACITRACIN ZINC: 3.5; 10000; 4 OINTMENT OPHTHALMIC at 09:31

## 2022-06-09 RX ADMIN — PROCHLORPERAZINE EDISYLATE 5 MG: 5 INJECTION INTRAMUSCULAR; INTRAVENOUS at 15:17

## 2022-06-09 RX ADMIN — NEOMYCIN SULFATE, POLYMYXIN B SULFATE AND BACITRACIN ZINC: 3.5; 10000; 4 OINTMENT OPHTHALMIC at 20:50

## 2022-06-09 RX ADMIN — PROCHLORPERAZINE EDISYLATE 5 MG: 5 INJECTION INTRAMUSCULAR; INTRAVENOUS at 01:31

## 2022-06-09 RX ADMIN — POTASSIUM CHLORIDE 20 MEQ: 20 TABLET, EXTENDED RELEASE ORAL at 09:23

## 2022-06-09 RX ADMIN — GABAPENTIN 600 MG: 300 CAPSULE ORAL at 15:19

## 2022-06-09 RX ADMIN — FLUCONAZOLE 200 MG: 100 TABLET ORAL at 09:23

## 2022-06-09 RX ADMIN — POTASSIUM CHLORIDE 20 MEQ: 20 TABLET, EXTENDED RELEASE ORAL at 16:57

## 2022-06-09 RX ADMIN — SODIUM CHLORIDE: 900 INJECTION, SOLUTION INTRAVENOUS at 09:30

## 2022-06-09 RX ADMIN — PROCHLORPERAZINE EDISYLATE 5 MG: 5 INJECTION INTRAMUSCULAR; INTRAVENOUS at 09:19

## 2022-06-09 RX ADMIN — FILGRASTIM-AAFI 300 MCG: 300 INJECTION, SOLUTION SUBCUTANEOUS at 09:21

## 2022-06-09 RX ADMIN — ONDANSETRON 4 MG: 2 INJECTION INTRAMUSCULAR; INTRAVENOUS at 20:46

## 2022-06-09 RX ADMIN — HYDROCODONE BITARTRATE AND ACETAMINOPHEN 1 TABLET: 7.5; 325 TABLET ORAL at 20:47

## 2022-06-09 RX ADMIN — LOPERAMIDE HYDROCHLORIDE 2 MG: 2 CAPSULE ORAL at 09:30

## 2022-06-09 RX ADMIN — GABAPENTIN 600 MG: 300 CAPSULE ORAL at 20:51

## 2022-06-09 RX ADMIN — LEVOFLOXACIN 500 MG: 500 TABLET, FILM COATED ORAL at 09:24

## 2022-06-09 RX ADMIN — AMOXICILLIN AND CLAVULANATE POTASSIUM 1 TABLET: 875; 125 TABLET, FILM COATED ORAL at 20:47

## 2022-06-09 ASSESSMENT — PAIN - FUNCTIONAL ASSESSMENT
PAIN_FUNCTIONAL_ASSESSMENT: ACTIVITIES ARE NOT PREVENTED

## 2022-06-09 ASSESSMENT — PAIN DESCRIPTION - LOCATION
LOCATION: FOOT;HAND
LOCATION: FOOT
LOCATION: FOOT

## 2022-06-09 ASSESSMENT — PAIN DESCRIPTION - DESCRIPTORS
DESCRIPTORS: ACHING;NUMBNESS

## 2022-06-09 ASSESSMENT — PAIN SCALES - GENERAL
PAINLEVEL_OUTOF10: 7
PAINLEVEL_OUTOF10: 0
PAINLEVEL_OUTOF10: 0
PAINLEVEL_OUTOF10: 7
PAINLEVEL_OUTOF10: 2
PAINLEVEL_OUTOF10: 0
PAINLEVEL_OUTOF10: 7

## 2022-06-09 ASSESSMENT — PAIN DESCRIPTION - ORIENTATION
ORIENTATION: RIGHT;LEFT

## 2022-06-09 ASSESSMENT — PAIN DESCRIPTION - PAIN TYPE
TYPE: CHRONIC PAIN
TYPE: CHRONIC PAIN;NEUROPATHIC PAIN
TYPE: CHRONIC PAIN;NEUROPATHIC PAIN

## 2022-06-09 ASSESSMENT — PAIN DESCRIPTION - FREQUENCY
FREQUENCY: INTERMITTENT
FREQUENCY: INTERMITTENT

## 2022-06-09 ASSESSMENT — PAIN DESCRIPTION - ONSET
ONSET: ON-GOING
ONSET: ON-GOING

## 2022-06-09 NOTE — PROGRESS NOTES
file   Transportation Needs:     Lack of Transportation (Medical): Not on file    Lack of Transportation (Non-Medical):  Not on file   Physical Activity:     Days of Exercise per Week: Not on file    Minutes of Exercise per Session: Not on file   Stress:     Feeling of Stress : Not on file   Social Connections:     Frequency of Communication with Friends and Family: Not on file    Frequency of Social Gatherings with Friends and Family: Not on file    Attends Jehovah's witness Services: Not on file    Active Member of [address] [place] [address] or Organizations: Not on file    Attends Club or Organization Meetings: Not on file    Marital Status: Not on file   Intimate Partner Violence:     Fear of Current or Ex-Partner: Not on file    Emotionally Abused: Not on file    Physically Abused: Not on file    Sexually Abused: Not on file   Housing Stability:     Unable to Pay for Housing in the Last Year: Not on file    Number of [place] in the Last Year: Not on file    Unstable Housing in the Last Year: Not on file     Current Facility-Administered Medications   Medication Dose Route Frequency Provider Last Rate Last Admin    [Held by provider] enoxaparin (LOVENOX) injection 40 mg  40 mg SubCUTAneous Daily Claudeen Gros, APRN - CNP        cetirizine (ZYRTEC) tablet 10 mg  10 mg Oral Nightly Cathy Baltazar MD        LORazepam (ATIVAN) injection 1 mg  1 mg IntraVENous Q6H PRN Claudeen Gros, APRN - CNP   1 mg at 06/07/22 2145    potassium chloride (KLOR-CON M) extended release tablet 20 mEq  20 mEq Oral BID  Poornima Reddy MD   20 mEq at 06/09/22 4061    hydrALAZINE (APRESOLINE) injection 10 mg  10 mg IntraVENous Q6H PRN BEE Hopson CNP        loperamide (IMODIUM) capsule 2 mg  2 mg Oral 4x Daily PRN BEE Hopson CNP   2 mg at 06/09/22 0930    amoxicillin-clavulanate (AUGMENTIN) 875-125 MG per tablet 1 tablet  1 tablet Oral 2 times per day BEE Hopson CNP   1 tablet at 06/09/22 0925    ondansetron [facility]) injection 4 mg  4 mg IntraVENous Q4H PRN Fiordaliza Farr APRN - CNP   4 mg at 06/07/22 0718    prochlorperazine (COMPAZINE) injection 5 mg  5 mg IntraVENous Q6H PRN Fiordaliza Chika, APRN - CNP   5 mg at 06/09/22 0919    morphine injection 2 mg  2 mg IntraVENous Q4H PRN Fiordalizanaif Farr, APRN - CNP        filgrastim-aafi (NIVESTYM) injection 300 mcg  300 mcg SubCUTAneous Daily Fiordaliza Farr APRN - CNP   300 mcg at 06/09/22 8240    meperidine (DEMEROL) injection 12.5 mg  12.5 mg IntraVENous PRN Vale Miller MD        EPINEPHrine PF 1 MG/ML injection 0.3 mg  0.3 mg IntraMUSCular PRN Vale Miller MD        neomycin-bacitracin-polymyxin (NEOSPORIN) ophthalmic ointment   Right Eye TID Fiordaliza Farr APRN - CNP   Given at 06/09/22 0931    DULoxetine (CYMBALTA) extended release capsule 30 mg  30 mg Oral Daily Fredo Mills, APRN - NP   30 mg at 06/09/22 0925    HYDROcodone-acetaminophen (NORCO) 5-325 MG per tablet 1 tablet  1 tablet Oral Q4H PRN Fredo Spear, APRN - NP   1 tablet at 06/08/22 2326    HYDROcodone-acetaminophen (Claressa Dadds) 7.5-325 MG per tablet 1 tablet  1 tablet Oral Q6H PRN Fredo Spear, APRN - NP   1 tablet at 06/09/22 0500    polyethylene glycol (GLYCOLAX) packet 17 g  17 g Oral Daily PRN Fredo Spear, APRN - NP        0.9 % sodium chloride infusion   IntraVENous Continuous Fredo Spear, APRN - NP 75 mL/hr at 06/09/22 0930 New Bag at 06/09/22 0930    acyclovir (ZOVIRAX) tablet 400 mg  400 mg Oral BID Fredo Spear, APRN - NP   400 mg at 06/09/22 0924    levoFLOXacin (LEVAQUIN) tablet 500 mg  500 mg Oral Daily Fredo Spear, APRN - NP   500 mg at 06/09/22 0924    fluconazole (DIFLUCAN) tablet 200 mg  200 mg Oral Daily Fredo Spear, APRN - NP   200 mg at 06/09/22 8226    gabapentin (NEURONTIN) capsule 600 mg  600 mg Oral TID Vale Miller MD   600 mg at 06/09/22 6724       OBJECTIVE:  Patient Vitals for the past 8 hrs:   BP Temp Temp src Pulse Resp SpO2   06/09/22 0800 (!) 121/53 97.8 °F (36.6 °C) Oral 95 18 99 % 22 0356 120/68 98.2 °F (36.8 °C) Oral (!) 101 18 99 %     Temp (24hrs), Av °F (36.7 °C), Min:97.8 °F (36.6 °C), Max:98.2 °F (36.8 °C)     07 -  1900  In: 997 [I.V.:885]  Out: 1700 [Urine:1400]    Physical Exam:  Constitutional: Well developed, well nourished female in no acute distress, sitting comfortably on the bedside couch. HEENT: Normocephalic and atraumatic. Oropharynx is clear, mucous membranes are moist.  Extraocular muscles are intact. Sclerae anicteric. Neck supple without JVD. No thyromegaly present. Skin Warm and dry. No bruising and no rash noted. No erythema. No pallor. Respiratory Lungs are clear to auscultation bilaterally without wheezes, rales or rhonchi, normal air exchange without accessory muscle use. CVS Normal rate, regular rhythm and normal S1 and S2. No murmurs, gallops, or rubs. Abdomen Soft, nontender and nondistended, normoactive bowel sounds. No palpable mass. No hepatosplenomegaly. Neuro Grossly nonfocal with no obvious sensory or motor deficits. MSK Normal range of motion in general.  Trace BLE edema and no tenderness. Psych Appropriate mood and affect.         Labs:    Recent Results (from the past 24 hour(s))   CBC with Auto Differential    Collection Time: 22  4:01 AM   Result Value Ref Range    WBC 0.3 (LL) 4.3 - 11.1 K/uL    RBC 3.18 (L) 4.05 - 5.2 M/uL    Hemoglobin 8.8 (L) 11.7 - 15.4 g/dL    Hematocrit 27.4 (L) 35.8 - 46.3 %    MCV 86.2 79.6 - 97.8 FL    MCH 27.7 26.1 - 32.9 PG    MCHC 32.1 31.4 - 35.0 g/dL    RDW 17.6 (H) 11.9 - 14.6 %    Platelets 54 (L) 651 - 450 K/uL    MPV 10.1 9.4 - 12.3 FL    nRBC 0.00 0.0 - 0.2 K/uL    Differential Type AUTOMATED     Magnesium    Collection Time: 22  4:01 AM   Result Value Ref Range    Magnesium 2.1 1.8 - 2.4 mg/dL   Comprehensive Metabolic Panel    Collection Time: 22  4:01 AM   Result Value Ref Range    Sodium 142 136 - 145 mmol/L    Potassium 3.7 3.5 - 5.1 mmol/L [phone]  Fax : [phone]

## 2022-06-09 NOTE — PROGRESS NOTES
Comprehensive Nutrition Assessment    Type and Reason for Visit: Initial,RD Nutrition Re-Screen/LOS    Nutrition Recommendations/Plan:    Continue current diet     Malnutrition Assessment:  Malnutrition Status: No malnutrition    Nutrition Assessment:  Nutrition History: No changes to PO diet indicated prior to admission. Do You Have Any Cultural, Druze, or Ethnic Food Preferences?: No   Nutrition Background:   Patient with PMH significant for obesity and MM. Admitted for Auto PBSCT 5/31, s/p transplant 6/2. Remains inpatient pending engraftment. Nutrition Interval:  Patient seen standing in room. She states that her appetite was not great earlier in admission but is much better now. She voices several foods that she really enjoys from the inpatient menu. She states that her family has brought in food or brought up food from the cafeteria for her. She is receiving Ensure Clear PRN from nursing staff if she does not eat well for a meal, but states that she is not utilizing daily. She declines need to be added to diet order stating that she feels like her appetite and intake are adequate. Wt stable since admission. Noted weight gain over the last year, now closer to UBW. Nutrition Related Findings:   No michelle wasting      Current Nutrition Therapies:  ADULT DIET;  Regular    Current Intake:   Average Meal Intake:  (variable 1-100%) Average Supplements Intake: None Ordered      Anthropometric Measures:  Height: 5' 2\" (157.5 cm)  Current Body Wt: 233 lb 0.4 oz (105.7 kg) (6/8), Weight source: Standing Scale  BMI: 42.6  Admission Body Weight: 232 lb (105.2 kg) (5/31)  Ideal Body Weight (Kg) (Calculated): 50 kg (110 lbs), 211.8 %  Usual Body Wt: 247 lb (112 kg) (6/2021 office visit), Percent weight change: -5.7       Edema: No data recorded  BMI Category    Estimated Daily Nutrient Needs:  Energy (kcal/day): 5337-1006 (Kcal/kg (12-15) Weight used: 105.7 kg Current  Protein (g/day): 63-79 (20% kcal) Weight Used: (Other (Comment))    Fluid (ml/day):   (1 ml/kcal)    Nutrition Diagnosis:   · No nutrition diagnosis at this time       Nutrition Interventions:   Food and/or Nutrient Delivery: Continue Current Diet     Coordination of Nutrition Care: Continue to monitor while inpatient       Goals:       Active Goal: Meet at least 75% of estimated needs       Nutrition Monitoring and Evaluation:      Food/Nutrient Intake Outcomes: Food and Nutrient Intake  Physical Signs/Symptoms Outcomes: Meal Time Behavior,Weight    Discharge Planning:    No discharge needs at this time    Kallie Weaver RD

## 2022-06-09 NOTE — PROGRESS NOTES
Diagnosis: MM  Transplant Date: 6/2/22  Chemo regimen used: Melphalan  Day: (+/-) +7. BMT assessments and toxicities completed. Mouth Care completed 4 times this shift. WBC/ANC= 0.3/0. Prophylactic medications started on D+1 PO Diflucan; Levaquin; Augmentin. (Acyclovir PTA). G-CSF started on D+6 (6/8/22). Toxicities greater than 2: none. Patient seen by MD/NP daily until engraftment or while IP. Labs not resulted that need follow-up: none. Change of shift report given to Zahira Alfred RN.

## 2022-06-09 NOTE — PLAN OF CARE
Problem: Safety - Adult  Goal: Free from fall injury  Outcome: Progressing  Flowsheets  Taken 6/9/2022 1118  Free From Fall Injury: Instruct family/caregiver on patient safety  Taken 6/9/2022 0830  Free From Fall Injury: Instruct family/caregiver on patient safety     Problem: Pain  Goal: Verbalizes/displays adequate comfort level or baseline comfort level  Outcome: Progressing  Flowsheets (Taken 6/9/2022 0830)  Verbalizes/displays adequate comfort level or baseline comfort level:   Encourage patient to monitor pain and request assistance   Assess pain using appropriate pain scale   Administer analgesics based on type and severity of pain and evaluate response     Problem: Gastrointestinal - Adult  Goal: Maintains or returns to baseline bowel function  Outcome: Progressing  Flowsheets (Taken 6/9/2022 0830)  Maintains or returns to baseline bowel function:   Assess bowel function   Encourage oral fluids to ensure adequate hydration   Administer IV fluids as ordered to ensure adequate hydration   Administer ordered medications as needed   Encourage mobilization and activity

## 2022-06-09 NOTE — PROGRESS NOTES
Diagnosis: MM  Transplant Date: 6/2  Day: (+/-) +7. Chemo regimen used: Melphalan  BMT assessments and toxicities completed. YES  WBC/ANC= 0.3. Prophylactic medications started  D+1 - Diflucan, Levaquin, Augmentin, Acyclovir(pta). G-CSF started on 6/8/22   Toxicities greater than 2 :NONE. Patient seen by MD/NP Daily until engraftment. New orders received: None.   Issues:none

## 2022-06-10 LAB
ALBUMIN SERPL-MCNC: 3.2 G/DL (ref 3.5–5)
ALBUMIN/GLOB SERPL: 1.3 {RATIO} (ref 1.2–3.5)
ALP SERPL-CCNC: 65 U/L (ref 50–136)
ALT SERPL-CCNC: 11 U/L (ref 12–65)
ANION GAP SERPL CALC-SCNC: 7 MMOL/L (ref 7–16)
AST SERPL-CCNC: 13 U/L (ref 15–37)
BILIRUB SERPL-MCNC: 0.1 MG/DL (ref 0.2–1.1)
BUN SERPL-MCNC: 3 MG/DL (ref 6–23)
CALCIUM SERPL-MCNC: 8.7 MG/DL (ref 8.3–10.4)
CHLORIDE SERPL-SCNC: 108 MMOL/L (ref 98–107)
CO2 SERPL-SCNC: 27 MMOL/L (ref 21–32)
CREAT SERPL-MCNC: 0.5 MG/DL (ref 0.6–1)
DIFFERENTIAL METHOD BLD: ABNORMAL
ERYTHROCYTE [DISTWIDTH] IN BLOOD BY AUTOMATED COUNT: 17.2 % (ref 11.9–14.6)
GGT SERPL-CCNC: 49 U/L (ref 5–55)
GLOBULIN SER CALC-MCNC: 2.5 G/DL (ref 2.3–3.5)
GLUCOSE SERPL-MCNC: 118 MG/DL (ref 65–100)
HCT VFR BLD AUTO: 28 % (ref 35.8–46.3)
HGB BLD-MCNC: 9.2 G/DL (ref 11.7–15.4)
LDH SERPL L TO P-CCNC: 200 U/L (ref 100–190)
MAGNESIUM SERPL-MCNC: 2 MG/DL (ref 1.8–2.4)
MCH RBC QN AUTO: 28.1 PG (ref 26.1–32.9)
MCHC RBC AUTO-ENTMCNC: 32.9 G/DL (ref 31.4–35)
MCV RBC AUTO: 85.6 FL (ref 79.6–97.8)
NRBC # BLD: 0 K/UL (ref 0–0.2)
PHOSPHATE SERPL-MCNC: 3 MG/DL (ref 2.5–4.5)
PLATELET # BLD AUTO: 33 K/UL (ref 150–450)
PLATELET COMMENT: ABNORMAL
PMV BLD AUTO: 10.1 FL (ref 9.4–12.3)
POTASSIUM SERPL-SCNC: 3.5 MMOL/L (ref 3.5–5.1)
PROT SERPL-MCNC: 5.7 G/DL (ref 6.3–8.2)
RBC # BLD AUTO: 3.27 M/UL (ref 4.05–5.2)
RBC MORPH BLD: ABNORMAL
RBC MORPH BLD: ABNORMAL
SODIUM SERPL-SCNC: 142 MMOL/L (ref 136–145)
WBC # BLD AUTO: 0.1 K/UL (ref 4.3–11.1)
WBC MORPH BLD: ABNORMAL

## 2022-06-10 PROCEDURE — 99233 SBSQ HOSP IP/OBS HIGH 50: CPT | Performed by: INTERNAL MEDICINE

## 2022-06-10 PROCEDURE — 83615 LACTATE (LD) (LDH) ENZYME: CPT

## 2022-06-10 PROCEDURE — 2580000003 HC RX 258: Performed by: NURSE PRACTITIONER

## 2022-06-10 PROCEDURE — 83735 ASSAY OF MAGNESIUM: CPT

## 2022-06-10 PROCEDURE — 6370000000 HC RX 637 (ALT 250 FOR IP): Performed by: INTERNAL MEDICINE

## 2022-06-10 PROCEDURE — 80053 COMPREHEN METABOLIC PANEL: CPT

## 2022-06-10 PROCEDURE — 6360000002 HC RX W HCPCS: Performed by: NURSE PRACTITIONER

## 2022-06-10 PROCEDURE — 6370000000 HC RX 637 (ALT 250 FOR IP): Performed by: NURSE PRACTITIONER

## 2022-06-10 PROCEDURE — 85025 COMPLETE CBC W/AUTO DIFF WBC: CPT

## 2022-06-10 PROCEDURE — APPSS60 APP SPLIT SHARED TIME 46-60 MINUTES: Performed by: NURSE PRACTITIONER

## 2022-06-10 PROCEDURE — 1170000000 HC RM PRIVATE ONCOLOGY

## 2022-06-10 PROCEDURE — 84100 ASSAY OF PHOSPHORUS: CPT

## 2022-06-10 PROCEDURE — 82977 ASSAY OF GGT: CPT

## 2022-06-10 RX ORDER — DIPHENOXYLATE HYDROCHLORIDE AND ATROPINE SULFATE 2.5; .025 MG/1; MG/1
1 TABLET ORAL 4 TIMES DAILY PRN
Status: DISCONTINUED | OUTPATIENT
Start: 2022-06-10 | End: 2022-06-11

## 2022-06-10 RX ADMIN — HYDROCODONE BITARTRATE AND ACETAMINOPHEN 1 TABLET: 7.5; 325 TABLET ORAL at 21:36

## 2022-06-10 RX ADMIN — FILGRASTIM-AAFI 300 MCG: 300 INJECTION, SOLUTION SUBCUTANEOUS at 08:40

## 2022-06-10 RX ADMIN — GABAPENTIN 600 MG: 300 CAPSULE ORAL at 15:48

## 2022-06-10 RX ADMIN — GABAPENTIN 600 MG: 300 CAPSULE ORAL at 21:36

## 2022-06-10 RX ADMIN — ACYCLOVIR 400 MG: 800 TABLET ORAL at 21:36

## 2022-06-10 RX ADMIN — NEOMYCIN SULFATE, POLYMYXIN B SULFATE AND BACITRACIN ZINC: 3.5; 10000; 4 OINTMENT OPHTHALMIC at 21:37

## 2022-06-10 RX ADMIN — CETIRIZINE HYDROCHLORIDE 10 MG: 5 TABLET ORAL at 21:36

## 2022-06-10 RX ADMIN — HYDROCODONE BITARTRATE AND ACETAMINOPHEN 1 TABLET: 7.5; 325 TABLET ORAL at 15:48

## 2022-06-10 RX ADMIN — PROCHLORPERAZINE EDISYLATE 5 MG: 5 INJECTION INTRAMUSCULAR; INTRAVENOUS at 15:49

## 2022-06-10 RX ADMIN — HYDROCODONE BITARTRATE AND ACETAMINOPHEN 1 TABLET: 7.5; 325 TABLET ORAL at 02:54

## 2022-06-10 RX ADMIN — POTASSIUM CHLORIDE 20 MEQ: 20 TABLET, EXTENDED RELEASE ORAL at 16:13

## 2022-06-10 RX ADMIN — AMOXICILLIN AND CLAVULANATE POTASSIUM 1 TABLET: 875; 125 TABLET, FILM COATED ORAL at 21:38

## 2022-06-10 RX ADMIN — GABAPENTIN 600 MG: 300 CAPSULE ORAL at 08:40

## 2022-06-10 RX ADMIN — POTASSIUM CHLORIDE 20 MEQ: 20 TABLET, EXTENDED RELEASE ORAL at 08:40

## 2022-06-10 RX ADMIN — LOPERAMIDE HYDROCHLORIDE 2 MG: 2 CAPSULE ORAL at 08:40

## 2022-06-10 RX ADMIN — FLUCONAZOLE 200 MG: 100 TABLET ORAL at 08:39

## 2022-06-10 RX ADMIN — LEVOFLOXACIN 500 MG: 500 TABLET, FILM COATED ORAL at 08:40

## 2022-06-10 RX ADMIN — ACYCLOVIR 400 MG: 800 TABLET ORAL at 08:39

## 2022-06-10 RX ADMIN — DULOXETINE HYDROCHLORIDE 30 MG: 30 CAPSULE, DELAYED RELEASE ORAL at 08:40

## 2022-06-10 RX ADMIN — DIPHENOXYLATE HYDROCHLORIDE AND ATROPINE SULFATE 1 TABLET: 2.5; .025 TABLET ORAL at 21:47

## 2022-06-10 RX ADMIN — ONDANSETRON 4 MG: 2 INJECTION INTRAMUSCULAR; INTRAVENOUS at 21:41

## 2022-06-10 RX ADMIN — HYDROCODONE BITARTRATE AND ACETAMINOPHEN 1 TABLET: 7.5; 325 TABLET ORAL at 09:00

## 2022-06-10 RX ADMIN — NEOMYCIN SULFATE, POLYMYXIN B SULFATE AND BACITRACIN ZINC: 3.5; 10000; 4 OINTMENT OPHTHALMIC at 08:40

## 2022-06-10 RX ADMIN — AMOXICILLIN AND CLAVULANATE POTASSIUM 1 TABLET: 875; 125 TABLET, FILM COATED ORAL at 08:40

## 2022-06-10 RX ADMIN — DIPHENOXYLATE HYDROCHLORIDE AND ATROPINE SULFATE 1 TABLET: 2.5; .025 TABLET ORAL at 09:00

## 2022-06-10 RX ADMIN — SODIUM CHLORIDE: 900 INJECTION, SOLUTION INTRAVENOUS at 03:05

## 2022-06-10 ASSESSMENT — PAIN DESCRIPTION - ORIENTATION
ORIENTATION: RIGHT;LEFT

## 2022-06-10 ASSESSMENT — PAIN SCALES - GENERAL
PAINLEVEL_OUTOF10: 7
PAINLEVEL_OUTOF10: 6
PAINLEVEL_OUTOF10: 4
PAINLEVEL_OUTOF10: 3
PAINLEVEL_OUTOF10: 6
PAINLEVEL_OUTOF10: 2
PAINLEVEL_OUTOF10: 2
PAINLEVEL_OUTOF10: 6

## 2022-06-10 ASSESSMENT — PAIN - FUNCTIONAL ASSESSMENT
PAIN_FUNCTIONAL_ASSESSMENT: ACTIVITIES ARE NOT PREVENTED
PAIN_FUNCTIONAL_ASSESSMENT: PREVENTS OR INTERFERES SOME ACTIVE ACTIVITIES AND ADLS
PAIN_FUNCTIONAL_ASSESSMENT: ACTIVITIES ARE NOT PREVENTED

## 2022-06-10 ASSESSMENT — PAIN DESCRIPTION - LOCATION
LOCATION: FOOT
LOCATION: FOOT
LOCATION: FOOT;HAND
LOCATION: FOOT

## 2022-06-10 ASSESSMENT — PAIN DESCRIPTION - DESCRIPTORS
DESCRIPTORS: BURNING;SHOOTING;SHARP
DESCRIPTORS: BURNING;SHOOTING
DESCRIPTORS: ACHING;NUMBNESS

## 2022-06-10 ASSESSMENT — PAIN DESCRIPTION - PAIN TYPE
TYPE: CHRONIC PAIN;NEUROPATHIC PAIN

## 2022-06-10 ASSESSMENT — PAIN DESCRIPTION - ONSET
ONSET: ON-GOING

## 2022-06-10 ASSESSMENT — PAIN DESCRIPTION - FREQUENCY
FREQUENCY: INTERMITTENT

## 2022-06-10 NOTE — PROGRESS NOTES
Diagnosis: MM  Transplant Date: 06/02/22  Day: (+/-) +8. Chemo regimen used: Melphalan. BMT assessments and toxicities completed. WBC/ANC= 0.1/0.0. Prophylactic medications started D+1  G-CSF started on D+6   Toxicities greater than 2 :none. Patient seen by MD/NP daily until engraftment. New orders received: Lomotil.   Issues:nausea and pain

## 2022-06-10 NOTE — PROGRESS NOTES
Diagnosis: MM  Transplant Date: 6/2/22  Chemo regimen used: Melphalan  Day: (+/-) +8. BMT assessments and toxicities completed. Mouth Care completed 3 times this shift (8915, 2644, 4742)  WBC/ANC= 0.1/pending. Prophylactic medications started on D+1 PO Diflucan; Levaquin; Augmentin. (Acyclovir PTA). G-CSF started on D+6 (6/8/22). Toxicities greater than 2: none. Patient seen by MD/NP daily until engraftment or while IP. Labs not resulted that need follow-up: ANC.

## 2022-06-10 NOTE — PROGRESS NOTES
[facility] Hematology & Oncology        Inpatient Hematology / Oncology Progress Note    Reason for Admission:  Stem cell transplant candidate [Z76.82]    24 Hour Events:  Afebrile, VSS  Day +8 Auto PBSCT  Awaiting count recovery, on daily G-CSF  Up walking around in room, feeling well    Transfusions: None  Replacements: None    ROS:  Constitutional: +fatigue. Negative for fever, chills. CV: +edema. Negative for chest pain, palpitations. Respiratory: Negative for dyspnea, cough, wheezing. GI: +N/D. Negative for abdominal pain. 10 point review of systems is otherwise negative with the exception of the elements mentioned above in the HPI. Allergies   Allergen Reactions    Amoxicillin-Pot Clavulanate Other (See Comments)     Yeast infection  Yeast infection     Past Medical History:   Diagnosis Date    Obesity     Prolactin increased      Past Surgical History:   Procedure Laterality Date    IR BIOPSY PERC SUPERF BONE      IR TUNNELED CATHETER PLACEMENT GREATER THAN 5 YEARS  5/9/2022    IR TUNNELED CATHETER PLACEMENT GREATER THAN 5 YEARS  5/9/2022    IR TUNNELED CATHETER PLACEMENT GREATER THAN 5 YEARS 5/9/2022 [facility]     No family history on file.   Social History     Socioeconomic History    Marital status:      Spouse name: Not on file    Number of children: Not on file    Years of education: Not on file    Highest education level: Not on file   Occupational History    Not on file   Tobacco Use    Smoking status: Never Smoker    Smokeless tobacco: Never Used   Substance and Sexual Activity    Alcohol use: Never    Drug use: Never    Sexual activity: Not on file   Other Topics Concern    Not on file   Social History Narrative    Not on file     Social Determinants of Health     Financial Resource Strain:     Difficulty of Paying Living Expenses: Not on file   Food Insecurity:     Worried About Running Out of Food in the Last Year: Not on file    Jimi steele Food in the Last Year: Not on file   Transportation Needs:     Lack of Transportation (Medical): Not on file    Lack of Transportation (Non-Medical):  Not on file   Physical Activity:     Days of Exercise per Week: Not on file    Minutes of Exercise per Session: Not on file   Stress:     Feeling of Stress : Not on file   Social Connections:     Frequency of Communication with Friends and Family: Not on file    Frequency of Social Gatherings with Friends and Family: Not on file    Attends Bahai Services: Not on file    Active Member of [address] [place] [address] [employer] or Organizations: Not on file    Attends Club or Organization Meetings: Not on file    Marital Status: Not on file   Intimate Partner Violence:     Fear of Current or Ex-Partner: Not on file    Emotionally Abused: Not on file    Physically Abused: Not on file    Sexually Abused: Not on file   Housing Stability:     Unable to Pay for Housing in the Last Year: Not on file    Number of [place] in the Last Year: Not on file    Unstable Housing in the Last Year: Not on file     Current Facility-Administered Medications   Medication Dose Route Frequency Provider Last Rate Last Admin    diphenoxylate-atropine (LOMOTIL) 2.5-0.025 MG per tablet 1 tablet  1 tablet Oral 4x Daily PRN Leora Chandra MD   1 tablet at 06/10/22 0900    [Held by provider] enoxaparin (LOVENOX) injection 40 mg  40 mg SubCUTAneous Daily BEE Marcum CNP        cetirizine (ZYRTEC) tablet 10 mg  10 mg Oral Nightly Leora Chandra MD   10 mg at 06/09/22 2048    LORazepam (ATIVAN) injection 1 mg  1 mg IntraVENous Q6H PRN BEE Marcum CNP   1 mg at 06/07/22 2145    potassium chloride (KLOR-CON M) extended release tablet 20 mEq  20 mEq Oral BID  Susan Church MD   20 mEq at 06/10/22 0840    hydrALAZINE (APRESOLINE) injection 10 mg  10 mg IntraVENous Q6H PRN BEE Perez CNP        loperamide (IMODIUM) capsule 2 mg  2 mg Oral 4x Daily PRN BEE Perez - NICOLE 2 mg at 06/10/22 0840    amoxicillin-clavulanate (AUGMENTIN) 875-125 MG per tablet 1 tablet  1 tablet Oral 2 times per day [facility], APRN - CNP   1 tablet at 06/10/22 0840    ondansetron (ZOFRAN) injection 4 mg  4 mg IntraVENous Q4H PRN [facility], APRN - CNP   4 mg at 06/09/22 2046    prochlorperazine (COMPAZINE) injection 5 mg  5 mg IntraVENous Q6H PRN [facility], APRN - CNP   5 mg at 06/09/22 1517    morphine injection 2 mg  2 mg IntraVENous Q4H PRN [facility], APRN - CNP        filgrastim-aafi (NIVESTYM) injection 300 mcg  300 mcg SubCUTAneous Daily [facility], APRN - CNP   300 mcg at 06/10/22 0840    meperidine (DEMEROL) injection 12.5 mg  12.5 mg IntraVENous PRN Milli Singer MD        EPINEPHrine PF 1 MG/ML injection 0.3 mg  0.3 mg IntraMUSCular PRN Milli Singer MD        neomycin-bacitracin-polymyxin (NEOSPORIN) ophthalmic ointment   Right Eye TID [facility], APRN - CNP   Given at 06/10/22 0840    DULoxetine (CYMBALTA) extended release capsule 30 mg  30 mg Oral Daily Alfornia Perch, APRN - NP   30 mg at 06/10/22 0840    HYDROcodone-acetaminophen (NORCO) 5-325 MG per tablet 1 tablet  1 tablet Oral Q4H PRN Alfornia Perch, APRN - NP   1 tablet at 06/08/22 2326    HYDROcodone-acetaminophen ([facility]) 7.5-325 MG per tablet 1 tablet  1 tablet Oral Q6H PRN Alfornia Perch, APRN - NP   1 tablet at 06/10/22 0900    polyethylene glycol (GLYCOLAX) packet 17 g  17 g Oral Daily PRN Alfornia Perch, APRN - NP        0.9 % sodium chloride infusion   IntraVENous Continuous Alfornia Perch, APRN - NP 75 mL/hr at 06/10/22 0305 New Bag at 06/10/22 0305    acyclovir (ZOVIRAX) tablet 400 mg  400 mg Oral BID Alfornia Perch, APRN - NP   400 mg at 06/10/22 0839    levoFLOXacin (LEVAQUIN) tablet 500 mg  500 mg Oral Daily Alfornia Perch, APRN - NP   500 mg at 06/10/22 0840    fluconazole (DIFLUCAN) tablet 200 mg  200 mg Oral Daily Alfornia Perch, APRN - NP   200 mg at 06/10/22 0839    gabapentin (NEURONTIN) capsule 600 mg  600 mg Oral TID [facility] Paula Adkins MD   600 mg at 06/10/22 0840       OBJECTIVE:  Patient Vitals for the past 8 hrs:   BP Temp Temp src Pulse Resp SpO2   06/10/22 0750 109/69 98 °F (36.7 °C) Oral 99 20 98 %   06/10/22 0302 135/77 98.3 °F (36.8 °C) Oral 99 18 99 %     Temp (24hrs), Av.1 °F (36.7 °C), Min:97.8 °F (36.6 °C), Max:98.3 °F (36.8 °C)    06/10 0701 - 06/10 1900  In: 221 [P.O.:221]  Out: 850 [Urine:600]    Physical Exam:  Constitutional: Well developed, well nourished female in no acute distress, sitting comfortably on the bedside couch. HEENT: Normocephalic and atraumatic. Oropharynx is clear, mucous membranes are moist.  Extraocular muscles are intact. Sclerae anicteric. Neck supple without JVD. No thyromegaly present. Skin Warm and dry. No bruising and no rash noted. No erythema. No pallor. Respiratory Lungs are clear to auscultation bilaterally without wheezes, rales or rhonchi, normal air exchange without accessory muscle use. CVS Normal rate, regular rhythm and normal S1 and S2. No murmurs, gallops, or rubs. Abdomen Soft, nontender and nondistended, normoactive bowel sounds. No palpable mass. No hepatosplenomegaly. Neuro Grossly nonfocal with no obvious sensory or motor deficits. MSK Normal range of motion in general.  Trace BLE edema and no tenderness. Psych Appropriate mood and affect.         Labs:    Recent Results (from the past 24 hour(s))   Phosphorus    Collection Time: 06/10/22  3:08 AM   Result Value Ref Range    Phosphorus 3.0 2.5 - 4.5 MG/DL   Lactate Dehydrogenase    Collection Time: 06/10/22  3:08 AM   Result Value Ref Range     (H) 100 - 190 U/L   Gamma GT    Collection Time: 06/10/22  3:08 AM   Result Value Ref Range    GGT 49 5 - 55 U/L   CBC with Auto Differential    Collection Time: 06/10/22  3:08 AM   Result Value Ref Range    WBC 0.1 (LL) 4.3 - 11.1 K/uL    RBC 3.27 (L) 4.05 - 5.2 M/uL    Hemoglobin 9.2 (L) 11.7 - 15.4 g/dL    Hematocrit 28.0 (L) 35.8 - 46.3 %    MCV 85.6 79.6 - 97.8 FL    MCH 28.1 26.1 - 32.9 PG    MCHC 32.9 31.4 - 35.0 g/dL    RDW 17.2 (H) 11.9 - 14.6 %    Platelets 33 (L) 091 - 450 K/uL    MPV 10.1 9.4 - 12.3 FL    nRBC 0.00 0.0 - 0.2 K/uL    RBC Comment ANISOCYTOSIS + POIKILOCYTOSIS      RBC Comment MICROCYTOSIS      WBC Comment WBC TOO FEW TO DIFFERENTIATE      Platelet Comment MARKED      Differential Type MANUAL     Magnesium    Collection Time: 06/10/22  3:08 AM   Result Value Ref Range    Magnesium 2.0 1.8 - 2.4 mg/dL   Comprehensive Metabolic Panel    Collection Time: 06/10/22  3:08 AM   Result Value Ref Range    Sodium 142 136 - 145 mmol/L    Potassium 3.5 3.5 - 5.1 mmol/L    Chloride 108 (H) 98 - 107 mmol/L    CO2 27 21 - 32 mmol/L    Anion Gap 7 7 - 16 mmol/L    Glucose 118 (H) 65 - 100 mg/dL    BUN 3 (L) 6 - 23 MG/DL    CREATININE 0.50 (L) 0.6 - 1.0 MG/DL    GFR African American >60 >60 ml/min/1.73m2    GFR Non- >60 >60 ml/min/1.73m2    Calcium 8.7 8.3 - 10.4 MG/DL    Total Bilirubin 0.1 (L) 0.2 - 1.1 MG/DL    ALT 11 (L) 12 - 65 U/L    AST 13 (L) 15 - 37 U/L    Alk Phosphatase 65 50 - 136 U/L    Total Protein 5.7 (L) 6.3 - 8.2 g/dL    Albumin 3.2 (L) 3.5 - 5.0 g/dL    Globulin 2.5 2.3 - 3.5 g/dL    Albumin/Globulin Ratio 1.3 1.2 - 3.5         Imaging:  n/a    ASSESSMENT:  Patient Active Problem List   Diagnosis    Bone marrow transplant candidate    [facility]    Stem cell transplant candidate    Immunocompromised ([facility]    [facility] marrow transplant status ([facility]    Admission for antineoplastic chemotherapy    Loose stools     Ms. Batool Bullard is a 54 y.o. female admitted on 5/31/2022 with a primary diagnosis of There were no encounter diagnoses. .       29-year-old -American female history of prolactinemia, thyroid Hurthle cell neoplasm, lambda light chain multiple myeloma, ISS stage I, high risk disease (gain of 1 q.), bortezomib related neuropathy, iron deficiency in CR 1 after daratumumab limited to, reviewing the chart and time with the patient and family, more than 50% of the time documented was spent in face-to-face contact with the patient and in the care of the patient on the floor/unit where the patient is located. My findings are as follows: She has Multiple Myeloma and is s/p Autologous PBHCT Day +8, appears weak, heart rate regular without murmurs, abdomen is non-tender, bowel sounds are positive, we will continue G-CSF and prophylactic ABX.               Kristin Rowe MD    [facility] Hematology/Oncology  [address] [place] [address]  Office : [phone]  Fax : [phone]

## 2022-06-11 LAB
ALBUMIN SERPL-MCNC: 3 G/DL (ref 3.5–5)
ALBUMIN/GLOB SERPL: 1.2 {RATIO} (ref 1.2–3.5)
ALP SERPL-CCNC: 63 U/L (ref 50–136)
ALT SERPL-CCNC: 10 U/L (ref 12–65)
ANION GAP SERPL CALC-SCNC: 6 MMOL/L (ref 7–16)
AST SERPL-CCNC: 7 U/L (ref 15–37)
B PERT DNA SPEC QL NAA+PROBE: NOT DETECTED
BILIRUB SERPL-MCNC: 0.2 MG/DL (ref 0.2–1.1)
BORDETELLA PARAPERTUSSIS BY PCR: NOT DETECTED
BUN SERPL-MCNC: 3 MG/DL (ref 6–23)
C PNEUM DNA SPEC QL NAA+PROBE: NOT DETECTED
CALCIUM SERPL-MCNC: 8.7 MG/DL (ref 8.3–10.4)
CHLORIDE SERPL-SCNC: 108 MMOL/L (ref 98–107)
CO2 SERPL-SCNC: 29 MMOL/L (ref 21–32)
CREAT SERPL-MCNC: 0.4 MG/DL (ref 0.6–1)
FLUAV SUBTYP SPEC NAA+PROBE: NOT DETECTED
FLUBV RNA SPEC QL NAA+PROBE: NOT DETECTED
GLOBULIN SER CALC-MCNC: 2.5 G/DL (ref 2.3–3.5)
GLUCOSE SERPL-MCNC: 95 MG/DL (ref 65–100)
HADV DNA SPEC QL NAA+PROBE: NOT DETECTED
HCOV 229E RNA SPEC QL NAA+PROBE: NOT DETECTED
HCOV HKU1 RNA SPEC QL NAA+PROBE: NOT DETECTED
HCOV NL63 RNA SPEC QL NAA+PROBE: NOT DETECTED
HCOV OC43 RNA SPEC QL NAA+PROBE: NOT DETECTED
HMPV RNA SPEC QL NAA+PROBE: NOT DETECTED
HPIV1 RNA SPEC QL NAA+PROBE: NOT DETECTED
HPIV2 RNA SPEC QL NAA+PROBE: NOT DETECTED
HPIV3 RNA SPEC QL NAA+PROBE: NOT DETECTED
HPIV4 RNA SPEC QL NAA+PROBE: NOT DETECTED
M PNEUMO DNA SPEC QL NAA+PROBE: NOT DETECTED
MAGNESIUM SERPL-MCNC: 2 MG/DL (ref 1.8–2.4)
POTASSIUM SERPL-SCNC: 3.5 MMOL/L (ref 3.5–5.1)
PROT SERPL-MCNC: 5.5 G/DL (ref 6.3–8.2)
RSV RNA SPEC QL NAA+PROBE: NOT DETECTED
RV+EV RNA SPEC QL NAA+PROBE: NOT DETECTED
SARS-COV-2 RNA RESP QL NAA+PROBE: NOT DETECTED
SODIUM SERPL-SCNC: 143 MMOL/L (ref 136–145)

## 2022-06-11 PROCEDURE — 2580000003 HC RX 258: Performed by: NURSE PRACTITIONER

## 2022-06-11 PROCEDURE — 1170000000 HC RM PRIVATE ONCOLOGY

## 2022-06-11 PROCEDURE — 6370000000 HC RX 637 (ALT 250 FOR IP): Performed by: NURSE PRACTITIONER

## 2022-06-11 PROCEDURE — 6360000002 HC RX W HCPCS: Performed by: NURSE PRACTITIONER

## 2022-06-11 PROCEDURE — 0202U NFCT DS 22 TRGT SARS-COV-2: CPT

## 2022-06-11 PROCEDURE — 6370000000 HC RX 637 (ALT 250 FOR IP): Performed by: INTERNAL MEDICINE

## 2022-06-11 PROCEDURE — 80053 COMPREHEN METABOLIC PANEL: CPT

## 2022-06-11 PROCEDURE — 83735 ASSAY OF MAGNESIUM: CPT

## 2022-06-11 PROCEDURE — 99232 SBSQ HOSP IP/OBS MODERATE 35: CPT | Performed by: INTERNAL MEDICINE

## 2022-06-11 PROCEDURE — 36592 COLLECT BLOOD FROM PICC: CPT

## 2022-06-11 PROCEDURE — 85025 COMPLETE CBC W/AUTO DIFF WBC: CPT

## 2022-06-11 RX ORDER — DIPHENOXYLATE HYDROCHLORIDE AND ATROPINE SULFATE 2.5; .025 MG/1; MG/1
2 TABLET ORAL 4 TIMES DAILY PRN
Status: DISCONTINUED | OUTPATIENT
Start: 2022-06-11 | End: 2022-06-14 | Stop reason: HOSPADM

## 2022-06-11 RX ADMIN — SODIUM CHLORIDE: 900 INJECTION, SOLUTION INTRAVENOUS at 11:58

## 2022-06-11 RX ADMIN — NEOMYCIN SULFATE, POLYMYXIN B SULFATE AND BACITRACIN ZINC: 3.5; 10000; 4 OINTMENT OPHTHALMIC at 09:57

## 2022-06-11 RX ADMIN — POTASSIUM CHLORIDE 20 MEQ: 20 TABLET, EXTENDED RELEASE ORAL at 07:53

## 2022-06-11 RX ADMIN — GABAPENTIN 600 MG: 300 CAPSULE ORAL at 13:13

## 2022-06-11 RX ADMIN — DIPHENOXYLATE HYDROCHLORIDE AND ATROPINE SULFATE 1 TABLET: 2.5; .025 TABLET ORAL at 06:39

## 2022-06-11 RX ADMIN — NEOMYCIN SULFATE, POLYMYXIN B SULFATE AND BACITRACIN ZINC: 3.5; 10000; 4 OINTMENT OPHTHALMIC at 20:14

## 2022-06-11 RX ADMIN — ACYCLOVIR 400 MG: 800 TABLET ORAL at 20:14

## 2022-06-11 RX ADMIN — HYDROCODONE BITARTRATE AND ACETAMINOPHEN 1 TABLET: 7.5; 325 TABLET ORAL at 20:13

## 2022-06-11 RX ADMIN — HYDROCODONE BITARTRATE AND ACETAMINOPHEN 1 TABLET: 7.5; 325 TABLET ORAL at 11:56

## 2022-06-11 RX ADMIN — ONDANSETRON 4 MG: 2 INJECTION INTRAMUSCULAR; INTRAVENOUS at 06:31

## 2022-06-11 RX ADMIN — ACYCLOVIR 400 MG: 800 TABLET ORAL at 07:55

## 2022-06-11 RX ADMIN — AMOXICILLIN AND CLAVULANATE POTASSIUM 1 TABLET: 875; 125 TABLET, FILM COATED ORAL at 07:53

## 2022-06-11 RX ADMIN — CETIRIZINE HYDROCHLORIDE 10 MG: 5 TABLET ORAL at 20:13

## 2022-06-11 RX ADMIN — LOPERAMIDE HYDROCHLORIDE 2 MG: 2 CAPSULE ORAL at 08:02

## 2022-06-11 RX ADMIN — DULOXETINE HYDROCHLORIDE 30 MG: 30 CAPSULE, DELAYED RELEASE ORAL at 07:52

## 2022-06-11 RX ADMIN — FLUCONAZOLE 200 MG: 100 TABLET ORAL at 07:53

## 2022-06-11 RX ADMIN — DIPHENOXYLATE HYDROCHLORIDE AND ATROPINE SULFATE 2 TABLET: 2.5; .025 TABLET ORAL at 13:16

## 2022-06-11 RX ADMIN — LEVOFLOXACIN 500 MG: 500 TABLET, FILM COATED ORAL at 07:53

## 2022-06-11 RX ADMIN — GABAPENTIN 600 MG: 300 CAPSULE ORAL at 07:52

## 2022-06-11 RX ADMIN — GABAPENTIN 600 MG: 300 CAPSULE ORAL at 20:13

## 2022-06-11 RX ADMIN — AMOXICILLIN AND CLAVULANATE POTASSIUM 1 TABLET: 875; 125 TABLET, FILM COATED ORAL at 20:13

## 2022-06-11 RX ADMIN — ONDANSETRON 4 MG: 2 INJECTION INTRAMUSCULAR; INTRAVENOUS at 17:02

## 2022-06-11 RX ADMIN — POTASSIUM CHLORIDE 20 MEQ: 20 TABLET, EXTENDED RELEASE ORAL at 17:00

## 2022-06-11 RX ADMIN — HYDROCODONE BITARTRATE AND ACETAMINOPHEN 1 TABLET: 5; 325 TABLET ORAL at 03:30

## 2022-06-11 RX ADMIN — DIPHENOXYLATE HYDROCHLORIDE AND ATROPINE SULFATE 2 TABLET: 2.5; .025 TABLET ORAL at 23:02

## 2022-06-11 RX ADMIN — FILGRASTIM-AAFI 300 MCG: 300 INJECTION, SOLUTION SUBCUTANEOUS at 08:02

## 2022-06-11 ASSESSMENT — PAIN DESCRIPTION - ORIENTATION
ORIENTATION: RIGHT;LEFT
ORIENTATION: RIGHT;LEFT
ORIENTATION: RIGHT;LEFT;LOWER
ORIENTATION_2: RIGHT;LEFT

## 2022-06-11 ASSESSMENT — PAIN - FUNCTIONAL ASSESSMENT
PAIN_FUNCTIONAL_ASSESSMENT: PREVENTS OR INTERFERES SOME ACTIVE ACTIVITIES AND ADLS

## 2022-06-11 ASSESSMENT — PAIN SCALES - GENERAL
PAINLEVEL_OUTOF10: 7
PAINLEVEL_OUTOF10: 3
PAINLEVEL_OUTOF10: 2
PAINLEVEL_OUTOF10: 7
PAINLEVEL_OUTOF10: 0
PAINLEVEL_OUTOF10: 0
PAINLEVEL_OUTOF10: 5
PAINLEVEL_OUTOF10: 0

## 2022-06-11 ASSESSMENT — PAIN DESCRIPTION - FREQUENCY
FREQUENCY: INTERMITTENT

## 2022-06-11 ASSESSMENT — PAIN DESCRIPTION - LOCATION
LOCATION: FOOT
LOCATION: FOOT;HAND
LOCATION: FOOT
LOCATION_2: FOOT

## 2022-06-11 ASSESSMENT — PAIN DESCRIPTION - PAIN TYPE
TYPE: NEUROPATHIC PAIN
TYPE: NEUROPATHIC PAIN
TYPE: ACUTE PAIN;NEUROPATHIC PAIN

## 2022-06-11 ASSESSMENT — PAIN DESCRIPTION - ONSET
ONSET: ON-GOING

## 2022-06-11 ASSESSMENT — PAIN DESCRIPTION - DESCRIPTORS
DESCRIPTORS: BURNING;NUMBNESS;TINGLING
DESCRIPTORS: BURNING;NUMBNESS;TINGLING
DESCRIPTORS: BURNING;TINGLING

## 2022-06-11 NOTE — PROGRESS NOTES
[facility] Hematology & Oncology        Inpatient Hematology / Oncology Progress Note    Reason for Admission:  Stem cell transplant candidate [Z76.82]    24 Hour Events:  Afebrile, VSS  Day +9 Auto PBSCT  Awaiting count recovery, on daily G-CSF  Up walking around in room, feeling well   Diarrhea ongoing  Nausea controlled     Transfusions: None  Replacements: None    ROS:  Constitutional: +fatigue. Negative for fever, chills. CV: +edema. Negative for chest pain, palpitations. Respiratory: Negative for dyspnea, cough, wheezing. GI: +N/D. Negative for abdominal pain. 10 point review of systems is otherwise negative with the exception of the elements mentioned above in the HPI. Allergies   Allergen Reactions    Amoxicillin-Pot Clavulanate Other (See Comments)     Yeast infection  Yeast infection     Past Medical History:   Diagnosis Date    Obesity     Prolactin increased      Past Surgical History:   Procedure Laterality Date    IR BIOPSY PERC SUPERF BONE      IR TUNNELED CATHETER PLACEMENT GREATER THAN 5 YEARS  5/9/2022    IR TUNNELED CATHETER PLACEMENT GREATER THAN 5 YEARS  5/9/2022    IR TUNNELED CATHETER PLACEMENT GREATER THAN 5 YEARS 5/9/2022 [facility] SPECI[facility]     No family history on file.   Social History     Socioeconomic History    Marital status:      Spouse name: Not on file    Number of children: Not on file    Years of education: Not on file    Highest education level: Not on file   Occupational History    Not on file   Tobacco Use    Smoking status: Never Smoker    Smokeless tobacco: Never Used   Substance and Sexual Activity    Alcohol use: Never    Drug use: Never    Sexual activity: Not on file   Other Topics Concern    Not on file   Social History Narrative    Not on file     Social Determinants of Health     Financial Resource Strain:     Difficulty of Paying Living Expenses: Not on file   Food Insecurity:     Worried About [address] in the Last Year: Not on file    Ran Out of Food in the Last Year: Not on file   Transportation Needs:     Lack of Transportation (Medical): Not on file    Lack of Transportation (Non-Medical):  Not on file   Physical Activity:     Days of Exercise per Week: Not on file    Minutes of Exercise per Session: Not on file   Stress:     Feeling of Stress : Not on file   Social Connections:     Frequency of Communication with Friends and Family: Not on file    Frequency of Social Gatherings with Friends and Family: Not on file    Attends Voodoo Services: Not on file    Active Member of [address] [place] [address] or Organizations: Not on file    Attends Club or Organization Meetings: Not on file    Marital Status: Not on file   Intimate Partner Violence:     Fear of Current or Ex-Partner: Not on file    Emotionally Abused: Not on file    Physically Abused: Not on file    Sexually Abused: Not on file   Housing Stability:     Unable to Pay for Housing in the Last Year: Not on file    Number of [place] in the Last Year: Not on file    Unstable Housing in the Last Year: Not on file     Current Facility-Administered Medications   Medication Dose Route Frequency Provider Last Rate Last Admin    diphenoxylate-atropine (LOMOTIL) 2.5-0.025 MG per tablet 2 tablet  2 tablet Oral 4x Daily PRN BEE Gloria CNP        [Held by provider] enoxaparin (LOVENOX) injection 40 mg  40 mg SubCUTAneous Daily BEE Chiu CNP        cetirizine (ZYRTEC) tablet 10 mg  10 mg Oral Nightly Connie Burton MD   10 mg at 06/10/22 2136    LORazepam (ATIVAN) injection 1 mg  1 mg IntraVENous Q6H PRN BEE Chiu CNP   1 mg at 06/07/22 2145    potassium chloride (KLOR-CON M) extended release tablet 20 mEq  20 mEq Oral BID  Nat Zamarripa MD   20 mEq at 06/11/22 0753    hydrALAZINE (APRESOLINE) injection 10 mg  10 mg IntraVENous Q6H PRN BEE Shields CNP        loperamide (IMODIUM) capsule 2 mg  2 mg Oral 4x Daily PRN Catarino Caraballo APRN - CNP   2 mg at 06/11/22 0802    amoxicillin-clavulanate (AUGMENTIN) 875-125 MG per tablet 1 tablet  1 tablet Oral 2 times per day Catarino Caraballo, APRN - CNP   1 tablet at 06/11/22 0753    ondansetron (ZOFRAN) injection 4 mg  4 mg IntraVENous Q4H PRN Catarino Caraballo, APRN - CNP   4 mg at 06/11/22 0631    prochlorperazine (COMPAZINE) injection 5 mg  5 mg IntraVENous Q6H PRN Catarino Caraballo, APRN - CNP   5 mg at 06/10/22 1549    morphine injection 2 mg  2 mg IntraVENous Q4H PRN Catarino Caraballo, APRN - CNP        filgrastim-aafi (NIVESTYM) injection 300 mcg  300 mcg SubCUTAneous Daily Catarino Caraballo APRN - CNP   300 mcg at 06/11/22 0802    meperidine (DEMEROL) injection 12.5 mg  12.5 mg IntraVENous PRN Shakeel Munroe MD        EPINEPHrine PF 1 MG/ML injection 0.3 mg  0.3 mg IntraMUSCular PRN Shakeel Munroe MD        neomycin-bacitracin-polymyxin (NEOSPORIN) ophthalmic ointment   Right Eye TID Catarino Caraballo APRN - CNP   Given at 06/10/22 2137    DULoxetine (CYMBALTA) extended release capsule 30 mg  30 mg Oral Daily Shy Dubin, APRN - NP   30 mg at 06/11/22 0752    HYDROcodone-acetaminophen (NORCO) 5-325 MG per tablet 1 tablet  1 tablet Oral Q4H PRN Shy Dubin, APRN - NP   1 tablet at 06/11/22 0330    HYDROcodone-acetaminophen (Connye Glory) 7.5-325 MG per tablet 1 tablet  1 tablet Oral Q6H PRN Shy Dubin, APRN - NP   1 tablet at 06/10/22 2136    polyethylene glycol (GLYCOLAX) packet 17 g  17 g Oral Daily PRN Shy Dubin, APRN - NP        0.9 % sodium chloride infusion   IntraVENous Continuous Shy Dubin, APRN - NP 75 mL/hr at 06/10/22 0305 New Bag at 06/10/22 0305    acyclovir (ZOVIRAX) tablet 400 mg  400 mg Oral BID Shy Dubin, APRN - NP   400 mg at 06/11/22 0755    levoFLOXacin (LEVAQUIN) tablet 500 mg  500 mg Oral Daily Shy Dubin, APRN - NP   500 mg at 06/11/22 0753    fluconazole (DIFLUCAN) tablet 200 mg  200 mg Oral Daily Shy Dubin, APRN - NP   200 mg at 06/11/22 0753    gabapentin (NEURONTIN) capsule 600 mg  600 mg Oral TID Fredo Orellana MD   600 mg at 22 5686       OBJECTIVE:  Patient Vitals for the past 8 hrs:   BP Temp Temp src Pulse Resp SpO2   22 0745 125/82 97.9 °F (36.6 °C) Oral (!) 103 16 99 %   22 0330 126/63 98 °F (36.7 °C) Oral 92 16 98 %     Temp (24hrs), Av.3 °F (36.8 °C), Min:97.9 °F (36.6 °C), Max:99.2 °F (37.3 °C)     0701 -  1900  In: -   Out: 600 [Urine:400]    Physical Exam:  Constitutional: Well developed, well nourished female in no acute distress, up walking around the room. HEENT: Normocephalic and atraumatic. Oropharynx is clear, mucous membranes are moist. Sclerae anicteric. Neck supple without JVD. No thyromegaly present. Skin Warm and dry. No bruising and no rash noted. No erythema. No pallor. Respiratory Lungs are clear to auscultation bilaterally without wheezes, rales or rhonchi, normal air exchange without accessory muscle use. CVS Normal rate, regular rhythm and normal S1 and S2. No murmurs, gallops, or rubs. Abdomen Soft, nontender and nondistended, normoactive bowel sounds. No palpable mass. Neuro Grossly nonfocal with no obvious sensory or motor deficits. MSK Normal range of motion in general.  Trace BLE edema and no tenderness. Psych Appropriate mood and affect.         Labs:    Recent Results (from the past 24 hour(s))   CBC with Auto Differential    Collection Time: 22  3:42 AM   Result Value Ref Range    WBC 0.2 (LL) 4.3 - 11.1 K/uL    RBC 3.20 (L) 4.05 - 5.2 M/uL    Hemoglobin 8.9 (L) 11.7 - 15.4 g/dL    Hematocrit 27.2 (L) 35.8 - 46.3 %    MCV 85.0 79.6 - 97.8 FL    MCH 27.8 26.1 - 32.9 PG    MCHC 32.7 31.4 - 35.0 g/dL    RDW 16.6 (H) 11.9 - 14.6 %    Platelets 18 (LL) 512 - 450 K/uL    MPV 9.0 (L) 9.4 - 12.3 FL    nRBC 0.00 0.0 - 0.2 K/uL    Differential Type PENDING    Magnesium    Collection Time: 22  3:42 AM   Result Value Ref Range    Magnesium 2.0 1.8 - 2.4 mg/dL   Comprehensive Metabolic Panel    Collection Time: 06/11/22  3:42 AM   Result Value Ref Range    Sodium 143 136 - 145 mmol/L    Potassium 3.5 3.5 - 5.1 mmol/L    Chloride 108 (H) 98 - 107 mmol/L    CO2 29 21 - 32 mmol/L    Anion Gap 6 (L) 7 - 16 mmol/L    Glucose 95 65 - 100 mg/dL    BUN 3 (L) 6 - 23 MG/DL    CREATININE 0.40 (L) 0.6 - 1.0 MG/DL    GFR African American >60 >60 ml/min/1.73m2    GFR Non- >60 >60 ml/min/1.73m2    Calcium 8.7 8.3 - 10.4 MG/DL    Total Bilirubin 0.2 0.2 - 1.1 MG/DL    ALT 10 (L) 12 - 65 U/L    AST 7 (L) 15 - 37 U/L    Alk Phosphatase 63 50 - 136 U/L    Total Protein 5.5 (L) 6.3 - 8.2 g/dL    Albumin 3.0 (L) 3.5 - 5.0 g/dL    Globulin 2.5 2.3 - 3.5 g/dL    Albumin/Globulin Ratio 1.2 1.2 - 3.5     Respiratory Panel, Molecular, with COVID-19 (Restricted: peds pts or suitable admitted adults)    Collection Time: 06/11/22  6:08 AM    Specimen: Nasopharyngeal   Result Value Ref Range    Adenovirus by PCR NOT DETECTED      Coronavirus 229E by PCR NOT DETECTED      Coronavirus HKU1 by PCR NOT DETECTED      Coronavirus NL63 by PCR NOT DETECTED      Coronavirus OC43 by PCR NOT DETECTED      SARS-CoV-2, PCR NOT DETECTED      Human Metapneumovirus by PCR NOT DETECTED      Rhinovirus Enterovirus PCR NOT DETECTED      Influenza A by PCR NOT DETECTED      INFLUENZA B PCR NOT DETECTED      Parainfluenza 1 PCR NOT DETECTED      Parainfluenza 2 PCR NOT DETECTED      Parainfluenza 3 PCR NOT DETECTED      Parainfluenza 4 PCR NOT DETECTED      Respiratory Syncytial Virus by PCR NOT DETECTED      Bordetella parapertussis by PCR NOT DETECTED      Bordetella pertussis by PCR NOT DETECTED      Chlamydophila Pneumonia PCR NOT DETECTED      Mycoplasma pneumo by PCR NOT DETECTED         Imaging:  n/a    ASSESSMENT:  Patient Active Problem List   Diagnosis    Bone marrow transplant candidate    Multiple myeloma not having achieved remission ([facility]    Stem cell transplant candidate    Immunocompromised ([facility]    [facility] marrow transplant status ([facility]    Admission for antineoplastic chemotherapy    Loose stools     Ms. Dalia Ferrell is a 54 y.o. female admitted on 5/31/2022 with a primary diagnosis of There were no encounter diagnoses. .       44-year-old -American female history of prolactinemia, thyroid Hurthle cell neoplasm, lambda light chain multiple myeloma, ISS stage I, high risk disease (gain of 1 q.), bortezomib related neuropathy, iron deficiency in CR 1 after daratumumab based regimen. Patient seen in oncology office 5/31/2022. Patient collected CD34 at 6.24 mil/kg w granix alone. Reports feeling reasonably. Extensive ROS unremarkable. PLAN:  Multiple myeloma  - Okay to proceed with melphalan 200 mg per metered squared followed by stem cell reinfusion following day (CD34 3.12 mil/kg back). - Hydration and IV electrolyte replacement as needed. - Antiemetic as well as antidiarrheal support as needed. - Blood transfusion support transfusion as needed. - Prophylactic antibiotics with Levaquin, Augmentin, acyclovir and Diflucan starting day +1.    - G-CSF support with Nivestym daily starting day +6.    6/1 Day -1. Melphalan today, stem cells tomorrow. 6/2 Day 0. Stem cells today. 6/3 Day +1. Ppx antibx start today. Wt up 5# with +I/Os. Lasix x 1 ordered. 6/4 Day +2. C/o fatigue, vomiting, and diarrhea. Diuresed well. 6/5 Day +3. Feeling better today. 6/8 D+6. Awaiting count recovery. Complains of fatigue and anxiety. G-CSF starts today. 6/9 D+7. Awaiting count recovery. Continue G-CSF. Feeling well, no complaints. 6/11 D+9 and doing well. WBC 0.2, Hgb 8.9, Plt 18K, continue G-CSF, awaiting count recovery    R hordeolum  6/2 Has R eye stye. Antibx ointment ordered. Can use warm compresses prn.     N/V/D  6/4 Con't antiemetics and anitdiarrheals prn  6/11 Nausea is controlled, increase lomotil to 2 tabs QID prn    Continue home meds  PPx Meds: Acyclovir, Diflucan, Augmentin, Levaquin  Vamshi SOPs  AC contraindicated d/t thrombocytopenia    Goals and plan of care reviewed with the patient. All questions answered to the best of our ability. Disposition:  Anticipate discharge home after engraftment.               BEE Lane  Hematology & Oncology  [address] [place] [address]  Office : [phone]  Fax : [phone]

## 2022-06-11 NOTE — PROGRESS NOTES
Diagnosis: Multiple Myeloma  Auto Transplant Date:  06/02/22  Day: (+/-) +9  Chemo regimen used: Melphalan  Labs not resulted that need follow-up: None. BMT assessments and toxicities completed: 06/11/22  Mouth Care completed 4 times this shift. WBC/ANC= 0.2/0.0     Prophylactic medications started on D+1 (06/03/22) PO Diflucan; Levaquin; Augmentin; (on Acyclovir PTA)  Toxicities greater than 2: None  Patient seen by MD/NP daily while inpatient & until engraftment. New orders received: None    Compliant w/ mouth care. Walked in the hallways throughout the day with mask on. Reports intermittent nausea, managed with Zofran IV x 1  Continues with neuropathic pain, medicated with Norco PO x 1 this shift. Medicated for diarrhea x 2 this shift (see eMAR). Afebrile.     Report to Chris Marr RN at the bedside.     Coreen Dewitt RN

## 2022-06-11 NOTE — PLAN OF CARE
Problem: Safety - Adult  Goal: Free from fall injury  Outcome: Progressing  Flowsheets (Taken 6/10/2022 1930)  Free From Fall Injury:   Instruct family/caregiver on patient safety   Based on caregiver fall risk screen, instruct family/caregiver to ask for assistance with transferring infant if caregiver noted to have fall risk factors     Problem: Pain  Goal: Verbalizes/displays adequate comfort level or baseline comfort level  Outcome: Progressing  Flowsheets  Taken 6/10/2022 2350  Verbalizes/displays adequate comfort level or baseline comfort level:   Encourage patient to monitor pain and request assistance   Assess pain using appropriate pain scale   Administer analgesics based on type and severity of pain and evaluate response   Implement non-pharmacological measures as appropriate and evaluate response  Taken 6/10/2022 2136  Verbalizes/displays adequate comfort level or baseline comfort level:   Encourage patient to monitor pain and request assistance   Assess pain using appropriate pain scale   Administer analgesics based on type and severity of pain and evaluate response   Implement non-pharmacological measures as appropriate and evaluate response   Consider cultural and social influences on pain and pain management  Taken 6/10/2022 1930  Verbalizes/displays adequate comfort level or baseline comfort level:   Encourage patient to monitor pain and request assistance   Assess pain using appropriate pain scale   Administer analgesics based on type and severity of pain and evaluate response     Problem: Gastrointestinal - Adult  Goal: Minimal or absence of nausea and vomiting  Outcome: Progressing  Flowsheets (Taken 6/10/2022 1930)  Minimal or absence of nausea and vomiting:   Administer IV fluids as ordered to ensure adequate hydration   Administer ordered antiemetic medications as needed   Provide nonpharmacologic comfort measures as appropriate   Advance diet as tolerated, if ordered  Goal: Maintains or returns to baseline bowel function  Outcome: Progressing  Flowsheets (Taken 6/10/2022 1930)  Maintains or returns to baseline bowel function:   Assess bowel function   Encourage oral fluids to ensure adequate hydration   Administer IV fluids as ordered to ensure adequate hydration   Administer ordered medications as needed   Encourage mobilization and activity  Goal: Maintains adequate nutritional intake  Outcome: Progressing  Flowsheets (Taken 6/10/2022 1930)  Maintains adequate nutritional intake:   Monitor percentage of each meal consumed   Monitor intake and output, weight and lab values     Problem: Chronic Conditions and Co-morbidities  Goal: Patient's chronic conditions and co-morbidity symptoms are monitored and maintained or improved  Recent Flowsheet Documentation  Taken 6/10/2022 1930 by Jaspreet Brown 34 - Patient's Chronic Conditions and Co-Morbidity Symptoms are Monitored and Maintained or Improved:   Monitor and assess patient's chronic conditions and comorbid symptoms for stability, deterioration, or improvement   Collaborate with multidisciplinary team to address chronic and comorbid conditions and prevent exacerbation or deterioration     Problem: ABCDS Injury Assessment  Goal: Absence of physical injury  Recent Flowsheet Documentation  Taken 6/10/2022 1930 by Segundo James RN  Absence of Physical Injury: Implement safety measures based on patient assessment

## 2022-06-11 NOTE — PLAN OF CARE
Problem: Safety - Adult  Goal: Free from fall injury  6/11/2022 1048 by Stan Ryan RN  Outcome: Progressing  Flowsheets  Taken 6/11/2022 1048 by tSan Ryan RN  Free From Fall Injury: Instruct family/caregiver on patient safety  Taken 6/11/2022 0745 by Stan Ryan RN  Free From Fall Injury: Instruct family/caregiver on patient safety  Taken 6/11/2022 0330 by Nelli Ventura RN  Free From Fall Injury:   Instruct family/caregiver on patient safety   Based on caregiver fall risk screen, instruct family/caregiver to ask for assistance with transferring infant if caregiver noted to have fall risk factors  6/11/2022 0129 by Nelli Ventura RN  Outcome: Progressing  [address] (Taken 6/10/2022 1930)  Free From Fall Injury:   Instruct family/caregiver on patient safety   Based on caregiver fall risk screen, instruct family/caregiver to ask for assistance with transferring infant if caregiver noted to have fall risk factors     Problem: Pain  Goal: Verbalizes/displays adequate comfort level or baseline comfort level  6/11/2022 1048 by Stan Ryan RN  Outcome: Progressing  Flowsheets (Taken 6/11/2022 0745)  Verbalizes/displays adequate comfort level or baseline comfort level:   Encourage patient to monitor pain and request assistance   Assess pain using appropriate pain scale   Administer analgesics based on type and severity of pain and evaluate response   Implement non-pharmacological measures as appropriate and evaluate response  6/11/2022 0129 by Nelli Ventura RN  Outcome: Progressing  Flowsheets  Taken 6/10/2022 2350  Verbalizes/displays adequate comfort level or baseline comfort level:   Encourage patient to monitor pain and request assistance   Assess pain using appropriate pain scale   Administer analgesics based on type and severity of pain and evaluate response   Implement non-pharmacological measures as appropriate and evaluate response  Taken 6/10/2022 2136  Verbalizes/displays adequate comfort level or baseline comfort level:   Encourage patient to monitor pain and request assistance   Assess pain using appropriate pain scale   Administer analgesics based on type and severity of pain and evaluate response   Implement non-pharmacological measures as appropriate and evaluate response   Consider cultural and social influences on pain and pain management  Taken 6/10/2022 1930  Verbalizes/displays adequate comfort level or baseline comfort level:   Encourage patient to monitor pain and request assistance   Assess pain using appropriate pain scale   Administer analgesics based on type and severity of pain and evaluate response     Problem: ABCDS Injury Assessment  Goal: Absence of physical injury  Outcome: Progressing  Flowsheets (Taken 6/11/2022 0745)  Absence of Physical Injury: Implement safety measures based on patient assessment     Problem: Neurosensory - Adult  Goal: Achieves maximal functionality and self care  Outcome: Progressing  Flowsheets (Taken 6/11/2022 0745)  Achieves maximal functionality and self care: Encourage and assist patient to increase activity and self care with guidance from physical therapy/occupational therapy     Problem: Gastrointestinal - Adult  Goal: Maintains adequate nutritional intake  6/11/2022 1048 by Kevin Valentin RN  Outcome: Progressing  Flowsheets (Taken 6/11/2022 0745)  Maintains adequate nutritional intake:   Monitor percentage of each meal consumed   Identify factors contributing to decreased intake, treat as appropriate   Monitor intake and output, weight and lab values  6/11/2022 0129 by Sen Nieto, RN  Outcome: Progressing  Flowsheets (Taken 6/10/2022 1930)  Maintains adequate nutritional intake:   Monitor percentage of each meal consumed   Monitor intake and output, weight and lab values

## 2022-06-11 NOTE — PROGRESS NOTES
End of Shift Summary: 7p ~ 7a    Diagnosis: Multiple Myeloma  Auto Transplant Date:  06/02/22  Day: (+/-) +9  Chemo regimen used: Melphalan  Labs not resulted that need follow-up: None at this time  BMT assessments and toxicities completed. 06/10/22  Mouth Care completed 2 times this shift. WBC/ANC= 0.2/0.0     Prophylactic medications started on D+1(06/03/22) PO Diflucan;Levaquin; Augmentin; (on Acyclovir PTA)  Toxicities greater than 2 : None  Patient seen by MD/NP daily while inpt & until engraftment. New orders received: No New Orders    24 I&O =  -[id number]  Compliant w/ mouth care. Walked in the hallways before bedtime. Reports intermittent nausea, managed with Zofran IV x 2  Continues with neuropathic pain, medicated with Norco PO x 2 this shift along with scheduled Neurontin, appears to be managing pain. Medicated for diarrhea x 2 with Lomotil this shift  Afebrile. Pt very anxious for counts to recover so she may go home and see her children, emotional support provided. Report to oncroberto RN at [facility].     Francis Sterling RN OCN

## 2022-06-12 LAB
ABO + RH BLD: NORMAL
ALBUMIN SERPL-MCNC: 3 G/DL (ref 3.5–5)
ALBUMIN/GLOB SERPL: 1.3 {RATIO} (ref 1.2–3.5)
ALP SERPL-CCNC: 60 U/L (ref 50–136)
ALT SERPL-CCNC: 9 U/L (ref 12–65)
ANION GAP SERPL CALC-SCNC: 8 MMOL/L (ref 7–16)
AST SERPL-CCNC: 7 U/L (ref 15–37)
BILIRUB SERPL-MCNC: 0.1 MG/DL (ref 0.2–1.1)
BLOOD GROUP ANTIBODIES SERPL: NORMAL
BUN SERPL-MCNC: 2 MG/DL (ref 6–23)
CALCIUM SERPL-MCNC: 9 MG/DL (ref 8.3–10.4)
CHLORIDE SERPL-SCNC: 106 MMOL/L (ref 98–107)
CO2 SERPL-SCNC: 28 MMOL/L (ref 21–32)
CREAT SERPL-MCNC: 0.5 MG/DL (ref 0.6–1)
DIFFERENTIAL METHOD BLD: ABNORMAL
DIFFERENTIAL METHOD BLD: ABNORMAL
ERYTHROCYTE [DISTWIDTH] IN BLOOD BY AUTOMATED COUNT: 16.5 % (ref 11.9–14.6)
ERYTHROCYTE [DISTWIDTH] IN BLOOD BY AUTOMATED COUNT: 16.6 % (ref 11.9–14.6)
GLOBULIN SER CALC-MCNC: 2.3 G/DL (ref 2.3–3.5)
GLUCOSE SERPL-MCNC: 107 MG/DL (ref 65–100)
HCT VFR BLD AUTO: 25.8 % (ref 35.8–46.3)
HCT VFR BLD AUTO: 27.2 % (ref 35.8–46.3)
HGB BLD-MCNC: 8.4 G/DL (ref 11.7–15.4)
HGB BLD-MCNC: 8.9 G/DL (ref 11.7–15.4)
MAGNESIUM SERPL-MCNC: 1.8 MG/DL (ref 1.8–2.4)
MCH RBC QN AUTO: 27.6 PG (ref 26.1–32.9)
MCH RBC QN AUTO: 27.8 PG (ref 26.1–32.9)
MCHC RBC AUTO-ENTMCNC: 32.6 G/DL (ref 31.4–35)
MCHC RBC AUTO-ENTMCNC: 32.7 G/DL (ref 31.4–35)
MCV RBC AUTO: 84.9 FL (ref 79.6–97.8)
MCV RBC AUTO: 85 FL (ref 79.6–97.8)
NRBC # BLD: 0 K/UL (ref 0–0.2)
NRBC # BLD: 0 K/UL (ref 0–0.2)
PLATELET # BLD AUTO: 18 K/UL (ref 150–450)
PLATELET # BLD AUTO: 43 K/UL (ref 150–450)
PLATELET # BLD AUTO: 8 K/UL (ref 150–450)
PLATELET COMMENT: ABNORMAL
PLATELET COMMENT: ABNORMAL
PMV BLD AUTO: 9 FL (ref 9.4–12.3)
PMV BLD AUTO: 9.6 FL (ref 9.4–12.3)
POTASSIUM SERPL-SCNC: 3.4 MMOL/L (ref 3.5–5.1)
PROT SERPL-MCNC: 5.3 G/DL (ref 6.3–8.2)
RBC # BLD AUTO: 3.04 M/UL (ref 4.05–5.2)
RBC # BLD AUTO: 3.2 M/UL (ref 4.05–5.2)
RBC MORPH BLD: ABNORMAL
RBC MORPH BLD: ABNORMAL
SODIUM SERPL-SCNC: 142 MMOL/L (ref 136–145)
SPECIMEN EXP DATE BLD: NORMAL
WBC # BLD AUTO: 0.2 K/UL (ref 4.3–11.1)
WBC # BLD AUTO: 0.3 K/UL (ref 4.3–11.1)
WBC MORPH BLD: ABNORMAL
WBC MORPH BLD: ABNORMAL

## 2022-06-12 PROCEDURE — 6360000002 HC RX W HCPCS: Performed by: NURSE PRACTITIONER

## 2022-06-12 PROCEDURE — 36592 COLLECT BLOOD FROM PICC: CPT

## 2022-06-12 PROCEDURE — 6370000000 HC RX 637 (ALT 250 FOR IP): Performed by: INTERNAL MEDICINE

## 2022-06-12 PROCEDURE — 36430 TRANSFUSION BLD/BLD COMPNT: CPT

## 2022-06-12 PROCEDURE — P9037 PLATE PHERES LEUKOREDU IRRAD: HCPCS

## 2022-06-12 PROCEDURE — 86644 CMV ANTIBODY: CPT

## 2022-06-12 PROCEDURE — 2580000003 HC RX 258: Performed by: NURSE PRACTITIONER

## 2022-06-12 PROCEDURE — 86901 BLOOD TYPING SEROLOGIC RH(D): CPT

## 2022-06-12 PROCEDURE — 6370000000 HC RX 637 (ALT 250 FOR IP): Performed by: NURSE PRACTITIONER

## 2022-06-12 PROCEDURE — 30233R1 TRANSFUSION OF NONAUTOLOGOUS PLATELETS INTO PERIPHERAL VEIN, PERCUTANEOUS APPROACH: ICD-10-PCS | Performed by: INTERNAL MEDICINE

## 2022-06-12 PROCEDURE — 85049 AUTOMATED PLATELET COUNT: CPT

## 2022-06-12 PROCEDURE — 83735 ASSAY OF MAGNESIUM: CPT

## 2022-06-12 PROCEDURE — 3E03305 INTRODUCTION OF OTHER ANTINEOPLASTIC INTO PERIPHERAL VEIN, PERCUTANEOUS APPROACH: ICD-10-PCS | Performed by: INTERNAL MEDICINE

## 2022-06-12 PROCEDURE — 99232 SBSQ HOSP IP/OBS MODERATE 35: CPT | Performed by: INTERNAL MEDICINE

## 2022-06-12 PROCEDURE — 1170000000 HC RM PRIVATE ONCOLOGY

## 2022-06-12 PROCEDURE — 85025 COMPLETE CBC W/AUTO DIFF WBC: CPT

## 2022-06-12 PROCEDURE — 80053 COMPREHEN METABOLIC PANEL: CPT

## 2022-06-12 RX ORDER — ACETAMINOPHEN 325 MG/1
650 TABLET ORAL EVERY 4 HOURS PRN
Status: DISCONTINUED | OUTPATIENT
Start: 2022-06-12 | End: 2022-06-14 | Stop reason: HOSPADM

## 2022-06-12 RX ORDER — DIPHENHYDRAMINE HCL 25 MG
25 CAPSULE ORAL EVERY 6 HOURS PRN
Status: DISCONTINUED | OUTPATIENT
Start: 2022-06-12 | End: 2022-06-14 | Stop reason: HOSPADM

## 2022-06-12 RX ORDER — SODIUM CHLORIDE 9 MG/ML
INJECTION, SOLUTION INTRAVENOUS PRN
Status: DISCONTINUED | OUTPATIENT
Start: 2022-06-12 | End: 2022-06-14 | Stop reason: HOSPADM

## 2022-06-12 RX ADMIN — ACETAMINOPHEN 650 MG: 325 TABLET ORAL at 13:14

## 2022-06-12 RX ADMIN — GABAPENTIN 600 MG: 300 CAPSULE ORAL at 13:14

## 2022-06-12 RX ADMIN — CETIRIZINE HYDROCHLORIDE 5 MG: 5 TABLET ORAL at 20:52

## 2022-06-12 RX ADMIN — AMOXICILLIN AND CLAVULANATE POTASSIUM 1 TABLET: 875; 125 TABLET, FILM COATED ORAL at 08:31

## 2022-06-12 RX ADMIN — FILGRASTIM-AAFI 300 MCG: 300 INJECTION, SOLUTION SUBCUTANEOUS at 08:31

## 2022-06-12 RX ADMIN — DIPHENOXYLATE HYDROCHLORIDE AND ATROPINE SULFATE 2 TABLET: 2.5; .025 TABLET ORAL at 08:31

## 2022-06-12 RX ADMIN — ACYCLOVIR 400 MG: 800 TABLET ORAL at 08:32

## 2022-06-12 RX ADMIN — GABAPENTIN 600 MG: 300 CAPSULE ORAL at 08:31

## 2022-06-12 RX ADMIN — HYDROCODONE BITARTRATE AND ACETAMINOPHEN 1 TABLET: 7.5; 325 TABLET ORAL at 08:32

## 2022-06-12 RX ADMIN — SODIUM CHLORIDE: 900 INJECTION, SOLUTION INTRAVENOUS at 01:14

## 2022-06-12 RX ADMIN — POTASSIUM CHLORIDE 20 MEQ: 20 TABLET, EXTENDED RELEASE ORAL at 08:31

## 2022-06-12 RX ADMIN — GABAPENTIN 600 MG: 300 CAPSULE ORAL at 20:52

## 2022-06-12 RX ADMIN — LEVOFLOXACIN 500 MG: 500 TABLET, FILM COATED ORAL at 08:31

## 2022-06-12 RX ADMIN — ACYCLOVIR 400 MG: 800 TABLET ORAL at 20:51

## 2022-06-12 RX ADMIN — NEOMYCIN SULFATE, POLYMYXIN B SULFATE AND BACITRACIN ZINC: 3.5; 10000; 4 OINTMENT OPHTHALMIC at 14:47

## 2022-06-12 RX ADMIN — WITCH HAZEL: 500 SOLUTION RECTAL; TOPICAL at 01:12

## 2022-06-12 RX ADMIN — HYDROCODONE BITARTRATE AND ACETAMINOPHEN 1 TABLET: 7.5; 325 TABLET ORAL at 03:00

## 2022-06-12 RX ADMIN — PROCHLORPERAZINE EDISYLATE 5 MG: 5 INJECTION INTRAMUSCULAR; INTRAVENOUS at 03:00

## 2022-06-12 RX ADMIN — CETIRIZINE HYDROCHLORIDE 5 MG: 5 TABLET ORAL at 20:56

## 2022-06-12 RX ADMIN — DULOXETINE HYDROCHLORIDE 30 MG: 30 CAPSULE, DELAYED RELEASE ORAL at 08:32

## 2022-06-12 RX ADMIN — SODIUM CHLORIDE: 900 INJECTION, SOLUTION INTRAVENOUS at 17:42

## 2022-06-12 RX ADMIN — FLUCONAZOLE 200 MG: 100 TABLET ORAL at 08:32

## 2022-06-12 RX ADMIN — HYDROCODONE BITARTRATE AND ACETAMINOPHEN 1 TABLET: 7.5; 325 TABLET ORAL at 19:16

## 2022-06-12 RX ADMIN — POTASSIUM CHLORIDE 20 MEQ: 20 TABLET, EXTENDED RELEASE ORAL at 17:42

## 2022-06-12 RX ADMIN — AMOXICILLIN AND CLAVULANATE POTASSIUM 1 TABLET: 875; 125 TABLET, FILM COATED ORAL at 20:52

## 2022-06-12 RX ADMIN — NEOMYCIN SULFATE, POLYMYXIN B SULFATE AND BACITRACIN ZINC: 3.5; 10000; 4 OINTMENT OPHTHALMIC at 20:52

## 2022-06-12 RX ADMIN — DIPHENHYDRAMINE HYDROCHLORIDE 25 MG: 25 CAPSULE ORAL at 13:14

## 2022-06-12 ASSESSMENT — PAIN DESCRIPTION - ORIENTATION
ORIENTATION: RIGHT;LEFT
ORIENTATION: OTHER (COMMENT)
ORIENTATION: RIGHT;LEFT

## 2022-06-12 ASSESSMENT — PAIN DESCRIPTION - PAIN TYPE: TYPE: NEUROPATHIC PAIN

## 2022-06-12 ASSESSMENT — PAIN SCALES - GENERAL
PAINLEVEL_OUTOF10: 6
PAINLEVEL_OUTOF10: 7
PAINLEVEL_OUTOF10: 3
PAINLEVEL_OUTOF10: 5
PAINLEVEL_OUTOF10: 7
PAINLEVEL_OUTOF10: 0

## 2022-06-12 ASSESSMENT — PAIN DESCRIPTION - DESCRIPTORS
DESCRIPTORS: ACHING;BURNING;NUMBNESS;TINGLING
DESCRIPTORS: ACHING

## 2022-06-12 ASSESSMENT — PAIN - FUNCTIONAL ASSESSMENT
PAIN_FUNCTIONAL_ASSESSMENT: ACTIVITIES ARE NOT PREVENTED
PAIN_FUNCTIONAL_ASSESSMENT: PREVENTS OR INTERFERES SOME ACTIVE ACTIVITIES AND ADLS

## 2022-06-12 ASSESSMENT — PAIN DESCRIPTION - LOCATION
LOCATION: FOOT
LOCATION: OTHER (COMMENT)
LOCATION: FOOT

## 2022-06-12 ASSESSMENT — PAIN DESCRIPTION - ONSET: ONSET: ON-GOING

## 2022-06-12 ASSESSMENT — PAIN DESCRIPTION - FREQUENCY: FREQUENCY: INTERMITTENT

## 2022-06-12 NOTE — PROGRESS NOTES
End of Shift Summary: 7p ~ 7a     Diagnosis: Multiple Myeloma  Auto Transplant Date:  06/02/22  Day: (+/-) +10  Chemo regimen used: Melphalan  Labs not resulted that need follow-up: None at this time  BMT assessments and toxicities completed. 06/11/22  Mouth Care completed 2 times this shift. WBC/ANC= 0.3/0.0     Prophylactic medications started on D+1(06/03/22) PO Diflucan;Levaquin; Augmentin; (on Acyclovir PTA)  Toxicities greater than 2 : None  Patient seen by MD/NP daily while inpt & until engraftment. New orders received: Transfuse 1 unit platelets per BMT protocol     24 I&O = -315cc  Compliant w/ mouth care. Walked in the hallways before bedtime. Reports intermittent nausea, managed with Compazine x 1  Continues with neuropathic pain, medicated with Norco PO x 2 this shift along with scheduled Neurontin, appears to be managing pain. Medicated for diarrhea x 1 with Lomotil this shift. c/o rectal irritation 2ndry to diarrhea, Tucks pads ordered per protocol. Afebrile. Pt anxious for counts to recover so she may go home and see her children, emotional support provided.   Report to oncroberto RN at [address] [facility] RN OCN

## 2022-06-12 NOTE — PLAN OF CARE
Problem: Safety - Adult  Goal: Free from fall injury  Outcome: Progressing  Flowsheets  Taken 6/12/2022 0300  Free From Fall Injury: Instruct family/caregiver on patient safety  Taken 6/11/2022 2000  Free From Fall Injury: Instruct family/caregiver on patient safety     Problem: Pain  Goal: Verbalizes/displays adequate comfort level or baseline comfort level  Outcome: Progressing     Problem: ABCDS Injury Assessment  Goal: Absence of physical injury  Outcome: Progressing  Flowsheets  Taken 6/12/2022 0300  Absence of Physical Injury: Implement safety measures based on patient assessment  Taken 6/11/2022 2000  Absence of Physical Injury: Implement safety measures based on patient assessment     Problem: Neurosensory - Adult  Goal: Achieves maximal functionality and self care  Outcome: Progressing  Flowsheets (Taken 6/11/2022 2000)  Achieves maximal functionality and self care: Encourage and assist patient to increase activity and self care with guidance from physical therapy/occupational therapy     Problem: Gastrointestinal - Adult  Goal: Minimal or absence of nausea and vomiting  Outcome: Progressing  Flowsheets (Taken 6/11/2022 2000)  Minimal or absence of nausea and vomiting:   Administer IV fluids as ordered to ensure adequate hydration   Administer ordered antiemetic medications as needed   Provide nonpharmacologic comfort measures as appropriate  Goal: Maintains or returns to baseline bowel function  Outcome: Progressing  Flowsheets (Taken 6/11/2022 2000)  Maintains or returns to baseline bowel function:   Assess bowel function   Encourage oral fluids to ensure adequate hydration   Administer IV fluids as ordered to ensure adequate hydration   Encourage mobilization and activity  Goal: Maintains adequate nutritional intake  Outcome: Progressing  Flowsheets (Taken 6/11/2022 2000)  Maintains adequate nutritional intake:   Monitor percentage of each meal consumed   Monitor intake and output, weight and lab values     Problem: Chronic Conditions and Co-morbidities  Goal: Patient's chronic conditions and co-morbidity symptoms are monitored and maintained or improved  Recent Flowsheet Documentation  Taken 6/11/2022 2000 by Efraín Marques RN  Care Plan - Patient's Chronic Conditions and Co-Morbidity Symptoms are Monitored and Maintained or Improved:   Monitor and assess patient's chronic conditions and comorbid symptoms for stability, deterioration, or improvement   Collaborate with multidisciplinary team to address chronic and comorbid conditions and prevent exacerbation or deterioration

## 2022-06-12 NOTE — PROGRESS NOTES
[facility] Hematology & Oncology        Inpatient Hematology / Oncology Progress Note    Reason for Admission:  Stem cell transplant candidate [Z76.82]    24 Hour Events:  Afebrile, VSS  Day +10 Auto PBSCT  WBC 0.3-achy today, on daily G-CSF  Continue to walk the halls, but more tired today   Nausea/Diarrhea controlled     Transfusions: 1 unit platelets   Replacements: None    ROS:  Constitutional: +fatigue. Negative for fever, chills. CV: +edema. Negative for chest pain, palpitations. Respiratory: Negative for dyspnea, cough, wheezing. GI: +N/D. Negative for abdominal pain. 10 point review of systems is otherwise negative with the exception of the elements mentioned above in the HPI. Allergies   Allergen Reactions    Amoxicillin-Pot Clavulanate Other (See Comments)     Yeast infection  Yeast infection     Past Medical History:   Diagnosis Date    Obesity     Prolactin increased      Past Surgical History:   Procedure Laterality Date    IR BIOPSY PERC SUPERF BONE      IR TUNNELED CATHETER PLACEMENT GREATER THAN 5 YEARS  5/9/2022    IR TUNNELED CATHETER PLACEMENT GREATER THAN 5 YEARS  5/9/2022    IR TUNNELED CATHETER PLACEMENT GREATER THAN 5 YEARS 5/9/2022 [facility]OLOG[facility] SPECIALS     No family history on file.   Social History     Socioeconomic History    Marital status:      Spouse name: Not on file    Number of children: Not on file    Years of education: Not on file    Highest education level: Not on file   Occupational History    Not on file   Tobacco Use    Smoking status: Never Smoker    Smokeless tobacco: Never Used   Substance and Sexual Activity    Alcohol use: Never    Drug use: Never    Sexual activity: Not on file   Other Topics Concern    Not on file   Social History Narrative    Not on file     Social Determinants of Health     Financial Resource Strain:     Difficulty of Paying Living Expenses: Not on file   Food Insecurity:     Worried About Running Out of  Nat Zamarripa MD   20 mEq at 06/12/22 0831    hydrALAZINE (APRESOLINE) injection 10 mg  10 mg IntraVENous Q6H PRN Rosevelt Dach, APRN - CNP        loperamide (IMODIUM) capsule 2 mg  2 mg Oral 4x Daily PRN Rosevelt Dach, APRN - CNP   2 mg at 06/11/22 0802    amoxicillin-clavulanate (AUGMENTIN) 875-125 MG per tablet 1 tablet  1 tablet Oral 2 times per day Rosevelt Dach, APRN - CNP   1 tablet at 06/12/22 0831    ondansetron (ZOFRAN) injection 4 mg  4 mg IntraVENous Q4H PRN Rosevelt Dach, APRN - CNP   4 mg at 06/11/22 1702    prochlorperazine (COMPAZINE) injection 5 mg  5 mg IntraVENous Q6H PRN Rosevelt Dach, APRN - CNP   5 mg at 06/12/22 0300    morphine injection 2 mg  2 mg IntraVENous Q4H PRN Rosevelt Dach, APRN - CNP        filgrastim-aafi (NIVESTYM) injection 300 mcg  300 mcg SubCUTAneous Daily Rosevelt Dach, APRN - CNP   300 mcg at 06/12/22 0831    meperidine (DEMEROL) injection 12.5 mg  12.5 mg IntraVENous PRN Nat Zamarripa MD        EPINEPHrine PF 1 MG/ML injection 0.3 mg  0.3 mg IntraMUSCular PRN Nat Zamarripa MD        neomycin-bacitracin-polymyxin (NEOSPORIN) ophthalmic ointment   Right Eye TID Rosevelt Dach, APRN - CNP   Given at 06/11/22 2014    DULoxetine (CYMBALTA) extended release capsule 30 mg  30 mg Oral Daily Gus Bers, APRN - NP   30 mg at 06/12/22 0832    HYDROcodone-acetaminophen (NORCO) 5-325 MG per tablet 1 tablet  1 tablet Oral Q4H PRN Gus Bers, APRN - NP   1 tablet at 06/11/22 0330    HYDROcodone-acetaminophen (Itzel Ankit) 7.5-325 MG per tablet 1 tablet  1 tablet Oral Q6H PRN Gus Bers, APRN - NP   1 tablet at 06/12/22 0832    polyethylene glycol (GLYCOLAX) packet 17 g  17 g Oral Daily PRN Gus Bers, APRN - NP        0.9 % sodium chloride infusion   IntraVENous Continuous Gus Bers, APRN - NP 75 mL/hr at 06/12/22 0114 New Bag at 06/12/22 0114    acyclovir (ZOVIRAX) tablet 400 mg  400 mg Oral BID Gus Lius, APRN - NP   400 mg at 06/12/22 0832    levoFLOXacin (LEVAQUIN) tablet 500 mg  500 mg 11.9 - 14.6 %    Platelets 8 (LL) 659 - 450 K/uL    MPV 9.6 9.4 - 12.3 FL    nRBC 0.00 0.0 - 0.2 K/uL    RBC Comment OCCASIONAL  ANISOCYTOSIS + POIKILOCYTOSIS        WBC Comment WBC TOO FEW TO DIFFERENTIATE      Platelet Comment MARKED      Differential Type AUTOMATED     Magnesium    Collection Time: 06/12/22  3:09 AM   Result Value Ref Range    Magnesium 1.8 1.8 - 2.4 mg/dL   Comprehensive Metabolic Panel    Collection Time: 06/12/22  3:09 AM   Result Value Ref Range    Sodium 142 136 - 145 mmol/L    Potassium 3.4 (L) 3.5 - 5.1 mmol/L    Chloride 106 98 - 107 mmol/L    CO2 28 21 - 32 mmol/L    Anion Gap 8 7 - 16 mmol/L    Glucose 107 (H) 65 - 100 mg/dL    BUN 2 (L) 6 - 23 MG/DL    CREATININE 0.50 (L) 0.6 - 1.0 MG/DL    GFR African American >60 >60 ml/min/1.73m2    GFR Non- >60 >60 ml/min/1.73m2    Calcium 9.0 8.3 - 10.4 MG/DL    Total Bilirubin 0.1 (L) 0.2 - 1.1 MG/DL    ALT 9 (L) 12 - 65 U/L    AST 7 (L) 15 - 37 U/L    Alk Phosphatase 60 50 - 136 U/L    Total Protein 5.3 (L) 6.3 - 8.2 g/dL    Albumin 3.0 (L) 3.5 - 5.0 g/dL    Globulin 2.3 2.3 - 3.5 g/dL    Albumin/Globulin Ratio 1.3 1.2 - 3.5         Imaging:  n/a    ASSESSMENT:  Patient Active Problem List   Diagnosis    Bone marrow transplant candidate    Multiple myeloma not having achieved remission ([address]    Stem cell transplant candidate    Immunocompromised ([address]    Bone marrow transplant status ([address]    Admission for antineoplastic chemotherapy    Loose stools     Ms. Kathy Lockett is a 54 y.o. female admitted on 5/31/2022 with a primary diagnosis of There were no encounter diagnoses. .       53-year-old -American female history of prolactinemia, thyroid Hurthle cell neoplasm, lambda light chain multiple myeloma, ISS stage I, high risk disease (gain of 1 q.), bortezomib related neuropathy, iron deficiency in CR 1 after daratumumab based regimen. Patient seen in oncology office 5/31/2022.  Patient collected CD34 at 6.24 mil/kg w brandy alone. Reports feeling reasonably. Extensive ROS unremarkable. PLAN:  Multiple myeloma  - Okay to proceed with melphalan 200 mg per metered squared followed by stem cell reinfusion following day (CD34 3.12 mil/kg back). - Hydration and IV electrolyte replacement as needed. - Antiemetic as well as antidiarrheal support as needed. - Blood transfusion support transfusion as needed. - Prophylactic antibiotics with Levaquin, Augmentin, acyclovir and Diflucan starting day +1.    - G-CSF support with Nivestym daily starting day +6.    6/1 Day -1. Melphalan today, stem cells tomorrow. 6/2 Day 0. Stem cells today. 6/3 Day +1. Ppx antibx start today. Wt up 5# with +I/Os. Lasix x 1 ordered. 6/4 Day +2. C/o fatigue, vomiting, and diarrhea. Diuresed well. 6/5 Day +3. Feeling better today. 6/8 D+6. Awaiting count recovery. Complains of fatigue and anxiety. G-CSF starts today. 6/9 D+7. Awaiting count recovery. Continue G-CSF. Feeling well, no complaints. 6/11 D+9 and doing well. WBC 0.2, Hgb 8.9, Plt 18K, continue G-CSF, awaiting count recovery  6/12 D+10, WBC 0.3, Hgb 8.4, Plt 8K- transfuse 1 unit platelets, continue G-CSF    R hordeolum  6/2 Has R eye stye. Antibx ointment ordered. Can use warm compresses prn. N/V/D  6/4 Con't antiemetics and anitdiarrheals prn  6/11 Nausea is controlled, increase lomotil to 2 tabs QID prn  6/12 Nausea/Diarrhea are well controlled    Continue home meds  PPx Meds: Acyclovir, Diflucan, Augmentin, Levaquin  Vamshi SOPs  AC contraindicated d/t thrombocytopenia    Goals and plan of care reviewed with the patient. All questions answered to the best of our ability. Disposition:  Anticipate discharge home after engraftment.               BEE Mobley [address] Hematology & Oncology  [address] [facility]  Office : [phone]  Fax : [phone]

## 2022-06-13 LAB
ALBUMIN SERPL-MCNC: 3.2 G/DL (ref 3.5–5)
ALBUMIN/GLOB SERPL: 1.3 {RATIO} (ref 1.2–3.5)
ALP SERPL-CCNC: 65 U/L (ref 50–136)
ALT SERPL-CCNC: 11 U/L (ref 12–65)
ANION GAP SERPL CALC-SCNC: 6 MMOL/L (ref 7–16)
AST SERPL-CCNC: 9 U/L (ref 15–37)
BILIRUB SERPL-MCNC: 0.2 MG/DL (ref 0.2–1.1)
BLD PROD TYP BPU: NORMAL
BLOOD BANK DISPENSE STATUS: NORMAL
BPU ID: NORMAL
BUN SERPL-MCNC: 2 MG/DL (ref 6–23)
CALCIUM SERPL-MCNC: 9.1 MG/DL (ref 8.3–10.4)
CHLORIDE SERPL-SCNC: 107 MMOL/L (ref 98–107)
CO2 SERPL-SCNC: 29 MMOL/L (ref 21–32)
CREAT SERPL-MCNC: 0.5 MG/DL (ref 0.6–1)
DIFFERENTIAL METHOD BLD: ABNORMAL
ERYTHROCYTE [DISTWIDTH] IN BLOOD BY AUTOMATED COUNT: 16.3 % (ref 11.9–14.6)
GGT SERPL-CCNC: 48 U/L (ref 5–55)
GLOBULIN SER CALC-MCNC: 2.4 G/DL (ref 2.3–3.5)
GLUCOSE SERPL-MCNC: 91 MG/DL (ref 65–100)
HCT VFR BLD AUTO: 25.3 % (ref 35.8–46.3)
HGB BLD-MCNC: 8.4 G/DL (ref 11.7–15.4)
LDH SERPL L TO P-CCNC: 158 U/L (ref 100–190)
MAGNESIUM SERPL-MCNC: 1.9 MG/DL (ref 1.8–2.4)
MCH RBC QN AUTO: 28 PG (ref 26.1–32.9)
MCHC RBC AUTO-ENTMCNC: 33.2 G/DL (ref 31.4–35)
MCV RBC AUTO: 84.3 FL (ref 79.6–97.8)
NRBC # BLD: 0.03 K/UL (ref 0–0.2)
PHOSPHATE SERPL-MCNC: 3.1 MG/DL (ref 2.5–4.5)
PLATELET # BLD AUTO: 38 K/UL (ref 150–450)
PLATELET COMMENT: ABNORMAL
PMV BLD AUTO: 11.2 FL (ref 9.4–12.3)
POTASSIUM SERPL-SCNC: 3.7 MMOL/L (ref 3.5–5.1)
PROT SERPL-MCNC: 5.6 G/DL (ref 6.3–8.2)
RBC # BLD AUTO: 3 M/UL (ref 4.05–5.2)
RBC MORPH BLD: ABNORMAL
RBC MORPH BLD: ABNORMAL
SODIUM SERPL-SCNC: 142 MMOL/L (ref 136–145)
UNIT DIVISION: 0
WBC # BLD AUTO: 0.5 K/UL (ref 4.3–11.1)
WBC MORPH BLD: ABNORMAL

## 2022-06-13 PROCEDURE — 83615 LACTATE (LD) (LDH) ENZYME: CPT

## 2022-06-13 PROCEDURE — 6360000002 HC RX W HCPCS: Performed by: NURSE PRACTITIONER

## 2022-06-13 PROCEDURE — 85025 COMPLETE CBC W/AUTO DIFF WBC: CPT

## 2022-06-13 PROCEDURE — 2580000003 HC RX 258: Performed by: NURSE PRACTITIONER

## 2022-06-13 PROCEDURE — 1170000000 HC RM PRIVATE ONCOLOGY

## 2022-06-13 PROCEDURE — 6370000000 HC RX 637 (ALT 250 FOR IP): Performed by: INTERNAL MEDICINE

## 2022-06-13 PROCEDURE — 99233 SBSQ HOSP IP/OBS HIGH 50: CPT | Performed by: INTERNAL MEDICINE

## 2022-06-13 PROCEDURE — 6370000000 HC RX 637 (ALT 250 FOR IP): Performed by: NURSE PRACTITIONER

## 2022-06-13 PROCEDURE — 83735 ASSAY OF MAGNESIUM: CPT

## 2022-06-13 PROCEDURE — 82977 ASSAY OF GGT: CPT

## 2022-06-13 PROCEDURE — 80053 COMPREHEN METABOLIC PANEL: CPT

## 2022-06-13 PROCEDURE — 36592 COLLECT BLOOD FROM PICC: CPT

## 2022-06-13 PROCEDURE — 84100 ASSAY OF PHOSPHORUS: CPT

## 2022-06-13 RX ORDER — FUROSEMIDE 40 MG/1
20 TABLET ORAL
Status: COMPLETED | OUTPATIENT
Start: 2022-06-13 | End: 2022-06-13

## 2022-06-13 RX ADMIN — LEVOFLOXACIN 500 MG: 500 TABLET, FILM COATED ORAL at 07:55

## 2022-06-13 RX ADMIN — GABAPENTIN 600 MG: 300 CAPSULE ORAL at 14:19

## 2022-06-13 RX ADMIN — DIPHENOXYLATE HYDROCHLORIDE AND ATROPINE SULFATE 2 TABLET: 2.5; .025 TABLET ORAL at 08:08

## 2022-06-13 RX ADMIN — HYDROCODONE BITARTRATE AND ACETAMINOPHEN 1 TABLET: 7.5; 325 TABLET ORAL at 01:30

## 2022-06-13 RX ADMIN — AMOXICILLIN AND CLAVULANATE POTASSIUM 1 TABLET: 875; 125 TABLET, FILM COATED ORAL at 07:55

## 2022-06-13 RX ADMIN — FUROSEMIDE 20 MG: 40 TABLET ORAL at 10:16

## 2022-06-13 RX ADMIN — FLUCONAZOLE 200 MG: 100 TABLET ORAL at 07:55

## 2022-06-13 RX ADMIN — DULOXETINE HYDROCHLORIDE 30 MG: 30 CAPSULE, DELAYED RELEASE ORAL at 07:55

## 2022-06-13 RX ADMIN — NEOMYCIN SULFATE, POLYMYXIN B SULFATE AND BACITRACIN ZINC: 3.5; 10000; 4 OINTMENT OPHTHALMIC at 20:46

## 2022-06-13 RX ADMIN — POTASSIUM CHLORIDE 20 MEQ: 20 TABLET, EXTENDED RELEASE ORAL at 17:08

## 2022-06-13 RX ADMIN — ACYCLOVIR 400 MG: 800 TABLET ORAL at 20:45

## 2022-06-13 RX ADMIN — HYDROCODONE BITARTRATE AND ACETAMINOPHEN 1 TABLET: 7.5; 325 TABLET ORAL at 20:45

## 2022-06-13 RX ADMIN — GABAPENTIN 600 MG: 300 CAPSULE ORAL at 20:45

## 2022-06-13 RX ADMIN — DIPHENOXYLATE HYDROCHLORIDE AND ATROPINE SULFATE 2 TABLET: 2.5; .025 TABLET ORAL at 14:18

## 2022-06-13 RX ADMIN — HYDROCODONE BITARTRATE AND ACETAMINOPHEN 1 TABLET: 7.5; 325 TABLET ORAL at 07:35

## 2022-06-13 RX ADMIN — LOPERAMIDE HYDROCHLORIDE 2 MG: 2 CAPSULE ORAL at 11:19

## 2022-06-13 RX ADMIN — NEOMYCIN SULFATE, POLYMYXIN B SULFATE AND BACITRACIN ZINC: 3.5; 10000; 4 OINTMENT OPHTHALMIC at 07:58

## 2022-06-13 RX ADMIN — POTASSIUM CHLORIDE 20 MEQ: 20 TABLET, EXTENDED RELEASE ORAL at 07:55

## 2022-06-13 RX ADMIN — AMOXICILLIN AND CLAVULANATE POTASSIUM 1 TABLET: 875; 125 TABLET, FILM COATED ORAL at 20:45

## 2022-06-13 RX ADMIN — LOPERAMIDE HYDROCHLORIDE 2 MG: 2 CAPSULE ORAL at 17:08

## 2022-06-13 RX ADMIN — GABAPENTIN 600 MG: 300 CAPSULE ORAL at 07:55

## 2022-06-13 RX ADMIN — PROCHLORPERAZINE EDISYLATE 5 MG: 5 INJECTION INTRAMUSCULAR; INTRAVENOUS at 01:30

## 2022-06-13 RX ADMIN — CETIRIZINE HYDROCHLORIDE 10 MG: 5 TABLET ORAL at 20:45

## 2022-06-13 RX ADMIN — FILGRASTIM-AAFI 300 MCG: 300 INJECTION, SOLUTION SUBCUTANEOUS at 07:55

## 2022-06-13 RX ADMIN — NEOMYCIN SULFATE, POLYMYXIN B SULFATE AND BACITRACIN ZINC: 3.5; 10000; 4 OINTMENT OPHTHALMIC at 17:09

## 2022-06-13 RX ADMIN — HYDROCODONE BITARTRATE AND ACETAMINOPHEN 1 TABLET: 7.5; 325 TABLET ORAL at 14:19

## 2022-06-13 RX ADMIN — SODIUM CHLORIDE: 900 INJECTION, SOLUTION INTRAVENOUS at 06:26

## 2022-06-13 RX ADMIN — ACYCLOVIR 400 MG: 800 TABLET ORAL at 07:55

## 2022-06-13 ASSESSMENT — PAIN DESCRIPTION - LOCATION
LOCATION: FOOT
LOCATION: FOOT;HAND

## 2022-06-13 ASSESSMENT — PAIN - FUNCTIONAL ASSESSMENT
PAIN_FUNCTIONAL_ASSESSMENT: PREVENTS OR INTERFERES SOME ACTIVE ACTIVITIES AND ADLS

## 2022-06-13 ASSESSMENT — PAIN DESCRIPTION - FREQUENCY
FREQUENCY: CONTINUOUS

## 2022-06-13 ASSESSMENT — PAIN SCALES - GENERAL
PAINLEVEL_OUTOF10: 3
PAINLEVEL_OUTOF10: 0
PAINLEVEL_OUTOF10: 8
PAINLEVEL_OUTOF10: 7
PAINLEVEL_OUTOF10: 7
PAINLEVEL_OUTOF10: 0
PAINLEVEL_OUTOF10: 7
PAINLEVEL_OUTOF10: 2

## 2022-06-13 ASSESSMENT — PAIN DESCRIPTION - ORIENTATION
ORIENTATION: RIGHT;LEFT

## 2022-06-13 ASSESSMENT — PAIN DESCRIPTION - DESCRIPTORS
DESCRIPTORS: ACHING;NUMBNESS;THROBBING;TINGLING
DESCRIPTORS: BURNING;NUMBNESS;TINGLING
DESCRIPTORS: ACHING;BURNING;NUMBNESS;TINGLING
DESCRIPTORS: BURNING;NUMBNESS;TINGLING

## 2022-06-13 ASSESSMENT — PAIN DESCRIPTION - ONSET
ONSET: ON-GOING

## 2022-06-13 ASSESSMENT — PAIN DESCRIPTION - PAIN TYPE
TYPE: CHRONIC PAIN;NEUROPATHIC PAIN
TYPE: NEUROPATHIC PAIN
TYPE: ACUTE PAIN;NEUROPATHIC PAIN
TYPE: CHRONIC PAIN;NEUROPATHIC PAIN

## 2022-06-13 NOTE — PROGRESS NOTES
Chart screened by  for discharge planning. No needs identified at this time. D+11.   Awaiting count recovery    Discharge plan is home with family assistance    Will continue to follow for discharge planning needs  Please consult  if any new issues arise

## 2022-06-13 NOTE — PROGRESS NOTES
[facility] Hematology & Oncology        Inpatient Hematology / Oncology Progress Note    Reason for Admission:  Stem cell transplant candidate [Z76.82]    24 Hour Events:  Afebrile, VSS  Day +11 Auto PBSCT  WBC up to 500  Awaiting count recovery, on daily G-CSF  Wt up 2#    Transfusions: None  Replacements: None    ROS:  Constitutional: +fatigue. Negative for fever, chills. CV: +edema. Negative for chest pain, palpitations. Respiratory: Negative for dyspnea, cough, wheezing. GI: +diarrhea (improved). Negative for nausea, abdominal pain. 10 point review of systems is otherwise negative with the exception of the elements mentioned above in the HPI. Allergies   Allergen Reactions    Amoxicillin-Pot Clavulanate Other (See Comments)     Yeast infection  Yeast infection     Past Medical History:   Diagnosis Date    Obesity     Prolactin increased      Past Surgical History:   Procedure Laterality Date    IR BIOPSY PERC SUPERF BONE      IR TUNNELED CATHETER PLACEMENT GREATER THAN 5 YEARS  5/9/2022    IR TUNNELED CATHETER PLACEMENT GREATER THAN 5 YEARS  5/9/2022    IR TUNNELED CATHETER PLACEMENT GREATER THAN 5 YEARS 5/9/2022 [facility] RADIOLOGY SPECIALS     No family history on file.   Social History     Socioeconomic History    Marital status:      Spouse name: Not on file    Number of children: Not on file    Years of education: Not on file    Highest education level: Not on file   Occupational History    Not on file   Tobacco Use    Smoking status: Never Smoker    Smokeless tobacco: Never Used   Substance and Sexual Activity    Alcohol use: Never    Drug use: Never    Sexual activity: Not on file   Other Topics Concern    Not on file   Social History Narrative    Not on file     Social Determinants of Health     Financial Resource Strain:     Difficulty of Paying Living Expenses: Not on file   Food Insecurity:     Worried About Running Out of Food in the Last Year: Not on file   [facility] Ran Out of Food in the Last Year: Not on file   Transportation Needs:     Lack of Transportation (Medical): Not on file    Lack of Transportation (Non-Medical):  Not on file   Physical Activity:     Days of Exercise per Week: Not on file    Minutes of Exercise per Session: Not on file   Stress:     Feeling of Stress : Not on file   Social Connections:     Frequency of Communication with Friends and Family: Not on file    Frequency of Social Gatherings with Friends and Family: Not on file    Attends Voodoo Services: Not on file    Active Member of Clubs or Organizations: Not on file    Attends Club or Organization Meetings: Not on file    Marital Status: Not on file   Intimate Partner Violence:     Fear of Current or Ex-Partner: Not on file    Emotionally Abused: Not on file    Physically Abused: Not on file    Sexually Abused: Not on file   Housing Stability:     Unable to Pay for Housing in the Last Year: Not on file    Number of [place] in the Last Year: Not on file    Unstable Housing in the Last Year: Not on file     Current Facility-Administered Medications   Medication Dose Route Frequency Provider Last Rate Last Admin    witch hazel-glycerin (TUCKS) pad   Topical PRN Poppy Miller MD   Given at 06/12/22 0112    0.9 % sodium chloride infusion   IntraVENous PRN Poppy Miller MD        acetaminophen (TYLENOL) tablet 650 mg  650 mg Oral Q4H PRN Poppy Miller MD   650 mg at 06/12/22 1314    diphenhydrAMINE (BENADRYL) capsule 25 mg  25 mg Oral Q6H PRN Poppy Miller MD   25 mg at 06/12/22 1314    diphenoxylate-atropine (LOMOTIL) 2.5-0.025 MG per tablet 2 tablet  2 tablet Oral 4x Daily PRN BEE Peck CNP   2 tablet at 06/13/22 0808    [Held by provider] enoxaparin (LOVENOX) injection 40 mg  40 mg SubCUTAneous Daily BEE Durham CNP        cetirizine (ZYRTEC) tablet 10 mg  10 mg Oral Nightly Poppy Miller MD   5 mg at 06/12/22 2056    LORazepam (ATIVAN) injection 1 mg  1 mg IntraVENous Q6H PRN Morganmason HallBEE lee CNP   1 mg at 06/07/22 2145    potassium chloride (KLOR-CON M) extended release tablet 20 mEq  20 mEq Oral BID WC Poornima Reddy MD   20 mEq at 06/13/22 0755    hydrALAZINE (APRESOLINE) injection 10 mg  10 mg IntraVENous Q6H PRN BEE Hopson CNP        loperamide (IMODIUM) capsule 2 mg  2 mg Oral 4x Daily PRN BEE Hopson CNP   2 mg at 06/11/22 0802    amoxicillin-clavulanate (AUGMENTIN) 875-125 MG per tablet 1 tablet  1 tablet Oral 2 times per day BEE Hposon CNP   1 tablet at 06/13/22 0755    ondansetron (ZOFRAN) injection 4 mg  4 mg IntraVENous Q4H PRN BEE Hopson CNP   4 mg at 06/11/22 1702    prochlorperazine (COMPAZINE) injection 5 mg  5 mg IntraVENous Q6H PRN BEE Hopson CNP   5 mg at 06/13/22 0130    morphine injection 2 mg  2 mg IntraVENous Q4H PRN BEE Hopson CNP        filgrastim-aafi (NIVESTYM) injection 300 mcg  300 mcg SubCUTAneous Daily BEE Hopson CNP   300 mcg at 06/13/22 0755    meperidine (DEMEROL) injection 12.5 mg  12.5 mg IntraVENous PRN Poornima Reddy MD        EPINEPHrine PF 1 MG/ML injection 0.3 mg  0.3 mg IntraMUSCular PRN Poornima Reddy MD        neomycin-bacitracin-polymyxin (NEOSPORIN) ophthalmic ointment   Right Eye TID BEE Hopson CNP   Given at 06/13/22 0758    DULoxetine (CYMBALTA) extended release capsule 30 mg  30 mg Oral Daily BEE Miller NP   30 mg at 06/13/22 0755    HYDROcodone-acetaminophen (NORCO) 5-325 MG per tablet 1 tablet  1 tablet Oral Q4H PRN BEE Miller NP   1 tablet at 06/11/22 0330    HYDROcodone-acetaminophen ([address]) 7.5-325 MG per tablet 1 tablet  1 tablet Oral Q6H PRN BEE Miller - NP   1 tablet at 06/13/22 0735    polyethylene glycol (GLYCOLAX) packet 17 g  17 g Oral Daily PRN BEE Miller - NP        0.9 % sodium chloride infusion   IntraVENous Continuous BEE Miller NP 75 mL/hr at 06/13/22 1448 New Bag at 22 06    acyclovir (ZOVIRAX) tablet 400 mg  400 mg Oral BID Fermin Cover, APRN - NP   400 mg at 22 075    levoFLOXacin (LEVAQUIN) tablet 500 mg  500 mg Oral Daily Fermin Cover, APRN - NP   500 mg at 22 075    fluconazole (DIFLUCAN) tablet 200 mg  200 mg Oral Daily Fermin Cover, APRN - NP   200 mg at 22 075    gabapentin (NEURONTIN) capsule 600 mg  600 mg Oral TID Fede Leos MD   600 mg at 22 075       OBJECTIVE:  Patient Vitals for the past 8 hrs:   BP Temp Temp src Pulse Resp SpO2   22 0807 136/70 98.6 °F (37 °C) Oral (!) 104 -- 99 %   22 0315 135/67 98.1 °F (36.7 °C) Oral (!) 102 18 --     Temp (24hrs), Av.2 °F (36.8 °C), Min:97.7 °F (36.5 °C), Max:98.9 °F (37.2 °C)    No intake/output data recorded. Physical Exam:  Constitutional: Well developed, well nourished female in no acute distress, up walking around the room. HEENT: Normocephalic and atraumatic. Oropharynx is clear, mucous membranes are moist. Sclerae anicteric. Neck supple without JVD. No thyromegaly present. Skin Warm and dry. No bruising and no rash noted. No erythema. No pallor. Respiratory Lungs are clear to auscultation bilaterally without wheezes, rales or rhonchi, normal air exchange without accessory muscle use. CVS Mildly tachycardic rate, regular rhythm and normal S1 and S2. No murmurs, gallops, or rubs. Abdomen Soft, nontender and nondistended, normoactive bowel sounds. No palpable mass. Neuro Grossly nonfocal with no obvious sensory or motor deficits. MSK Normal range of motion in general.  Trace BLE edema and no tenderness. Psych Appropriate mood and affect.         Labs:    Recent Results (from the past 24 hour(s))   Platelet Count    Collection Time: 22  5:42 PM   Result Value Ref Range    Platelets 43 (L) 250 - 450 K/uL   Phosphorus    Collection Time: 22  3:13 AM   Result Value Ref Range    Phosphorus 3.1 2.5 - 4.5 MG/DL   Lactate thrombocytopenia    Goals and plan of care reviewed with the patient. All questions answered to the best of our ability. Disposition:  Anticipate discharge home after engraftment.               BEE Shields  Hematology & Oncology  [id number] [place] [address]  Office : [phone]  Fax : [phone]

## 2022-06-13 NOTE — PROGRESS NOTES
No intake/output data recorded. I/O last 3 completed shifts: In: 3772 [P.O.:3280; I.V.:1707;  Blood:345]  Out: 5900 [Urine:5400; MSGEW:873]  (+140 for 1744-1420 today)  Pedro Zavala RN

## 2022-06-13 NOTE — PROGRESS NOTES
End of Shift Summary: 7p ~ 7a     Diagnosis: Multiple Myeloma  Auto Transplant Date:  06/02/22  Day: (+/-) +11  Chemo regimen used: Melphalan  Labs not resulted that need follow-up: None at this time  BMT assessments and toxicities completed. 06/12/22  Mouth Care completed 2 times this shift. WBC/ANC= 0.5/0.03  Prophylactic medications started on D+1(06/03/22) PO Diflucan;Levaquin; Augmentin; (on Acyclovir PTA)  Toxicities greater than 2 : None  Patient seen by MD/NP daily while inpt & until engraftment. New orders received: None at this time     24 I&O = -708cc  Compliant w/ mouth care. Walked in the hallways before bedtime. Reports intermittent nausea, managed with Compazine x 1  Continues with neuropathic pain, medicated with Norco PO x 2 this shift along with scheduled Neurontin, appears to be managing pain. Stool is becoming more soft instead of liquid. Afebrile. Pt anxious for counts to recover and discharge to home.   Report to oncoming RN at 1423 [facility] RN OCN

## 2022-06-13 NOTE — PROGRESS NOTES
's follow-up visit with Mrs. Pedro Gonzáles, preferred name Alf Araujo. Patient was walking the hallways and I joined her for a walking visit. Ms. Pedro Gonzáles has significant maricel in [place] and we talked at length about her maricel, health, family, and discharge gaol. Mrs. Pedro Gonzáles expressed appreciation for the visit. Chaplains remain available for support.      Bishop Padilla 70  Board Certified

## 2022-06-13 NOTE — PROGRESS NOTES
Diagnosis: MM  Transplant Date: 06/02/2022  Day: (+/-) +11. Chemo regimen used: Melphalan  BMT assessments and toxicities completed. WBC/ANC= 0.5/0.03. Prophylactic medications started D+1(06/03/22). G-CSF started on D+6. Toxicities greater than 2 :none. Patient seen by MD/NP daily until engraftment. New orders received: None. Issues:Neuropathy pain      END OF SHIFT:    Shift Summary:    Patient had 1 loose stool today. Still having neuropathic pain, given norco twice. Continued to alternate lomotil and imodium for loose stools. No complaints of nausea. Mouth care completed 3 times today. VSS    I/Os:    06/13 0701 - 06/13 1900  In: 2712 [P.O.:1740]  Out: 1300 [Urine:1300]  I/O last 3 completed shifts: In: 5236 [P.O.:2160; I.V.:2731; Blood:345]  Out: 5600 [Urine:4900; Stool:700]    Mayela Arndt

## 2022-06-13 NOTE — PLAN OF CARE
Problem: Safety - Adult  Goal: Free from fall injury  Outcome: Progressing     Problem: Pain  Goal: Verbalizes/displays adequate comfort level or baseline comfort level  Outcome: Progressing     Problem: Chronic Conditions and Co-morbidities  Goal: Patient's chronic conditions and co-morbidity symptoms are monitored and maintained or improved  Outcome: Progressing  Flowsheets (Taken 6/12/2022 1916)  Care Plan - Patient's Chronic Conditions and Co-Morbidity Symptoms are Monitored and Maintained or Improved:   Monitor and assess patient's chronic conditions and comorbid symptoms for stability, deterioration, or improvement   Collaborate with multidisciplinary team to address chronic and comorbid conditions and prevent exacerbation or deterioration   Update acute care plan with appropriate goals if chronic or comorbid symptoms are exacerbated and prevent overall improvement and discharge     Problem: ABCDS Injury Assessment  Goal: Absence of physical injury  Outcome: Progressing     Problem: Neurosensory - Adult  Goal: Achieves maximal functionality and self care  Outcome: Progressing     Problem: Gastrointestinal - Adult  Goal: Minimal or absence of nausea and vomiting  Outcome: Progressing  Flowsheets (Taken 6/12/2022 1916)  Minimal or absence of nausea and vomiting:   Administer IV fluids as ordered to ensure adequate hydration   Administer ordered antiemetic medications as needed   Provide nonpharmacologic comfort measures as appropriate   Nutrition consult to assist patient with adequate nutrition and appropriate food choices  Goal: Maintains or returns to baseline bowel function  Outcome: Progressing  Flowsheets (Taken 6/12/2022 1916)  Maintains or returns to baseline bowel function:   Assess bowel function   Encourage oral fluids to ensure adequate hydration   Administer IV fluids as ordered to ensure adequate hydration   Administer ordered medications as needed   Encourage mobilization and activity  Goal:

## 2022-06-14 VITALS
OXYGEN SATURATION: 98 % | BODY MASS INDEX: 42.8 KG/M2 | TEMPERATURE: 98 F | HEIGHT: 62 IN | SYSTOLIC BLOOD PRESSURE: 134 MMHG | RESPIRATION RATE: 20 BRPM | HEART RATE: 103 BPM | DIASTOLIC BLOOD PRESSURE: 78 MMHG | WEIGHT: 232.6 LBS

## 2022-06-14 LAB
ALBUMIN SERPL-MCNC: 3.3 G/DL (ref 3.5–5)
ALBUMIN/GLOB SERPL: 1.3 {RATIO} (ref 1.2–3.5)
ALP SERPL-CCNC: 73 U/L (ref 50–136)
ALT SERPL-CCNC: 13 U/L (ref 12–65)
ANION GAP SERPL CALC-SCNC: 7 MMOL/L (ref 7–16)
AST SERPL-CCNC: 17 U/L (ref 15–37)
BASOPHILS # BLD: 0 K/UL (ref 0–0.2)
BASOPHILS NFR BLD: 1 % (ref 0–2)
BILIRUB SERPL-MCNC: 0.1 MG/DL (ref 0.2–1.1)
BUN SERPL-MCNC: 5 MG/DL (ref 6–23)
CALCIUM SERPL-MCNC: 9.4 MG/DL (ref 8.3–10.4)
CHLORIDE SERPL-SCNC: 105 MMOL/L (ref 98–107)
CO2 SERPL-SCNC: 29 MMOL/L (ref 21–32)
CREAT SERPL-MCNC: 0.6 MG/DL (ref 0.6–1)
DIFFERENTIAL METHOD BLD: ABNORMAL
EOSINOPHIL # BLD: 0 K/UL (ref 0–0.8)
EOSINOPHIL NFR BLD: 0 % (ref 0.5–7.8)
ERYTHROCYTE [DISTWIDTH] IN BLOOD BY AUTOMATED COUNT: 16.7 % (ref 11.9–14.6)
GLOBULIN SER CALC-MCNC: 2.6 G/DL (ref 2.3–3.5)
GLUCOSE SERPL-MCNC: 101 MG/DL (ref 65–100)
HCT VFR BLD AUTO: 27.1 % (ref 35.8–46.3)
HGB BLD-MCNC: 8.8 G/DL (ref 11.7–15.4)
IMM GRANULOCYTES # BLD AUTO: 0.3 K/UL (ref 0–0.5)
IMM GRANULOCYTES NFR BLD AUTO: 13 % (ref 0–5)
LYMPHOCYTES # BLD: 0.3 K/UL (ref 0.5–4.6)
LYMPHOCYTES NFR BLD: 16 % (ref 13–44)
MAGNESIUM SERPL-MCNC: 1.7 MG/DL (ref 1.8–2.4)
MCH RBC QN AUTO: 27.8 PG (ref 26.1–32.9)
MCHC RBC AUTO-ENTMCNC: 32.5 G/DL (ref 31.4–35)
MCV RBC AUTO: 85.8 FL (ref 79.6–97.8)
MONOCYTES # BLD: 0.4 K/UL (ref 0.1–1.3)
MONOCYTES NFR BLD: 18 % (ref 4–12)
NEUTS SEG # BLD: 1.1 K/UL (ref 1.7–8.2)
NEUTS SEG NFR BLD: 52 % (ref 43–78)
NRBC # BLD: 0.18 K/UL (ref 0–0.2)
PLATELET # BLD AUTO: 33 K/UL (ref 150–450)
PLATELET COMMENT: ABNORMAL
PMV BLD AUTO: 12.4 FL (ref 9.4–12.3)
POTASSIUM SERPL-SCNC: 3.3 MMOL/L (ref 3.5–5.1)
PROT SERPL-MCNC: 5.9 G/DL (ref 6.3–8.2)
RBC # BLD AUTO: 3.16 M/UL (ref 4.05–5.2)
RBC MORPH BLD: ABNORMAL
SODIUM SERPL-SCNC: 141 MMOL/L (ref 136–145)
WBC # BLD AUTO: 2.1 K/UL (ref 4.3–11.1)
WBC MORPH BLD: ABNORMAL

## 2022-06-14 PROCEDURE — 36592 COLLECT BLOOD FROM PICC: CPT

## 2022-06-14 PROCEDURE — 6370000000 HC RX 637 (ALT 250 FOR IP): Performed by: NURSE PRACTITIONER

## 2022-06-14 PROCEDURE — 6370000000 HC RX 637 (ALT 250 FOR IP): Performed by: INTERNAL MEDICINE

## 2022-06-14 PROCEDURE — 6360000002 HC RX W HCPCS: Performed by: NURSE PRACTITIONER

## 2022-06-14 PROCEDURE — 85025 COMPLETE CBC W/AUTO DIFF WBC: CPT

## 2022-06-14 PROCEDURE — 80053 COMPREHEN METABOLIC PANEL: CPT

## 2022-06-14 PROCEDURE — 99239 HOSP IP/OBS DSCHRG MGMT >30: CPT | Performed by: INTERNAL MEDICINE

## 2022-06-14 PROCEDURE — 83735 ASSAY OF MAGNESIUM: CPT

## 2022-06-14 RX ORDER — LANOLIN ALCOHOL/MO/W.PET/CERES
400 CREAM (GRAM) TOPICAL 2 TIMES DAILY
Status: DISCONTINUED | OUTPATIENT
Start: 2022-06-14 | End: 2022-06-14 | Stop reason: HOSPADM

## 2022-06-14 RX ORDER — POTASSIUM CHLORIDE 20 MEQ/1
40 TABLET, EXTENDED RELEASE ORAL 2 TIMES DAILY WITH MEALS
Status: DISCONTINUED | OUTPATIENT
Start: 2022-06-14 | End: 2022-06-14 | Stop reason: HOSPADM

## 2022-06-14 RX ORDER — POTASSIUM CHLORIDE 20 MEQ/1
40 TABLET, EXTENDED RELEASE ORAL ONCE
Status: COMPLETED | OUTPATIENT
Start: 2022-06-14 | End: 2022-06-14

## 2022-06-14 RX ORDER — FUROSEMIDE 40 MG/1
20 TABLET ORAL ONCE
Status: COMPLETED | OUTPATIENT
Start: 2022-06-14 | End: 2022-06-14

## 2022-06-14 RX ADMIN — ACYCLOVIR 400 MG: 800 TABLET ORAL at 07:42

## 2022-06-14 RX ADMIN — DULOXETINE HYDROCHLORIDE 30 MG: 30 CAPSULE, DELAYED RELEASE ORAL at 07:42

## 2022-06-14 RX ADMIN — HYDROCODONE BITARTRATE AND ACETAMINOPHEN 1 TABLET: 7.5; 325 TABLET ORAL at 12:26

## 2022-06-14 RX ADMIN — HYDROCODONE BITARTRATE AND ACETAMINOPHEN 1 TABLET: 7.5; 325 TABLET ORAL at 08:51

## 2022-06-14 RX ADMIN — FILGRASTIM-AAFI 300 MCG: 300 INJECTION, SOLUTION SUBCUTANEOUS at 07:42

## 2022-06-14 RX ADMIN — LEVOFLOXACIN 500 MG: 500 TABLET, FILM COATED ORAL at 07:41

## 2022-06-14 RX ADMIN — POTASSIUM CHLORIDE 40 MEQ: 20 TABLET, EXTENDED RELEASE ORAL at 07:50

## 2022-06-14 RX ADMIN — ONDANSETRON 4 MG: 2 INJECTION INTRAMUSCULAR; INTRAVENOUS at 06:42

## 2022-06-14 RX ADMIN — LOPERAMIDE HYDROCHLORIDE 2 MG: 2 CAPSULE ORAL at 12:26

## 2022-06-14 RX ADMIN — FUROSEMIDE 20 MG: 40 TABLET ORAL at 11:28

## 2022-06-14 RX ADMIN — DIPHENOXYLATE HYDROCHLORIDE AND ATROPINE SULFATE 2 TABLET: 2.5; .025 TABLET ORAL at 07:42

## 2022-06-14 RX ADMIN — HYDROCODONE BITARTRATE AND ACETAMINOPHEN 1 TABLET: 7.5; 325 TABLET ORAL at 02:46

## 2022-06-14 RX ADMIN — GABAPENTIN 600 MG: 300 CAPSULE ORAL at 07:41

## 2022-06-14 RX ADMIN — AMOXICILLIN AND CLAVULANATE POTASSIUM 1 TABLET: 875; 125 TABLET, FILM COATED ORAL at 07:41

## 2022-06-14 RX ADMIN — FLUCONAZOLE 200 MG: 100 TABLET ORAL at 07:41

## 2022-06-14 RX ADMIN — POTASSIUM CHLORIDE 40 MEQ: 20 TABLET, EXTENDED RELEASE ORAL at 11:28

## 2022-06-14 ASSESSMENT — PAIN DESCRIPTION - DESCRIPTORS
DESCRIPTORS: ACHING
DESCRIPTORS: ACHING

## 2022-06-14 ASSESSMENT — PAIN DESCRIPTION - FREQUENCY: FREQUENCY: CONTINUOUS

## 2022-06-14 ASSESSMENT — PAIN DESCRIPTION - ONSET
ONSET: GRADUAL
ONSET: GRADUAL

## 2022-06-14 ASSESSMENT — PAIN SCALES - GENERAL
PAINLEVEL_OUTOF10: 7
PAINLEVEL_OUTOF10: 1
PAINLEVEL_OUTOF10: 0
PAINLEVEL_OUTOF10: 7

## 2022-06-14 ASSESSMENT — PAIN DESCRIPTION - LOCATION
LOCATION: HIP
LOCATION: FOOT

## 2022-06-14 ASSESSMENT — PAIN - FUNCTIONAL ASSESSMENT
PAIN_FUNCTIONAL_ASSESSMENT: PREVENTS OR INTERFERES SOME ACTIVE ACTIVITIES AND ADLS
PAIN_FUNCTIONAL_ASSESSMENT: PREVENTS OR INTERFERES SOME ACTIVE ACTIVITIES AND ADLS

## 2022-06-14 ASSESSMENT — PAIN DESCRIPTION - ORIENTATION
ORIENTATION: RIGHT;LEFT
ORIENTATION: RIGHT;LEFT

## 2022-06-14 ASSESSMENT — PAIN DESCRIPTION - PAIN TYPE
TYPE: ACUTE PAIN
TYPE: ACUTE PAIN

## 2022-06-14 NOTE — PLAN OF CARE
Problem: Safety - Adult  Goal: Free from fall injury  Outcome: Progressing  Flowsheets (Taken 6/13/2022 1945)  Free From Fall Injury: Instruct family/caregiver on patient safety     Problem: Pain  Goal: Verbalizes/displays adequate comfort level or baseline comfort level  Outcome: Progressing     Problem: Chronic Conditions and Co-morbidities  Goal: Patient's chronic conditions and co-morbidity symptoms are monitored and maintained or improved  Outcome: Progressing  Flowsheets (Taken 6/13/2022 1945)  Care Plan - Patient's Chronic Conditions and Co-Morbidity Symptoms are Monitored and Maintained or Improved:   Monitor and assess patient's chronic conditions and comorbid symptoms for stability, deterioration, or improvement   Collaborate with multidisciplinary team to address chronic and comorbid conditions and prevent exacerbation or deterioration     Problem: ABCDS Injury Assessment  Goal: Absence of physical injury  Outcome: Progressing  Flowsheets (Taken 6/13/2022 1945)  Absence of Physical Injury: Implement safety measures based on patient assessment     Problem: Neurosensory - Adult  Goal: Achieves maximal functionality and self care  Outcome: Progressing     Problem: Gastrointestinal - Adult  Goal: Minimal or absence of nausea and vomiting  Outcome: Progressing  Flowsheets (Taken 6/13/2022 1945)  Minimal or absence of nausea and vomiting:   Administer IV fluids as ordered to ensure adequate hydration   Administer ordered antiemetic medications as needed   Provide nonpharmacologic comfort measures as appropriate   Advance diet as tolerated, if ordered  Goal: Maintains or returns to baseline bowel function  Outcome: Progressing  Flowsheets (Taken 6/13/2022 1945)  Maintains or returns to baseline bowel function:   Assess bowel function   Encourage oral fluids to ensure adequate hydration   Administer ordered medications as needed  Goal: Maintains adequate nutritional intake  Outcome: Progressing  Flowsheets (Taken 6/13/2022 1945)  Maintains adequate nutritional intake:   Monitor percentage of each meal consumed   Identify factors contributing to decreased intake, treat as appropriate   Assist with meals as needed

## 2022-06-14 NOTE — PLAN OF CARE
Problem: Safety - Adult  Goal: Free from fall injury  6/14/2022 1153 by [facility] LULA Spaulding RN  Outcome: Completed  6/14/2022 6600 by Noreen Spaulding RN  Outcome: Progressing  Flowsheets (Taken 6/14/2022 0246 by Efraín Marques RN)  Free From Fall Injury: Instruct family/caregiver on patient safety  6/14/2022 0132 by Efraín Marques RN  Outcome: Progressing  Flowsheets (Taken 6/13/2022 1945)  Free From Fall Injury: Instruct family/caregiver on patient safety     Problem: Pain  Goal: Verbalizes/displays adequate comfort level or baseline comfort level  6/14/2022 1153 by [facility] LULA Spaulding RN  Outcome: Completed  6/14/2022 3516 by Noreen Cuello RN  Outcome: Progressing  6/14/2022 0132 by Efraín Marques RN  Outcome: Progressing     Problem: Chronic Conditions and Co-morbidities  Goal: Patient's chronic conditions and co-morbidity symptoms are monitored and maintained or improved  6/14/2022 1153 by [facility] LULA Spaulding RN  Outcome: Completed  6/14/2022 9929 by Noreen Cuello RN  Outcome: Progressing  Flowsheets (Taken 6/14/2022 0800)  Care Plan - Patient's Chronic Conditions and Co-Morbidity Symptoms are Monitored and Maintained or Improved: Monitor and assess patient's chronic conditions and comorbid symptoms for stability, deterioration, or improvement  6/14/2022 0132 by Efraín Marques RN  Outcome: Progressing  Flowsheets (Taken 6/13/2022 1945)  Care Plan - Patient's Chronic Conditions and Co-Morbidity Symptoms are Monitored and Maintained or Improved:   Monitor and assess patient's chronic conditions and comorbid symptoms for stability, deterioration, or improvement   Collaborate with multidisciplinary team to address chronic and comorbid conditions and prevent exacerbation or deterioration     Problem: ABCDS Injury Assessment  Goal: Absence of physical injury  6/14/2022 1153 by [facility] LULA Spaulding RN  Outcome: Completed  6/14/2022 4035 by Noreen Cuello RN  Outcome: Progressing  6/14/2022 0132 by Anabelle Walker RN  Outcome: Progressing  Flowsheets (Taken 6/13/2022 1945)  Absence of Physical Injury: Implement safety measures based on patient assessment     Problem: Neurosensory - Adult  Goal: Achieves maximal functionality and self care  6/14/2022 1153 by [facility] D. Twilla Opitz RN  Outcome: Completed  6/14/2022 0628 by Leanne Garland Twilla Opitz RN  Outcome: Progressing  6/14/2022 0132 by Anabelle Walker RN  Outcome: Progressing     Problem: Gastrointestinal - Adult  Goal: Minimal or absence of nausea and vomiting  6/14/2022 1153 by Leanne Garland. Twilla Opitz RN  Outcome: Completed  6/14/2022 0250 by Liv Granados RN  Outcome: Progressing  Flowsheets (Taken 6/14/2022 0800)  Minimal or absence of nausea and vomiting:   Administer IV fluids as ordered to ensure adequate hydration   Administer ordered antiemetic medications as needed   Provide nonpharmacologic comfort measures as appropriate  6/14/2022 0132 by Anabelle Walker RN  Outcome: Progressing  Flowsheets (Taken 6/13/2022 1945)  Minimal or absence of nausea and vomiting:   Administer IV fluids as ordered to ensure adequate hydration   Administer ordered antiemetic medications as needed   Provide nonpharmacologic comfort measures as appropriate   Advance diet as tolerated, if ordered  Goal: Maintains or returns to baseline bowel function  6/14/2022 1153 by [facility] D. Twilla Opitz RN  Outcome: Completed  6/14/2022 0987 by Leanne Garland Twilla Opitz RN  Outcome: Progressing  Flowsheets (Taken 6/14/2022 0800)  Maintains or returns to baseline bowel function:   Assess bowel function   Encourage oral fluids to ensure adequate hydration   Administer ordered medications as needed   Administer IV fluids as ordered to ensure adequate hydration   Encourage mobilization and activity  6/14/2022 0132 by Anabelle Walker RN  Outcome: Progressing  Flowsheets (Taken 6/13/2022 1945)  Maintains or returns to baseline bowel function:   Assess bowel function   Encourage oral fluids to ensure adequate hydration   Administer ordered medications as needed  Goal: Maintains adequate nutritional intake  6/14/2022 1153 by Kemal Nam RN  Outcome: Completed  6/14/2022 0880 by Naty Cash RN  Outcome: Progressing  Flowsheets (Taken 6/14/2022 0800)  Maintains adequate nutritional intake:   Monitor percentage of each meal consumed   Assist with meals as needed   Identify factors contributing to decreased intake, treat as appropriate   Monitor intake and output, weight and lab values  6/14/2022 0132 by Jourdan Mcfarlane RN  Outcome: Progressing  Flowsheets (Taken 6/13/2022 1945)  Maintains adequate nutritional intake:   Monitor percentage of each meal consumed   Identify factors contributing to decreased intake, treat as appropriate   Assist with meals as needed

## 2022-06-14 NOTE — CARE COORDINATION
Pt is for discharge home today with no needs/supportive care orders received for CM at this time. Pt will have close follow up at the [address] [place] [address] met  ASSESSMENT NOTE    Attending Physician: Nae Brown MD  Admit Problem: Stem cell transplant candidate [Z76.82]  Date/Time of Admission: 5/31/2022  2:17 PM  Problem List:  Patient Active Problem List   Diagnosis    Bone marrow transplant candidate    Multiple myeloma not having achieved remission ([facility] [address]    Stem cell transplant candidate    Immunocompromised ([facility] [address]    Bone marrow transplant status ([facility] [address]    Admission for antineoplastic chemotherapy    Loose stools       Service Assessment  Patient Orientation Alert and Oriented   Cognition Alert   History Provided By     Primary Caregiver Self   Accompanied By/Relationship     Support Systems Spouse/Significant Other   Patient's Healthcare Decision Maker is: Legal Next of Kin   PCP Verified by CM Yes   Last Visit to PCP Within last 3 months   Prior Functional Level Independent in ADLs/IADLs   Current Functional Level Independent in ADLs/IADLs   Can patient return to prior living arrangement Yes   Ability to make needs known: Good   Family able to assist with home care needs: Yes   Would you like for me to discuss the discharge plan with any other family members/significant others, and if so, who?      Financial Resources     Community Resources     CM/SW Referral       Social/Functional History  Lives With Spouse   Type of [address] [place] [address] Access     Entrance Stairs - Number of Steps     Entrance Stairs - Rails     Bathroom Shower/Tub     Bathroom Toilet     Bathroom Equipment     Bathroom Accessibility     Home Equipment     Receives Help From [employer])   ADL Assistance Independent   Bath     Dressing     Grooming     Feeding     Toileting     Homemaking Assistance Independent   Meal Prep     Laundry     Vacuuming     Cleaning     Gardening     Yard Work     Driving Representative who was provided with the Choice of Provider and agrees with the Discharge Plan? The Patient and/or Patient Representative Agree with the Discharge Plan? Yes   Freedom of Choice list was provided with basic dialogue that supports the individualized plan of care/goals, treatment preferences, and shares the quality data associated with the providers?  Yes     Documentation for Discharge Appeal  Discharge Appealed by     Date notified by [facility] of appeal request:     Time notified by [facility] of appeal request:     Detailed Notice of Discharge given to:     Date Notice of Discharge given:     Time Notice of Discharge given:     Date records sent to [facility]     Time records sent to [address] [place] [address]     Date Notified of Outcome     Time Notified of Outcome     Outcome of appeal           Ricardo Taylor RN 06/14/22 12:55 PM

## 2022-06-14 NOTE — DISCHARGE SUMMARY
[facility]: Inpatient Hematology / Oncology Discharge Summary Note    Patient ID:  Leighann Sheikh  [id number]  54 y.o.  1970    Admit Date: 5/31/2022    Discharge Date: 6/14/2022    Admission Diagnoses: Stem cell transplant candidate [Z76.82]    Discharge Diagnoses:  Principal Diagnosis: Stem cell transplant candidate  Principal Problem:    Stem cell transplant candidate  Active Problems:    Immunocompromised ([facility] [address]    Bone marrow transplant status ([facility] [address]    Admission for antineoplastic chemotherapy    Loose stools  Resolved Problems:    * No resolved hospital problems. [facility] Course:  Ms. Maurice Partida is a 54 y.o. female admitted on 5/31/2022 with a primary diagnosis of multiple myeloma.     53-year-old -American female history of prolactinemia, thyroid Hurthle cell neoplasm, lambda light chain multiple myeloma, ISS stage I, high risk disease (gain of 1 q.), bortezomib related neuropathy, iron deficiency in CR 1 after daratumumab based regimen. Patient seen in oncology office 5/31/2022. Patient collected CD34 at 6.24 mil/kg w granix alone. Reports feeling reasonably. Extensive ROS unremarkable. She was admitted for Auto PBSCT. She is Day +12. ANC up to 1100. She has received last dose of G-CSF today prior to discharge. Still c/o BLE edema. Diuresed well after Lasix 20mg po yesterday, down 2# with net -1.7L. Still c/o BLE edema and feeling swollen. Will repeat Lasix dose today prior to discharge. She will also receive K+ and Mg+. She noted a deflated blister at port site, likely from bra rubbing against port, will need to monitor as OP. She is feeling well and is ready for discharge home. She will f/u as previously scheduled with NP tomorrow, 6/15. Advised to call with fever, chills, uncontrollable symptoms, or with any other concerns.     Multiple myeloma  - Okay to proceed with melphalan 200 mg per metered squared followed by stem cell reinfusion cyclobenzaprine 5 MG tablet  Commonly known as: FLEXERIL     dicyclomine 20 MG tablet  Commonly known as: BENTYL     diphenoxylate-atropine 2.5-0.025 MG per tablet  Commonly known as: LOMOTIL     DULoxetine 30 MG extended release capsule  Commonly known as: CYMBALTA     gabapentin 300 MG capsule  Commonly known as: NEURONTIN        STOP taking these medications    aspirin 325 MG EC tablet     lenalidomide 25 MG chemo capsule  Commonly known as: REVLIMID     pregabalin 75 MG capsule  Commonly known as: LYRICA              OBJECTIVE:  Patient Vitals for the past 8 hrs:   BP Temp Temp src Pulse Resp SpO2   22 0921 -- -- -- -- 20 --   22 0800 134/78 98 °F (36.7 °C) Oral (!) 103 20 98 %     Temp (24hrs), Av °F (36.7 °C), Min:97.7 °F (36.5 °C), Max:98.4 °F (36.9 °C)     0701 -  1900  In: 100 [P.O.:100]  Out: 400 [Urine:200]    Physical Exam:  Constitutional: Well developed, well nourished female in no acute distress, sitting comfortably on the hospital bed. HEENT: Normocephalic and atraumatic. Oropharynx is clear, mucous membranes are moist. Extraocular muscles are intact. Sclerae anicteric. Neck supple without JVD. No thyromegaly present. Skin Warm and dry. No bruising and no rash noted. No erythema. No pallor. Deflated blister at port site. Respiratory Lungs are clear to auscultation bilaterally without wheezes, rales or rhonchi, normal air exchange without accessory muscle use. CVS Normal rate, regular rhythm and normal S1 and S2. No murmurs, gallops, or rubs. Abdomen Soft, nontender and nondistended, normoactive bowel sounds. No palpable mass. No hepatosplenomegaly. Neuro Grossly nonfocal with no obvious sensory or motor deficits. MSK Normal range of motion in general.  Trace BLE edema and no tenderness. Psych Appropriate mood and affect.         ASSESSMENT:    Principal Problem:    Stem cell transplant candidate  Active Problems:    Immunocompromised ([address]    Bone marrow transplant status [facility])    Admission for antineoplastic chemotherapy    Loose stools  Resolved Problems:    * No resolved hospital problems. *      DISPOSITION:  Follow up as scheduled with NP tomorrow, 6/15      Over 60 minutes was spent in discharge planning and coordination of care.             [facility] Hematology [facility] Oncology  [id number] [place] [address]  Office : [phone]  Fax : [phone]

## 2022-06-14 NOTE — PLAN OF CARE
self care  6/14/2022 8553 by Reed Parks. Marly Mark RN  Outcome: Progressing  6/14/2022 0132 by Harrington Saint, RN  Outcome: Progressing     Problem: Gastrointestinal - Adult  Goal: Minimal or absence of nausea and vomiting  6/14/2022 0822 by Reed Granados RN  Outcome: Progressing  Flowsheets (Taken 6/14/2022 0800)  Minimal or absence of nausea and vomiting:   Administer IV fluids as ordered to ensure adequate hydration   Administer ordered antiemetic medications as needed   Provide nonpharmacologic comfort measures as appropriate  6/14/2022 0132 by Harrington Saint, RN  Outcome: Progressing  Flowsheets (Taken 6/13/2022 1945)  Minimal or absence of nausea and vomiting:   Administer IV fluids as ordered to ensure adequate hydration   Administer ordered antiemetic medications as needed   Provide nonpharmacologic comfort measures as appropriate   Advance diet as tolerated, if ordered  Goal: Maintains or returns to baseline bowel function  6/14/2022 0822 by Reed Parks. Marly Mark RN  Outcome: Progressing  Flowsheets (Taken 6/14/2022 0800)  Maintains or returns to baseline bowel function:   Assess bowel function   Encourage oral fluids to ensure adequate hydration   Administer ordered medications as needed   Administer IV fluids as ordered to ensure adequate hydration   Encourage mobilization and activity  6/14/2022 0132 by Harrington Saint, RN  Outcome: Progressing  Flowsheets (Taken 6/13/2022 1945)  Maintains or returns to baseline bowel function:   Assess bowel function   Encourage oral fluids to ensure adequate hydration   Administer ordered medications as needed  Goal: Maintains adequate nutritional intake  6/14/2022 0822 by Reed Parks.  Barry Serrano RN  Outcome: Progressing  Flowsheets (Taken 6/14/2022 0800)  Maintains adequate nutritional intake:   Monitor percentage of each meal consumed   Assist with meals as needed   Identify factors contributing to decreased intake, treat as appropriate   Monitor intake and output, weight and lab values  6/14/2022 0132 by Olive Lopez, RN  Outcome: Progressing  Flowsheets (Taken 6/13/2022 1945)  Maintains adequate nutritional intake:   Monitor percentage of each meal consumed   Identify factors contributing to decreased intake, treat as appropriate   Assist with meals as needed

## 2022-06-15 ENCOUNTER — OFFICE VISIT (OUTPATIENT)
Dept: ONCOLOGY | Age: 52
End: 2022-06-15
Payer: COMMERCIAL

## 2022-06-15 ENCOUNTER — HOSPITAL ENCOUNTER (OUTPATIENT)
Dept: INFUSION THERAPY | Age: 52
Discharge: HOME | End: 2022-06-15
Payer: COMMERCIAL

## 2022-06-15 VITALS
SYSTOLIC BLOOD PRESSURE: 125 MMHG | TEMPERATURE: 98 F | BODY MASS INDEX: 42.8 KG/M2 | RESPIRATION RATE: 18 BRPM | OXYGEN SATURATION: 96 % | WEIGHT: 234 LBS | DIASTOLIC BLOOD PRESSURE: 67 MMHG | HEART RATE: 105 BPM

## 2022-06-15 VITALS
BODY MASS INDEX: 42.8 KG/M2 | DIASTOLIC BLOOD PRESSURE: 67 MMHG | TEMPERATURE: 97.8 F | OXYGEN SATURATION: 96 % | HEART RATE: 105 BPM | WEIGHT: 234 LBS | RESPIRATION RATE: 18 BRPM | SYSTOLIC BLOOD PRESSURE: 125 MMHG

## 2022-06-15 DIAGNOSIS — R19.7 DIARRHEA, UNSPECIFIED TYPE: ICD-10-CM

## 2022-06-15 DIAGNOSIS — D84.9 IMMUNOCOMPROMISED (HCC): ICD-10-CM

## 2022-06-15 DIAGNOSIS — C90.00 MULTIPLE MYELOMA NOT HAVING ACHIEVED REMISSION (HCC): Primary | ICD-10-CM

## 2022-06-15 DIAGNOSIS — Z94.84 H/O AUTOLOGOUS STEM CELL TRANSPLANT (HCC): ICD-10-CM

## 2022-06-15 DIAGNOSIS — C90.00 MULTIPLE MYELOMA, REMISSION STATUS UNSPECIFIED (HCC): Primary | ICD-10-CM

## 2022-06-15 DIAGNOSIS — G62.0 CHEMOTHERAPY-INDUCED NEUROPATHY (HCC): ICD-10-CM

## 2022-06-15 DIAGNOSIS — E83.42 HYPOMAGNESEMIA: ICD-10-CM

## 2022-06-15 DIAGNOSIS — T45.1X5A CHEMOTHERAPY-INDUCED NEUROPATHY (HCC): ICD-10-CM

## 2022-06-15 LAB
ALBUMIN SERPL-MCNC: 3.3 G/DL (ref 3.5–5)
ALBUMIN/GLOB SERPL: 1.3 {RATIO} (ref 1.2–3.5)
ALP SERPL-CCNC: 82 U/L (ref 50–136)
ALT SERPL-CCNC: 13 U/L (ref 12–65)
ANION GAP SERPL CALC-SCNC: 8 MMOL/L (ref 7–16)
AST SERPL-CCNC: 22 U/L (ref 15–37)
BILIRUB SERPL-MCNC: 0.1 MG/DL (ref 0.2–1.1)
BLASTS NFR BLD MANUAL: 8 %
BUN SERPL-MCNC: 5 MG/DL (ref 6–23)
CALCIUM SERPL-MCNC: 9.3 MG/DL (ref 8.3–10.4)
CHLORIDE SERPL-SCNC: 104 MMOL/L (ref 98–107)
CO2 SERPL-SCNC: 29 MMOL/L (ref 21–32)
CREAT SERPL-MCNC: 0.7 MG/DL (ref 0.6–1)
DIFFERENTIAL METHOD BLD: ABNORMAL
ERYTHROCYTE [DISTWIDTH] IN BLOOD BY AUTOMATED COUNT: 17.2 % (ref 11.9–14.6)
GGT SERPL-CCNC: 68 U/L (ref 5–55)
GLOBULIN SER CALC-MCNC: 2.6 G/DL (ref 2.3–3.5)
GLUCOSE SERPL-MCNC: 109 MG/DL (ref 65–100)
HCT VFR BLD AUTO: 28.2 % (ref 35.8–46.3)
HGB BLD-MCNC: 9.2 G/DL (ref 11.7–15.4)
LDH SERPL L TO P-CCNC: 480 U/L (ref 100–190)
LYMPHOCYTES # BLD: 0.5 K/UL (ref 0.5–4.6)
LYMPHOCYTES NFR BLD MANUAL: 4 % (ref 16–44)
MAGNESIUM SERPL-MCNC: 1.7 MG/DL (ref 1.8–2.4)
MCH RBC QN AUTO: 28.1 PG (ref 26.1–32.9)
MCHC RBC AUTO-ENTMCNC: 32.6 G/DL (ref 31.4–35)
MCV RBC AUTO: 86.2 FL (ref 79.6–97.8)
METAMYELOCYTES NFR BLD MANUAL: 3 %
MONOCYTES # BLD: 0.4 K/UL (ref 0.1–1.3)
MONOCYTES NFR BLD MANUAL: 3 % (ref 3–9)
MYELOCYTES NFR BLD MANUAL: 4 %
NEUTS BAND NFR BLD MANUAL: 13 % (ref 0–6)
NEUTS SEG # BLD: 10.9 K/UL (ref 1.7–8.2)
NEUTS SEG NFR BLD MANUAL: 63 % (ref 47–75)
NRBC # BLD: 0.44 K/UL (ref 0–0.2)
PHOSPHATE SERPL-MCNC: 3.3 MG/DL (ref 2.5–4.5)
PLATELET # BLD AUTO: 46 K/UL (ref 150–450)
PLATELET COMMENT: ABNORMAL
PMV BLD AUTO: 11.8 FL (ref 9.4–12.3)
POTASSIUM SERPL-SCNC: 3.5 MMOL/L (ref 3.5–5.1)
PROMYELOCYTES NFR BLD MANUAL: 2 %
PROT SERPL-MCNC: 5.9 G/DL (ref 6.3–8.2)
RBC # BLD AUTO: 3.27 M/UL (ref 4.05–5.2)
RBC MORPH BLD: ABNORMAL
SODIUM SERPL-SCNC: 141 MMOL/L (ref 136–145)
WBC # BLD AUTO: 12.8 K/UL (ref 4.3–11.1)
WBC MORPH BLD: ABNORMAL

## 2022-06-15 PROCEDURE — 96366 THER/PROPH/DIAG IV INF ADDON: CPT

## 2022-06-15 PROCEDURE — 96372 THER/PROPH/DIAG INJ SC/IM: CPT

## 2022-06-15 PROCEDURE — 2580000003 HC RX 258: Performed by: NURSE PRACTITIONER

## 2022-06-15 PROCEDURE — 83735 ASSAY OF MAGNESIUM: CPT

## 2022-06-15 PROCEDURE — 85025 COMPLETE CBC W/AUTO DIFF WBC: CPT

## 2022-06-15 PROCEDURE — 83615 LACTATE (LD) (LDH) ENZYME: CPT

## 2022-06-15 PROCEDURE — 99214 OFFICE O/P EST MOD 30 MIN: CPT | Performed by: NURSE PRACTITIONER

## 2022-06-15 PROCEDURE — 2580000003 HC RX 258: Performed by: INTERNAL MEDICINE

## 2022-06-15 PROCEDURE — 84100 ASSAY OF PHOSPHORUS: CPT

## 2022-06-15 PROCEDURE — 96375 TX/PRO/DX INJ NEW DRUG ADDON: CPT

## 2022-06-15 PROCEDURE — 6360000002 HC RX W HCPCS: Performed by: INTERNAL MEDICINE

## 2022-06-15 PROCEDURE — 6360000002 HC RX W HCPCS: Performed by: NURSE PRACTITIONER

## 2022-06-15 PROCEDURE — 96365 THER/PROPH/DIAG IV INF INIT: CPT

## 2022-06-15 PROCEDURE — 82977 ASSAY OF GGT: CPT

## 2022-06-15 PROCEDURE — 80053 COMPREHEN METABOLIC PANEL: CPT

## 2022-06-15 RX ORDER — ONDANSETRON 2 MG/ML
8 INJECTION INTRAMUSCULAR; INTRAVENOUS
Status: COMPLETED | OUTPATIENT
Start: 2022-06-15 | End: 2022-06-15

## 2022-06-15 RX ORDER — DEXTROSE, SODIUM CHLORIDE, AND POTASSIUM CHLORIDE 5; .45; .15 G/100ML; G/100ML; G/100ML
1000 INJECTION INTRAVENOUS ONCE
Status: DISCONTINUED | OUTPATIENT
Start: 2022-06-15 | End: 2022-06-15 | Stop reason: ALTCHOICE

## 2022-06-15 RX ORDER — SODIUM CHLORIDE 0.9 % (FLUSH) 0.9 %
5-40 SYRINGE (ML) INJECTION PRN
Status: ACTIVE | OUTPATIENT
Start: 2022-06-15 | End: 2022-06-15

## 2022-06-15 RX ADMIN — SODIUM CHLORIDE, PRESERVATIVE FREE 10 ML: 5 INJECTION INTRAVENOUS at 10:20

## 2022-06-15 RX ADMIN — POTASSIUM CHLORIDE: 2 INJECTION, SOLUTION, CONCENTRATE INTRAVENOUS at 10:29

## 2022-06-15 RX ADMIN — SODIUM CHLORIDE, PRESERVATIVE FREE 10 ML: 5 INJECTION INTRAVENOUS at 10:25

## 2022-06-15 RX ADMIN — FILGRASTIM-AAFI 300 MCG: 300 INJECTION, SOLUTION SUBCUTANEOUS at 10:22

## 2022-06-15 RX ADMIN — ONDANSETRON 8 MG: 2 INJECTION INTRAMUSCULAR; INTRAVENOUS at 10:21

## 2022-06-15 RX ADMIN — SODIUM CHLORIDE, PRESERVATIVE FREE 20 ML: 5 INJECTION INTRAVENOUS at 09:50

## 2022-06-15 RX ADMIN — SODIUM CHLORIDE, PRESERVATIVE FREE 10 ML: 5 INJECTION INTRAVENOUS at 12:40

## 2022-06-15 NOTE — PROGRESS NOTES
Data Source: Patient, [facility] record. 6/15/22        Candance Bossier [id number]      54 y.o. Patient Encounter: [facility] Visit      Heme Diagnosis:   MM, transplant evaluation. Heme History (Copied from prior):    54 female, history of prolactinoma, now kindly referred to us by Dr. James Phillips for evaluation of her multiple myeloma and consideration of autologous stem cell  transplant. Her hematologic history is as follows:      - Summer 2021: Started noticing back discomfort.   - 10/21: Progressive debility leading to be being dependent on wheelchair. Led to imaging including T and L-spine showing a cortically destructive lytic lesion with significant soft tissue component involving T11 with resulting extradural soft tissue  mass compression of underlying spinal cord. Smaller lytic lesions in the right and left iliac wing also noted. Subsequently hospitalized and underwent T10/T11 laminectomies with excision of spinal cord tumor by neurosurgery. A bone marrow biopsy from  iliac crest also performed. 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 Bone marrow biopsy from right hip showing scant bone marrow specimen, with 15% cellularity and 70% involvement by lambda  light chain restricted plasma cell neoplasm. FISH not available. PET scan 11/9/2021 with markedly hypermetabolic 2.8 cm thyroid nodule, T11 site surgical changes, however no evidence of FDG avid multiple myeloma noted. Labs showing normal counts, creatinine  normal, beta-2 microglobulin at 1.72, SPEP with M spike 0.7, however lambda light chain significantly elevated at 44,763. She was felt to have ISS stage I disease, with plans to start RVD in the near future. She has also been referred to radiation oncology  for consolidative XRT to spine. Dental evaluation prior to bone directed therapy. Her thyroid nodule is also being evaluated with ultrasound. Today seen in office, reports doing reasonably. Planning to start RVD tomorrow. Currently in wheelchair however has only recently started working with physical therapy. Reports minimal headaches. Denies incontinence. Reasonable PO intake. Reports back  pain improved since surgery. Recently saw radiation oncology and not felt to need consolidative xrt. She is scheduled to see ENT regarding thyroid nodules. Stage:   1   Performance Status:   2   Pain Score/Fatigue:        Pain Medication related Constipation:   Addressed    Interval History:  6/15/2022: Here for day + 13 status post cycle autologous PBSC transplant. She is doing well overall. Energy is better, appetite is good. Diarrhea ongoing - not taking Lomotil and Imodium aggressively but will start. No nausea or vomiting. Her neuropathy is ongoing - gabapentin helpful. No cough, dyspnea or wheezing. No bleeding. No fevers, chills or other infectious symptoms. Taking acyclovir BID. NCCN Distress Score:  No data recorded  0       REVIEW OF SYSTEMS:   As mentioned above, all other systems were reviewed in full and are negative.     Active Ambulatory Problems     Diagnosis Date Noted    Bone marrow transplant candidate 04/18/2022    Multiple myeloma not having achieved remission ([facility] 05/05/2022    Stem cell transplant candidate 05/31/2022    Immunocompromised ([facility]     Bone marrow transplant status ([facility]     Admission for antineoplastic chemotherapy     Loose stools      Resolved Ambulatory Problems     Diagnosis Date Noted    No Resolved Ambulatory Problems     Past Medical History:   Diagnosis Date    Obesity     Prolactin increased      Past Surgical History:   Procedure Laterality Date    IR BIOPSY PERC SUPERF BONE      IR TUNNELED CATHETER PLACEMENT GREATER THAN 5 YEARS  5/9/2022    IR TUNNELED CATHETER PLACEMENT GREATER THAN 5 YEARS  5/9/2022    IR TUNNELED CATHETER PLACEMENT GREATER THAN 5 YEARS 5/9/2022 [facility]IOLOG[facility] SPECIALS     Current Outpatient Medications   Medication Sig Dispense Refill    diphenoxylate-atropine (LOMOTIL) 2.5-0.025 MG per tablet Take 2 tablets by mouth 4 times daily as needed for Diarrhea.  acyclovir (ZOVIRAX) 400 MG tablet Take 400 mg by mouth 2 times daily      DULoxetine (CYMBALTA) 30 MG extended release capsule Take 30 mg by mouth daily      gabapentin (NEURONTIN) 300 MG capsule Take 300 mg by mouth.  HYDROcodone-acetaminophen (NORCO) 7.5-325 MG per tablet Take 1 tablet by mouth every 6 hours as needed. No current facility-administered medications for this visit. Facility-Administered Medications Ordered in Other Visits   Medication Dose Route Frequency Provider Last Rate Last Admin    sodium chloride flush 0.9 % injection 5-40 mL  5-40 mL IntraVENous PRN Mali Stevenson MD   10 mL at 06/15/22 1025    potassium chloride 20 mEq, magnesium sulfate 4,000 mg in sodium chloride 0.9 % 500 mL infusion   IntraVENous Once BEE Grey -  mL/hr at 06/15/22 1029 New Bag at 06/15/22 1029        Social History     Socioeconomic History    Marital status:      Spouse name: Not on file    Number of children: Not on file    Years of education: Not on file    Highest education level: Not on file   Occupational History    Not on file   Tobacco Use    Smoking status: Never Smoker    Smokeless tobacco: Never Used   Substance and Sexual Activity    Alcohol use: Never    Drug use: Never    Sexual activity: Not on file   Other Topics Concern    Not on file   Social History Narrative    Not on file     Social Determinants of Health     Financial Resource Strain:     Difficulty of Paying Living Expenses: Not on file   Food Insecurity:     Worried About [address] in the Last Year: Not on file    [address] in the Last Year: Not on file   Transportation Needs:     Lack of Transportation (Medical):  Not on file    Lack of Transportation (Non-Medical): Not on file   Physical Activity:     Days of Exercise per Week: Not on file    Minutes of Exercise per Session: Not on file   Stress:     Feeling of Stress : Not on file   Social Connections:     Frequency of Communication with Friends and Family: Not on file    Frequency of Social Gatherings with Friends and Family: Not on file    Attends Mandaeism Services: Not on file    Active Member of [address] [place] [address] or Organizations: Not on file    Attends Club or Organization Meetings: Not on file    Marital Status: Not on file   Intimate Partner Violence:     Fear of Current or Ex-Partner: Not on file    Emotionally Abused: Not on file    Physically Abused: Not on file    Sexually Abused: Not on file   Housing Stability:     Unable to Pay for Housing in the Last Year: Not on file    Number of [place] in the Last Year: Not on file    Unstable Housing in the Last Year: Not on file            Allergies   Allergen Reactions    Amoxicillin-Pot Clavulanate Other (See Comments)     Yeast infection  Yeast infection            PHYSICAL EXAMINATION:   General Appearance: Healthy appearing patient in no acute distress  Vitals:    06/15/22 1000   BP: 125/67   Pulse: (!) 105   Resp: 18   Temp: 98 °F (36.7 °C)   SpO2: 96%   Weight: 234 lb (106.1 kg)      There were no vitals taken for this visit. HEENT: No oral or pharyngeal masses, ulceration or thrush noted, no sinus tenderness. Neck is supple with no thyromegaly or JVD noted. Lungs/Thorax: Clear to auscultation, no accessory muscles of respiration being used. Heart: Regular rate and rhythm, normal S1, S2, no appreciable murmurs, rubs, gallops   Abdomen: Soft, nontender, bowel sounds present, no appreciable hepatosplenomegaly, no palpable masses   Extremeties: Good pulses bilaterally, trace BLE peripheral edema. Skin: Normal skin tone with no rash, petechiae, ecchymosis noted.    Musculoskeletal: No pain on palpation over bony prominence, trace BLE edema, no evidence of gout, no joint or bony deformity   Neurologic: Grossly intact      LABS/IMAGING:  Hospital Outpatient Visit on 06/15/2022   Component Date Value Ref Range Status    WBC 06/15/2022 12.8* 4.3 - 11.1 K/uL Final    Comment: RESULTS CHECKED X 2  PERIPHERAL REVIEW TO FOLLOW      RBC 06/15/2022 3.27* 4.05 - 5.2 M/uL Final    Hemoglobin 06/15/2022 9.2* 11.7 - 15.4 g/dL Final    Hematocrit 06/15/2022 28.2* 35.8 - 46.3 % Final    MCV 06/15/2022 86.2  79.6 - 97.8 FL Final    MCH 06/15/2022 28.1  26.1 - 32.9 PG Final    MCHC 06/15/2022 32.6  31.4 - 35.0 g/dL Final    RDW 06/15/2022 17.2* 11.9 - 14.6 % Final    Platelets 37/01/3408 46* 150 - 450 K/uL Final    MPV 06/15/2022 11.8  9.4 - 12.3 FL Final    nRBC 06/15/2022 0.44* 0.0 - 0.2 K/uL Final    **Note: Absolute NRBC parameter is now reported with Hemogram**    Differential Type 06/15/2022 PENDING   Incomplete    Sodium 06/15/2022 141  136 - 145 mmol/L Final    Potassium 06/15/2022 3.5  3.5 - 5.1 mmol/L Final    Chloride 06/15/2022 104  98 - 107 mmol/L Final    CO2 06/15/2022 29  21 - 32 mmol/L Final    Anion Gap 06/15/2022 8  7 - 16 mmol/L Final    Glucose 06/15/2022 109* 65 - 100 mg/dL Final    BUN 06/15/2022 5* 6 - 23 MG/DL Final    CREATININE 06/15/2022 0.70  0.6 - 1.0 MG/DL Final    GFR  06/15/2022 >60  >60 ml/min/1.73m2 Final    GFR Non- 06/15/2022 >60  >60 ml/min/1.73m2 Final    Comment:      Estimated GFR is calculated using the Modification of Diet in Renal Disease (MDRD) Study equation, reported for both  Americans (GFRAA) and non- Americans (GFRNA), and normalized to 1.73m2 body surface area. The physician must decide which value applies to the patient. The MDRD study equation should only be used in individuals age 25 or older.  It has not been validated for the following: pregnant women, patients with serious comorbid conditions,or on certain medications, or persons with extremes of body size, muscle mass, or nutritional status.  Calcium 06/15/2022 9.3  8.3 - 10.4 MG/DL Final    Total Bilirubin 06/15/2022 0.1* 0.2 - 1.1 MG/DL Final    ALT 06/15/2022 13  12 - 65 U/L Final    AST 06/15/2022 22  15 - 37 U/L Final    Alk Phosphatase 06/15/2022 82  50 - 136 U/L Final    Total Protein 06/15/2022 5.9* 6.3 - 8.2 g/dL Final    Albumin 06/15/2022 3.3* 3.5 - 5.0 g/dL Final    Globulin 06/15/2022 2.6  2.3 - 3.5 g/dL Final    Albumin/Globulin Ratio 06/15/2022 1.3  1.2 - 3.5   Final    Magnesium 06/15/2022 1.7* 1.8 - 2.4 mg/dL Final    Phosphorus 06/15/2022 3.3  2.5 - 4.5 MG/DL Final    LD 06/15/2022 480* 100 - 190 U/L Final    GGT 06/15/2022 68* 5 - 55 U/L Final   No results displayed because visit has over 200 results. Above results reviewed with patient. ASSESSMENT:   54 female, history of prolactinoma, now kindly referred to us by Dr. Jorge Luis Carreon for evaluation of her multiple myeloma and consideration of autologous stem cell  transplant. Her hematologic history is as follows:      - Summer 2021: Started noticing back discomfort.   - 10/21: Progressive debility leading to be being dependent on wheelchair. Led to imaging including T and L-spine showing a cortically destructive lytic lesion with significant soft tissue component involving T11 with resulting extradural soft tissue  mass compression of underlying spinal cord. Smaller lytic lesions in the right and left iliac wing also noted. Subsequently hospitalized and underwent T10/T11 laminectomies with excision of spinal cord tumor by neurosurgery. A bone marrow biopsy from  iliac crest also performed. 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    Bone marrow biopsy from right hip showing scant bone marrow specimen, with 15% cellularity and 70% involvement by lambda light chain restricted plasma cell neoplasm. FISH not available. PET scan 11/9/2021  with markedly hypermetabolic 2.8 cm thyroid nodule, T11 site surgical changes, however no evidence of FDG avid multiple myeloma noted. Labs showing normal counts, creatinine normal, beta-2 microglobulin at 1.72, SPEP with M spike 0.7, however lambda  light chain significantly elevated at 44,763. She was felt to have ISS stage I disease, with plans to start RVD in the near future. She has also been referred to radiation oncology for consolidative XRT to spine. Dental evaluation prior to bone directed  therapy. Her thyroid nodule is also being evaluated with ultrasound. Today seen in office, reports doing reasonably. Planning to start RVD tomorrow. Currently in wheelchair however has only recently started working with physical therapy. Reports minimal headaches. Denies incontinence. Reasonable PO intake. Reports back  pain improved since surgery. Recently saw radiation oncology and not felt to need consolidative xrt. She is scheduled to see ENT regarding thyroid nodules. We discussed role of autologous stem cell transplant in first remission after induction therapy. Various aspects pertaining to process, risks and side effects discussed. Patient appears interested in proceeding with procedure moving forward. 2/25/2022: Seen in follow-up. Her bone marrow biopsy with cytogenetics showed normal karyotype, FISH panel had shown gain of 1 q., monosomy 11 and 13, and t(11;14), negative for 17p deletion  or other IGH rearrangement. She has since been started on daratumumab-RVD. Appears  to have responding disease as her lambda LC has dropped from 60,020 down to 39. M spike is now turned negative. She recently completed cycle 4-day 15 on 2/21/2022 (3-week cycle) . Due to ongoing neuropathy, patient today using walker.   Describes neuropathy as tingling/discomfort coming up to her calf, her Velcade dose more recently has been decreased to 0.7 mg/m2. We will discuss case with Dr. Shanell Morales. Given excellent response,  will be reasonable to remove Velcade altogether and proceed with Daratumumab-RD q. 28 days x 2 cycles followed by plans for mobilization  and collection. This will hopefully also give her time for neuropathy and overall performance status improved. We will see her back in 2 months time. 4/18/2022: Patient seen in follow-up. She recently completed cycle 6-day 15 therapy on 4/11/2022, with last Revlimid dose on 4/14/2022. Neuropathy has improved with supportive care including gabapentin 600 mg 3 times daily along with Cymbalta 30 mg  daily. Mobility much improved, now freely able to move in the room. Back discomfort described as stable to improved. Denies any recent fevers or chills, good p.o. intake. Routine blood work unremarkable, follow-up SPEP/LC. We will proceed with premobilization  studies, as well as restaging including PET scan and bone marrow biopsy. Given responding disease, will plan to proceed with mobilization and collection, currently scheduled for pre-mob evaluation 5/5/2022 and high-dose therapy/ASCT 6/1/22. Given lack  of social support, transplant will be inpatient. CD34+ goal will be 6 mil/kg. She will be mobilized with G-CSF +/- Mozobil. Conditioning will be with melphalan 200 mg per metered square. She will need a tunneled catheter during procedure. We will  see her back in 2 to 3 weeks with above, navigation following. 5/5/2022: Reports feeling well. Extensive ROS unremarkable. Restaging studies showing lambda LC only borderline high, SPEP with low level M spike, 24-hour UPEP negative, bone marrow biopsy with 20 to 30% cellularity without any evidence of residual  disease, low iron stores noted: Serum ferritin also low at 20: We will plan for IV iron x2. She will benefit from GI evaluation as well as pelvic exam post transplant.   Skeletal survey negative, PET scan without evidence of active disease, of note right  sided thyroid nodule with increased uptake noted. Patient has seen [facility] ENT Dr. 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 She was advised  to continue following with them posttransplant. Pre-mobilization studies unremarkable including chest x-ray, EKG, 2D echo, PFTs as well as infectious panel. She does note some chronic bilateral LE swelling: We will get Dopplers to rule out DVT. We will  also plan for Evusheld post collection. We will now see her back prior to ASCT. 5/10/2022: she is here for day 1 of collection. She tolerated mobilization very well. There has been some G-CSF related bone pain in upper legs and low back. There is no GI or bowel complaints. Appetite is good and weight is stable. No cough, shortness  of breath. Trace BLE edema is stable. There are no new concerns or complaints. Denies any fevers or infectious symptoms. Labs reviewed. Proceed with day 1 collection. 5/11/2022: Here for day 2 collection, she collected 4.42 milk/kg yesterday of goal of 6. She tolerated yesterday's collection very well. There have been no clinical changes and she has no concerns or complaints. There was mild oozing around her apheresis  line-dressing changed today and currently without bleeding. Denies any fevers or infectious symptoms. Weight up 9#, BLE edema stable. Labs reviewed, WBC 60K, Plt 73K, Cr 1.1, Mg+ 1.2-replace. Proceed with day 2 collection. 6/15/2022: Here for day + 13 status post cycle autologous PBSC transplant. She is doing well overall. Energy is better, appetite is good. Diarrhea ongoing - not taking Lomotil and Imodium aggressively but will start. No nausea or vomiting. Her neuropathy is ongoing - gabapentin helpful. No cough, dyspnea or wheezing. No bleeding.  No fevers, chills or other infectious symptoms. Taking acyclovir BID. Labs reviewed. 1. Multiple Myeloma, ISS stage 1, high risk disease (gain of 1 q.)   2. Neuropathy. 3. Iron Deficiency   4. 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 ICD-10-CM    1. Multiple myeloma, remission status unspecified (HCC)  C90.00    2. Immunocompromised ([facility]  D84.9    3. H/O autologous stem cell transplant ([facility]  Z94.84    4. Diarrhea, unspecified type  R19.7    5. Chemotherapy-induced neuropathy ([facility]  G62.0     T45. 1X5A    6. Hypomagnesemia  E83.42          PLAN:   - As above. Status post autologous PBSC transplant. Day +13.     - HDT/ASCT will be inpatient given lack of social support. - CD34+ goal will be 6 mil/kg. - Mobilized with G-CSF +/- Mozobil.     - Conditioned with melphalan 200 mg per metered square. - S/p Olivia-RVD [id number] x 4 cycles and Olivia-RD [id number] x 2.    - Neuropathy: On Gabapentin 600 mg 3 times daily along with Cymbalta 60 mg daily.   - Immunocompromised: Acyclovir BID.    - Evusheld [id number]   - Iron deficiency: IV iron x 2. She will benefit from GI evaluation as well as pelvic exam post transplant.   - F/u w ENT as needed. - Diarrhea: Aggressive with Lomotil/Imodium alternating.   - Replace magnesium IV today. - Replace potassium IV today. - Continue good oral nutrition and hydration.   - Encouraged frequent activity throughout the day and rest as needed to combat fatigue.   - Call with any fevers, uncontrolled side effects from treatment or any other worrisome/concerning symptoms. Return daily for now.                 [facility]  Hematology and Oncology   [address] [address]   Office : [phone]   Fax : [phone]

## 2022-06-15 NOTE — PROGRESS NOTES
Diagnosis: Multiple Myeloma  Auto Transplant Date:  06/02/22  Day: (+/-) +13  Chemo regimen used: Melphalan  Labs not resulted that need follow-up: None at this time  BMT assessments and toxicities completed. 06/13/22  WBC/ANC=12.8/10.9  Prophylactic medications started on D+1(06/03/22) PO Diflucan;Levaquin; Augmentin; (on Acyclovir PTA)  Toxicities greater than 2 : None  Patient seen by MD/NP Deanna Cai NP & until engraftment. New orders received: pt c/o diarrhea 5 times in last 24 hours. Pt instructed to alternate use of Imodium and Lomotil. Pt reports drinking \"plenty of fluids\" at least greater than 8-10 cups per day. Pt with mild fluid retention to BLE, 500 ml's NS with 20 meqKCL & Magnesium 4 grams IV over 2 hours ordered.   Next appointment scheduled is June 16 @ 0900

## 2022-06-16 ENCOUNTER — HOSPITAL ENCOUNTER (OUTPATIENT)
Dept: INFUSION THERAPY | Age: 52
Discharge: HOME | End: 2022-06-16
Payer: COMMERCIAL

## 2022-06-16 ENCOUNTER — OFFICE VISIT (OUTPATIENT)
Dept: ONCOLOGY | Age: 52
End: 2022-06-16
Payer: COMMERCIAL

## 2022-06-16 VITALS
HEART RATE: 110 BPM | BODY MASS INDEX: 42.98 KG/M2 | OXYGEN SATURATION: 92 % | SYSTOLIC BLOOD PRESSURE: 138 MMHG | TEMPERATURE: 98.5 F | WEIGHT: 235 LBS | DIASTOLIC BLOOD PRESSURE: 74 MMHG | RESPIRATION RATE: 18 BRPM

## 2022-06-16 VITALS
BODY MASS INDEX: 42.98 KG/M2 | OXYGEN SATURATION: 92 % | HEART RATE: 110 BPM | DIASTOLIC BLOOD PRESSURE: 74 MMHG | RESPIRATION RATE: 18 BRPM | TEMPERATURE: 98.5 F | WEIGHT: 235 LBS | SYSTOLIC BLOOD PRESSURE: 138 MMHG

## 2022-06-16 DIAGNOSIS — C90.00 MULTIPLE MYELOMA NOT HAVING ACHIEVED REMISSION (HCC): Primary | ICD-10-CM

## 2022-06-16 DIAGNOSIS — Z94.84 H/O AUTOLOGOUS STEM CELL TRANSPLANT (HCC): ICD-10-CM

## 2022-06-16 DIAGNOSIS — E83.42 HYPOMAGNESEMIA: ICD-10-CM

## 2022-06-16 DIAGNOSIS — T45.1X5A CHEMOTHERAPY-INDUCED NEUROPATHY (HCC): ICD-10-CM

## 2022-06-16 DIAGNOSIS — R19.7 DIARRHEA, UNSPECIFIED TYPE: ICD-10-CM

## 2022-06-16 DIAGNOSIS — D84.9 IMMUNOCOMPROMISED (HCC): ICD-10-CM

## 2022-06-16 DIAGNOSIS — G62.0 CHEMOTHERAPY-INDUCED NEUROPATHY (HCC): ICD-10-CM

## 2022-06-16 DIAGNOSIS — C90.00 MULTIPLE MYELOMA, REMISSION STATUS UNSPECIFIED (HCC): Primary | ICD-10-CM

## 2022-06-16 DIAGNOSIS — E87.6 HYPOKALEMIA: ICD-10-CM

## 2022-06-16 LAB
ANION GAP SERPL CALC-SCNC: 8 MMOL/L (ref 7–16)
BLASTS NFR BLD MANUAL: 2 %
BUN SERPL-MCNC: 6 MG/DL (ref 6–23)
CALCIUM SERPL-MCNC: 8.6 MG/DL (ref 8.3–10.4)
CHLORIDE SERPL-SCNC: 106 MMOL/L (ref 98–107)
CO2 SERPL-SCNC: 28 MMOL/L (ref 21–32)
CREAT SERPL-MCNC: 0.7 MG/DL (ref 0.6–1)
DIFFERENTIAL METHOD BLD: ABNORMAL
ERYTHROCYTE [DISTWIDTH] IN BLOOD BY AUTOMATED COUNT: 18.1 % (ref 11.9–14.6)
GLUCOSE SERPL-MCNC: 118 MG/DL (ref 65–100)
HCT VFR BLD AUTO: 27.4 % (ref 35.8–46.3)
HGB BLD-MCNC: 8.8 G/DL (ref 11.7–15.4)
LYMPHOCYTES # BLD: 1 K/UL (ref 0.5–4.6)
LYMPHOCYTES NFR BLD MANUAL: 4 % (ref 16–44)
MAGNESIUM SERPL-MCNC: 2 MG/DL (ref 1.8–2.4)
MCH RBC QN AUTO: 28.3 PG (ref 26.1–32.9)
MCHC RBC AUTO-ENTMCNC: 32.1 G/DL (ref 31.4–35)
MCV RBC AUTO: 88.1 FL (ref 79.6–97.8)
METAMYELOCYTES NFR BLD MANUAL: 4 %
MONOCYTES # BLD: 1.5 K/UL (ref 0.1–1.3)
MONOCYTES NFR BLD MANUAL: 6 % (ref 3–9)
MYELOCYTES NFR BLD MANUAL: 3 %
NEUTS BAND NFR BLD MANUAL: 15 % (ref 0–6)
NEUTS SEG # BLD: 21.8 K/UL (ref 1.7–8.2)
NEUTS SEG NFR BLD MANUAL: 65 % (ref 47–75)
NRBC # BLD: 0.41 K/UL (ref 0–0.2)
PLATELET # BLD AUTO: 58 K/UL (ref 150–450)
PLATELET COMMENT: ABNORMAL
PMV BLD AUTO: 11.8 FL (ref 9.4–12.3)
POTASSIUM SERPL-SCNC: 3.4 MMOL/L (ref 3.5–5.1)
PROMYELOCYTES NFR BLD MANUAL: 1 %
RBC # BLD AUTO: 3.11 M/UL (ref 4.05–5.2)
RBC MORPH BLD: ABNORMAL
SODIUM SERPL-SCNC: 142 MMOL/L (ref 136–145)
WBC # BLD AUTO: 24.8 K/UL (ref 4.3–11.1)
WBC MORPH BLD: ABNORMAL

## 2022-06-16 PROCEDURE — 96366 THER/PROPH/DIAG IV INF ADDON: CPT

## 2022-06-16 PROCEDURE — 99214 OFFICE O/P EST MOD 30 MIN: CPT | Performed by: NURSE PRACTITIONER

## 2022-06-16 PROCEDURE — 2580000003 HC RX 258: Performed by: INTERNAL MEDICINE

## 2022-06-16 PROCEDURE — 6360000002 HC RX W HCPCS: Performed by: NURSE PRACTITIONER

## 2022-06-16 PROCEDURE — 83735 ASSAY OF MAGNESIUM: CPT

## 2022-06-16 PROCEDURE — 96365 THER/PROPH/DIAG IV INF INIT: CPT

## 2022-06-16 PROCEDURE — 85025 COMPLETE CBC W/AUTO DIFF WBC: CPT

## 2022-06-16 PROCEDURE — 96368 THER/DIAG CONCURRENT INF: CPT

## 2022-06-16 PROCEDURE — 80048 BASIC METABOLIC PNL TOTAL CA: CPT

## 2022-06-16 RX ORDER — POTASSIUM CHLORIDE 14.9 MG/ML
20 INJECTION INTRAVENOUS ONCE
Status: COMPLETED | OUTPATIENT
Start: 2022-06-16 | End: 2022-06-16

## 2022-06-16 RX ORDER — MAGNESIUM SULFATE IN WATER 40 MG/ML
4000 INJECTION, SOLUTION INTRAVENOUS ONCE
Status: COMPLETED | OUTPATIENT
Start: 2022-06-16 | End: 2022-06-16

## 2022-06-16 RX ORDER — SODIUM CHLORIDE AND POTASSIUM CHLORIDE .9; .15 G/100ML; G/100ML
SOLUTION INTRAVENOUS ONCE
Status: DISCONTINUED | OUTPATIENT
Start: 2022-06-16 | End: 2022-06-16

## 2022-06-16 RX ORDER — SODIUM CHLORIDE 0.9 % (FLUSH) 0.9 %
5-40 SYRINGE (ML) INJECTION PRN
Status: DISCONTINUED | OUTPATIENT
Start: 2022-06-16 | End: 2022-06-17 | Stop reason: HOSPADM

## 2022-06-16 RX ADMIN — SODIUM CHLORIDE, PRESERVATIVE FREE 10 ML: 5 INJECTION INTRAVENOUS at 12:55

## 2022-06-16 RX ADMIN — SODIUM CHLORIDE, PRESERVATIVE FREE 10 ML: 5 INJECTION INTRAVENOUS at 09:35

## 2022-06-16 RX ADMIN — MAGNESIUM SULFATE HEPTAHYDRATE 4000 MG: 4 INJECTION, SOLUTION INTRAVENOUS at 10:55

## 2022-06-16 RX ADMIN — POTASSIUM CHLORIDE 20 MEQ: 14.9 INJECTION, SOLUTION INTRAVENOUS at 10:55

## 2022-06-16 NOTE — PROGRESS NOTES
Diagnosis: Multiple Myeloma  Transplant Date: 6/2/2022  Day: (+/-) +14  Chemo regimen used: Melphalan  Labs needed at next visit: see orders  Labs not resulted that need follow-up: none  Next Appointment scheduled: 6/18/2022 @ 9 am.  BMT assessments and toxicities completed. WBC/ANC= 24.8/21.8  Prophylactic medications started on D +1 and stopped 6/14/2022. G-CSF started inpatient and last dose given on 6/15/2022. Toxicities greater than 2: none  Bactrim or equivalent to be initiated on D +30. Patient seen by MD/NP Zara Chapman) until engraftment. New orders received: Order received for 20 KCl and 4g magnesium sulfate. However, after magnesium infusion was started, magnesium resulted at 2.0. Order received to give only 2g magneiusm sulfate, per Robel Cheung NP. No Nivestym or fluids today, per Gus Westbrook NP. Issues: Patient with ongoing diarrhea. Patient reports proper use of anti-diarrheals and feels like it is improving. 5 stools reported since she was here yesterday. Patient reports dry mouth, but no sore or issues were noted. Central line dressing was loose. Dressing changed per protocol. Patient discharged ambulatory with self.

## 2022-06-16 NOTE — PROGRESS NOTES
Her thyroid nodule is also being evaluated with ultrasound. Today seen in office, reports doing reasonably. Planning to start RVD tomorrow. Currently in wheelchair however has only recently started working with physical therapy. Reports minimal headaches. Denies incontinence. Reasonable PO intake. Reports back  pain improved since surgery. Recently saw radiation oncology and not felt to need consolidative xrt. She is scheduled to see ENT regarding thyroid nodules. Stage:   1   Performance Status:   2   Pain Score/Fatigue:        Pain Medication related Constipation:   Addressed    Interval History:  6/16/2022: Here for day + 14 status post cycle autologous PBSC transplant. She continues to do very well overall. Appetite is fair - weight stable. Energy is fair - naps when needed. Diarrhea slightly improved - taking Lomotil and Imodium as needed. No nausea or vomiting. Her neuropathy is ongoing - gabapentin helpful. No cough, dyspnea or wheezing. No bleeding. No fevers, chills or other infectious symptoms. Taking acyclovir BID. Complains of dry mouth. NCCN Distress Score:  No data recorded         REVIEW OF SYSTEMS:   As mentioned above, all other systems were reviewed in full and are negative.     Active Ambulatory Problems     Diagnosis Date Noted    Bone marrow transplant candidate 04/18/2022    Multiple myeloma not having achieved remission ([address] 05/05/2022    Stem cell transplant candidate 05/31/2022    Immunocompromised ([address]     [facility]     Admission for antineoplastic chemotherapy     Loose stools      Resolved Ambulatory Problems     Diagnosis Date Noted    No Resolved Ambulatory Problems     Past Medical History:   Diagnosis Date    Obesity     Prolactin increased      Past Surgical History:   Procedure Laterality Date    IR BIOPSY PERC SUPERF BONE      IR TUNNELED CATHETER PLACEMENT GREATER THAN 5 YEARS  5/9/2022    IR TUNNELED CATHETER PLACEMENT GREATER THAN 5 YEARS  5/9/2022    IR TUNNELED CATHETER PLACEMENT GREATER THAN 5 YEARS 5/9/2022 [facility] SPECIALS     Current Outpatient Medications   Medication Sig Dispense Refill    diphenoxylate-atropine (LOMOTIL) 2.5-0.025 MG per tablet Take 2 tablets by mouth 4 times daily as needed for Diarrhea.  acyclovir (ZOVIRAX) 400 MG tablet Take 400 mg by mouth 2 times daily      DULoxetine (CYMBALTA) 30 MG extended release capsule Take 30 mg by mouth daily      gabapentin (NEURONTIN) 300 MG capsule Take 300 mg by mouth.  HYDROcodone-acetaminophen (NORCO) 7.5-325 MG per tablet Take 1 tablet by mouth every 6 hours as needed. No current facility-administered medications for this visit.      Facility-Administered Medications Ordered in Other Visits   Medication Dose Route Frequency Provider Last Rate Last Admin    sodium chloride flush 0.9 % injection 5-40 mL  5-40 mL IntraVENous PRN Dennison Goodpasture, MD   10 mL at 06/16/22 0935    magnesium sulfate 4000 mg in 100 mL IVPB premix  4,000 mg IntraVENous Once BEE Granger CNP        potassium chloride 20 mEq in 100 mL IVPB  20 mEq IntraVENous Once BEE Granger CNP            Social History     Socioeconomic History    Marital status:      Spouse name: Not on file    Number of children: Not on file    Years of education: Not on file    Highest education level: Not on file   Occupational History    Not on file   Tobacco Use    Smoking status: Never Smoker    Smokeless tobacco: Never Used   Substance and Sexual Activity    Alcohol use: Never    Drug use: Never    Sexual activity: Not on file   Other Topics Concern    Not on file   Social History Narrative    Not on file     Social Determinants of Health     Financial Resource Strain:     Difficulty of Paying Living Expenses: Not on file   Food Insecurity:     Worried About [address] in the Last Year: Not on file    [address] in the Last Year: Not on file   Transportation Needs:     Lack of Transportation (Medical): Not on file    Lack of Transportation (Non-Medical): Not on file   Physical Activity:     Days of Exercise per Week: Not on file    Minutes of Exercise per Session: Not on file   Stress:     Feeling of Stress : Not on file   Social Connections:     Frequency of Communication with Friends and Family: Not on file    Frequency of Social Gatherings with Friends and Family: Not on file    Attends [employer] Services: Not on file    Active Member of [address] [place] [address] or Organizations: Not on file    Attends Club or Organization Meetings: Not on file    Marital Status: Not on file   Intimate Partner Violence:     Fear of Current or Ex-Partner: Not on file    Emotionally Abused: Not on file    Physically Abused: Not on file    Sexually Abused: Not on file   Housing Stability:     Unable to Pay for Housing in the Last Year: Not on file    Number of [place] in the Last Year: Not on file    Unstable Housing in the Last Year: Not on file            Allergies   Allergen Reactions    Amoxicillin-Pot Clavulanate Other (See Comments)     Yeast infection  Yeast infection            PHYSICAL EXAMINATION:   General Appearance: Healthy appearing patient in no acute distress  Vitals:    06/16/22 0929   BP: 138/74   Pulse: (!) 110   Resp: 18   Temp: 98.5 °F (36.9 °C)   SpO2: 92%   Weight: 235 lb (106.6 kg)      There were no vitals taken for this visit. HEENT: No oral or pharyngeal masses, ulceration or thrush noted, no sinus tenderness. Neck is supple with no thyromegaly or JVD noted. Lungs/Thorax: Clear to auscultation, no accessory muscles of respiration being used. Heart: Regular rate and rhythm, normal S1, S2, no appreciable murmurs, rubs, gallops   Abdomen: Soft, nontender, bowel sounds present, no appreciable hepatosplenomegaly, no palpable masses   Extremeties: Good pulses bilaterally, trace BLE peripheral edema.    Skin: Normal skin tone with no rash, petechiae, ecchymosis noted. Musculoskeletal: No pain on palpation over bony prominence, trace BLE edema, no evidence of gout, no joint or bony deformity   Neurologic: Grossly intact      LABS/IMAGING:  Hospital Outpatient Visit on 06/16/2022   Component Date Value Ref Range Status    Sodium 06/16/2022 142  136 - 145 mmol/L Final    Potassium 06/16/2022 3.4* 3.5 - 5.1 mmol/L Final    Chloride 06/16/2022 106  98 - 107 mmol/L Final    CO2 06/16/2022 28  21 - 32 mmol/L Final    Anion Gap 06/16/2022 8  7 - 16 mmol/L Final    Glucose 06/16/2022 118* 65 - 100 mg/dL Final    BUN 06/16/2022 6  6 - 23 MG/DL Final    CREATININE 06/16/2022 0.70  0.6 - 1.0 MG/DL Final    GFR  06/16/2022 >60  >60 ml/min/1.73m2 Final    GFR Non- 06/16/2022 >60  >60 ml/min/1.73m2 Final    Comment:      Estimated GFR is calculated using the Modification of Diet in Renal Disease (MDRD) Study equation, reported for both  Americans (GFRAA) and non- Americans (GFRNA), and normalized to 1.73m2 body surface area. The physician must decide which value applies to the patient. The MDRD study equation should only be used in individuals age 25 or older. It has not been validated for the following: pregnant women, patients with serious comorbid conditions,or on certain medications, or persons with extremes of body size, muscle mass, or nutritional status.       Calcium 06/16/2022 8.6  8.3 - 10.4 MG/DL Final    WBC 06/16/2022 24.8* 4.3 - 11.1 K/uL Final    PERIPHERAL REVIEW TO FOLLOW    RBC 06/16/2022 3.11* 4.05 - 5.2 M/uL Final    Hemoglobin 06/16/2022 8.8* 11.7 - 15.4 g/dL Final    Hematocrit 06/16/2022 27.4* 35.8 - 46.3 % Final    MCV 06/16/2022 88.1  79.6 - 97.8 FL Final    MCH 06/16/2022 28.3  26.1 - 32.9 PG Final    MCHC 06/16/2022 32.1  31.4 - 35.0 g/dL Final    RDW 06/16/2022 18.1* 11.9 - 14.6 % Final    Platelets 72/35/2487 58* 150 - 450 K/uL Final    MPV 06/16/2022 11.8 9.4 - 12.3 FL Final    nRBC 06/16/2022 0.41* 0.0 - 0.2 K/uL Final    **Note: Absolute NRBC parameter is now reported with Hemogram**    Differential Type 06/16/2022 PENDING   Incomplete   Hospital Outpatient Visit on 06/15/2022   Component Date Value Ref Range Status    WBC 06/15/2022 12.8* 4.3 - 11.1 K/uL Final    Comment: RESULTS CHECKED X 2  PERIPHERAL REVIEW TO FOLLOW      RBC 06/15/2022 3.27* 4.05 - 5.2 M/uL Final    Hemoglobin 06/15/2022 9.2* 11.7 - 15.4 g/dL Final    Hematocrit 06/15/2022 28.2* 35.8 - 46.3 % Final    MCV 06/15/2022 86.2  79.6 - 97.8 FL Final    MCH 06/15/2022 28.1  26.1 - 32.9 PG Final    MCHC 06/15/2022 32.6  31.4 - 35.0 g/dL Final    RDW 06/15/2022 17.2* 11.9 - 14.6 % Final    Platelets 06/13/8300 46* 150 - 450 K/uL Final    MPV 06/15/2022 11.8  9.4 - 12.3 FL Final    nRBC 06/15/2022 0.44* 0.0 - 0.2 K/uL Final    **Note: Absolute NRBC parameter is now reported with Hemogram**    Seg Neutrophils 06/15/2022 63  47 - 75 % Final    Bands 06/15/2022 13* 0 - 6 % Final    Lymphocytes 06/15/2022 4* 16 - 44 % Final    Monocytes 06/15/2022 3  3 - 9 % Final    Metamyelocytes 06/15/2022 3  % Final    Myelocytes 06/15/2022 4  % Final    Promyelocytes 06/15/2022 2  % Final    Blasts 06/15/2022 8  % Final    Segs Absolute 06/15/2022 10.9* 1.7 - 8.2 K/UL Final    Absolute Lymph # 06/15/2022 0.5  0.5 - 4.6 K/UL Final    Absolute Mono # 06/15/2022 0.4  0.1 - 1.3 K/UL Final    RBC Comment 06/15/2022     Final                    Value:SLIGHT  ANISOCYTOSIS + POIKILOCYTOSIS      RBC Comment 06/15/2022    Final                    Value:NRBCS PRESENT  MODERATE  HYPOCHROMIA      RBC Comment 06/15/2022     Final                    Value:MODERATE  POLYCHROMASIA      RBC Comment 06/15/2022     Final                    Value:OCCASIONAL  STOMATOCYTES      RBC Comment 06/15/2022     Final                    Value:OCCASIONAL  TEARDROP CELLS      RBC Comment 06/15/2022     Final Value:OCCASIONAL  OVALOCYTES      WBC Comment 06/15/2022 Result Confirmed By Smear    Final    Comment: SLIGHT  TOXIC GRANULATION  OCCASIONAL  DOHLE BODIES  OCCASIONAL  ATYPICAL LYMPHOCYTES PRESENT      Platelet Comment 73/55/4077 DECREASED    Final    Comment: OCCASIONAL  LARGE FORMS PRESENT      Differential Type 06/15/2022 MANUAL    Final    Sodium 06/15/2022 141  136 - 145 mmol/L Final    Potassium 06/15/2022 3.5  3.5 - 5.1 mmol/L Final    Chloride 06/15/2022 104  98 - 107 mmol/L Final    CO2 06/15/2022 29  21 - 32 mmol/L Final    Anion Gap 06/15/2022 8  7 - 16 mmol/L Final    Glucose 06/15/2022 109* 65 - 100 mg/dL Final    BUN 06/15/2022 5* 6 - 23 MG/DL Final    CREATININE 06/15/2022 0.70  0.6 - 1.0 MG/DL Final    GFR  06/15/2022 >60  >60 ml/min/1.73m2 Final    GFR Non- 06/15/2022 >60  >60 ml/min/1.73m2 Final    Comment:      Estimated GFR is calculated using the Modification of Diet in Renal Disease (MDRD) Study equation, reported for both  Americans (GFRAA) and non- Americans (GFRNA), and normalized to 1.73m2 body surface area. The physician must decide which value applies to the patient. The MDRD study equation should only be used in individuals age 25 or older. It has not been validated for the following: pregnant women, patients with serious comorbid conditions,or on certain medications, or persons with extremes of body size, muscle mass, or nutritional status.       Calcium 06/15/2022 9.3  8.3 - 10.4 MG/DL Final    Total Bilirubin 06/15/2022 0.1* 0.2 - 1.1 MG/DL Final    ALT 06/15/2022 13  12 - 65 U/L Final    AST 06/15/2022 22  15 - 37 U/L Final    Alk Phosphatase 06/15/2022 82  50 - 136 U/L Final    Total Protein 06/15/2022 5.9* 6.3 - 8.2 g/dL Final    Albumin 06/15/2022 3.3* 3.5 - 5.0 g/dL Final    Globulin 06/15/2022 2.6  2.3 - 3.5 g/dL Final    Albumin/Globulin Ratio 06/15/2022 1.3  1.2 - 3.5   Final    Magnesium 06/15/2022 1. 7* 1.8 - 2.4 mg/dL Final    Phosphorus 06/15/2022 3.3  2.5 - 4.5 MG/DL Final    LD 06/15/2022 480* 100 - 190 U/L Final    GGT 06/15/2022 68* 5 - 55 U/L Final   No results displayed because visit has over 200 results. Above results reviewed with patient. ASSESSMENT:   54 female, history of prolactinoma, now kindly referred to us by Dr. Serene Alex for evaluation of her multiple myeloma and consideration of autologous stem cell  transplant. Her hematologic history is as follows:      - Summer 2021: Started noticing back discomfort.   - 10/21: Progressive debility leading to be being dependent on wheelchair. Led to imaging including T and L-spine showing a cortically destructive lytic lesion with significant soft tissue component involving T11 with resulting extradural soft tissue  mass compression of underlying spinal cord. Smaller lytic lesions in the right and left iliac wing also noted. Subsequently hospitalized and underwent T10/T11 laminectomies with excision of spinal cord tumor by neurosurgery. A bone marrow biopsy from  iliac crest also performed. 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 Bone marrow biopsy from right hip showing scant bone marrow specimen, with 15% cellularity and 70% involvement by lambda light chain restricted plasma cell neoplasm. FISH not available. PET scan 11/9/2021  with markedly hypermetabolic 2.8 cm thyroid nodule, T11 site surgical changes, however no evidence of FDG avid multiple myeloma noted. Labs showing normal counts, creatinine normal, beta-2 microglobulin at 1.72, SPEP with M spike 0.7, however lambda  light chain significantly elevated at 44,763. She was felt to have ISS stage I disease, with plans to start RVD in the near future. She has also been referred to radiation oncology for consolidative XRT to spine.   Dental evaluation prior to bone directed  therapy. Her thyroid nodule is also being evaluated with ultrasound. Today seen in office, reports doing reasonably. Planning to start RVD tomorrow. Currently in wheelchair however has only recently started working with physical therapy. Reports minimal headaches. Denies incontinence. Reasonable PO intake. Reports back  pain improved since surgery. Recently saw radiation oncology and not felt to need consolidative xrt. She is scheduled to see ENT regarding thyroid nodules. We discussed role of autologous stem cell transplant in first remission after induction therapy. Various aspects pertaining to process, risks and side effects discussed. Patient appears interested in proceeding with procedure moving forward. 2/25/2022: Seen in follow-up. Her bone marrow biopsy with cytogenetics showed normal karyotype, FISH panel had shown gain of 1 q., monosomy 11 and 13, and t(11;14), negative for 17p deletion  or other IGH rearrangement. She has since been started on daratumumab-RVD. Appears  to have responding disease as her lambda LC has dropped from 60,020 down to 39. M spike is now turned negative. She recently completed cycle 4-day 15 on 2/21/2022 (3-week cycle) . Due to ongoing neuropathy, patient today using walker. Describes neuropathy as tingling/discomfort coming up to her calf, her Velcade dose more recently has been decreased to 0.7 mg/m2. We will discuss case with Dr. Lucia Cornejo. Given excellent response,  will be reasonable to remove Velcade altogether and proceed with Daratumumab-RD q. 28 days x 2 cycles followed by plans for mobilization  and collection. This will hopefully also give her time for neuropathy and overall performance status improved. We will see her back in 2 months time. 4/18/2022: Patient seen in follow-up. She recently completed cycle 6-day 15 therapy on 4/11/2022, with last Revlimid dose on 4/14/2022.   Neuropathy has improved with supportive care including gabapentin 600 mg 3 times daily along with Cymbalta 30 mg  daily. Mobility much improved, now freely able to move in the room. Back discomfort described as stable to improved. Denies any recent fevers or chills, good p.o. intake. Routine blood work unremarkable, follow-up SPEP/LC. We will proceed with premobilization  studies, as well as restaging including PET scan and bone marrow biopsy. Given responding disease, will plan to proceed with mobilization and collection, currently scheduled for pre-mob evaluation 5/5/2022 and high-dose therapy/ASCT 6/1/22. Given lack  of social support, transplant will be inpatient. CD34+ goal will be 6 mil/kg. She will be mobilized with G-CSF +/- Mozobil. Conditioning will be with melphalan 200 mg per metered square. She will need a tunneled catheter during procedure. We will  see her back in 2 to 3 weeks with above, navigation following. 5/5/2022: Reports feeling well. Extensive ROS unremarkable. Restaging studies showing lambda LC only borderline high, SPEP with low level M spike, 24-hour UPEP negative, bone marrow biopsy with 20 to 30% cellularity without any evidence of residual  disease, low iron stores noted: Serum ferritin also low at 20: We will plan for IV iron x2. She will benefit from GI evaluation as well as pelvic exam post transplant. Skeletal survey negative, PET scan without evidence of active disease, of note right  sided thyroid nodule with increased uptake noted. Patient has seen [facility] Dr. 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 She was advised  to continue following with them posttransplant. Pre-mobilization studies unremarkable including chest x-ray, EKG, 2D echo, PFTs as well as infectious panel. She does note some chronic bilateral LE swelling: We will get Dopplers to rule out DVT.   We will  also plan for Anne post collection. We will now see her back prior to ASCT. 5/10/2022: she is here for day 1 of collection. She tolerated mobilization very well. There has been some G-CSF related bone pain in upper legs and low back. There is no GI or bowel complaints. Appetite is good and weight is stable. No cough, shortness  of breath. Trace BLE edema is stable. There are no new concerns or complaints. Denies any fevers or infectious symptoms. Labs reviewed. Proceed with day 1 collection. 5/11/2022: Here for day 2 collection, she collected 4.42 milk/kg yesterday of goal of 6. She tolerated yesterday's collection very well. There have been no clinical changes and she has no concerns or complaints. There was mild oozing around her apheresis  line-dressing changed today and currently without bleeding. Denies any fevers or infectious symptoms. Weight up 9#, BLE edema stable. Labs reviewed, WBC 60K, Plt 73K, Cr 1.1, Mg+ 1.2-replace. Proceed with day 2 collection. 6/15/2022: Here for day + 13 status post cycle autologous PBSC transplant. She is doing well overall. Energy is better, appetite is good. Diarrhea ongoing - not taking Lomotil and Imodium aggressively but will start. No nausea or vomiting. Her neuropathy is ongoing - gabapentin helpful. No cough, dyspnea or wheezing. No bleeding. No fevers, chills or other infectious symptoms. Taking acyclovir BID. Labs reviewed. 6/16/2022: Here for day + 14 status post cycle autologous PBSC transplant. She continues to do very well overall. Appetite is fair - weight stable. Energy is fair - naps when needed. Diarrhea slightly improved - taking Lomotil and Imodium as needed. No nausea or vomiting. Her neuropathy is ongoing - gabapentin helpful. No cough, dyspnea or wheezing. No bleeding. No fevers, chills or other infectious symptoms. Taking acyclovir BID. Complains of dry mouth. Labs reviewed.            1. Multiple Myeloma, ISS stage 1, high risk disease (gain of 1 q.)   2. Neuropathy. 3. Iron Deficiency   4. 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 ICD-10-CM    1. Multiple myeloma, remission status unspecified (HCC)  C90.00    2. H/O autologous stem cell transplant ([facility]  Z94.84    3. Immunocompromised ([facility]  D84.9    4. Diarrhea, unspecified type  R19.7    5. Chemotherapy-induced neuropathy ([facility]  G62.0     T45. 1X5A    6. Hypomagnesemia  E83.42    7. Hypokalemia  E87.6          PLAN:   - As above. Status post autologous PBSC transplant. Day +14.     - HDT/ASCT will be inpatient given lack of social support. - CD34+ goal will be 6 mil/kg. - Mobilized with G-CSF +/- Mozobil.     - Conditioned with melphalan 200 mg per metered square. - S/p Olivia-RVD [id number] x 4 cycles and Olivia-RD [id number] x 2.    - Neuropathy: On Gabapentin 600 mg 3 times daily along with Cymbalta 60 mg daily.   - Immunocompromised: Acyclovir BID.    - Evusheld [id number]   - Iron deficiency: IV iron x 2. She will benefit from GI evaluation as well as pelvic exam post transplant.   - F/u w ENT as needed. - Diarrhea: Aggressive with Lomotil/Imodium alternating.   - Replace magnesium IV today. - Replace potassium IV today. - Continue good oral nutrition and hydration.   - Encouraged frequent activity throughout the day and rest as needed to combat fatigue.   - Call with any fevers, uncontrolled side effects from treatment or any other worrisome/concerning symptoms. Return every other day for now.                  [facility] [place] [address] H[facility]ology and Oncology   [address] [place] [address]   Office : [phone]   Fax : [phone]

## 2022-06-17 ENCOUNTER — HOSPITAL ENCOUNTER (OUTPATIENT)
Dept: INFUSION THERAPY | Age: 52
End: 2022-06-17

## 2022-06-18 ENCOUNTER — HOSPITAL ENCOUNTER (OUTPATIENT)
Dept: INFUSION THERAPY | Age: 52
Discharge: HOME | End: 2022-06-18
Payer: COMMERCIAL

## 2022-06-18 VITALS
WEIGHT: 230.2 LBS | TEMPERATURE: 97.9 F | HEART RATE: 106 BPM | BODY MASS INDEX: 42.1 KG/M2 | RESPIRATION RATE: 18 BRPM | DIASTOLIC BLOOD PRESSURE: 86 MMHG | OXYGEN SATURATION: 99 % | SYSTOLIC BLOOD PRESSURE: 137 MMHG

## 2022-06-18 DIAGNOSIS — C90.00 MULTIPLE MYELOMA NOT HAVING ACHIEVED REMISSION (HCC): Primary | ICD-10-CM

## 2022-06-18 LAB
ALBUMIN SERPL-MCNC: 3.5 G/DL (ref 3.5–5)
ALBUMIN/GLOB SERPL: 1.2 {RATIO} (ref 1.2–3.5)
ALP SERPL-CCNC: 116 U/L (ref 50–136)
ALT SERPL-CCNC: 15 U/L (ref 12–65)
ANION GAP SERPL CALC-SCNC: 8 MMOL/L (ref 7–16)
AST SERPL-CCNC: 24 U/L (ref 15–37)
BILIRUB SERPL-MCNC: 0.1 MG/DL (ref 0.2–1.1)
BLASTS NFR BLD MANUAL: 1 %
BUN SERPL-MCNC: 6 MG/DL (ref 6–23)
CALCIUM SERPL-MCNC: 8.9 MG/DL (ref 8.3–10.4)
CHLORIDE SERPL-SCNC: 105 MMOL/L (ref 98–107)
CO2 SERPL-SCNC: 27 MMOL/L (ref 21–32)
CREAT SERPL-MCNC: 0.6 MG/DL (ref 0.6–1)
DIFFERENTIAL METHOD BLD: ABNORMAL
ERYTHROCYTE [DISTWIDTH] IN BLOOD BY AUTOMATED COUNT: 18.8 % (ref 11.9–14.6)
GGT SERPL-CCNC: 94 U/L (ref 5–55)
GLOBULIN SER CALC-MCNC: 2.9 G/DL (ref 2.3–3.5)
GLUCOSE SERPL-MCNC: 112 MG/DL (ref 65–100)
HCT VFR BLD AUTO: 30.5 % (ref 35.8–46.3)
HGB BLD-MCNC: 9.5 G/DL (ref 11.7–15.4)
LDH SERPL L TO P-CCNC: 507 U/L (ref 100–190)
LYMPHOCYTES # BLD: 0.8 K/UL (ref 0.5–4.6)
LYMPHOCYTES NFR BLD MANUAL: 6 % (ref 16–44)
MAGNESIUM SERPL-MCNC: 1.9 MG/DL (ref 1.8–2.4)
MCH RBC QN AUTO: 27.6 PG (ref 26.1–32.9)
MCHC RBC AUTO-ENTMCNC: 31.1 G/DL (ref 31.4–35)
MCV RBC AUTO: 88.7 FL (ref 79.6–97.8)
METAMYELOCYTES NFR BLD MANUAL: 8 %
MONOCYTES # BLD: 1.7 K/UL (ref 0.1–1.3)
MONOCYTES NFR BLD MANUAL: 13 % (ref 3–9)
MYELOCYTES NFR BLD MANUAL: 3 %
NEUTS BAND NFR BLD MANUAL: 1 % (ref 0–6)
NEUTS SEG # BLD: 10.2 K/UL (ref 1.7–8.2)
NEUTS SEG NFR BLD MANUAL: 67 % (ref 47–75)
NRBC # BLD: 0.64 K/UL (ref 0–0.2)
PHOSPHATE SERPL-MCNC: 4.2 MG/DL (ref 2.5–4.5)
PLATELET # BLD AUTO: 88 K/UL (ref 150–450)
PLATELET COMMENT: SLIGHT
PMV BLD AUTO: 10.9 FL (ref 9.4–12.3)
POTASSIUM SERPL-SCNC: 3.7 MMOL/L (ref 3.5–5.1)
PROMYELOCYTES NFR BLD MANUAL: 1 %
PROT SERPL-MCNC: 6.4 G/DL (ref 6.3–8.2)
RBC # BLD AUTO: 3.44 M/UL (ref 4.05–5.2)
RBC MORPH BLD: ABNORMAL
SODIUM SERPL-SCNC: 140 MMOL/L (ref 136–145)
WBC # BLD AUTO: 12.7 K/UL (ref 4.3–11.1)
WBC MORPH BLD: ABNORMAL

## 2022-06-18 PROCEDURE — 85025 COMPLETE CBC W/AUTO DIFF WBC: CPT

## 2022-06-18 PROCEDURE — 83615 LACTATE (LD) (LDH) ENZYME: CPT

## 2022-06-18 PROCEDURE — 84100 ASSAY OF PHOSPHORUS: CPT

## 2022-06-18 PROCEDURE — 36591 DRAW BLOOD OFF VENOUS DEVICE: CPT

## 2022-06-18 PROCEDURE — 36592 COLLECT BLOOD FROM PICC: CPT

## 2022-06-18 PROCEDURE — 83735 ASSAY OF MAGNESIUM: CPT

## 2022-06-18 PROCEDURE — 82977 ASSAY OF GGT: CPT

## 2022-06-18 PROCEDURE — 80053 COMPREHEN METABOLIC PANEL: CPT

## 2022-06-18 RX ORDER — SODIUM CHLORIDE 0.9 % (FLUSH) 0.9 %
5-40 SYRINGE (ML) INJECTION PRN
Status: DISCONTINUED | OUTPATIENT
Start: 2022-06-18 | End: 2022-06-19 | Stop reason: HOSPADM

## 2022-06-18 NOTE — PROGRESS NOTES
6/18/2022   Diagnosis: Multiple Myeloma  Transplant Date: 6/2/2022  Day: (+/-) +16  Chemo regimen used: Melphalan  Labs needed at next visit: see orders  Labs not resulted that need follow-up: none  Next Appointment scheduled: [email or web address]. BMT assessments and toxicities completed. WBC/ANC= 12.7/10.2  Prophylactic medications started on D +1 and stopped 6/14/2022. G-CSF started inpatient and last dose given on 6/15/2022. Toxicities greater than 2: none  Bactrim or equivalent to be initiated on D +30. New orders received: None  Issues: Patient states feeling better, no diarrhea, appetite good. Patient discharged ambulatory with self.

## 2022-06-20 ENCOUNTER — HOSPITAL ENCOUNTER (OUTPATIENT)
Dept: INFUSION THERAPY | Age: 52
Discharge: HOME | End: 2022-06-20
Payer: COMMERCIAL

## 2022-06-20 VITALS
HEART RATE: 106 BPM | RESPIRATION RATE: 18 BRPM | BODY MASS INDEX: 42.1 KG/M2 | OXYGEN SATURATION: 94 % | SYSTOLIC BLOOD PRESSURE: 144 MMHG | TEMPERATURE: 98.2 F | DIASTOLIC BLOOD PRESSURE: 96 MMHG | WEIGHT: 230.2 LBS

## 2022-06-20 DIAGNOSIS — C90.00 MULTIPLE MYELOMA NOT HAVING ACHIEVED REMISSION (HCC): Primary | ICD-10-CM

## 2022-06-20 LAB
ALBUMIN SERPL-MCNC: 3.5 G/DL (ref 3.5–5)
ALBUMIN/GLOB SERPL: 1.2 {RATIO} (ref 1.2–3.5)
ALP SERPL-CCNC: 97 U/L (ref 50–136)
ALT SERPL-CCNC: 14 U/L (ref 12–65)
ANION GAP SERPL CALC-SCNC: 7 MMOL/L (ref 7–16)
AST SERPL-CCNC: 27 U/L (ref 15–37)
BASOPHILS # BLD: 0.1 K/UL (ref 0–0.2)
BASOPHILS NFR BLD: 1 % (ref 0–2)
BILIRUB SERPL-MCNC: 0.2 MG/DL (ref 0.2–1.1)
BUN SERPL-MCNC: 13 MG/DL (ref 6–23)
CALCIUM SERPL-MCNC: 9.1 MG/DL (ref 8.3–10.4)
CHLORIDE SERPL-SCNC: 105 MMOL/L (ref 98–107)
CO2 SERPL-SCNC: 28 MMOL/L (ref 21–32)
CREAT SERPL-MCNC: 0.7 MG/DL (ref 0.6–1)
DIFFERENTIAL METHOD BLD: ABNORMAL
EOSINOPHIL # BLD: 0 K/UL (ref 0–0.8)
EOSINOPHIL NFR BLD: 0 % (ref 0.5–7.8)
ERYTHROCYTE [DISTWIDTH] IN BLOOD BY AUTOMATED COUNT: 19.4 % (ref 11.9–14.6)
GGT SERPL-CCNC: 89 U/L (ref 5–55)
GLOBULIN SER CALC-MCNC: 2.9 G/DL (ref 2.3–3.5)
GLUCOSE SERPL-MCNC: 111 MG/DL (ref 65–100)
HCT VFR BLD AUTO: 30.8 % (ref 35.8–46.3)
HGB BLD-MCNC: 9.8 G/DL (ref 11.7–15.4)
IMM GRANULOCYTES # BLD AUTO: 1.1 K/UL (ref 0–0.5)
IMM GRANULOCYTES NFR BLD AUTO: 15 % (ref 0–5)
LDH SERPL L TO P-CCNC: 403 U/L (ref 100–190)
LYMPHOCYTES # BLD: 1 K/UL (ref 0.5–4.6)
LYMPHOCYTES NFR BLD: 14 % (ref 13–44)
MAGNESIUM SERPL-MCNC: 1.9 MG/DL (ref 1.8–2.4)
MCH RBC QN AUTO: 27.8 PG (ref 26.1–32.9)
MCHC RBC AUTO-ENTMCNC: 31.8 G/DL (ref 31.4–35)
MCV RBC AUTO: 87.5 FL (ref 79.6–97.8)
MONOCYTES # BLD: 1.9 K/UL (ref 0.1–1.3)
MONOCYTES NFR BLD: 27 % (ref 4–12)
NEUTS SEG # BLD: 2.9 K/UL (ref 1.7–8.2)
NEUTS SEG NFR BLD: 43 % (ref 43–78)
NRBC # BLD: 0.44 K/UL (ref 0–0.2)
PHOSPHATE SERPL-MCNC: 5 MG/DL (ref 2.5–4.5)
PLATELET # BLD AUTO: 118 K/UL (ref 150–450)
PLATELET COMMENT: ABNORMAL
PMV BLD AUTO: 11.6 FL (ref 9.4–12.3)
POTASSIUM SERPL-SCNC: 3.6 MMOL/L (ref 3.5–5.1)
PROT SERPL-MCNC: 6.4 G/DL (ref 6.3–8.2)
RBC # BLD AUTO: 3.52 M/UL (ref 4.05–5.2)
RBC MORPH BLD: ABNORMAL
SODIUM SERPL-SCNC: 140 MMOL/L (ref 136–145)
WBC # BLD AUTO: 7 K/UL (ref 4.3–11.1)
WBC MORPH BLD: ABNORMAL

## 2022-06-20 PROCEDURE — 85025 COMPLETE CBC W/AUTO DIFF WBC: CPT

## 2022-06-20 PROCEDURE — 83735 ASSAY OF MAGNESIUM: CPT

## 2022-06-20 PROCEDURE — 83615 LACTATE (LD) (LDH) ENZYME: CPT

## 2022-06-20 PROCEDURE — 80053 COMPREHEN METABOLIC PANEL: CPT

## 2022-06-20 PROCEDURE — 96361 HYDRATE IV INFUSION ADD-ON: CPT

## 2022-06-20 PROCEDURE — 2500000003 HC RX 250 WO HCPCS: Performed by: INTERNAL MEDICINE

## 2022-06-20 PROCEDURE — 84100 ASSAY OF PHOSPHORUS: CPT

## 2022-06-20 PROCEDURE — 82977 ASSAY OF GGT: CPT

## 2022-06-20 PROCEDURE — 2580000003 HC RX 258: Performed by: INTERNAL MEDICINE

## 2022-06-20 PROCEDURE — 96360 HYDRATION IV INFUSION INIT: CPT

## 2022-06-20 RX ORDER — DEXTROSE, SODIUM CHLORIDE, AND POTASSIUM CHLORIDE 5; .45; .15 G/100ML; G/100ML; G/100ML
1000 INJECTION INTRAVENOUS ONCE
Status: COMPLETED | OUTPATIENT
Start: 2022-06-20 | End: 2022-06-20

## 2022-06-20 RX ORDER — SODIUM CHLORIDE 0.9 % (FLUSH) 0.9 %
5-40 SYRINGE (ML) INJECTION 2 TIMES DAILY
Status: DISCONTINUED | OUTPATIENT
Start: 2022-06-20 | End: 2022-06-21 | Stop reason: HOSPADM

## 2022-06-20 RX ADMIN — SODIUM CHLORIDE, PRESERVATIVE FREE 20 ML: 5 INJECTION INTRAVENOUS at 11:13

## 2022-06-20 RX ADMIN — SODIUM CHLORIDE, PRESERVATIVE FREE 10 ML: 5 INJECTION INTRAVENOUS at 09:07

## 2022-06-20 RX ADMIN — DEXTROSE, SODIUM CHLORIDE, AND POTASSIUM CHLORIDE 1000 ML: 5; .45; .15 INJECTION INTRAVENOUS at 09:07

## 2022-06-20 NOTE — PROGRESS NOTES
Clinical Social Work Note  Name: Migel Hooper    : 1970    MRN: [id number]    Date of Service: 2022    Type of Service: Case Management & Health and Behavior Intervention     Length of Service: 16 minutes    Patient Diagnosis:   1. Multiple myeloma not having achieved remission [facility])         Referral Source: Infusion RN    Reason for Visit: F/U    CM Note: Seen patient at BMT, provided therapeutic reassurance. SUSIE-CP discussed Self-Compassion and gave information on support groups. Mini Mental Status Exam: Migel Hooper was dressed properly. No abnormal psychomotor movements observed. Intellectual functioning appeared to be intact. Insight was adequate. Judgment was adequate. Patient did not report suicidal ideations, intent or plans. Speech was coherent. Thought process was clear. Patient did not report homicidal ideations, intent or plans. Patient was oriented to self, place, time and situation. Protective Factors: Current care for physical and mental illness, adequate insight and judgment, family support, cultural and Protestant beliefs and values that support self-care. Next Steps: ITALO gave contact information and encouraged pt to call should any needs arise. Pt verbalized understanding. SUSIE -CP intends to follow up as needed. No flowsheet data found.         Electronically Signed By:  SUSIE Baez

## 2022-06-20 NOTE — PROGRESS NOTES
Diagnosis: Multiple Myeloma  Transplant Date: 6/2/22  Day: (+/-) +18. Chemo regimen used: Melphalan  Labs needed at next visit: See orders. Labs not resulted that need follow-up: None. Next Appointment scheduled: 6/24/22 @ 0900. BMT assessments and toxicities completed. WBC/ANC= 7.0/2.9  Prophylactic medications started Day +1 and stopped 6/14/22. G-CSF started inpatient and last dose given on 6/15/22. Toxicities greater than 2 : None. Bactrim or equivalent to be initiated on Day +30. New orders received: None. Issues: None  Patient arrived ambulatory . Labs drawn & reviewed. 1 liter D5 0.45%NS with 20 mEq KCl given over 2 hours.  Discharged ambulatory in stable condition

## 2022-06-22 RX ORDER — PROCHLORPERAZINE MALEATE 10 MG
10 TABLET ORAL EVERY 6 HOURS PRN
Qty: 120 TABLET | Refills: 3 | Status: SHIPPED | OUTPATIENT
Start: 2022-06-22

## 2022-06-24 ENCOUNTER — OFFICE VISIT (OUTPATIENT)
Dept: ONCOLOGY | Age: 52
End: 2022-06-24
Payer: COMMERCIAL

## 2022-06-24 ENCOUNTER — HOSPITAL ENCOUNTER (OUTPATIENT)
Dept: INFUSION THERAPY | Age: 52
Discharge: HOME | End: 2022-06-24
Payer: COMMERCIAL

## 2022-06-24 VITALS
OXYGEN SATURATION: 94 % | SYSTOLIC BLOOD PRESSURE: 132 MMHG | WEIGHT: 229 LBS | DIASTOLIC BLOOD PRESSURE: 87 MMHG | HEART RATE: 108 BPM | TEMPERATURE: 98.2 F | BODY MASS INDEX: 41.88 KG/M2 | RESPIRATION RATE: 18 BRPM

## 2022-06-24 VITALS
SYSTOLIC BLOOD PRESSURE: 132 MMHG | DIASTOLIC BLOOD PRESSURE: 87 MMHG | RESPIRATION RATE: 16 BRPM | BODY MASS INDEX: 40.79 KG/M2 | HEART RATE: 108 BPM | TEMPERATURE: 98.2 F | WEIGHT: 223 LBS | OXYGEN SATURATION: 94 %

## 2022-06-24 DIAGNOSIS — C90.00 MULTIPLE MYELOMA, REMISSION STATUS UNSPECIFIED (HCC): Primary | ICD-10-CM

## 2022-06-24 DIAGNOSIS — C90.00 MULTIPLE MYELOMA NOT HAVING ACHIEVED REMISSION (HCC): Primary | ICD-10-CM

## 2022-06-24 DIAGNOSIS — R19.7 DIARRHEA, UNSPECIFIED TYPE: ICD-10-CM

## 2022-06-24 DIAGNOSIS — Z94.84 H/O AUTOLOGOUS STEM CELL TRANSPLANT (HCC): ICD-10-CM

## 2022-06-24 DIAGNOSIS — D84.9 IMMUNOCOMPROMISED (HCC): ICD-10-CM

## 2022-06-24 LAB
ALBUMIN SERPL-MCNC: 3.6 G/DL (ref 3.5–5)
ALBUMIN/GLOB SERPL: 1.2 {RATIO} (ref 1.2–3.5)
ALP SERPL-CCNC: 83 U/L (ref 50–136)
ALT SERPL-CCNC: 15 U/L (ref 12–65)
ANION GAP SERPL CALC-SCNC: 6 MMOL/L (ref 7–16)
AST SERPL-CCNC: 27 U/L (ref 15–37)
BASOPHILS # BLD: 0 K/UL (ref 0–0.2)
BASOPHILS NFR BLD: 0 % (ref 0–2)
BILIRUB SERPL-MCNC: 0.2 MG/DL (ref 0.2–1.1)
BUN SERPL-MCNC: 9 MG/DL (ref 6–23)
CALCIUM SERPL-MCNC: 9.4 MG/DL (ref 8.3–10.4)
CHLORIDE SERPL-SCNC: 107 MMOL/L (ref 98–107)
CO2 SERPL-SCNC: 29 MMOL/L (ref 21–32)
CREAT SERPL-MCNC: 0.6 MG/DL (ref 0.6–1)
DIFFERENTIAL METHOD BLD: ABNORMAL
EOSINOPHIL # BLD: 0 K/UL (ref 0–0.8)
EOSINOPHIL NFR BLD: 0 % (ref 0.5–7.8)
ERYTHROCYTE [DISTWIDTH] IN BLOOD BY AUTOMATED COUNT: 19.6 % (ref 11.9–14.6)
GGT SERPL-CCNC: 96 U/L (ref 5–55)
GLOBULIN SER CALC-MCNC: 2.9 G/DL (ref 2.3–3.5)
GLUCOSE SERPL-MCNC: 108 MG/DL (ref 65–100)
HCT VFR BLD AUTO: 32 % (ref 35.8–46.3)
HGB BLD-MCNC: 10.1 G/DL (ref 11.7–15.4)
IMM GRANULOCYTES # BLD AUTO: 0 K/UL (ref 0–0.5)
IMM GRANULOCYTES NFR BLD AUTO: 0 % (ref 0–5)
LDH SERPL L TO P-CCNC: 314 U/L (ref 100–190)
LYMPHOCYTES # BLD: 1.7 K/UL (ref 0.5–4.6)
LYMPHOCYTES NFR BLD: 37 % (ref 13–44)
MAGNESIUM SERPL-MCNC: 2.3 MG/DL (ref 1.8–2.4)
MCH RBC QN AUTO: 28 PG (ref 26.1–32.9)
MCHC RBC AUTO-ENTMCNC: 31.6 G/DL (ref 31.4–35)
MCV RBC AUTO: 88.6 FL (ref 79.6–97.8)
MONOCYTES # BLD: 1.1 K/UL (ref 0.1–1.3)
MONOCYTES NFR BLD: 24 % (ref 4–12)
NEUTS SEG # BLD: 1.8 K/UL (ref 1.7–8.2)
NEUTS SEG NFR BLD: 39 % (ref 43–78)
NRBC # BLD: 0.07 K/UL (ref 0–0.2)
PHOSPHATE SERPL-MCNC: 4.3 MG/DL (ref 2.5–4.5)
PLATELET # BLD AUTO: 194 K/UL (ref 150–450)
PLATELET COMMENT: ADEQUATE
PMV BLD AUTO: 9.7 FL (ref 9.4–12.3)
POTASSIUM SERPL-SCNC: 4 MMOL/L (ref 3.5–5.1)
PROT SERPL-MCNC: 6.5 G/DL (ref 6.3–8.2)
RBC # BLD AUTO: 3.61 M/UL (ref 4.05–5.2)
RBC MORPH BLD: ABNORMAL
SODIUM SERPL-SCNC: 142 MMOL/L (ref 136–145)
WBC # BLD AUTO: 4.6 K/UL (ref 4.3–11.1)
WBC MORPH BLD: ABNORMAL

## 2022-06-24 PROCEDURE — 80053 COMPREHEN METABOLIC PANEL: CPT

## 2022-06-24 PROCEDURE — 83735 ASSAY OF MAGNESIUM: CPT

## 2022-06-24 PROCEDURE — 2500000003 HC RX 250 WO HCPCS: Performed by: INTERNAL MEDICINE

## 2022-06-24 PROCEDURE — 85025 COMPLETE CBC W/AUTO DIFF WBC: CPT

## 2022-06-24 PROCEDURE — 96361 HYDRATE IV INFUSION ADD-ON: CPT

## 2022-06-24 PROCEDURE — 84100 ASSAY OF PHOSPHORUS: CPT

## 2022-06-24 PROCEDURE — 82977 ASSAY OF GGT: CPT

## 2022-06-24 PROCEDURE — 2580000003 HC RX 258: Performed by: INTERNAL MEDICINE

## 2022-06-24 PROCEDURE — 99214 OFFICE O/P EST MOD 30 MIN: CPT | Performed by: NURSE PRACTITIONER

## 2022-06-24 PROCEDURE — 83615 LACTATE (LD) (LDH) ENZYME: CPT

## 2022-06-24 PROCEDURE — 96360 HYDRATION IV INFUSION INIT: CPT

## 2022-06-24 RX ORDER — SODIUM CHLORIDE 0.9 % (FLUSH) 0.9 %
5-40 SYRINGE (ML) INJECTION PRN
Status: DISCONTINUED | OUTPATIENT
Start: 2022-06-24 | End: 2022-06-25 | Stop reason: HOSPADM

## 2022-06-24 RX ORDER — DEXTROSE, SODIUM CHLORIDE, AND POTASSIUM CHLORIDE 5; .45; .15 G/100ML; G/100ML; G/100ML
1000 INJECTION INTRAVENOUS ONCE
Status: COMPLETED | OUTPATIENT
Start: 2022-06-24 | End: 2022-06-24

## 2022-06-24 RX ADMIN — DEXTROSE, SODIUM CHLORIDE, AND POTASSIUM CHLORIDE 1000 ML: 5; .45; .15 INJECTION INTRAVENOUS at 09:53

## 2022-06-24 RX ADMIN — Medication 20 ML: at 12:01

## 2022-06-24 NOTE — PROGRESS NOTES
Data Source: Patient, [facility] record. 6/24/22        Kevin Babb [id number]      54 y.o. Patient Encounter: [address] Visit      Heme Diagnosis:   MM, transplant evaluation. Heme History (Copied from prior):    54 female, history of prolactinoma, now kindly referred to us by Dr. Sean Yañez for evaluation of her multiple myeloma and consideration of autologous stem cell  transplant. Her hematologic history is as follows:      - Summer 2021: Started noticing back discomfort.   - 10/21: Progressive debility leading to be being dependent on wheelchair. Led to imaging including T and L-spine showing a cortically destructive lytic lesion with significant soft tissue component involving T11 with resulting extradural soft tissue  mass compression of underlying spinal cord. Smaller lytic lesions in the right and left iliac wing also noted. Subsequently hospitalized and underwent T10/T11 laminectomies with excision of spinal cord tumor by neurosurgery. A bone marrow biopsy from  iliac crest also performed. 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 Bone marrow biopsy from right hip showing scant bone marrow specimen, with 15% cellularity and 70% involvement by lambda  light chain restricted plasma cell neoplasm. FISH not available. PET scan 11/9/2021 with markedly hypermetabolic 2.8 cm thyroid nodule, T11 site surgical changes, however no evidence of FDG avid multiple myeloma noted. Labs showing normal counts, creatinine  normal, beta-2 microglobulin at 1.72, SPEP with M spike 0.7, however lambda light chain significantly elevated at 44,763. She was felt to have ISS stage I disease, with plans to start RVD in the near future. She has also been referred to radiation oncology  for consolidative XRT to spine. Dental evaluation prior to bone directed therapy. Her thyroid nodule is also being evaluated with ultrasound. Today seen in office, reports doing reasonably. Planning to start RVD tomorrow. Currently in wheelchair however has only recently started working with physical therapy. Reports minimal headaches. Denies incontinence. Reasonable PO intake. Reports back  pain improved since surgery. Recently saw radiation oncology and not felt to need consolidative xrt. She is scheduled to see ENT regarding thyroid nodules. Stage:   1   Performance Status:   2   Pain Score/Fatigue: Pain Score:   0 - No pain      Pain Medication related Constipation:   Addressed    Interval History:  6/24/2022: Here for day + 22 status post cycle autologous PBSC transplant. She continues to do very well overall. Her appetite is okay - portion sizes have been smaller. Energy is fair - naps when needed. Diarrhea off and on - taking Lomotil and Imodium as needed. She weirdly had some nausea yesterday - relieved with Compazine. Her neuropathy is ongoing - gabapentin/cymbalta helpful. No cough, dyspnea or wheezing. No bleeding. No fevers, chills or other infectious symptoms. Taking acyclovir BID. NCCN Distress Score:  No data recorded         REVIEW OF SYSTEMS:   As mentioned above, all other systems were reviewed in full and are negative.     Active Ambulatory Problems     Diagnosis Date Noted    Bone marrow transplant candidate 04/18/2022    Multiple myeloma not having achieved remission ([facility] [address] 05/05/2022    Stem cell transplant candidate 05/31/2022    Immunocompromised ([facility] [address]     Bone marrow transplant status ([facility] [address]     Admission for antineoplastic chemotherapy     Loose stools      Resolved Ambulatory Problems     Diagnosis Date Noted    No Resolved Ambulatory Problems     Past Medical History:   Diagnosis Date    Obesity     Prolactin increased      Past Surgical History:   Procedure Laterality Date    IR BIOPSY PERC SUPERF BONE      IR TUNNELED CATHETER PLACEMENT GREATER THAN 5 YEARS  5/9/2022    IR TUNNELED CATHETER PLACEMENT GREATER THAN 5 YEARS  5/9/2022    IR TUNNELED CATHETER PLACEMENT GREATER THAN 5 YEARS 5/9/2022 [facility] SPECIALS     Current Outpatient Medications   Medication Sig Dispense Refill    prochlorperazine (COMPAZINE) 10 MG tablet Take 1 tablet by mouth every 6 hours as needed (nausea) 120 tablet 3    diphenoxylate-atropine (LOMOTIL) 2.5-0.025 MG per tablet Take 2 tablets by mouth 4 times daily as needed for Diarrhea.  acyclovir (ZOVIRAX) 400 MG tablet Take 400 mg by mouth 2 times daily      DULoxetine (CYMBALTA) 30 MG extended release capsule Take 30 mg by mouth daily      gabapentin (NEURONTIN) 300 MG capsule Take 300 mg by mouth.  HYDROcodone-acetaminophen (NORCO) 7.5-325 MG per tablet Take 1 tablet by mouth every 6 hours as needed. No current facility-administered medications for this visit.      Facility-Administered Medications Ordered in Other Visits   Medication Dose Route Frequency Provider Last Rate Last Admin    sodium chloride flush 0.9 % injection 5-40 mL  5-40 mL IntraVENous PRN Gayle Howell MD            Social History     Socioeconomic History    Marital status:      Spouse name: Not on file    Number of children: Not on file    Years of education: Not on file    Highest education level: Not on file   Occupational History    Not on file   Tobacco Use    Smoking status: Never Smoker    Smokeless tobacco: Never Used   Substance and Sexual Activity    Alcohol use: Never    Drug use: Never    Sexual activity: Not on file   Other Topics Concern    Not on file   Social History Narrative    Not on file     Social Determinants of Health     Financial Resource Strain:     Difficulty of Paying Living Expenses: Not on file   Food Insecurity:     Worried About Running Out of Food in the Last Year: Not on file    Jimi of Food in the Last Year: Not on file   Transportation Needs:     Lack of Transportation (Medical): Not on file    Lack of Transportation (Non-Medical): Not on file   Physical Activity:     Days of Exercise per Week: Not on file    Minutes of Exercise per Session: Not on file   Stress:     Feeling of Stress : Not on file   Social Connections:     Frequency of Communication with Friends and Family: Not on file    Frequency of Social Gatherings with Friends and Family: Not on file    Attends Nondenominational Services: Not on file    Active Member of [address] [place] [address] [employer] or Organizations: Not on file    Attends Club or Organization Meetings: Not on file    Marital Status: Not on file   Intimate Partner Violence:     Fear of Current or Ex-Partner: Not on file    Emotionally Abused: Not on file    Physically Abused: Not on file    Sexually Abused: Not on file   Housing Stability:     Unable to Pay for Housing in the Last Year: Not on file    Number of [place] in the Last Year: Not on file    Unstable Housing in the Last Year: Not on file            Allergies   Allergen Reactions    Amoxicillin-Pot Clavulanate Other (See Comments)     Yeast infection  Yeast infection            PHYSICAL EXAMINATION:   General Appearance: Healthy appearing patient in no acute distress  Vitals:    06/24/22 1101   BP: 132/87   Pulse: (!) 108   Resp: 16   Temp: 98.2 °F (36.8 °C)   SpO2: 94%   Weight: 223 lb (101.2 kg)      There were no vitals taken for this visit. HEENT: No oral or pharyngeal masses, ulceration or thrush noted, no sinus tenderness. Neck is supple with no thyromegaly or JVD noted. Lungs/Thorax: Clear to auscultation, no accessory muscles of respiration being used. Heart: Regular rate and rhythm, normal S1, S2, no appreciable murmurs, rubs, gallops   Abdomen: Soft, nontender, bowel sounds present, no appreciable hepatosplenomegaly, no palpable masses   Extremeties: Good pulses bilaterally, trace BLE peripheral edema. Skin: Normal skin tone with no rash, petechiae, ecchymosis noted.    Musculoskeletal: No pain on palpation over bony prominence, trace BLE edema, no evidence of gout, no joint or bony deformity   Neurologic: Grossly intact      LABS/IMAGING:  Hospital Outpatient Visit on 06/24/2022   Component Date Value Ref Range Status    GGT 06/24/2022 96* 5 - 55 U/L Final    LD 06/24/2022 314* 100 - 190 U/L Final    Phosphorus 06/24/2022 4.3  2.5 - 4.5 MG/DL Final    Magnesium 06/24/2022 2.3  1.8 - 2.4 mg/dL Final    Sodium 06/24/2022 142  136 - 145 mmol/L Final    Potassium 06/24/2022 4.0  3.5 - 5.1 mmol/L Final    Chloride 06/24/2022 107  98 - 107 mmol/L Final    CO2 06/24/2022 29  21 - 32 mmol/L Final    Anion Gap 06/24/2022 6* 7 - 16 mmol/L Final    Glucose 06/24/2022 108* 65 - 100 mg/dL Final    BUN 06/24/2022 9  6 - 23 MG/DL Final    CREATININE 06/24/2022 0.60  0.6 - 1.0 MG/DL Final    GFR  06/24/2022 >60  >60 ml/min/1.73m2 Final    GFR Non- 06/24/2022 >60  >60 ml/min/1.73m2 Final    Comment:      Estimated GFR is calculated using the Modification of Diet in Renal Disease (MDRD) Study equation, reported for both  Americans (GFRAA) and non- Americans (GFRNA), and normalized to 1.73m2 body surface area. The physician must decide which value applies to the patient. The MDRD study equation should only be used in individuals age 25 or older. It has not been validated for the following: pregnant women, patients with serious comorbid conditions,or on certain medications, or persons with extremes of body size, muscle mass, or nutritional status.       Calcium 06/24/2022 9.4  8.3 - 10.4 MG/DL Final    Total Bilirubin 06/24/2022 0.2  0.2 - 1.1 MG/DL Final    ALT 06/24/2022 15  12 - 65 U/L Final    AST 06/24/2022 27  15 - 37 U/L Final    Alk Phosphatase 06/24/2022 83  50 - 136 U/L Final    Total Protein 06/24/2022 6.5  6.3 - 8.2 g/dL Final    Albumin 06/24/2022 3.6  3.5 - 5.0 g/dL Final    Globulin 06/24/2022 2.9  2.3 - 3.5 g/dL Final    Albumin/Globulin Ratio 06/24/2022 1.2  1.2 - 3.5   Final    WBC 06/24/2022 4.6  4.3 - 11.1 K/uL Final    Comment: RESULTS CHECKED X 2  PERIPHERAL REVIEW TO FOLLOW      RBC 06/24/2022 3.61* 4.05 - 5.2 M/uL Final    Hemoglobin 06/24/2022 10.1* 11.7 - 15.4 g/dL Final    Hematocrit 06/24/2022 32.0* 35.8 - 46.3 % Final    MCV 06/24/2022 88.6  79.6 - 97.8 FL Final    MCH 06/24/2022 28.0  26.1 - 32.9 PG Final    MCHC 06/24/2022 31.6  31.4 - 35.0 g/dL Final    RDW 06/24/2022 19.6* 11.9 - 14.6 % Final    Platelets 23/24/7515 194  150 - 450 K/uL Final    MPV 06/24/2022 9.7  9.4 - 12.3 FL Final    nRBC 06/24/2022 0.07  0.0 - 0.2 K/uL Final    **Note: Absolute NRBC parameter is now reported with Hemogram**    Seg Neutrophils 06/24/2022 39* 43 - 78 % Final    Lymphocytes 06/24/2022 37  13 - 44 % Final    Monocytes 06/24/2022 24* 4.0 - 12.0 % Final    Eosinophils % 06/24/2022 0* 0.5 - 7.8 % Final    Basophils 06/24/2022 0  0.0 - 2.0 % Final    Immature Granulocytes 06/24/2022 0  0.0 - 5.0 % Final    Segs Absolute 06/24/2022 1.8  1.7 - 8.2 K/UL Final    Absolute Lymph # 06/24/2022 1.7  0.5 - 4.6 K/UL Final    Absolute Mono # 06/24/2022 1.1  0.1 - 1.3 K/UL Final    Absolute Eos # 06/24/2022 0.0  0.0 - 0.8 K/UL Final    Basophils Absolute 06/24/2022 0.0  0.0 - 0.2 K/UL Final    Absolute Immature Granulocyte 06/24/2022 0.0  0.0 - 0.5 K/UL Final    RBC Comment 06/24/2022     Final                    Value:SLIGHT  ANISOCYTOSIS + POIKILOCYTOSIS      RBC Comment 06/24/2022     Final                    Value:OCCASIONAL  OVALOCYTES      RBC Comment 06/24/2022     Final                    Value:SLIGHT  POLYCHROMASIA      WBC Comment 06/24/2022 Result Confirmed By Smear    Final    Comment: OCCASIONAL  ATYPICAL LYMPHOCYTES PRESENT      Platelet Comment 72/30/5183 ADEQUATE    Final    Comment: OCCASIONAL  LARGE FORMS PRESENT      Differential Type 06/24/2022 AUTOMATED    Final            Above results reviewed with patient. ASSESSMENT:   54 female, history of prolactinoma, now kindly referred to us by Dr. Charis Santa for evaluation of her multiple myeloma and consideration of autologous stem cell  transplant. Her hematologic history is as follows:      - Summer 2021: Started noticing back discomfort.   - 10/21: Progressive debility leading to be being dependent on wheelchair. Led to imaging including T and L-spine showing a cortically destructive lytic lesion with significant soft tissue component involving T11 with resulting extradural soft tissue  mass compression of underlying spinal cord. Smaller lytic lesions in the right and left iliac wing also noted. Subsequently hospitalized and underwent T10/T11 laminectomies with excision of spinal cord tumor by neurosurgery. A bone marrow biopsy from  iliac crest also performed. 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 Bone marrow biopsy from right hip showing scant bone marrow specimen, with 15% cellularity and 70% involvement by lambda light chain restricted plasma cell neoplasm. FISH not available. PET scan 11/9/2021  with markedly hypermetabolic 2.8 cm thyroid nodule, T11 site surgical changes, however no evidence of FDG avid multiple myeloma noted. Labs showing normal counts, creatinine normal, beta-2 microglobulin at 1.72, SPEP with M spike 0.7, however lambda  light chain significantly elevated at 44,763. She was felt to have ISS stage I disease, with plans to start RVD in the near future. She has also been referred to radiation oncology for consolidative XRT to spine. Dental evaluation prior to bone directed  therapy. Her thyroid nodule is also being evaluated with ultrasound. Today seen in office, reports doing reasonably. Planning to start RVD tomorrow. Currently in wheelchair however has only recently started working with physical therapy. Reports minimal headaches. Denies incontinence. Reasonable PO intake. Reports back  pain improved since surgery. Recently saw radiation oncology and not felt to need consolidative xrt. She is scheduled to see ENT regarding thyroid nodules. We discussed role of autologous stem cell transplant in first remission after induction therapy. Various aspects pertaining to process, risks and side effects discussed. Patient appears interested in proceeding with procedure moving forward. 2/25/2022: Seen in follow-up. Her bone marrow biopsy with cytogenetics showed normal karyotype, FISH panel had shown gain of 1 q., monosomy 11 and 13, and t(11;14), negative for 17p deletion  or other IGH rearrangement. She has since been started on daratumumab-RVD. Appears  to have responding disease as her lambda LC has dropped from 60,020 down to 39. M spike is now turned negative. She recently completed cycle 4-day 15 on 2/21/2022 (3-week cycle) . Due to ongoing neuropathy, patient today using walker. Describes neuropathy as tingling/discomfort coming up to her calf, her Velcade dose more recently has been decreased to 0.7 mg/m2. We will discuss case with Dr. Kate Betancourt. Given excellent response,  will be reasonable to remove Velcade altogether and proceed with Daratumumab-RD q. 28 days x 2 cycles followed by plans for mobilization  and collection. This will hopefully also give her time for neuropathy and overall performance status improved. We will see her back in 2 months time. 4/18/2022: Patient seen in follow-up. She recently completed cycle 6-day 15 therapy on 4/11/2022, with last Revlimid dose on 4/14/2022. Neuropathy has improved with supportive care including gabapentin 600 mg 3 times daily along with Cymbalta 30 mg  daily. Mobility much improved, now freely able to move in the room. Back discomfort described as stable to improved. Denies any recent fevers or chills, good p.o. intake.   Routine blood work unremarkable, follow-up SPEP/LC. We will proceed with premobilization  studies, as well as restaging including PET scan and bone marrow biopsy. Given responding disease, will plan to proceed with mobilization and collection, currently scheduled for pre-mob evaluation 5/5/2022 and high-dose therapy/ASCT 6/1/22. Given lack  of social support, transplant will be inpatient. CD34+ goal will be 6 mil/kg. She will be mobilized with G-CSF +/- Mozobil. Conditioning will be with melphalan 200 mg per metered square. She will need a tunneled catheter during procedure. We will  see her back in 2 to 3 weeks with above, navigation following. 5/5/2022: Reports feeling well. Extensive ROS unremarkable. Restaging studies showing lambda LC only borderline high, SPEP with low level M spike, 24-hour UPEP negative, bone marrow biopsy with 20 to 30% cellularity without any evidence of residual  disease, low iron stores noted: Serum ferritin also low at 20: We will plan for IV iron x2. She will benefit from GI evaluation as well as pelvic exam post transplant. Skeletal survey negative, PET scan without evidence of active disease, of note right  sided thyroid nodule with increased uptake noted. Patient has seen [facility] Dr. 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 She was advised  to continue following with them posttransplant. Pre-mobilization studies unremarkable including chest x-ray, EKG, 2D echo, PFTs as well as infectious panel. She does note some chronic bilateral LE swelling: We will get Dopplers to rule out DVT. We will  also plan for Evusheld post collection. We will now see her back prior to ASCT. 5/10/2022: she is here for day 1 of collection. She tolerated mobilization very well. There has been some G-CSF related bone pain in upper legs and low back.   There is no GI or bowel complaints. Appetite is good and weight is stable. No cough, shortness  of breath. Trace BLE edema is stable. There are no new concerns or complaints. Denies any fevers or infectious symptoms. Labs reviewed. Proceed with day 1 collection. 5/11/2022: Here for day 2 collection, she collected 4.42 milk/kg yesterday of goal of 6. She tolerated yesterday's collection very well. There have been no clinical changes and she has no concerns or complaints. There was mild oozing around her apheresis  line-dressing changed today and currently without bleeding. Denies any fevers or infectious symptoms. Weight up 9#, BLE edema stable. Labs reviewed, WBC 60K, Plt 73K, Cr 1.1, Mg+ 1.2-replace. Proceed with day 2 collection. 6/15/2022: Here for day + 13 status post cycle autologous PBSC transplant. She is doing well overall. Energy is better, appetite is good. Diarrhea ongoing - not taking Lomotil and Imodium aggressively but will start. No nausea or vomiting. Her neuropathy is ongoing - gabapentin helpful. No cough, dyspnea or wheezing. No bleeding. No fevers, chills or other infectious symptoms. Taking acyclovir BID. Labs reviewed. 6/16/2022: Here for day + 14 status post cycle autologous PBSC transplant. She continues to do very well overall. Appetite is fair - weight stable. Energy is fair - naps when needed. Diarrhea slightly improved - taking Lomotil and Imodium as needed. No nausea or vomiting. Her neuropathy is ongoing - gabapentin helpful. No cough, dyspnea or wheezing. No bleeding. No fevers, chills or other infectious symptoms. Taking acyclovir BID. Complains of dry mouth. Labs reviewed. 6/24/2022: Here for day + 22 status post cycle autologous PBSC transplant. She continues to do very well overall. Her appetite is okay - portion sizes have been smaller. Energy is fair - naps when needed. Diarrhea off and on - taking Lomotil and Imodium as needed.  She weirdly had some nausea yesterday

## 2022-06-24 NOTE — PROGRESS NOTES
Diagnosis: Multiple Myeloma  Transplant Date: 6/2/22  Day: (+/-) +22. Chemo regimen used: Melphalan  Labs needed at next visit: See orders. Labs not resulted that need follow-up: None. Next Appointment scheduled: 6/27/22 @ 0900. BMT assessments and toxicities completed. WBC/ANC= 4.6/1.8  Prophylactic medications started Day +1 and stopped 6/14/22. G-CSF started inpatient and last dose given on 6/15/22. Toxicities greater than 2 : None. Bactrim or equivalent to be initiated on Day +30. New orders received: None. Issues: Diarrhea  Patient arrived ambulatory . Labs drawn & reviewed. 1 liter D5 0.45%NS with 20 mEq KCl given over 2 hours. Patient encouraged to take lomotil/Imodium alternating.   Discharged ambulatory in stable condition

## 2022-06-27 ENCOUNTER — HOSPITAL ENCOUNTER (OUTPATIENT)
Dept: INFUSION THERAPY | Age: 52
Discharge: HOME | End: 2022-06-27
Payer: COMMERCIAL

## 2022-06-27 ENCOUNTER — OFFICE VISIT (OUTPATIENT)
Dept: ONCOLOGY | Age: 52
End: 2022-06-27
Payer: COMMERCIAL

## 2022-06-27 VITALS
SYSTOLIC BLOOD PRESSURE: 134 MMHG | BODY MASS INDEX: 41.99 KG/M2 | HEART RATE: 99 BPM | RESPIRATION RATE: 18 BRPM | OXYGEN SATURATION: 95 % | DIASTOLIC BLOOD PRESSURE: 90 MMHG | TEMPERATURE: 98 F | WEIGHT: 229.6 LBS

## 2022-06-27 DIAGNOSIS — D84.9 IMMUNOCOMPROMISED (HCC): ICD-10-CM

## 2022-06-27 DIAGNOSIS — Z94.81 BONE MARROW TRANSPLANT STATUS (HCC): ICD-10-CM

## 2022-06-27 DIAGNOSIS — G62.0 CHEMOTHERAPY-INDUCED NEUROPATHY (HCC): ICD-10-CM

## 2022-06-27 DIAGNOSIS — T45.1X5A CHEMOTHERAPY-INDUCED NEUROPATHY (HCC): ICD-10-CM

## 2022-06-27 DIAGNOSIS — C90.00 MULTIPLE MYELOMA NOT HAVING ACHIEVED REMISSION (HCC): Primary | ICD-10-CM

## 2022-06-27 DIAGNOSIS — Z79.899 HIGH RISK MEDICATION USE: ICD-10-CM

## 2022-06-27 LAB
ALBUMIN SERPL-MCNC: 3.5 G/DL (ref 3.5–5)
ALBUMIN/GLOB SERPL: 1.2 {RATIO} (ref 1.2–3.5)
ALP SERPL-CCNC: 96 U/L (ref 50–136)
ALT SERPL-CCNC: 16 U/L (ref 12–65)
ANION GAP SERPL CALC-SCNC: 7 MMOL/L (ref 7–16)
AST SERPL-CCNC: 24 U/L (ref 15–37)
BASOPHILS # BLD: 0.1 K/UL (ref 0–0.2)
BASOPHILS NFR BLD: 1 % (ref 0–2)
BILIRUB SERPL-MCNC: 0.2 MG/DL (ref 0.2–1.1)
BUN SERPL-MCNC: 11 MG/DL (ref 6–23)
CALCIUM SERPL-MCNC: 9.3 MG/DL (ref 8.3–10.4)
CHLORIDE SERPL-SCNC: 108 MMOL/L (ref 98–107)
CO2 SERPL-SCNC: 28 MMOL/L (ref 21–32)
CREAT SERPL-MCNC: 0.6 MG/DL (ref 0.6–1)
DIFFERENTIAL METHOD BLD: ABNORMAL
EOSINOPHIL # BLD: 0 K/UL (ref 0–0.8)
EOSINOPHIL NFR BLD: 0 % (ref 0.5–7.8)
ERYTHROCYTE [DISTWIDTH] IN BLOOD BY AUTOMATED COUNT: 19.5 % (ref 11.9–14.6)
GGT SERPL-CCNC: 89 U/L (ref 5–55)
GLOBULIN SER CALC-MCNC: 2.9 G/DL (ref 2.3–3.5)
GLUCOSE SERPL-MCNC: 114 MG/DL (ref 65–100)
HCT VFR BLD AUTO: 31.8 % (ref 35.8–46.3)
HGB BLD-MCNC: 10.2 G/DL (ref 11.7–15.4)
IMM GRANULOCYTES # BLD AUTO: 0 K/UL (ref 0–0.5)
IMM GRANULOCYTES NFR BLD AUTO: 0 % (ref 0–5)
LDH SERPL L TO P-CCNC: 304 U/L (ref 100–190)
LYMPHOCYTES # BLD: 2.8 K/UL (ref 0.5–4.6)
LYMPHOCYTES NFR BLD: 57 % (ref 13–44)
MAGNESIUM SERPL-MCNC: 2.1 MG/DL (ref 1.8–2.4)
MCH RBC QN AUTO: 28.6 PG (ref 26.1–32.9)
MCHC RBC AUTO-ENTMCNC: 32.1 G/DL (ref 31.4–35)
MCV RBC AUTO: 89.1 FL (ref 79.6–97.8)
MONOCYTES # BLD: 0.9 K/UL (ref 0.1–1.3)
MONOCYTES NFR BLD: 17 % (ref 4–12)
NEUTS SEG # BLD: 1.3 K/UL (ref 1.7–8.2)
NEUTS SEG NFR BLD: 25 % (ref 43–78)
NRBC # BLD: 0.02 K/UL (ref 0–0.2)
PHOSPHATE SERPL-MCNC: 4.8 MG/DL (ref 2.5–4.5)
PLATELET # BLD AUTO: 221 K/UL (ref 150–450)
PLATELET COMMENT: ADEQUATE
PMV BLD AUTO: 10.6 FL (ref 9.4–12.3)
POTASSIUM SERPL-SCNC: 3.7 MMOL/L (ref 3.5–5.1)
PROT SERPL-MCNC: 6.4 G/DL (ref 6.3–8.2)
RBC # BLD AUTO: 3.57 M/UL (ref 4.05–5.2)
RBC MORPH BLD: ABNORMAL
SODIUM SERPL-SCNC: 143 MMOL/L (ref 136–145)
WBC # BLD AUTO: 5.1 K/UL (ref 4.3–11.1)
WBC MORPH BLD: ABNORMAL

## 2022-06-27 PROCEDURE — 2500000003 HC RX 250 WO HCPCS: Performed by: INTERNAL MEDICINE

## 2022-06-27 PROCEDURE — 85025 COMPLETE CBC W/AUTO DIFF WBC: CPT

## 2022-06-27 PROCEDURE — 2580000003 HC RX 258: Performed by: INTERNAL MEDICINE

## 2022-06-27 PROCEDURE — 80053 COMPREHEN METABOLIC PANEL: CPT

## 2022-06-27 PROCEDURE — 96361 HYDRATE IV INFUSION ADD-ON: CPT

## 2022-06-27 PROCEDURE — 82977 ASSAY OF GGT: CPT

## 2022-06-27 PROCEDURE — 96360 HYDRATION IV INFUSION INIT: CPT

## 2022-06-27 PROCEDURE — 99215 OFFICE O/P EST HI 40 MIN: CPT | Performed by: INTERNAL MEDICINE

## 2022-06-27 PROCEDURE — 83735 ASSAY OF MAGNESIUM: CPT

## 2022-06-27 PROCEDURE — 84100 ASSAY OF PHOSPHORUS: CPT

## 2022-06-27 PROCEDURE — 83615 LACTATE (LD) (LDH) ENZYME: CPT

## 2022-06-27 RX ORDER — SODIUM CHLORIDE 0.9 % (FLUSH) 0.9 %
5-40 SYRINGE (ML) INJECTION 2 TIMES DAILY
Status: DISCONTINUED | OUTPATIENT
Start: 2022-06-27 | End: 2022-06-28 | Stop reason: HOSPADM

## 2022-06-27 RX ORDER — DEXTROSE, SODIUM CHLORIDE, AND POTASSIUM CHLORIDE 5; .45; .15 G/100ML; G/100ML; G/100ML
INJECTION INTRAVENOUS ONCE
Status: COMPLETED | OUTPATIENT
Start: 2022-06-27 | End: 2022-06-27

## 2022-06-27 RX ADMIN — Medication 20 ML: at 11:48

## 2022-06-27 RX ADMIN — DEXTROSE, SODIUM CHLORIDE, AND POTASSIUM CHLORIDE: 5; .45; .15 INJECTION INTRAVENOUS at 09:46

## 2022-06-27 NOTE — PROGRESS NOTES
Data Source: [facility], [facility]. 6/27/2022    12:12 PM    [address] [id number]    54 y.o. Patient Encounter: [facility] Visit    Heme Diagnosis:  MM, transplant evaluation. Heme History (Copied from prior):   54 female, history of prolactinoma, now kindly referred to us by Dr. Cecillia Goodpasture for evaluation of her multiple myeloma and consideration of autologous stem cell transplant. Her hematologic history is as follows:    - Summer 2021: Started noticing back discomfort.  - 10/21: Progressive debility leading to be being dependent on wheelchair. Led to imaging including T and L-spine showing a cortically destructive lytic lesion with significant soft tissue component involving T11 with resulting extradural soft tissue mass compression of underlying spinal cord. Smaller lytic lesions in the right and left iliac wing also noted. Subsequently hospitalized and underwent T10/T11 laminectomies with excision of spinal cord tumor by neurosurgery. A bone marrow biopsy from iliac crest also performed. 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 Bone marrow biopsy from right hip showing scant bone marrow specimen, with 15% cellularity and 70% involvement by lambda light chain restricted plasma cell neoplasm. FISH not available. PET scan 11/9/2021 with markedly hypermetabolic 2.8 cm thyroid nodule, T11 site surgical changes, however no evidence of FDG avid multiple myeloma noted. Labs showing normal counts, creatinine normal, beta-2 microglobulin at 1.72, SPEP with M spike 0.7, however lambda light chain significantly elevated at 44,763. She was felt to have ISS stage I disease, with plans to start RVD in the near future. She has also been referred to radiation oncology for consolidative XRT to spine. Dental evaluation prior to bone directed therapy.   Her thyroid nodule is also being evaluated with ultrasound. Today seen in office, reports doing reasonably. Planning to start RVD tomorrow. Currently in wheelchair however has only recently started working with physical therapy. Reports minimal headaches. Denies incontinence. Reasonable PO intake. Reports back pain improved since surgery. Recently saw radiation oncology and not felt to need consolidative xrt. She is scheduled to see ENT regarding thyroid nodules. Stage:  1  Performance Status:  2  Pain Score (0-10):  2  Pain Medication related Constipation:  Addressed  Interval History:  6/27/2022: Patient with multiple myeloma, ongoing treatments including high-dose systemic therapy followed by autologous stem cell transplant, seen in infusion center for toxicity management. Patient discharged on 6/14/2022 after she engrafted. She required potassium replacement and Lasix as needed for LE swelling. Today is day +25 of her autologous stem cell transplant. Reports off-and-on loose stools for which Lomotil/Imodium as needed work. Antinausea medications as needed. Neuropathy stable, on gabapentin and Cymbalta. Regular with her acyclovir prophylaxis. Vitals stable. Labs today with adequate counts and ANC 1.3, mild anemia, unremarkable chemistries. Gets 1 L IV NS along with KCl 20 meq's. We will see her back in 3 days time. REVIEW OF SYSTEMS:  As mentioned above, all other systems were reviewed in full and are negative.     Past Medical History:   Diagnosis Date    Obesity     Prolactin increased        Past Surgical History:   Procedure Laterality Date    IR BIOPSY PERC SUPERF BONE      IR TUNNELED CATHETER PLACEMENT GREATER THAN 5 YEARS  5/9/2022    IR TUNNELED CATHETER PLACEMENT GREATER THAN 5 YEARS  5/9/2022    IR TUNNELED CATHETER PLACEMENT GREATER THAN 5 YEARS 5/9/2022 [facility]       Current Outpatient Medications   Medication Sig Dispense Refill    prochlorperazine (COMPAZINE) 10 MG tablet Take 1 tablet by mouth every 6 hours as needed (nausea) 120 tablet 3    diphenoxylate-atropine (LOMOTIL) 2.5-0.025 MG per tablet Take 2 tablets by mouth 4 times daily as needed for Diarrhea.  acyclovir (ZOVIRAX) 400 MG tablet Take 400 mg by mouth 2 times daily      DULoxetine (CYMBALTA) 30 MG extended release capsule Take 30 mg by mouth daily      gabapentin (NEURONTIN) 300 MG capsule Take 300 mg by mouth.  HYDROcodone-acetaminophen (NORCO) 7.5-325 MG per tablet Take 1 tablet by mouth every 6 hours as needed. No current facility-administered medications for this visit. Facility-Administered Medications Ordered in Other Visits   Medication Dose Route Frequency Provider Last Rate Last Admin    sodium chloride flush 0.9 % injection 5-40 mL  5-40 mL IntraVENous BID Antonio Shabazz MD           Social History     Socioeconomic History    Marital status:      Spouse name: Not on file    Number of children: Not on file    Years of education: Not on file    Highest education level: Not on file   Occupational History    Not on file   Tobacco Use    Smoking status: Never Smoker    Smokeless tobacco: Never Used   Substance and Sexual Activity    Alcohol use: Never    Drug use: Never    Sexual activity: Not on file   Other Topics Concern    Not on file   Social History Narrative    Not on file     Social Determinants of Health     Financial Resource Strain:     Difficulty of Paying Living Expenses: Not on file   Food Insecurity:     Worried About Running Out of Food in the Last Year: Not on file    Jimi of Food in the Last Year: Not on file   Transportation Needs:     Lack of Transportation (Medical): Not on file    Lack of Transportation (Non-Medical):  Not on file   Physical Activity:     Days of Exercise per Week: Not on file    Minutes of Exercise per Session: Not on file   Stress:     Feeling of Stress : Not on file   Social Connections:     Frequency of Communication with Friends and Family: Not on file    Frequency of Social Gatherings with Friends and Family: Not on file    Attends Confucianist Services: Not on file    Active Member of Clubs or Organizations: Not on file    Attends Club or Organization Meetings: Not on file    Marital Status: Not on file   Intimate Partner Violence:     Fear of Current or Ex-Partner: Not on file    Emotionally Abused: Not on file    Physically Abused: Not on file    Sexually Abused: Not on file   Housing Stability:     Unable to Pay for Housing in the Last Year: Not on file    Number of [place] in the Last Year: Not on file    Unstable Housing in the Last Year: Not on file       No family history on file. Allergies   Allergen Reactions    Amoxicillin-Pot Clavulanate Other (See Comments)     Yeast infection  Yeast infection       PHYSICAL EXAMINATION:  General Appearance: Healthy appearing patient in no acute distress  Vitals reviewed. There were no vitals taken for this visit. HEENT: No oral or pharyngeal masses, ulceration or thrush noted, no sinus tenderness. Neck is supple with no thyromegaly or JVD noted. Lymph Nodes: No lymphadenopathy noted in the occipital, pre and post auricular, cervical, supra and infraclavicular, axillary, epitrochlear, inguinal, and popliteal region. Breasts: No palpable masses, nipple discharge or skin retraction  Lungs/Thorax: Clear to auscultation, no accessory muscles of respiration being used. Heart: Regular rate and rhythm, normal S1, S2, no appreciable murmurs, rubs, gallops  Abdomen: Soft, nontender, bowel sounds present, no appreciable hepatosplenomegaly, no palpable masses  Extremeties: Good pulses bilaterally, no peripheral edema. Skin: Normal skin tone with no rash, petechiae, ecchymosis noted.   Musculoskeletal: No pain on palpation over bony prominence, no edema, no evidence of gout, no joint or bony deformity  Neurologic: Grossly intact        LABS/IMAGING:    Lab Results Component Value Date    WBC 5.1 06/27/2022    HGB 10.2 06/27/2022    HCT 31.8 06/27/2022     06/27/2022    MCV 89.1 06/27/2022       Lab Results   Component Value Date     06/27/2022    K 3.7 06/27/2022     06/27/2022    CO2 28 06/27/2022    BUN 11 06/27/2022    GFRAA >60 06/27/2022    GLOB 2.9 06/27/2022    ALT 16 06/27/2022             Above results reviewed with patient. ASSESSMENT:  54 female, history of prolactinoma, now kindly referred to us by Dr. Kyra Mancini for evaluation of her multiple myeloma and consideration of autologous stem cell transplant. Her hematologic history is as follows:    - Summer 2021: Started noticing back discomfort.  - 10/21: Progressive debility leading to be being dependent on wheelchair. Led to imaging including T and L-spine showing a cortically destructive lytic lesion with significant soft tissue component involving T11 with resulting extradural soft tissue mass compression of underlying spinal cord. Smaller lytic lesions in the right and left iliac wing also noted. Subsequently hospitalized and underwent T10/T11 laminectomies with excision of spinal cord tumor by neurosurgery. A bone marrow biopsy from iliac crest also performed. 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 Bone marrow biopsy from right hip showing scant bone marrow specimen, with 15% cellularity and 70% involvement by lambda light chain restricted plasma cell neoplasm. FISH not available. PET scan 11/9/2021 with markedly hypermetabolic 2.8 cm thyroid nodule, T11 site surgical changes, however no evidence of FDG avid multiple myeloma noted. Labs showing normal counts, creatinine normal, beta-2 microglobulin at 1.72, SPEP with M spike 0.7, however lambda light chain significantly elevated at 44,763.   She was felt to have ISS stage I disease, with plans to start RVD in the near future. She has also been referred to radiation oncology for consolidative XRT to spine. Dental evaluation prior to bone directed therapy. Her thyroid nodule is also being evaluated with ultrasound. Today seen in office, reports doing reasonably. Planning to start RVD tomorrow. Currently in wheelchair however has only recently started working with physical therapy. Reports minimal headaches. Denies incontinence. Reasonable PO intake. Reports back pain improved since surgery. Recently saw radiation oncology and not felt to need consolidative xrt. She is scheduled to see ENT regarding thyroid nodules. We discussed role of autologous stem cell transplant in first remission after induction therapy. Various aspects pertaining to process, risks and side effects discussed. Patient appears interested in proceeding with procedure moving forward. 2/25/2022: Seen in follow-up. Her bone marrow biopsy with cytogenetics showed normal karyotype, FISH panel had shown gain of 1 q., monosomy 11 and 13, and t(11;14), negative for 17p deletion or other IGH rearrangement. She has since been started on daratumumab-RVD. Appears to have responding disease as her lambda LC has dropped from 60,020 down to 39. M spike is now turned negative. She recently completed cycle 4-day 15 on 2/21/2022 (3-week cycle). Due to ongoing neuropathy, patient today using walker. Describes neuropathy as tingling/discomfort coming up to her calf, her Velcade dose more recently has been decreased to 0.7 mg/m2. We will discuss case with Dr. Candelario Maynard. Given excellent response, will be reasonable to remove Velcade altogether and proceed with Daratumumab-RD q. 28 days x 2 cycles followed by plans for mobilization and collection. This will hopefully also give her time for neuropathy and overall performance status improved. We will see her back in 2 months time. 4/18/2022: Patient seen in follow-up.   She recently completed cycle 6-day 15 therapy on 4/11/2022, with last Revlimid dose on 4/14/2022. Neuropathy has improved with supportive care including gabapentin 600 mg 3 times daily along with Cymbalta 30 mg daily. Mobility much improved, now freely able to move in the room. Back discomfort described as stable to improved. Denies any recent fevers or chills, good p.o. intake. Routine blood work unremarkable, follow-up SPEP/LC. We will proceed with premobilization studies, as well as restaging including PET scan and bone marrow biopsy. Given responding disease, will plan to proceed with mobilization and collection, currently scheduled for pre-mob evaluation 5/5/2022 and high-dose therapy/ASCT 6/1/22. Given lack of social support, transplant will be inpatient. CD34+ goal will be 6 mil/kg. She will be mobilized with G-CSF +/- Mozobil. Conditioning will be with melphalan 200 mg per metered square. She will need a tunneled catheter during procedure. We will see her back in 2 to 3 weeks with above, navigation following. 5/5/2022: Reports feeling well. Extensive ROS unremarkable. Restaging studies showing lambda LC only borderline high, SPEP with low level M spike, 24-hour UPEP negative, bone marrow biopsy with 20 to 30% cellularity without any evidence of residual disease, low iron stores noted: Serum ferritin also low at 20: We will plan for IV iron x2. She will benefit from GI evaluation as well as pelvic exam post transplant. Skeletal survey negative, PET scan without evidence of active disease, of note right sided thyroid nodule with increased uptake noted. Patient has [facility] Dr. 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 She was advised to continue following with them posttransplant. Pre-mobilization studies unremarkable including chest x-ray, EKG, 2D echo, PFTs as well as infectious panel.   She does note some chronic bilateral LE swelling: We will get Dopplers to rule out DVT. We will also plan for Evusheld post collection. We will now see her back prior to ASCT. 5/31/2022: Patient collected CD34 at 6.24 mil/kg w granix alone. Hospitalized for melphalan 200 mg per metered squared followed by stem cell reinfusion following day (CD34 3.12 mil/kg back). Tolerated melphalan and stem cell re-infusion well. D+3 6/5/22. Hydration and IV electrolyte replacement as needed. Some fatigue and loose stools, monitor. Anti-diarrheals helping. Antiemetic as well as antidiarrheal support as needed. Blood transfusion support transfusion as needed. On  prophylactic antibiotics with Levaquin, Augmentin, acyclovir and Diflucan.  G-CSF support with Granix daily starting day +6.  She will be hospitalized through engraftment. 6/27/2022: Patient with multiple myeloma, ongoing treatments including high-dose systemic therapy followed by autologous stem cell transplant, seen in infusion center for toxicity management. Patient discharged on 6/14/2022 after she engrafted. She required potassium replacement and Lasix as needed for LE swelling. Today is day +25 of her autologous stem cell transplant. Reports off-and-on loose stools for which Lomotil/Imodium as needed work. Antinausea medications as needed. Neuropathy stable, on gabapentin and Cymbalta. Regular with her acyclovir prophylaxis. Vitals stable. Labs today with adequate counts and ANC 1.3, mild anemia, unremarkable chemistries. Gets 1 L IV NS along with KCl 20 meq's. We will see her back in 3 days time. 1. Multiple Myeloma, ISS stage 1, high risk disease (gain of 1 q.)  2. Neuropathy. 3. Iron Deficiency  4. 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 PLAN:  - As above. Wzo155/ASCT D+25. IV fluids & electrolyte replacement as needed. - CD34+ goal will be 6 mil/kg.     - Tunneled catheter to come out after D+30 if doing well. - S/p Olivia-RVD [id number] x 4 cycles and Olivia-RD [id number] x 2.   - Neuropathy: On Gabapentin 600 mg 3 times daily along with Cymbalta 30 mg daily.  - Prophylactic medication: acyclovir. Start Bactrim MWF D+30.     - Evusheld [id number]  - Iron deficiency: IV iron x 2. She will benefit from GI evaluation as well as pelvic exam post transplant.  - F/u w ENT as needed. Twice weekly visits for now. Once weekly visits once D+3. I truly appreciate the kind referral from Dr. Den Nobles.  Please call w/ any ladyions      Larry Cuadra MD  [facility]  Hematology Oncology  [address] [place] [address]  Office : [phone]  Fax : [phone]

## 2022-06-27 NOTE — PROGRESS NOTES
Diagnosis: Multiple Myeloma  Transplant Date: 6/2/22  Day: (+/-) +25. Chemo regimen used: Melphalan  Labs needed at next visit: See orders. Labs not resulted that need follow-up: None. Next Appointment scheduled: 6/30/22 @ 0900. BMT assessments and toxicities completed. WBC/ANC= 5.1/1.3  Prophylactic medications started Day +1 and stopped 6/14/22. G-CSF started inpatient and last dose given on 6/15/22. Toxicities greater than 2 : None. Bactrim or equivalent to be initiated on Day +30. New orders received: None. Issues: None  Patient arrived ambulatory .  Labs drawn & reviewed.  1 liter D5 0.45%NS with 20 mEq KCl given over 2 hours.   Discharged ambulatory in stable condition

## 2022-06-30 ENCOUNTER — HOSPITAL ENCOUNTER (OUTPATIENT)
Dept: INFUSION THERAPY | Age: 52
Discharge: HOME | End: 2022-06-30
Payer: COMMERCIAL

## 2022-06-30 ENCOUNTER — OFFICE VISIT (OUTPATIENT)
Dept: ONCOLOGY | Age: 52
End: 2022-06-30
Payer: COMMERCIAL

## 2022-06-30 VITALS
HEART RATE: 101 BPM | DIASTOLIC BLOOD PRESSURE: 86 MMHG | SYSTOLIC BLOOD PRESSURE: 146 MMHG | RESPIRATION RATE: 18 BRPM | TEMPERATURE: 98.3 F | BODY MASS INDEX: 42.25 KG/M2 | WEIGHT: 231 LBS | OXYGEN SATURATION: 97 %

## 2022-06-30 DIAGNOSIS — R30.0 DYSURIA: ICD-10-CM

## 2022-06-30 DIAGNOSIS — C90.00 MULTIPLE MYELOMA NOT HAVING ACHIEVED REMISSION (HCC): Primary | ICD-10-CM

## 2022-06-30 DIAGNOSIS — R19.7 DIARRHEA, UNSPECIFIED TYPE: ICD-10-CM

## 2022-06-30 DIAGNOSIS — Z94.81 BONE MARROW TRANSPLANT STATUS (HCC): ICD-10-CM

## 2022-06-30 DIAGNOSIS — C90.00 MULTIPLE MYELOMA, REMISSION STATUS UNSPECIFIED (HCC): Primary | ICD-10-CM

## 2022-06-30 DIAGNOSIS — G62.0 CHEMOTHERAPY-INDUCED NEUROPATHY (HCC): ICD-10-CM

## 2022-06-30 DIAGNOSIS — T45.1X5A CHEMOTHERAPY-INDUCED NEUROPATHY (HCC): ICD-10-CM

## 2022-06-30 LAB
ALBUMIN SERPL-MCNC: 3.5 G/DL (ref 3.5–5)
ALBUMIN/GLOB SERPL: 1.2 {RATIO} (ref 1.2–3.5)
ALP SERPL-CCNC: 81 U/L (ref 50–136)
ALT SERPL-CCNC: 16 U/L (ref 12–65)
AMORPH CRY URNS QL MICRO: ABNORMAL
ANION GAP SERPL CALC-SCNC: 4 MMOL/L (ref 7–16)
APPEARANCE UR: ABNORMAL
AST SERPL-CCNC: 25 U/L (ref 15–37)
BACTERIA URNS QL MICRO: ABNORMAL /HPF
BASOPHILS # BLD: 0 K/UL (ref 0–0.2)
BASOPHILS NFR BLD: 1 % (ref 0–2)
BILIRUB SERPL-MCNC: 0.2 MG/DL (ref 0.2–1.1)
BILIRUB UR QL: NEGATIVE
BUN SERPL-MCNC: 9 MG/DL (ref 6–23)
CALCIUM SERPL-MCNC: 9.2 MG/DL (ref 8.3–10.4)
CASTS URNS QL MICRO: 0 /LPF
CHLORIDE SERPL-SCNC: 108 MMOL/L (ref 98–107)
CO2 SERPL-SCNC: 31 MMOL/L (ref 21–32)
COLOR UR: YELLOW
CREAT SERPL-MCNC: 0.6 MG/DL (ref 0.6–1)
CRYSTALS URNS QL MICRO: 0 /LPF
DIFFERENTIAL METHOD BLD: ABNORMAL
EOSINOPHIL # BLD: 0.1 K/UL (ref 0–0.8)
EOSINOPHIL NFR BLD: 1 % (ref 0.5–7.8)
EPI CELLS #/AREA URNS HPF: ABNORMAL /HPF
ERYTHROCYTE [DISTWIDTH] IN BLOOD BY AUTOMATED COUNT: 19.9 % (ref 11.9–14.6)
GGT SERPL-CCNC: 92 U/L (ref 5–55)
GLOBULIN SER CALC-MCNC: 3 G/DL (ref 2.3–3.5)
GLUCOSE SERPL-MCNC: 99 MG/DL (ref 65–100)
GLUCOSE UR STRIP.AUTO-MCNC: NEGATIVE MG/DL
HCT VFR BLD AUTO: 33.5 % (ref 35.8–46.3)
HGB BLD-MCNC: 10.6 G/DL (ref 11.7–15.4)
HGB UR QL STRIP: NEGATIVE
IMM GRANULOCYTES # BLD AUTO: 0 K/UL (ref 0–0.5)
IMM GRANULOCYTES NFR BLD AUTO: 0 % (ref 0–5)
KETONES UR QL STRIP.AUTO: NEGATIVE MG/DL
LDH SERPL L TO P-CCNC: 305 U/L (ref 100–190)
LEUKOCYTE ESTERASE UR QL STRIP.AUTO: ABNORMAL
LYMPHOCYTES # BLD: 2.2 K/UL (ref 0.5–4.6)
LYMPHOCYTES NFR BLD: 39 % (ref 13–44)
MAGNESIUM SERPL-MCNC: 2.1 MG/DL (ref 1.8–2.4)
MCH RBC QN AUTO: 28.3 PG (ref 26.1–32.9)
MCHC RBC AUTO-ENTMCNC: 31.6 G/DL (ref 31.4–35)
MCV RBC AUTO: 89.3 FL (ref 79.6–97.8)
MONOCYTES # BLD: 0.9 K/UL (ref 0.1–1.3)
MONOCYTES NFR BLD: 16 % (ref 4–12)
MUCOUS THREADS URNS QL MICRO: 0 /LPF
NEUTS SEG # BLD: 2.5 K/UL (ref 1.7–8.2)
NEUTS SEG NFR BLD: 43 % (ref 43–78)
NITRITE UR QL STRIP.AUTO: NEGATIVE
NRBC # BLD: 0 K/UL (ref 0–0.2)
PH UR STRIP: 7.5 [PH] (ref 5–9)
PHOSPHATE SERPL-MCNC: 4.2 MG/DL (ref 2.5–4.5)
PLATELET # BLD AUTO: 196 K/UL (ref 150–450)
PMV BLD AUTO: 10.2 FL (ref 9.4–12.3)
POTASSIUM SERPL-SCNC: 3.7 MMOL/L (ref 3.5–5.1)
PROT SERPL-MCNC: 6.5 G/DL (ref 6.3–8.2)
PROT UR STRIP-MCNC: NEGATIVE MG/DL
RBC # BLD AUTO: 3.75 M/UL (ref 4.05–5.2)
RBC #/AREA URNS HPF: 0 /HPF
SODIUM SERPL-SCNC: 143 MMOL/L (ref 136–145)
SP GR UR REFRACTOMETRY: 1.02 (ref 1–1.02)
URINE CULTURE IF INDICATED: ABNORMAL
UROBILINOGEN UR QL STRIP.AUTO: 0.2 EU/DL (ref 0.2–1)
WBC # BLD AUTO: 5.7 K/UL (ref 4.3–11.1)
WBC URNS QL MICRO: ABNORMAL /HPF

## 2022-06-30 PROCEDURE — 80053 COMPREHEN METABOLIC PANEL: CPT

## 2022-06-30 PROCEDURE — 84100 ASSAY OF PHOSPHORUS: CPT

## 2022-06-30 PROCEDURE — 81001 URINALYSIS AUTO W/SCOPE: CPT

## 2022-06-30 PROCEDURE — 85025 COMPLETE CBC W/AUTO DIFF WBC: CPT

## 2022-06-30 PROCEDURE — 2580000003 HC RX 258: Performed by: INTERNAL MEDICINE

## 2022-06-30 PROCEDURE — 2500000003 HC RX 250 WO HCPCS: Performed by: INTERNAL MEDICINE

## 2022-06-30 PROCEDURE — 83615 LACTATE (LD) (LDH) ENZYME: CPT

## 2022-06-30 PROCEDURE — 96360 HYDRATION IV INFUSION INIT: CPT

## 2022-06-30 PROCEDURE — 83735 ASSAY OF MAGNESIUM: CPT

## 2022-06-30 PROCEDURE — 99214 OFFICE O/P EST MOD 30 MIN: CPT | Performed by: NURSE PRACTITIONER

## 2022-06-30 PROCEDURE — 96361 HYDRATE IV INFUSION ADD-ON: CPT

## 2022-06-30 PROCEDURE — 82977 ASSAY OF GGT: CPT

## 2022-06-30 RX ORDER — DEXTROSE, SODIUM CHLORIDE, AND POTASSIUM CHLORIDE 5; .45; .15 G/100ML; G/100ML; G/100ML
1000 INJECTION INTRAVENOUS CONTINUOUS PRN
Status: DISCONTINUED | OUTPATIENT
Start: 2022-06-30 | End: 2022-07-01 | Stop reason: HOSPADM

## 2022-06-30 RX ORDER — SODIUM CHLORIDE 0.9 % (FLUSH) 0.9 %
5-40 SYRINGE (ML) INJECTION PRN
Status: DISCONTINUED | OUTPATIENT
Start: 2022-06-30 | End: 2022-07-01 | Stop reason: HOSPADM

## 2022-06-30 RX ADMIN — SODIUM CHLORIDE, PRESERVATIVE FREE 10 ML: 5 INJECTION INTRAVENOUS at 09:45

## 2022-06-30 RX ADMIN — DEXTROSE, SODIUM CHLORIDE, AND POTASSIUM CHLORIDE 1000 ML: 5; .45; .15 INJECTION INTRAVENOUS at 10:07

## 2022-06-30 RX ADMIN — SODIUM CHLORIDE, PRESERVATIVE FREE 10 ML: 5 INJECTION INTRAVENOUS at 12:36

## 2022-06-30 NOTE — PROGRESS NOTES
Data Source: Patient, [facility] record. 6/30/2022    10:49 AM    [address] [id number]    54 y.o. Patient Encounter: [facility] Visit    Heme Diagnosis:  MM, transplant evaluation. Heme History (Copied from prior):   54 female, history of prolactinoma, now kindly referred to us by Dr. Allyson Jane for evaluation of her multiple myeloma and consideration of autologous stem cell transplant. Her hematologic history is as follows:    - Summer 2021: Started noticing back discomfort.  - 10/21: Progressive debility leading to be being dependent on wheelchair. Led to imaging including T and L-spine showing a cortically destructive lytic lesion with significant soft tissue component involving T11 with resulting extradural soft tissue mass compression of underlying spinal cord. Smaller lytic lesions in the right and left iliac wing also noted. Subsequently hospitalized and underwent T10/T11 laminectomies with excision of spinal cord tumor by neurosurgery. A bone marrow biopsy from iliac crest also performed. 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 Bone marrow biopsy from right hip showing scant bone marrow specimen, with 15% cellularity and 70% involvement by lambda light chain restricted plasma cell neoplasm. FISH not available. PET scan 11/9/2021 with markedly hypermetabolic 2.8 cm thyroid nodule, T11 site surgical changes, however no evidence of FDG avid multiple myeloma noted. Labs showing normal counts, creatinine normal, beta-2 microglobulin at 1.72, SPEP with M spike 0.7, however lambda light chain significantly elevated at 44,763. She was felt to have ISS stage I disease, with plans to start RVD in the near future. She has also been referred to radiation oncology for consolidative XRT to spine. Dental evaluation prior to bone directed therapy.   Her thyroid (CYMBALTA) 30 MG extended release capsule Take 30 mg by mouth daily      gabapentin (NEURONTIN) 300 MG capsule Take 300 mg by mouth.  HYDROcodone-acetaminophen (NORCO) 7.5-325 MG per tablet Take 1 tablet by mouth every 6 hours as needed. No current facility-administered medications for this visit. Facility-Administered Medications Ordered in Other Visits   Medication Dose Route Frequency Provider Last Rate Last Admin    sodium chloride flush 0.9 % injection 5-40 mL  5-40 mL IntraVENous PRN Thelma Thompson MD   10 mL at 06/30/22 0945    dextrose 5 % and 0.45 % NaCl with KCl 20 mEq infusion  1,000 mL IntraVENous Continuous PRN Thelma Thompson  mL/hr at 06/30/22 1007 1,000 mL at 06/30/22 1007       Social History     Socioeconomic History    Marital status:      Spouse name: None    Number of children: None    Years of education: None    Highest education level: None   Occupational History    None   Tobacco Use    Smoking status: Never Smoker    Smokeless tobacco: Never Used   Substance and Sexual Activity    Alcohol use: Never    Drug use: Never    Sexual activity: None   Other Topics Concern    None   Social History Narrative    None     Social Determinants of Health     Financial Resource Strain:     Difficulty of Paying Living Expenses: Not on file   Food Insecurity:     Worried About Running Out of Food in the Last Year: Not on file    Jimi of Food in the Last Year: Not on file   Transportation Needs:     Lack of Transportation (Medical): Not on file    Lack of Transportation (Non-Medical):  Not on file   Physical Activity:     Days of Exercise per Week: Not on file    Minutes of Exercise per Session: Not on file   Stress:     Feeling of Stress : Not on file   Social Connections:     Frequency of Communication with Friends and Family: Not on file    Frequency of Social Gatherings with Friends and Family: Not on file    Attends Sabianism Services: Not on file   Sary Hernandez Active Member of Clubs or Organizations: Not on file    Attends Club or Organization Meetings: Not on file    Marital Status: Not on file   Intimate Partner Violence:     Fear of Current or Ex-Partner: Not on file    Emotionally Abused: Not on file    Physically Abused: Not on file    Sexually Abused: Not on file   Housing Stability:     Unable to Pay for Housing in the Last Year: Not on file    Number of [place] in the Last Year: Not on file    Unstable Housing in the Last Year: Not on file       History reviewed. No pertinent family history. Allergies   Allergen Reactions    Amoxicillin-Pot Clavulanate Other (See Comments)     Yeast infection  Yeast infection       PHYSICAL EXAMINATION:  General Appearance: Healthy appearing patient in no acute distress  Vitals reviewed. There were no vitals taken for this visit. HEENT: No oral or pharyngeal masses, ulceration or thrush noted, no sinus tenderness. Neck is supple with no thyromegaly or JVD noted. Lungs/Thorax: Clear to auscultation, no accessory muscles of respiration being used. Heart: Regular rate and rhythm, normal S1, S2, no appreciable murmurs, rubs, gallops  Abdomen: Soft, nontender, bowel sounds present, no appreciable hepatosplenomegaly, no palpable masses  Extremeties: Good pulses bilaterally, +BLE peripheral edema. Skin: Normal skin tone with no rash, petechiae, ecchymosis noted.   Musculoskeletal: No pain on palpation over bony prominence, +BLE edema, no evidence of gout, no joint or bony deformity  Neurologic: Grossly intact      LABS/IMAGING:    Lab Results   Component Value Date/Time    WBC 5.7 06/30/2022 09:39 AM    HGB 10.6 06/30/2022 09:39 AM    HCT 33.5 06/30/2022 09:39 AM     06/30/2022 09:39 AM    MCV 89.3 06/30/2022 09:39 AM       Lab Results   Component Value Date/Time     06/30/2022 09:39 AM    K 3.7 06/30/2022 09:39 AM     06/30/2022 09:39 AM    CO2 31 06/30/2022 09:39 AM    BUN 9 06/30/2022 09:39 AM    GFRAA >60 06/30/2022 09:39 AM    GLOB 3.0 06/30/2022 09:39 AM    ALT 16 06/30/2022 09:39 AM       Above results reviewed with patient. ASSESSMENT:  54 female, history of prolactinoma, now kindly referred to us by Dr. Senait Rankin for evaluation of her multiple myeloma and consideration of autologous stem cell transplant. Her hematologic history is as follows:    - Summer 2021: Started noticing back discomfort.  - 10/21: Progressive debility leading to be being dependent on wheelchair. Led to imaging including T and L-spine showing a cortically destructive lytic lesion with significant soft tissue component involving T11 with resulting extradural soft tissue mass compression of underlying spinal cord. Smaller lytic lesions in the right and left iliac wing also noted. Subsequently hospitalized and underwent T10/T11 laminectomies with excision of spinal cord tumor by neurosurgery. A bone marrow biopsy from iliac crest also performed. 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 Bone marrow biopsy from right hip showing scant bone marrow specimen, with 15% cellularity and 70% involvement by lambda light chain restricted plasma cell neoplasm. FISH not available. PET scan 11/9/2021 with markedly hypermetabolic 2.8 cm thyroid nodule, T11 site surgical changes, however no evidence of FDG avid multiple myeloma noted. Labs showing normal counts, creatinine normal, beta-2 microglobulin at 1.72, SPEP with M spike 0.7, however lambda light chain significantly elevated at 44,763. She was felt to have ISS stage I disease, with plans to start RVD in the near future. She has also been referred to radiation oncology for consolidative XRT to spine. Dental evaluation prior to bone directed therapy. Her thyroid nodule is also being evaluated with ultrasound. Today seen in office, reports doing reasonably.  Planning to start RVD tomorrow. Currently in wheelchair however has only recently started working with physical therapy. Reports minimal headaches. Denies incontinence. Reasonable PO intake. Reports back pain improved since surgery. Recently saw radiation oncology and not felt to need consolidative xrt. She is scheduled to see ENT regarding thyroid nodules. We discussed role of autologous stem cell transplant in first remission after induction therapy. Various aspects pertaining to process, risks and side effects discussed. Patient appears interested in proceeding with procedure moving forward. 2/25/2022: Seen in follow-up. Her bone marrow biopsy with cytogenetics showed normal karyotype, FISH panel had shown gain of 1 q., monosomy 11 and 13, and t(11;14), negative for 17p deletion or other IGH rearrangement. She has since been started on daratumumab-RVD. Appears to have responding disease as her lambda LC has dropped from 60,020 down to 39. M spike is now turned negative. She recently completed cycle 4-day 15 on 2/21/2022 (3-week cycle). Due to ongoing neuropathy, patient today using walker. Describes neuropathy as tingling/discomfort coming up to her calf, her Velcade dose more recently has been decreased to 0.7 mg/m2. We will discuss case with Dr. Junie Grace. Given excellent response, will be reasonable to remove Velcade altogether and proceed with Daratumumab-RD q. 28 days x 2 cycles followed by plans for mobilization and collection. This will hopefully also give her time for neuropathy and overall performance status improved. We will see her back in 2 months time. 4/18/2022: Patient seen in follow-up. She recently completed cycle 6-day 15 therapy on 4/11/2022, with last Revlimid dose on 4/14/2022. Neuropathy has improved with supportive care including gabapentin 600 mg 3 times daily along with Cymbalta 30 mg daily. Mobility much improved, now freely able to move in the room.   Back discomfort described as stable to improved. Denies any recent fevers or chills, good p.o. intake. Routine blood work unremarkable, follow-up SPEP/LC. We will proceed with premobilization studies, as well as restaging including PET scan and bone marrow biopsy. Given responding disease, will plan to proceed with mobilization and collection, currently scheduled for pre-mob evaluation 5/5/2022 and high-dose therapy/ASCT 6/1/22. Given lack of social support, transplant will be inpatient. CD34+ goal will be 6 mil/kg. She will be mobilized with G-CSF +/- Mozobil. Conditioning will be with melphalan 200 mg per metered square. She will need a tunneled catheter during procedure. We will see her back in 2 to 3 weeks with above, navigation following. 5/5/2022: Reports feeling well. Extensive ROS unremarkable. Restaging studies showing lambda LC only borderline high, SPEP with low level M spike, 24-hour UPEP negative, bone marrow biopsy with 20 to 30% cellularity without any evidence of residual disease, low iron stores noted: Serum ferritin also low at 20: We will plan for IV iron x2. She will benefit from GI evaluation as well as pelvic exam post transplant. Skeletal survey negative, PET scan without evidence of active disease, of note right sided thyroid nodule with increased uptake noted. Patient has seen [facility] Dr. 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 She was advised to continue following with them posttransplant. Pre-mobilization studies unremarkable including chest x-ray, EKG, 2D echo, PFTs as well as infectious panel. She does note some chronic bilateral LE swelling: We will get Dopplers to rule out DVT. We will also plan for Evusheld post collection. We will now see her back prior to ASCT.     5/31/2022: Patient collected CD34 at 6.24 mil/kg w granix alone. Hospitalized for melphalan 200 mg per metered squared followed by stem cell reinfusion following day (CD34 3.12 mil/kg back). Tolerated melphalan and stem cell re-infusion well. D+3 6/5/22. Hydration and IV electrolyte replacement as needed. Some fatigue and loose stools, monitor. Anti-diarrheals helping. Antiemetic as well as antidiarrheal support as needed. Blood transfusion support transfusion as needed. On  prophylactic antibiotics with Levaquin, Augmentin, acyclovir and Diflucan.  G-CSF support with Granix daily starting day +6.  She will be hospitalized through engraftment. 6/27/2022: Patient with multiple myeloma, ongoing treatments including high-dose systemic therapy followed by autologous stem cell transplant, seen in infusion center for toxicity management. Patient discharged on 6/14/2022 after she engrafted. She required potassium replacement and Lasix as needed for LE swelling. Today is day +25 of her autologous stem cell transplant. Reports off-and-on loose stools for which Lomotil/Imodium as needed work. Antinausea medications as needed. Neuropathy stable, on gabapentin and Cymbalta. Regular with her acyclovir prophylaxis. Vitals stable. Labs today with adequate counts and ANC 1.3, mild anemia, unremarkable chemistries. Gets 1 L IV NS along with KCl 20 meq's. We will see her back in 3 days time. 6/30/2022: D+28 and doing well. Today is the first day she has not had any nausea and diarrhea is much improved, only 1 episode yesterday. She is eating and drinking well. Energy level is improving. No cough, shortness of breath. BLE edema is stable/mild. Neuropathy is unchanged. No true pain, however she feels achy in the AM and this improves as she is more active. She has new suprapubic pressure/dysuria, no fevers. Labs reviewed. Will obtain UA/UC. 1. Multiple Myeloma, ISS stage 1, high risk disease (gain of 1 q.)  2. Neuropathy. 3. Iron Deficiency  4.  RT sided PET avid thyroid nodule, per ENT FNA biopsy blaire cordero cell neoplasm w/ plans to monitor. PLAN:  - As above. Dpo251/ASCT D+28. IV fluids & electrolyte replacement as needed. - CD34+ goal will be 6 mil/kg. - Tunneled catheter to come out after D+30 if doing well. - S/p Olivia-RVD [id number] x 4 cycles and Olivia-RD [id number] x 2.   - Neuropathy: On Gabapentin 600 mg 3 times daily along with Cymbalta 30 mg daily.  - Prophylactic medication: acyclovir. Start Bactrim MWF D+30.     - Evusheld [id number]  - Iron deficiency: IV iron x 2. She will benefit from GI evaluation as well as pelvic exam post transplant.  - F/u w ENT as needed. Twice weekly visits for now. Once weekly visits once D+30.          Drake Copeland) [address] [place] [address]  [facility]  [address] [place] [address]  Office : [phone]  Fax : [phone]

## 2022-07-01 NOTE — PROGRESS NOTES
Data Source: [facility]. 7/5/2022    1:05 PM    [address] [id number]    54 y.o. Patient Encounter: [facility] Visit    Heme Diagnosis:  MM, transplant evaluation. Heme History (Copied from prior):   54 female, history of prolactinoma, now kindly referred to us by Dr. Deandra Kendall for evaluation of her multiple myeloma and consideration of autologous stem cell transplant. Her hematologic history is as follows:    - Summer 2021: Started noticing back discomfort.  - 10/21: Progressive debility leading to be being dependent on wheelchair. Led to imaging including T and L-spine showing a cortically destructive lytic lesion with significant soft tissue component involving T11 with resulting extradural soft tissue mass compression of underlying spinal cord. Smaller lytic lesions in the right and left iliac wing also noted. Subsequently hospitalized and underwent T10/T11 laminectomies with excision of spinal cord tumor by neurosurgery. A bone marrow biopsy from iliac crest also performed. 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 Bone marrow biopsy from right hip showing scant bone marrow specimen, with 15% cellularity and 70% involvement by lambda light chain restricted plasma cell neoplasm. FISH not available. PET scan 11/9/2021 with markedly hypermetabolic 2.8 cm thyroid nodule, T11 site surgical changes, however no evidence of FDG avid multiple myeloma noted. Labs showing normal counts, creatinine normal, beta-2 microglobulin at 1.72, SPEP with M spike 0.7, however lambda light chain significantly elevated at 44,763. She was felt to have ISS stage I disease, with plans to start RVD in the near future. She has also been referred to radiation oncology for consolidative XRT to spine. Dental evaluation prior to bone directed therapy.   Her thyroid nodule is also being evaluated with ultrasound. Today seen in office, reports doing reasonably. Planning to start RVD tomorrow. Currently in wheelchair however has only recently started working with physical therapy. Reports minimal headaches. Denies incontinence. Reasonable PO intake. Reports back pain improved since surgery. Recently saw radiation oncology and not felt to need consolidative xrt. She is scheduled to see ENT regarding thyroid nodules. Stage:  1  Performance Status:  2  Pain Score (0-10):  2  Pain Medication related Constipation:  Addressed  Interval History:  7/5/2022:  D+33. She has been well overall since last seen. Currently, her GI symptoms have resolved (last episode of nausea this past Thursday). Her appetite has been good. Her energy level continues to improve. She does note that the neuropathy has worsened in her feet, noticeable for the last week and a half. She is on Gabapentin/Cymbalta currently for this. She denies any other new aches or pains. She still has some trace BLE swelling, stable. She denies any breathing difficulties. She denies any fevers, chills, or other infectious symptoms. REVIEW OF SYSTEMS:  As mentioned above, all other systems were reviewed in full and are negative. Past Medical History:   Diagnosis Date    Obesity     Prolactin increased        Past Surgical History:   Procedure Laterality Date    IR BIOPSY PERC SUPERF BONE      IR TUNNELED CATHETER PLACEMENT GREATER THAN 5 YEARS  5/9/2022    IR TUNNELED CATHETER PLACEMENT GREATER THAN 5 YEARS  5/9/2022    IR TUNNELED CATHETER PLACEMENT GREATER THAN 5 YEARS 5/9/2022 [facility] SPECI[facility]       Current Outpatient Medications   Medication Sig Dispense Refill    prochlorperazine (COMPAZINE) 10 MG tablet Take 1 tablet by mouth every 6 hours as needed (nausea) 120 tablet 3    diphenoxylate-atropine (LOMOTIL) 2.5-0.025 MG per tablet Take 2 tablets by mouth 4 times daily as needed for Diarrhea.  acyclovir (ZOVIRAX) 400 MG tablet Take 400 mg by mouth 2 times daily      DULoxetine (CYMBALTA) 30 MG extended release capsule Take 30 mg by mouth daily      gabapentin (NEURONTIN) 300 MG capsule Take 300 mg by mouth.  HYDROcodone-acetaminophen (NORCO) 7.5-325 MG per tablet Take 1 tablet by mouth every 6 hours as needed. No current facility-administered medications for this visit. Facility-Administered Medications Ordered in Other Visits   Medication Dose Route Frequency Provider Last Rate Last Admin    sodium chloride flush 0.9 % injection 5-40 mL  5-40 mL IntraVENous PRN Susan Church MD   30 mL at 07/05/22 1205       Social History     Socioeconomic History    Marital status:      Spouse name: None    Number of children: None    Years of education: None    Highest education level: None   Occupational History    None   Tobacco Use    Smoking status: Never Smoker    Smokeless tobacco: Never Used   Substance and Sexual Activity    Alcohol use: Never    Drug use: Never    Sexual activity: None   Other Topics Concern    None   Social History Narrative    None     Social Determinants of Health     Financial Resource Strain:     Difficulty of Paying Living Expenses: Not on file   Food Insecurity:     Worried About Running Out of Food in the Last Year: Not on file    Jimi of Food in the Last Year: Not on file   Transportation Needs:     Lack of Transportation (Medical): Not on file    Lack of Transportation (Non-Medical):  Not on file   Physical Activity:     Days of Exercise per Week: Not on file    Minutes of Exercise per Session: Not on file   Stress:     Feeling of Stress : Not on file   Social Connections:     Frequency of Communication with Friends and Family: Not on file    Frequency of Social Gatherings with Friends and Family: Not on file    Attends Cheondoism Services: Not on file    Active Member of Clubs or Organizations: Not on file    Attends Club or Organization Meetings: Not on file    Marital Status: Not on file   Intimate Partner Violence:     Fear of Current or Ex-Partner: Not on file    Emotionally Abused: Not on file    Physically Abused: Not on file    Sexually Abused: Not on file   Housing Stability:     Unable to Pay for Housing in the Last Year: Not on file    Number of [place] in the Last Year: Not on file    Unstable Housing in the Last Year: Not on file       History reviewed. No pertinent family history. Allergies   Allergen Reactions    Amoxicillin-Pot Clavulanate Other (See Comments)     Yeast infection  Yeast infection       PHYSICAL EXAMINATION:  General Appearance: Healthy appearing patient in no acute distress  Vitals reviewed. There were no vitals taken for this visit. HEENT: No oral or pharyngeal masses, ulceration or thrush noted, no sinus tenderness. Neck is supple with no thyromegaly or JVD noted. Lungs/Thorax: Clear to auscultation, no accessory muscles of respiration being used. Heart: Regular rate and rhythm, normal S1, S2, no appreciable murmurs, rubs, gallops  Abdomen: Soft, nontender, bowel sounds present, no appreciable hepatosplenomegaly, no palpable masses  Extremeties: Good pulses bilaterally, +BLE peripheral edema. Skin: Normal skin tone with no rash, petechiae, ecchymosis noted.   Musculoskeletal: No pain on palpation over bony prominence, +BLE edema, no evidence of gout, no joint or bony deformity  Neurologic: Grossly intact      LABS/IMAGING:    Lab Results   Component Value Date/Time    WBC 5.9 07/05/2022 11:42 AM    HGB 10.9 07/05/2022 11:42 AM    HCT 33.8 07/05/2022 11:42 AM     07/05/2022 11:42 AM    MCV 89.9 07/05/2022 11:42 AM       Lab Results   Component Value Date/Time     07/05/2022 11:42 AM    K 4.1 07/05/2022 11:42 AM     07/05/2022 11:42 AM    CO2 30 07/05/2022 11:42 AM    BUN 10 07/05/2022 11:42 AM    GFRAA >60 07/05/2022 11:42 AM    GLOB 3.0 07/05/2022 11:42 AM ALT 15 07/05/2022 11:42 AM       Above results reviewed with patient. ASSESSMENT:  54 female, history of prolactinoma, now kindly referred to us by Dr. Mckenna Santos for evaluation of her multiple myeloma and consideration of autologous stem cell transplant. Her hematologic history is as follows:    - Summer 2021: Started noticing back discomfort.  - 10/21: Progressive debility leading to be being dependent on wheelchair. Led to imaging including T and L-spine showing a cortically destructive lytic lesion with significant soft tissue component involving T11 with resulting extradural soft tissue mass compression of underlying spinal cord. Smaller lytic lesions in the right and left iliac wing also noted. Subsequently hospitalized and underwent T10/T11 laminectomies with excision of spinal cord tumor by neurosurgery. A bone marrow biopsy from iliac crest also performed. 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 Bone marrow biopsy from right hip showing scant bone marrow specimen, with 15% cellularity and 70% involvement by lambda light chain restricted plasma cell neoplasm. FISH not available. PET scan 11/9/2021 with markedly hypermetabolic 2.8 cm thyroid nodule, T11 site surgical changes, however no evidence of FDG avid multiple myeloma noted. Labs showing normal counts, creatinine normal, beta-2 microglobulin at 1.72, SPEP with M spike 0.7, however lambda light chain significantly elevated at 44,763. She was felt to have ISS stage I disease, with plans to start RVD in the near future. She has also been referred to radiation oncology for consolidative XRT to spine. Dental evaluation prior to bone directed therapy. Her thyroid nodule is also being evaluated with ultrasound. Today seen in office, reports doing reasonably. Planning to start RVD tomorrow.  Currently in wheelchair however has only recently started working with physical therapy. Reports minimal headaches. Denies incontinence. Reasonable PO intake. Reports back pain improved since surgery. Recently saw radiation oncology and not felt to need consolidative xrt. She is scheduled to see ENT regarding thyroid nodules. We discussed role of autologous stem cell transplant in first remission after induction therapy. Various aspects pertaining to process, risks and side effects discussed. Patient appears interested in proceeding with procedure moving forward. 2/25/2022: Seen in follow-up. Her bone marrow biopsy with cytogenetics showed normal karyotype, FISH panel had shown gain of 1 q., monosomy 11 and 13, and t(11;14), negative for 17p deletion or other IGH rearrangement. She has since been started on daratumumab-RVD. Appears to have responding disease as her lambda LC has dropped from 60,020 down to 39. M spike is now turned negative. She recently completed cycle 4-day 15 on 2/21/2022 (3-week cycle). Due to ongoing neuropathy, patient today using walker. Describes neuropathy as tingling/discomfort coming up to her calf, her Velcade dose more recently has been decreased to 0.7 mg/m2. We will discuss case with Dr. Carol Elise. Given excellent response, will be reasonable to remove Velcade altogether and proceed with Daratumumab-RD q. 28 days x 2 cycles followed by plans for mobilization and collection. This will hopefully also give her time for neuropathy and overall performance status improved. We will see her back in 2 months time. 4/18/2022: Patient seen in follow-up. She recently completed cycle 6-day 15 therapy on 4/11/2022, with last Revlimid dose on 4/14/2022. Neuropathy has improved with supportive care including gabapentin 600 mg 3 times daily along with Cymbalta 30 mg daily. Mobility much improved, now freely able to move in the room. Back discomfort described as stable to improved. Denies any recent fevers or chills, good p.o. intake. Routine blood work unremarkable, follow-up SPEP/LC. We will proceed with premobilization studies, as well as restaging including PET scan and bone marrow biopsy. Given responding disease, will plan to proceed with mobilization and collection, currently scheduled for pre-mob evaluation 5/5/2022 and high-dose therapy/ASCT 6/1/22. Given lack of social support, transplant will be inpatient. CD34+ goal will be 6 mil/kg. She will be mobilized with G-CSF +/- Mozobil. Conditioning will be with melphalan 200 mg per metered square. She will need a tunneled catheter during procedure. We will see her back in 2 to 3 weeks with above, navigation following. 5/5/2022: Reports feeling well. Extensive ROS unremarkable. Restaging studies showing lambda LC only borderline high, SPEP with low level M spike, 24-hour UPEP negative, bone marrow biopsy with 20 to 30% cellularity without any evidence of residual disease, low iron stores noted: Serum ferritin also low at 20: We will plan for IV iron x2. She will benefit from GI evaluation as well as pelvic exam post transplant. Skeletal survey negative, PET scan without evidence of active disease, of note right sided thyroid nodule with increased uptake noted. Patient has seen [facility] Dr. 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 She was advised to continue following with them posttransplant. Pre-mobilization studies unremarkable including chest x-ray, EKG, 2D echo, PFTs as well as infectious panel. She does note some chronic bilateral LE swelling: We will get Dopplers to rule out DVT. We will also plan for Evusheld post collection. We will now see her back prior to ASCT.     5/31/2022: Patient collected CD34 at 6.24 mil/kg w granix alone. Hospitalized for melphalan 200 mg per metered squared followed by stem cell reinfusion following day (CD34 3.12 mil/kg back). Tolerated melphalan and stem cell re-infusion well. D+3 6/5/22. Hydration and IV electrolyte replacement as needed. Some fatigue and loose stools, monitor. Anti-diarrheals helping. Antiemetic as well as antidiarrheal support as needed. Blood transfusion support transfusion as needed. On  prophylactic antibiotics with Levaquin, Augmentin, acyclovir and Diflucan.  G-CSF support with Granix daily starting day +6.  She will be hospitalized through engraftment. 6/27/2022: Patient with multiple myeloma, ongoing treatments including high-dose systemic therapy followed by autologous stem cell transplant, seen in infusion center for toxicity management. Patient discharged on 6/14/2022 after she engrafted. She required potassium replacement and Lasix as needed for LE swelling. Today is day +25 of her autologous stem cell transplant. Reports off-and-on loose stools for which Lomotil/Imodium as needed work. Antinausea medications as needed. Neuropathy stable, on gabapentin and Cymbalta. Regular with her acyclovir prophylaxis. Vitals stable. Labs today with adequate counts and ANC 1.3, mild anemia, unremarkable chemistries. Gets 1 L IV NS along with KCl 20 meq's. We will see her back in 3 days time. 6/30/2022: D+28 and doing well. Today is the first day she has not had any nausea and diarrhea is much improved, only 1 episode yesterday. She is eating and drinking well. Energy level is improving. No cough, shortness of breath. BLE edema is stable/mild. Neuropathy is unchanged. No true pain, however she feels achy in the AM and this improves as she is more active. She has new suprapubic pressure/dysuria, no fevers. Labs reviewed. Will obtain UA/UC.    7/5/2022:  D+33. She has been well overall since last seen. Currently, her GI symptoms have resolved (last episode of nausea this past Thursday). Her appetite has been good. Her energy level continues to improve.   She does note that the neuropathy has worsened in her feet, noticeable for the last week and a half. She is on Gabapentin/Cymbalta currently for this. She denies any other new aches or pains. She still has some trace BLE swelling, stable. She denies any breathing difficulties. She denies any fevers, chills, or other infectious symptoms. Labs reviewed and CBC/CMP stable, unremarkable. LD down to 287. F/u in two weeks as scheduled. Will add Bactrim as pt > D+30 and also schedule her for tunneled cath removal in IR. 1. Multiple Myeloma, ISS stage 1, high risk disease (gain of 1 q.)  2. Neuropathy. 3. Iron Deficiency  4. 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 PLAN:  - As above. [id number]/ASCT D+33. IV fluids & electrolyte replacement as needed. - CD34+ goal will be 6 mil/kg. - Tunneled catheter to come out after D+30 if doing well. - S/p Olivia-RVD [id number] x 4 cycles and Olivia-RD [id number] x 2.   - Neuropathy: On Gabapentin 600 mg 3 times daily along with Cymbalta 30 mg daily, increase Cymbalta to 60 mg daily and add B complex. - Prophylactic medication: acyclovir. Start Bactrim MWF D+30.     - Evusheld [id number]  - Iron deficiency: IV iron x 2. She will benefit from GI evaluation as well as pelvic exam post transplant.  - F/u w ENT as needed. Will f/u in 2 weeks.           BEE Garvey - ABRAHAN  [facility]  [address] [place] [address]  Office : [phone]  Fax : [phone]

## 2022-07-05 ENCOUNTER — HOSPITAL ENCOUNTER (OUTPATIENT)
Dept: INFUSION THERAPY | Age: 52
Discharge: HOME | End: 2022-07-05
Payer: COMMERCIAL

## 2022-07-05 ENCOUNTER — OFFICE VISIT (OUTPATIENT)
Dept: ONCOLOGY | Age: 52
End: 2022-07-05
Payer: COMMERCIAL

## 2022-07-05 VITALS
WEIGHT: 229.4 LBS | TEMPERATURE: 97.9 F | RESPIRATION RATE: 18 BRPM | HEART RATE: 88 BPM | BODY MASS INDEX: 41.96 KG/M2 | DIASTOLIC BLOOD PRESSURE: 82 MMHG | SYSTOLIC BLOOD PRESSURE: 129 MMHG

## 2022-07-05 DIAGNOSIS — C90.00 MULTIPLE MYELOMA NOT HAVING ACHIEVED REMISSION (HCC): Primary | ICD-10-CM

## 2022-07-05 DIAGNOSIS — C90.00 MULTIPLE MYELOMA, REMISSION STATUS UNSPECIFIED (HCC): Primary | ICD-10-CM

## 2022-07-05 DIAGNOSIS — Z94.84 H/O AUTOLOGOUS STEM CELL TRANSPLANT (HCC): ICD-10-CM

## 2022-07-05 LAB
ALBUMIN SERPL-MCNC: 3.5 G/DL (ref 3.5–5)
ALBUMIN/GLOB SERPL: 1.2 {RATIO} (ref 1.2–3.5)
ALP SERPL-CCNC: 76 U/L (ref 50–136)
ALT SERPL-CCNC: 15 U/L (ref 12–65)
ANION GAP SERPL CALC-SCNC: 5 MMOL/L (ref 7–16)
AST SERPL-CCNC: 22 U/L (ref 15–37)
BASOPHILS # BLD: 0 K/UL (ref 0–0.2)
BASOPHILS NFR BLD: 1 % (ref 0–2)
BILIRUB SERPL-MCNC: 0.2 MG/DL (ref 0.2–1.1)
BUN SERPL-MCNC: 10 MG/DL (ref 6–23)
CALCIUM SERPL-MCNC: 9.3 MG/DL (ref 8.3–10.4)
CHLORIDE SERPL-SCNC: 108 MMOL/L (ref 98–107)
CO2 SERPL-SCNC: 30 MMOL/L (ref 21–32)
CREAT SERPL-MCNC: 0.6 MG/DL (ref 0.6–1)
DIFFERENTIAL METHOD BLD: ABNORMAL
EOSINOPHIL # BLD: 0.3 K/UL (ref 0–0.8)
EOSINOPHIL NFR BLD: 4 % (ref 0.5–7.8)
ERYTHROCYTE [DISTWIDTH] IN BLOOD BY AUTOMATED COUNT: 19.6 % (ref 11.9–14.6)
GGT SERPL-CCNC: 80 U/L (ref 5–55)
GLOBULIN SER CALC-MCNC: 3 G/DL (ref 2.3–3.5)
GLUCOSE SERPL-MCNC: 95 MG/DL (ref 65–100)
HCT VFR BLD AUTO: 33.8 % (ref 35.8–46.3)
HGB BLD-MCNC: 10.9 G/DL (ref 11.7–15.4)
IMM GRANULOCYTES # BLD AUTO: 0 K/UL (ref 0–0.5)
IMM GRANULOCYTES NFR BLD AUTO: 0 % (ref 0–5)
LDH SERPL L TO P-CCNC: 287 U/L (ref 100–190)
LYMPHOCYTES # BLD: 2.6 K/UL (ref 0.5–4.6)
LYMPHOCYTES NFR BLD: 44 % (ref 13–44)
MAGNESIUM SERPL-MCNC: 2.3 MG/DL (ref 1.8–2.4)
MCH RBC QN AUTO: 29 PG (ref 26.1–32.9)
MCHC RBC AUTO-ENTMCNC: 32.2 G/DL (ref 31.4–35)
MCV RBC AUTO: 89.9 FL (ref 79.6–97.8)
MONOCYTES # BLD: 0.9 K/UL (ref 0.1–1.3)
MONOCYTES NFR BLD: 16 % (ref 4–12)
NEUTS SEG # BLD: 2.1 K/UL (ref 1.7–8.2)
NEUTS SEG NFR BLD: 35 % (ref 43–78)
NRBC # BLD: 0 K/UL (ref 0–0.2)
PHOSPHATE SERPL-MCNC: 4.4 MG/DL (ref 2.5–4.5)
PLATELET # BLD AUTO: 208 K/UL (ref 150–450)
PMV BLD AUTO: 10.4 FL (ref 9.4–12.3)
POTASSIUM SERPL-SCNC: 4.1 MMOL/L (ref 3.5–5.1)
PROT SERPL-MCNC: 6.5 G/DL (ref 6.3–8.2)
RBC # BLD AUTO: 3.76 M/UL (ref 4.05–5.2)
SODIUM SERPL-SCNC: 143 MMOL/L (ref 136–145)
WBC # BLD AUTO: 5.9 K/UL (ref 4.3–11.1)

## 2022-07-05 PROCEDURE — 36591 DRAW BLOOD OFF VENOUS DEVICE: CPT

## 2022-07-05 PROCEDURE — 80053 COMPREHEN METABOLIC PANEL: CPT

## 2022-07-05 PROCEDURE — 84100 ASSAY OF PHOSPHORUS: CPT

## 2022-07-05 PROCEDURE — 83735 ASSAY OF MAGNESIUM: CPT

## 2022-07-05 PROCEDURE — 82977 ASSAY OF GGT: CPT

## 2022-07-05 PROCEDURE — 99214 OFFICE O/P EST MOD 30 MIN: CPT | Performed by: NURSE PRACTITIONER

## 2022-07-05 PROCEDURE — 83615 LACTATE (LD) (LDH) ENZYME: CPT

## 2022-07-05 PROCEDURE — 2580000003 HC RX 258: Performed by: INTERNAL MEDICINE

## 2022-07-05 PROCEDURE — 85025 COMPLETE CBC W/AUTO DIFF WBC: CPT

## 2022-07-05 RX ORDER — DEXTROSE, SODIUM CHLORIDE, AND POTASSIUM CHLORIDE 5; .45; .15 G/100ML; G/100ML; G/100ML
1000 INJECTION INTRAVENOUS CONTINUOUS PRN
Status: DISCONTINUED | OUTPATIENT
Start: 2022-07-05 | End: 2022-07-05 | Stop reason: ALTCHOICE

## 2022-07-05 RX ORDER — SODIUM CHLORIDE 0.9 % (FLUSH) 0.9 %
5-40 SYRINGE (ML) INJECTION PRN
Status: DISCONTINUED | OUTPATIENT
Start: 2022-07-05 | End: 2022-07-06 | Stop reason: HOSPADM

## 2022-07-05 RX ADMIN — SODIUM CHLORIDE, PRESERVATIVE FREE 30 ML: 5 INJECTION INTRAVENOUS at 12:05

## 2022-07-05 NOTE — PROGRESS NOTES
Diagnosis: Multiple Myeloma  Transplant Date: 6/2/22  Day: (+/-) +33. Chemo regimen used: Melphalan  Labs needed at next visit: see orders. Labs not resulted that need follow-up: none. Next Appointment scheduled: 7/11 @ 1130. BMT assessments and toxicities completed. WBC/ANC= 5.9/2.1  Prophylactic medications started Day +1 and stopped 6/14/22. G-CSF started on Day +6  and last dose given on 6/15. Toxicities greater than 2 :none. Bactrim or equivalent initiated on today- patient to start tomorrow. Patient seen by Zahira Bingham NP  New orders received: none. Pt arrived ambulatory. Labs drawn from apheresis cath. Apheresis cath dressing and tego caps changed. Pt declined IVF. No replacements needed. Pt aware of next appt 7/11. Discharged ambulatory, no distress noted.  .Patient instructed to call provider with temperature of 100.4 or greater or nausea/vomiting/ diarrhea or pain not controlled by medications

## 2022-07-05 NOTE — PATIENT INSTRUCTIONS
Patient Instructions from Today's Visit    Reason for Visit:  Follow up visit  D+33 post transplant    Plan:  -Try adding on a Vitamin B complex to help with the neurontin    -Start taking Bactrim Monday Wednesday and Friday    Follow Up: Follow up next Monday    Recent Lab Results:      Treatment Summary has been discussed and given to patient: n/a        -------------------------------------------------------------------------------------------------------------------  Please call our office at [phone] if you have any  of the following symptoms:   · Fever of 100.5 or greater  · Chills  · Shortness of breath  · Swelling or pain in one leg    After office hours an answering service is available and will contact a provider for emergencies or if you are experiencing any of the above symptoms.  Patient did express an interest in My Chart. My Chart log in information explained on the after visit summary printout at the [facility] [employer] desk. Altha Aschoff, RN  Hematology Navigator  [address] [place] [address]  Cell:  [phone]  Office: [phone]   Fax: [phone]  Email:  [email or web address]     Navigator is available by phone Monday through Thursday 8 am to 5 pm.  If you need assistance after 5pm, on the weekend or on a Friday, please call [phone]. The answering service is available 24 hours a day, 7 days a week.

## 2022-07-12 ENCOUNTER — HOSPITAL ENCOUNTER (OUTPATIENT)
Dept: INTERVENTIONAL RADIOLOGY/VASCULAR | Age: 52
Discharge: HOME | End: 2022-07-15
Payer: COMMERCIAL

## 2022-07-12 VITALS
DIASTOLIC BLOOD PRESSURE: 63 MMHG | HEART RATE: 79 BPM | TEMPERATURE: 98 F | RESPIRATION RATE: 16 BRPM | SYSTOLIC BLOOD PRESSURE: 134 MMHG

## 2022-07-12 DIAGNOSIS — Z94.84 H/O AUTOLOGOUS STEM CELL TRANSPLANT (HCC): ICD-10-CM

## 2022-07-12 DIAGNOSIS — C90.00 MULTIPLE MYELOMA, REMISSION STATUS UNSPECIFIED (HCC): ICD-10-CM

## 2022-07-12 PROCEDURE — 2500000003 HC RX 250 WO HCPCS: Performed by: PHYSICIAN ASSISTANT

## 2022-07-12 PROCEDURE — 36589 REMOVAL TUNNELED CV CATH: CPT

## 2022-07-12 RX ADMIN — LIDOCAINE HYDROCHLORIDE 8 ML: 10; .005 INJECTION, SOLUTION EPIDURAL; INFILTRATION; INTRACAUDAL; PERINEURAL at 15:56

## 2022-07-12 NOTE — ADDENDUM NOTE
Encounter addended by: Ky Velazquez RN on: 7/12/2022 11:44 AM   Actions taken: Charge Capture section accepted

## 2022-07-15 DIAGNOSIS — C90.00 MULTIPLE MYELOMA, REMISSION STATUS UNSPECIFIED (HCC): Primary | ICD-10-CM

## 2022-07-18 ENCOUNTER — OFFICE VISIT (OUTPATIENT)
Dept: ONCOLOGY | Age: 52
End: 2022-07-18
Payer: COMMERCIAL

## 2022-07-18 ENCOUNTER — HOSPITAL ENCOUNTER (OUTPATIENT)
Dept: INFUSION THERAPY | Age: 52
Discharge: HOME | End: 2022-07-18

## 2022-07-18 ENCOUNTER — HOSPITAL ENCOUNTER (OUTPATIENT)
Dept: LAB | Age: 52
Discharge: HOME | End: 2022-07-21
Payer: COMMERCIAL

## 2022-07-18 VITALS
HEART RATE: 91 BPM | SYSTOLIC BLOOD PRESSURE: 121 MMHG | WEIGHT: 227.1 LBS | DIASTOLIC BLOOD PRESSURE: 87 MMHG | HEIGHT: 63 IN | BODY MASS INDEX: 40.24 KG/M2 | RESPIRATION RATE: 18 BRPM | TEMPERATURE: 98.3 F | OXYGEN SATURATION: 100 %

## 2022-07-18 DIAGNOSIS — D84.9 IMMUNOCOMPROMISED (HCC): ICD-10-CM

## 2022-07-18 DIAGNOSIS — C90.00 MULTIPLE MYELOMA NOT HAVING ACHIEVED REMISSION (HCC): Primary | ICD-10-CM

## 2022-07-18 DIAGNOSIS — Z94.81 BONE MARROW TRANSPLANT STATUS (HCC): ICD-10-CM

## 2022-07-18 DIAGNOSIS — T45.1X5A CHEMOTHERAPY-INDUCED NEUROPATHY (HCC): ICD-10-CM

## 2022-07-18 DIAGNOSIS — C90.00 MULTIPLE MYELOMA, REMISSION STATUS UNSPECIFIED (HCC): ICD-10-CM

## 2022-07-18 DIAGNOSIS — G62.0 CHEMOTHERAPY-INDUCED NEUROPATHY (HCC): ICD-10-CM

## 2022-07-18 LAB
ALBUMIN SERPL-MCNC: 3.8 G/DL (ref 3.5–5)
ALBUMIN/GLOB SERPL: 1.4 {RATIO} (ref 1.2–3.5)
ALP SERPL-CCNC: 74 U/L (ref 50–136)
ALT SERPL-CCNC: 16 U/L (ref 12–65)
ANION GAP SERPL CALC-SCNC: 5 MMOL/L (ref 7–16)
AST SERPL-CCNC: 23 U/L (ref 15–37)
BASOPHILS # BLD: 0 K/UL (ref 0–0.2)
BASOPHILS NFR BLD: 0 % (ref 0–2)
BILIRUB SERPL-MCNC: 0.3 MG/DL (ref 0.2–1.1)
BUN SERPL-MCNC: 9 MG/DL (ref 6–23)
CALCIUM SERPL-MCNC: 9.4 MG/DL (ref 8.3–10.4)
CHLORIDE SERPL-SCNC: 106 MMOL/L (ref 98–107)
CO2 SERPL-SCNC: 30 MMOL/L (ref 21–32)
CREAT SERPL-MCNC: 0.8 MG/DL (ref 0.6–1)
DIFFERENTIAL METHOD BLD: ABNORMAL
EOSINOPHIL # BLD: 0.3 K/UL (ref 0–0.8)
EOSINOPHIL NFR BLD: 4 % (ref 0.5–7.8)
ERYTHROCYTE [DISTWIDTH] IN BLOOD BY AUTOMATED COUNT: 17.6 % (ref 11.9–14.6)
GLOBULIN SER CALC-MCNC: 2.7 G/DL (ref 2.3–3.5)
GLUCOSE SERPL-MCNC: 88 MG/DL (ref 65–100)
HCT VFR BLD AUTO: 34.7 % (ref 35.8–46.3)
HGB BLD-MCNC: 11.1 G/DL (ref 11.7–15.4)
IMM GRANULOCYTES # BLD AUTO: 0 K/UL (ref 0–0.5)
IMM GRANULOCYTES NFR BLD AUTO: 0 % (ref 0–5)
LYMPHOCYTES # BLD: 3.3 K/UL (ref 0.5–4.6)
LYMPHOCYTES NFR BLD: 45 % (ref 13–44)
MAGNESIUM SERPL-MCNC: 2.1 MG/DL (ref 1.8–2.4)
MCH RBC QN AUTO: 29.4 PG (ref 26.1–32.9)
MCHC RBC AUTO-ENTMCNC: 32 G/DL (ref 31.4–35)
MCV RBC AUTO: 92 FL (ref 79.6–97.8)
MONOCYTES # BLD: 0.9 K/UL (ref 0.1–1.3)
MONOCYTES NFR BLD: 12 % (ref 4–12)
NEUTS SEG # BLD: 2.8 K/UL (ref 1.7–8.2)
NEUTS SEG NFR BLD: 38 % (ref 43–78)
NRBC # BLD: 0 K/UL (ref 0–0.2)
PLATELET # BLD AUTO: 240 K/UL (ref 150–450)
PMV BLD AUTO: 9.7 FL (ref 9.4–12.3)
POTASSIUM SERPL-SCNC: 3.5 MMOL/L (ref 3.5–5.1)
PROT SERPL-MCNC: 6.5 G/DL (ref 6.3–8.2)
RBC # BLD AUTO: 3.77 M/UL (ref 4.05–5.2)
SODIUM SERPL-SCNC: 141 MMOL/L (ref 136–145)
WBC # BLD AUTO: 7.3 K/UL (ref 4.3–11.1)

## 2022-07-18 PROCEDURE — 99214 OFFICE O/P EST MOD 30 MIN: CPT | Performed by: INTERNAL MEDICINE

## 2022-07-18 PROCEDURE — 80053 COMPREHEN METABOLIC PANEL: CPT

## 2022-07-18 PROCEDURE — 36415 COLL VENOUS BLD VENIPUNCTURE: CPT

## 2022-07-18 PROCEDURE — 83735 ASSAY OF MAGNESIUM: CPT

## 2022-07-18 PROCEDURE — 85025 COMPLETE CBC W/AUTO DIFF WBC: CPT

## 2022-07-18 ASSESSMENT — PATIENT HEALTH QUESTIONNAIRE - PHQ9
1. LITTLE INTEREST OR PLEASURE IN DOING THINGS: 0
SUM OF ALL RESPONSES TO PHQ QUESTIONS 1-9: 0
2. FEELING DOWN, DEPRESSED OR HOPELESS: 0
4. FEELING TIRED OR HAVING LITTLE ENERGY: 0
SUM OF ALL RESPONSES TO PHQ9 QUESTIONS 1 & 2: 0
SUM OF ALL RESPONSES TO PHQ QUESTIONS 1-9: 0
6. FEELING BAD ABOUT YOURSELF - OR THAT YOU ARE A FAILURE OR HAVE LET YOURSELF OR YOUR FAMILY DOWN: 0
9. THOUGHTS THAT YOU WOULD BE BETTER OFF DEAD, OR OF HURTING YOURSELF: 0
7. TROUBLE CONCENTRATING ON THINGS, SUCH AS READING THE NEWSPAPER OR WATCHING TELEVISION: 0
8. MOVING OR SPEAKING SO SLOWLY THAT OTHER PEOPLE COULD HAVE NOTICED. OR THE OPPOSITE, BEING SO FIGETY OR RESTLESS THAT YOU HAVE BEEN MOVING AROUND A LOT MORE THAN USUAL: 0
5. POOR APPETITE OR OVEREATING: 0
3. TROUBLE FALLING OR STAYING ASLEEP: 0
SUM OF ALL RESPONSES TO PHQ QUESTIONS 1-9: 0
SUM OF ALL RESPONSES TO PHQ QUESTIONS 1-9: 0

## 2022-07-18 NOTE — PROGRESS NOTES
Data Source: Patient, [facility] record. 7/18/2022    10:02 AM    [address] [id number]    54 y.o. Patient Encounter: [address] Visit    Heme Diagnosis:  MM, transplant evaluation. Heme History (Copied from prior):   54 female, history of prolactinoma, now kindly referred to us by Dr. Lata Muniz for evaluation of her multiple myeloma and consideration of autologous stem cell transplant. Her hematologic history is as follows:    - Summer 2021: Started noticing back discomfort.  - 10/21: Progressive debility leading to be being dependent on wheelchair. Led to imaging including T and L-spine showing a cortically destructive lytic lesion with significant soft tissue component involving T11 with resulting extradural soft tissue mass compression of underlying spinal cord. Smaller lytic lesions in the right and left iliac wing also noted. Subsequently hospitalized and underwent T10/T11 laminectomies with excision of spinal cord tumor by neurosurgery. A bone marrow biopsy from iliac crest also performed. 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 Bone marrow biopsy from right hip showing scant bone marrow specimen, with 15% cellularity and 70% involvement by lambda light chain restricted plasma cell neoplasm. FISH not available. PET scan 11/9/2021 with markedly hypermetabolic 2.8 cm thyroid nodule, T11 site surgical changes, however no evidence of FDG avid multiple myeloma noted. Labs showing normal counts, creatinine normal, beta-2 microglobulin at 1.72, SPEP with M spike 0.7, however lambda light chain significantly elevated at 44,763. She was felt to have ISS stage I disease, with plans to start RVD in the near future. She has also been referred to radiation oncology for consolidative XRT to spine. Dental evaluation prior to bone directed therapy.   Her thyroid nodule is also being evaluated with ultrasound. Today seen in office, reports doing reasonably. Planning to start RVD tomorrow. Currently in wheelchair however has only recently started working with physical therapy. Reports minimal headaches. Denies incontinence. Reasonable PO intake. Reports back pain improved since surgery. Recently saw radiation oncology and not felt to need consolidative xrt. She is scheduled to see ENT regarding thyroid nodules. Stage:  1  Performance Status:  2  Pain Score (0-10):  2  Pain Medication related Constipation:  Addressed  Interval History:  7/18/2022: Patient seen for her multiple myeloma, and recent high-dose therapy followed by autologous stem cell transplant. Today is day +46. Status post tunneled catheter removal.  Site healing well, understands to let us know should she develop erythema/tenderness, or fevers. Good p.o. intake, denies any mucositis or loose stools. Blood work today with adequate counts, chemistries unremarkable. Continue prophylaxis with acyclovir and Diflucan. We will look into Evusheld every 6 months. REVIEW OF SYSTEMS:  As mentioned above, all other systems were reviewed in full and are negative. Past Medical History:   Diagnosis Date    Obesity     Prolactin increased        Past Surgical History:   Procedure Laterality Date    IR BIOPSY PERC SUPERF BONE      IR TUNNELED CATHETER PLACEMENT GREATER THAN 5 YEARS  5/9/2022    IR TUNNELED CATHETER PLACEMENT GREATER THAN 5 YEARS  5/9/2022    IR TUNNELED CATHETER PLACEMENT GREATER THAN 5 YEARS 5/9/2022 [facility] SPECIALS       Current Outpatient Medications   Medication Sig Dispense Refill    Handicap [facility] Supply prescription to [facility] with completed form [id number].       prochlorperazine (COMPAZINE) 10 MG tablet Take 1 tablet by mouth every 6 hours as needed (nausea) 120 tablet 3    diphenoxylate-atropine (LOMOTIL) 2.5-0.025 MG per tablet Take 2 tablets by mouth 4 times daily as needed for Diarrhea. acyclovir (ZOVIRAX) 400 MG tablet Take 400 mg by mouth 2 times daily      DULoxetine (CYMBALTA) 30 MG extended release capsule Take 30 mg by mouth daily      gabapentin (NEURONTIN) 300 MG capsule Take 300 mg by mouth. HYDROcodone-acetaminophen (NORCO) 7.5-325 MG per tablet Take 1 tablet by mouth every 6 hours as needed. No current facility-administered medications for this visit. Social History     Socioeconomic History    Marital status:      Spouse name: None    Number of children: None    Years of education: None    Highest education level: None   Tobacco Use    Smoking status: Never    Smokeless tobacco: Never   Substance and Sexual Activity    Alcohol use: Never    Drug use: Never       No family history on file. Allergies   Allergen Reactions    Amoxicillin-Pot Clavulanate Other (See Comments)     Yeast infection  Yeast infection       PHYSICAL EXAMINATION:  General Appearance: Healthy appearing patient in no acute distress  Vitals reviewed. /87 Comment: standing  Pulse 91   Temp 98.3 °F (36.8 °C)   Resp 18   Ht 5' 2.5\" (1.588 m)   Wt 227 lb 1.6 oz (103 kg)   SpO2 100%   BMI 40.88 kg/m²   HEENT: No oral or pharyngeal masses, ulceration or thrush noted, no sinus tenderness. Neck is supple with no thyromegaly or JVD noted. Lymph Nodes: No lymphadenopathy noted in the occipital, pre and post auricular, cervical, supra and infraclavicular, axillary, epitrochlear, inguinal, and popliteal region. Breasts: No palpable masses, nipple discharge or skin retraction  Lungs/Thorax: Clear to auscultation, no accessory muscles of respiration being used. Heart: Regular rate and rhythm, normal S1, S2, no appreciable murmurs, rubs, gallops  Abdomen: Soft, nontender, bowel sounds present, no appreciable hepatosplenomegaly, no palpable masses  Extremeties: Good pulses bilaterally, no peripheral edema.   Skin: Normal skin tone with no rash, petechiae, ecchymosis noted.  Musculoskeletal: No pain on palpation over bony prominence, no edema, no evidence of gout, no joint or bony deformity  Neurologic: Grossly intact        LABS/IMAGING:    Lab Results   Component Value Date/Time    WBC 7.3 07/18/2022 09:19 AM    HGB 11.1 07/18/2022 09:19 AM    HCT 34.7 07/18/2022 09:19 AM     07/18/2022 09:19 AM    MCV 92.0 07/18/2022 09:19 AM       Lab Results   Component Value Date/Time     07/18/2022 09:19 AM    K 3.5 07/18/2022 09:19 AM     07/18/2022 09:19 AM    CO2 30 07/18/2022 09:19 AM    BUN 9 07/18/2022 09:19 AM    GFRAA >60 07/18/2022 09:19 AM    GLOB 2.7 07/18/2022 09:19 AM    ALT 16 07/18/2022 09:19 AM             Above results reviewed with patient. ASSESSMENT:  54 female, history of prolactinoma, now kindly referred to us by Dr. Belkis Schmidt for evaluation of her multiple myeloma and consideration of autologous stem cell transplant. Her hematologic history is as follows:    - Summer 2021: Started noticing back discomfort.  - 10/21: Progressive debility leading to be being dependent on wheelchair. Led to imaging including T and L-spine showing a cortically destructive lytic lesion with significant soft tissue component involving T11 with resulting extradural soft tissue mass compression of underlying spinal cord. Smaller lytic lesions in the right and left iliac wing also noted. Subsequently hospitalized and underwent T10/T11 laminectomies with excision of spinal cord tumor by neurosurgery. A bone marrow biopsy from iliac crest also performed. 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 Bone marrow biopsy from right hip showing scant bone marrow specimen, with 15% cellularity and 70% involvement by lambda light chain restricted plasma cell neoplasm. FISH not available.   PET scan 11/9/2021 with markedly hypermetabolic 2.8 cm thyroid nodule, T11 site surgical changes, however no evidence of FDG avid multiple myeloma noted. Labs showing normal counts, creatinine normal, beta-2 microglobulin at 1.72, SPEP with M spike 0.7, however lambda light chain significantly elevated at 44,763. She was felt to have ISS stage I disease, with plans to start RVD in the near future. She has also been referred to radiation oncology for consolidative XRT to spine. Dental evaluation prior to bone directed therapy. Her thyroid nodule is also being evaluated with ultrasound. Today seen in office, reports doing reasonably. Planning to start RVD tomorrow. Currently in wheelchair however has only recently started working with physical therapy. Reports minimal headaches. Denies incontinence. Reasonable PO intake. Reports back pain improved since surgery. Recently saw radiation oncology and not felt to need consolidative xrt. She is scheduled to see ENT regarding thyroid nodules. We discussed role of autologous stem cell transplant in first remission after induction therapy. Various aspects pertaining to process, risks and side effects discussed. Patient appears interested in proceeding with procedure moving forward. 2/25/2022: Seen in follow-up. Her bone marrow biopsy with cytogenetics showed normal karyotype, FISH panel had shown gain of 1 q., monosomy 11 and 13, and t(11;14), negative for 17p deletion or other IGH rearrangement. She has since been started on daratumumab-RVD. Appears to have responding disease as her lambda LC has dropped from 60,020 down to 39. M spike is now turned negative. She recently completed cycle 4-day 15 on 2/21/2022 (3-week cycle). Due to ongoing neuropathy, patient today using walker. Describes neuropathy as tingling/discomfort coming up to her calf, her Velcade dose more recently has been decreased to 0.7 mg/m2. We will discuss case with Dr. Rhiannon Yeboah.   Given excellent response, will be reasonable to remove Velcade altogether and proceed with Daratumumab-RD q. 28 days x 2 cycles followed by plans for mobilization and collection. This will hopefully also give her time for neuropathy and overall performance status improved. We will see her back in 2 months time. 4/18/2022: Patient seen in follow-up. She recently completed cycle 6-day 15 therapy on 4/11/2022, with last Revlimid dose on 4/14/2022. Neuropathy has improved with supportive care including gabapentin 600 mg 3 times daily along with Cymbalta 30 mg daily. Mobility much improved, now freely able to move in the room. Back discomfort described as stable to improved. Denies any recent fevers or chills, good p.o. intake. Routine blood work unremarkable, follow-up SPEP/LC. We will proceed with premobilization studies, as well as restaging including PET scan and bone marrow biopsy. Given responding disease, will plan to proceed with mobilization and collection, currently scheduled for pre-mob evaluation 5/5/2022 and high-dose therapy/ASCT 6/1/22. Given lack of social support, transplant will be inpatient. CD34+ goal will be 6 mil/kg. She will be mobilized with G-CSF +/- Mozobil. Conditioning will be with melphalan 200 mg per metered square. She will need a tunneled catheter during procedure. We will see her back in 2 to 3 weeks with above, navigation following. 5/5/2022: Reports feeling well. Extensive ROS unremarkable. Restaging studies showing lambda LC only borderline high, SPEP with low level M spike, 24-hour UPEP negative, bone marrow biopsy with 20 to 30% cellularity without any evidence of residual disease, low iron stores noted: Serum ferritin also low at 20: We will plan for IV iron x2. She will benefit from GI evaluation as well as pelvic exam post transplant. Skeletal survey negative, PET scan without evidence of active disease, of note right sided thyroid nodule with increased uptake noted.   Patient has seen [facility] Dr. 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 She was advised to continue following with them posttransplant. Pre-mobilization studies unremarkable including chest x-ray, EKG, 2D echo, PFTs as well as infectious panel. She does note some chronic bilateral LE swelling: We will get Dopplers to rule out DVT. We will also plan for Evusheld post collection. We will now see her back prior to ASCT. 5/31/2022: Patient collected CD34 at 6.24 mil/kg w granix alone. Hospitalized for melphalan 200 mg per metered squared followed by stem cell reinfusion following day (CD34 3.12 mil/kg back). Tolerated melphalan and stem cell re-infusion well. D+3 6/5/22. Hydration and IV electrolyte replacement as needed. Some fatigue and loose stools, monitor. Anti-diarrheals helping. Antiemetic as well as antidiarrheal support as needed. Blood transfusion support transfusion as needed. On  prophylactic antibiotics with Levaquin, Augmentin, acyclovir and Diflucan.  G-CSF support with Granix daily starting day +6. She will be hospitalized through engraftment. 6/27/2022: Patient with multiple myeloma, ongoing treatments including high-dose systemic therapy followed by autologous stem cell transplant, seen in infusion center for toxicity management. Patient discharged on 6/14/2022 after she engrafted. She required potassium replacement and Lasix as needed for LE swelling. Today is day +25 of her autologous stem cell transplant. Reports off-and-on loose stools for which Lomotil/Imodium as needed work. Antinausea medications as needed. Neuropathy stable, on gabapentin and Cymbalta. Regular with her acyclovir prophylaxis. Vitals stable. Labs today with adequate counts and ANC 1.3, mild anemia, unremarkable chemistries. Gets 1 L IV NS along with KCl 20 meq's. We will see her back in 3 days time.     7/18/2022: Patient seen for her multiple myeloma, and recent high-dose therapy followed by autologous stem cell transplant. Today is day +46. Status post tunneled catheter removal.  Site healing well, understands to let us know should she develop erythema/tenderness, or fevers. Good p.o. intake, denies any mucositis or loose stools. Blood work today with adequate counts, chemistries unremarkable. Continue prophylaxis with acyclovir and Diflucan. Neuropathy significant: We will increase gabapentin to 900 mg 3 times daily. We will look into Evusheld every 6 months. We will see her back in 2 weeks. 1. Multiple Myeloma, ISS stage 1, high risk disease (gain of 1 q.)  2. Neuropathy. 3. Iron Deficiency  4. 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 PLAN:  - As above. Bih237/ASCT D+46. IV fluids & electrolyte replacement as needed. - S/p Olivia-RVD [id number] x 4 cycles and Olivia-RD [id number] x 2.   - Neuropathy: On Gabapentin 600 mg 3 times daily along with Cymbalta 30 mg daily. We will give a trial of escalating gabapentin to 900 mg 3 times daily.  - Prophylactic medication: acyclovir,  Bactrim MWF      - Evusheld [id number]  - Iron deficiency: IV iron x 2. She will benefit from GI evaluation as well as pelvic exam post transplant.  - F/u w ENT as needed. RTC in 2 weeks w labs. I truly appreciate the kind referral from Dr. Dallis Harada.  Please call w/ any quesions      Wilder Donovan MD  [facility]  Hematology Oncology  [address] [place] [address]  Office : [phone]  Fax : [phone]

## 2022-07-18 NOTE — PATIENT INSTRUCTIONS
Patient Instructions from Today's Visit    Reason for Visit:  Follow up visit D+46      Plan:  -You are doing really well!      -Lets increase your Gabapentin to 3 pills 3 times daily - so 900mg three times daily. If you feel dizzy on that dose, reduce back to 600mg 3 times daily    -Continue the Acyclovir and Bactrim    -Where the line was moved, keep that area clean. If it shows any redness or it is warm to the touch or you have a fever we need to know immediately. Follow Up:   Follow up with Dr. Nathan He as scheduled    Recent Lab Results:  Hospital Outpatient Visit on 07/18/2022   Component Date Value Ref Range Status    WBC 07/18/2022 7.3  4.3 - 11.1 K/uL Final    RBC 07/18/2022 3.77 (A) 4.05 - 5.2 M/uL Final    Hemoglobin 07/18/2022 11.1 (A) 11.7 - 15.4 g/dL Final    Hematocrit 07/18/2022 34.7 (A) 35.8 - 46.3 % Final    MCV 07/18/2022 92.0  79.6 - 97.8 FL Final    MCH 07/18/2022 29.4  26.1 - 32.9 PG Final    MCHC 07/18/2022 32.0  31.4 - 35.0 g/dL Final    RDW 07/18/2022 17.6 (A) 11.9 - 14.6 % Final    Platelets 67/65/2558 240  150 - 450 K/uL Final    MPV 07/18/2022 9.7  9.4 - 12.3 FL Final    nRBC 07/18/2022 0.00  0.0 - 0.2 K/uL Final    **Note: Absolute NRBC parameter is now reported with Hemogram**    Differential Type 07/18/2022 AUTOMATED    Final    Seg Neutrophils 07/18/2022 38 (A) 43 - 78 % Final    Lymphocytes 07/18/2022 45 (A) 13 - 44 % Final    Monocytes 07/18/2022 12  4.0 - 12.0 % Final    Eosinophils % 07/18/2022 4  0.5 - 7.8 % Final    Basophils 07/18/2022 0  0.0 - 2.0 % Final    Immature Granulocytes 07/18/2022 0  0.0 - 5.0 % Final    Segs Absolute 07/18/2022 2.8  1.7 - 8.2 K/UL Final    Absolute Lymph # 07/18/2022 3.3  0.5 - 4.6 K/UL Final    Absolute Mono # 07/18/2022 0.9  0.1 - 1.3 K/UL Final    Absolute Eos # 07/18/2022 0.3  0.0 - 0.8 K/UL Final    Basophils Absolute 07/18/2022 0.0  0.0 - 0.2 K/UL Final    Absolute Immature Granulocyte 07/18/2022 0.0  0.0 - 0.5 K/UL Final Treatment Summary has been discussed and given to patient: n/a        -------------------------------------------------------------------------------------------------------------------  Please call our office at [phone] if you have any  of the following symptoms:   Fever of 100.5 or greater  Chills  Shortness of breath  Swelling or pain in one leg    After office hours an answering service is available and will contact a provider for emergencies or if you are experiencing any of the above symptoms. Patient did express an interest in My Chart. My Chart log in information explained on the after visit summary printout at the [facility] [employer] desk. Cecilio Brittle, RN  Hematology Navigator  [address] [place] [address]  Cell:  [phone]  Office: [phone]   Fax: [phone]  Email:  [email or web address]     Navigator is available by phone Monday through Thursday 8 am to 5 pm.  If you need assistance after 5pm, on the weekend or on a Friday, please call [phone]. The answering service is available 24 hours a day, 7 days a week.

## 2022-08-01 ENCOUNTER — HOSPITAL ENCOUNTER (OUTPATIENT)
Dept: LAB | Age: 52
Discharge: HOME | End: 2022-08-04
Payer: COMMERCIAL

## 2022-08-01 ENCOUNTER — OFFICE VISIT (OUTPATIENT)
Dept: ONCOLOGY | Age: 52
End: 2022-08-01
Payer: COMMERCIAL

## 2022-08-01 ENCOUNTER — HOSPITAL ENCOUNTER (OUTPATIENT)
Dept: INFUSION THERAPY | Age: 52
Discharge: HOME | End: 2022-08-01

## 2022-08-01 VITALS
WEIGHT: 224 LBS | RESPIRATION RATE: 14 BRPM | HEART RATE: 100 BPM | DIASTOLIC BLOOD PRESSURE: 80 MMHG | SYSTOLIC BLOOD PRESSURE: 120 MMHG | OXYGEN SATURATION: 99 % | HEIGHT: 63 IN | BODY MASS INDEX: 39.69 KG/M2 | TEMPERATURE: 98 F

## 2022-08-01 DIAGNOSIS — C90.00 MULTIPLE MYELOMA NOT HAVING ACHIEVED REMISSION (HCC): Primary | ICD-10-CM

## 2022-08-01 DIAGNOSIS — C90.00 MULTIPLE MYELOMA NOT HAVING ACHIEVED REMISSION (HCC): ICD-10-CM

## 2022-08-01 DIAGNOSIS — G62.0 CHEMOTHERAPY-INDUCED NEUROPATHY (HCC): ICD-10-CM

## 2022-08-01 DIAGNOSIS — Z94.81 BONE MARROW TRANSPLANT STATUS (HCC): ICD-10-CM

## 2022-08-01 DIAGNOSIS — T45.1X5A CHEMOTHERAPY-INDUCED NEUROPATHY (HCC): ICD-10-CM

## 2022-08-01 DIAGNOSIS — D84.9 IMMUNOCOMPROMISED (HCC): ICD-10-CM

## 2022-08-01 LAB
ALBUMIN SERPL-MCNC: 3.7 G/DL (ref 3.5–5)
ALBUMIN/GLOB SERPL: 1.2 {RATIO} (ref 1.2–3.5)
ALP SERPL-CCNC: 66 U/L (ref 50–136)
ALT SERPL-CCNC: 17 U/L (ref 12–65)
ANION GAP SERPL CALC-SCNC: 5 MMOL/L (ref 7–16)
AST SERPL-CCNC: 24 U/L (ref 15–37)
BASOPHILS # BLD: 0 K/UL (ref 0–0.2)
BASOPHILS NFR BLD: 0 % (ref 0–2)
BILIRUB SERPL-MCNC: 0.2 MG/DL (ref 0.2–1.1)
BUN SERPL-MCNC: 10 MG/DL (ref 6–23)
CALCIUM SERPL-MCNC: 10 MG/DL (ref 8.3–10.4)
CHLORIDE SERPL-SCNC: 106 MMOL/L (ref 98–107)
CO2 SERPL-SCNC: 30 MMOL/L (ref 21–32)
CREAT SERPL-MCNC: 0.7 MG/DL (ref 0.6–1)
DIFFERENTIAL METHOD BLD: ABNORMAL
EOSINOPHIL # BLD: 0.2 K/UL (ref 0–0.8)
EOSINOPHIL NFR BLD: 3 % (ref 0.5–7.8)
ERYTHROCYTE [DISTWIDTH] IN BLOOD BY AUTOMATED COUNT: 16.1 % (ref 11.9–14.6)
GLOBULIN SER CALC-MCNC: 3.1 G/DL (ref 2.3–3.5)
GLUCOSE SERPL-MCNC: 91 MG/DL (ref 65–100)
HCT VFR BLD AUTO: 36.2 % (ref 35.8–46.3)
HGB BLD-MCNC: 11.9 G/DL (ref 11.7–15.4)
IMM GRANULOCYTES # BLD AUTO: 0 K/UL (ref 0–0.5)
IMM GRANULOCYTES NFR BLD AUTO: 0 % (ref 0–5)
LYMPHOCYTES # BLD: 2.7 K/UL (ref 0.5–4.6)
LYMPHOCYTES NFR BLD: 40 % (ref 13–44)
MAGNESIUM SERPL-MCNC: 2.2 MG/DL (ref 1.8–2.4)
MCH RBC QN AUTO: 30 PG (ref 26.1–32.9)
MCHC RBC AUTO-ENTMCNC: 32.9 G/DL (ref 31.4–35)
MCV RBC AUTO: 91.2 FL (ref 79.6–97.8)
MONOCYTES # BLD: 0.7 K/UL (ref 0.1–1.3)
MONOCYTES NFR BLD: 11 % (ref 4–12)
NEUTS SEG # BLD: 3.1 K/UL (ref 1.7–8.2)
NEUTS SEG NFR BLD: 46 % (ref 43–78)
NRBC # BLD: 0 K/UL (ref 0–0.2)
PLATELET # BLD AUTO: 255 K/UL (ref 150–450)
PMV BLD AUTO: 8.9 FL (ref 9.4–12.3)
POTASSIUM SERPL-SCNC: 3.5 MMOL/L (ref 3.5–5.1)
PROT SERPL-MCNC: 6.8 G/DL (ref 6.3–8.2)
RBC # BLD AUTO: 3.97 M/UL (ref 4.05–5.2)
SODIUM SERPL-SCNC: 141 MMOL/L (ref 136–145)
WBC # BLD AUTO: 6.7 K/UL (ref 4.3–11.1)

## 2022-08-01 PROCEDURE — 85025 COMPLETE CBC W/AUTO DIFF WBC: CPT

## 2022-08-01 PROCEDURE — 80053 COMPREHEN METABOLIC PANEL: CPT

## 2022-08-01 PROCEDURE — 99214 OFFICE O/P EST MOD 30 MIN: CPT | Performed by: INTERNAL MEDICINE

## 2022-08-01 PROCEDURE — 36415 COLL VENOUS BLD VENIPUNCTURE: CPT

## 2022-08-01 PROCEDURE — 83735 ASSAY OF MAGNESIUM: CPT

## 2022-08-01 RX ORDER — SULFAMETHOXAZOLE AND TRIMETHOPRIM 800; 160 MG/1; MG/1
TABLET ORAL
COMMUNITY
Start: 2021-12-15 | End: 2022-08-17 | Stop reason: SDUPTHER

## 2022-08-01 ASSESSMENT — PATIENT HEALTH QUESTIONNAIRE - PHQ9
SUM OF ALL RESPONSES TO PHQ QUESTIONS 1-9: 0
2. FEELING DOWN, DEPRESSED OR HOPELESS: 0
SUM OF ALL RESPONSES TO PHQ QUESTIONS 1-9: 0

## 2022-08-01 NOTE — PROGRESS NOTES
Data Source: Patient, [facility] record. 8/1/2022    10:39 AM    [address] [id number]    54 y.o. Patient Encounter: [facility] Visit    Heme Diagnosis:  MM, transplant evaluation. Heme History (Copied from prior):   54 female, history of prolactinoma, now kindly referred to us by Dr. Zhanna Martinez for evaluation of her multiple myeloma and consideration of autologous stem cell transplant. Her hematologic history is as follows:    - Summer 2021: Started noticing back discomfort.  - 10/21: Progressive debility leading to be being dependent on wheelchair. Led to imaging including T and L-spine showing a cortically destructive lytic lesion with significant soft tissue component involving T11 with resulting extradural soft tissue mass compression of underlying spinal cord. Smaller lytic lesions in the right and left iliac wing also noted. Subsequently hospitalized and underwent T10/T11 laminectomies with excision of spinal cord tumor by neurosurgery. A bone marrow biopsy from iliac crest also performed. 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 Bone marrow biopsy from right hip showing scant bone marrow specimen, with 15% cellularity and 70% involvement by lambda light chain restricted plasma cell neoplasm. FISH not available. PET scan 11/9/2021 with markedly hypermetabolic 2.8 cm thyroid nodule, T11 site surgical changes, however no evidence of FDG avid multiple myeloma noted. Labs showing normal counts, creatinine normal, beta-2 microglobulin at 1.72, SPEP with M spike 0.7, however lambda light chain significantly elevated at 44,763. She was felt to have ISS stage I disease, with plans to start RVD in the near future. She has also been referred to radiation oncology for consolidative XRT to spine. Dental evaluation prior to bone directed therapy.   Her thyroid nodule is also being evaluated with ultrasound. Today seen in office, reports doing reasonably. Planning to start RVD tomorrow. Currently in wheelchair however has only recently started working with physical therapy. Reports minimal headaches. Denies incontinence. Reasonable PO intake. Reports back pain improved since surgery. Recently saw radiation oncology and not felt to need consolidative xrt. She is scheduled to see ENT regarding thyroid nodules. Stage:  1  Performance Status:  2  Pain Score (0-10):  2  Pain Medication related Constipation:  Addressed  Interval History:  8/1/2022: Patient seen for her multiple myeloma, and recent high-dose therapy followed by autologous stem cell transplant. Today is day +60. Reports feeling \"great\". Denies mucositis, or GI complaints. Good p.o. intake and mobility. Neuropathy about the same. Increasing gabapentin to 900 mg 3 times daily without significant benefit and increased sleepiness, as such she is back down to 600 mg 3 times a day along with her Cymbalta. Blood work today unremarkable. She remains on acyclovir and Bactrim prophylaxis. We will restage disease around D100. REVIEW OF SYSTEMS:  As mentioned above, all other systems were reviewed in full and are negative. Past Medical History:   Diagnosis Date    Obesity     Prolactin increased        Past Surgical History:   Procedure Laterality Date    IR BIOPSY PERC SUPERF BONE      IR TUNNELED CATHETER PLACEMENT GREATER THAN 5 YEARS  5/9/2022    IR TUNNELED CATHETER PLACEMENT GREATER THAN 5 YEARS  5/9/2022    IR TUNNELED CATHETER PLACEMENT GREATER THAN 5 YEARS 5/9/2022 [facility] SPECIALS       Current Outpatient Medications   Medication Sig Dispense Refill    sulfamethoxazole-trimethoprim (BACTRIM DS;SEPTRA DS) 800-160 MG per tablet       Handicap Plac[facility] Supply prescription to [facility] with completed form [id number].       diphenoxylate-atropine (LOMOTIL) 2.5-0.025 MG per tablet Take 2 tablets by mouth 4 times daily as needed for Diarrhea. acyclovir (ZOVIRAX) 400 MG tablet Take 400 mg by mouth 2 times daily      DULoxetine (CYMBALTA) 30 MG extended release capsule Take 30 mg by mouth daily      gabapentin (NEURONTIN) 300 MG capsule Take 300 mg by mouth. HYDROcodone-acetaminophen (NORCO) 7.5-325 MG per tablet Take 1 tablet by mouth every 6 hours as needed. prochlorperazine (COMPAZINE) 10 MG tablet Take 1 tablet by mouth every 6 hours as needed (nausea) (Patient not taking: Reported on 8/1/2022) 120 tablet 3     No current facility-administered medications for this visit. Social History     Socioeconomic History    Marital status:      Spouse name: None    Number of children: None    Years of education: None    Highest education level: None   Tobacco Use    Smoking status: Never    Smokeless tobacco: Never   Substance and Sexual Activity    Alcohol use: Never    Drug use: Never       No family history on file. Allergies   Allergen Reactions    Amoxicillin-Pot Clavulanate Other (See Comments)     Yeast infection  Yeast infection  Yeast infection  Yeast infection         PHYSICAL EXAMINATION:  General Appearance: Healthy appearing patient in no acute distress  Vitals reviewed. /80 (Site: Right Upper Arm, Position: Standing)   Pulse 100   Temp 98 °F (36.7 °C)   Resp 14   Ht 5' 2.5\" (1.588 m)   Wt 224 lb (101.6 kg)   SpO2 99%   BMI 40.32 kg/m²   HEENT: No oral or pharyngeal masses, ulceration or thrush noted, no sinus tenderness. Neck is supple with no thyromegaly or JVD noted. Lymph Nodes: No lymphadenopathy noted in the occipital, pre and post auricular, cervical, supra and infraclavicular, axillary, epitrochlear, inguinal, and popliteal region. Breasts: No palpable masses, nipple discharge or skin retraction  Lungs/Thorax: Clear to auscultation, no accessory muscles of respiration being used.   Heart: Regular rate and rhythm, normal S1, S2, no appreciable murmurs, hip showing scant bone marrow specimen, with 15% cellularity and 70% involvement by lambda light chain restricted plasma cell neoplasm. FISH not available. PET scan 11/9/2021 with markedly hypermetabolic 2.8 cm thyroid nodule, T11 site surgical changes, however no evidence of FDG avid multiple myeloma noted. Labs showing normal counts, creatinine normal, beta-2 microglobulin at 1.72, SPEP with M spike 0.7, however lambda light chain significantly elevated at 44,763. She was felt to have ISS stage I disease, with plans to start RVD in the near future. She has also been referred to radiation oncology for consolidative XRT to spine. Dental evaluation prior to bone directed therapy. Her thyroid nodule is also being evaluated with ultrasound. Today seen in office, reports doing reasonably. Planning to start RVD tomorrow. Currently in wheelchair however has only recently started working with physical therapy. Reports minimal headaches. Denies incontinence. Reasonable PO intake. Reports back pain improved since surgery. Recently saw radiation oncology and not felt to need consolidative xrt. She is scheduled to see ENT regarding thyroid nodules. We discussed role of autologous stem cell transplant in first remission after induction therapy. Various aspects pertaining to process, risks and side effects discussed. Patient appears interested in proceeding with procedure moving forward. 2/25/2022: Seen in follow-up. Her bone marrow biopsy with cytogenetics showed normal karyotype, FISH panel had shown gain of 1 q., monosomy 11 and 13, and t(11;14), negative for 17p deletion or other IGH rearrangement. She has since been started on daratumumab-RVD. Appears to have responding disease as her lambda LC has dropped from 60,020 down to 39. M spike is now turned negative. She recently completed cycle 4-day 15 on 2/21/2022 (3-week cycle). Due to ongoing neuropathy, patient today using walker.   Describes neuropathy as tingling/discomfort coming up to her calf, her Velcade dose more recently has been decreased to 0.7 mg/m2. We will discuss case with Dr. Rasheed Obrien. Given excellent response, will be reasonable to remove Velcade altogether and proceed with Daratumumab-RD q. 28 days x 2 cycles followed by plans for mobilization and collection. This will hopefully also give her time for neuropathy and overall performance status improved. We will see her back in 2 months time. 4/18/2022: Patient seen in follow-up. She recently completed cycle 6-day 15 therapy on 4/11/2022, with last Revlimid dose on 4/14/2022. Neuropathy has improved with supportive care including gabapentin 600 mg 3 times daily along with Cymbalta 30 mg daily. Mobility much improved, now freely able to move in the room. Back discomfort described as stable to improved. Denies any recent fevers or chills, good p.o. intake. Routine blood work unremarkable, follow-up SPEP/LC. We will proceed with premobilization studies, as well as restaging including PET scan and bone marrow biopsy. Given responding disease, will plan to proceed with mobilization and collection, currently scheduled for pre-mob evaluation 5/5/2022 and high-dose therapy/ASCT 6/1/22. Given lack of social support, transplant will be inpatient. CD34+ goal will be 6 mil/kg. She will be mobilized with G-CSF +/- Mozobil. Conditioning will be with melphalan 200 mg per metered square. She will need a tunneled catheter during procedure. We will see her back in 2 to 3 weeks with above, navigation following. 5/5/2022: Reports feeling well. Extensive ROS unremarkable. Restaging studies showing lambda LC only borderline high, SPEP with low level M spike, 24-hour UPEP negative, bone marrow biopsy with 20 to 30% cellularity without any evidence of residual disease, low iron stores noted: Serum ferritin also low at 20: We will plan for IV iron x2.   She will benefit from GI evaluation as well as pelvic exam post transplant. Skeletal survey negative, PET scan without evidence of active disease, of note right sided thyroid nodule with increased uptake noted. Patient has seen [facility] Dr. 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 She was advised to continue following with them posttransplant. Pre-mobilization studies unremarkable including chest x-ray, EKG, 2D echo, PFTs as well as infectious panel. She does note some chronic bilateral LE swelling: We will get Dopplers to rule out DVT. We will also plan for Evusheld post collection. We will now see her back prior to ASCT. 5/31/2022: Patient collected CD34 at 6.24 mil/kg w granix alone. Hospitalized for melphalan 200 mg per metered squared followed by stem cell reinfusion following day (CD34 3.12 mil/kg back). Tolerated melphalan and stem cell re-infusion well. D+3 6/5/22. Hydration and IV electrolyte replacement as needed. Some fatigue and loose stools, monitor. Anti-diarrheals helping. Antiemetic as well as antidiarrheal support as needed. Blood transfusion support transfusion as needed. On  prophylactic antibiotics with Levaquin, Augmentin, acyclovir and Diflucan.  G-CSF support with Granix daily starting day +6. She will be hospitalized through engraftment. 6/27/2022: Patient with multiple myeloma, ongoing treatments including high-dose systemic therapy followed by autologous stem cell transplant, seen in infusion center for toxicity management. Patient discharged on 6/14/2022 after she engrafted. She required potassium replacement and Lasix as needed for LE swelling. Today is day +25 of her autologous stem cell transplant. Reports off-and-on loose stools for which Lomotil/Imodium as needed work. Antinausea medications as needed. Neuropathy stable, on gabapentin and Cymbalta.   Regular with her acyclovir prophylaxis. Vitals stable. Labs today with adequate counts and ANC 1.3, mild anemia, unremarkable chemistries. Gets 1 L IV NS along with KCl 20 meq's. We will see her back in 3 days time. 7/18/2022: Patient seen for her multiple myeloma, and recent high-dose therapy followed by autologous stem cell transplant. Today is day +46. Status post tunneled catheter removal.  Site healing well, understands to let us know should she develop erythema/tenderness, or fevers. Good p.o. intake, denies any mucositis or loose stools. Blood work today with adequate counts, chemistries unremarkable. Continue prophylaxis with acyclovir and Diflucan. Neuropathy significant: We will increase gabapentin to 900 mg 3 times daily. We will look into Evusheld every 6 months. We will see her back in 2 weeks. 8/1/2022: Patient seen for her multiple myeloma, and recent high-dose therapy followed by autologous stem cell transplant. Today is day +60. Reports feeling \"great\". Denies mucositis, or GI complaints. Good p.o. intake and mobility. Neuropathy about the same. Increasing gabapentin to 900 mg 3 times daily without significant benefit and increased sleepiness, as such she is back down to 600 mg 3 times a day along with her Cymbalta. Blood work today unremarkable. She remains on acyclovir and Bactrim prophylaxis. We will restage disease around D100.        1. Multiple Myeloma, ISS stage 1, high risk disease (gain of 1 q.)  2. Neuropathy. 3. Iron Deficiency  4. 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 PLAN:  - As above. Fdo454/ASCT D+60. IV fluids & electrolyte replacement as needed. - S/p Olivia-RVD [id number] x 4 cycles and Olivia-RD [id number] x 2.   - Neuropathy: On Gabapentin 600 mg 3 times daily along with Cymbalta 30 mg daily.     - Prophylactic medication: acyclovir,  Bactrim MWF      - Evusheld [id number]  - Iron deficiency: IV iron x 2.  She will benefit from GI evaluation as well as pelvic exam post transplant.  - F/u w ENT as needed. RTC in 2 weeks w labs. I truly appreciate the kind referral from Dr. Rodgers Aschoff.  Please call w/ any questions    Raymond Hazel MD  [address] [place] [address],[address]  Hematology Oncology  [address] [place] [address]  Office : [phone]  Fax : [phone]

## 2022-08-01 NOTE — PATIENT INSTRUCTIONS
- 100 mg/dL Final    BUN 08/01/2022 10  6 - 23 MG/DL Final    Creatinine 08/01/2022 0.70  0.6 - 1.0 MG/DL Final    GFR  08/01/2022 >60  >60 ml/min/1.73m2 Final    GFR Non- 08/01/2022 >60  >60 ml/min/1.73m2 Final    Comment:      Estimated GFR is calculated using the Modification of Diet in Renal Disease (MDRD) Study equation, reported for both  Americans (GFRAA) and non- Americans (GFRNA), and normalized to 1.73m2 body surface area. The physician must decide which value applies to the patient. The MDRD study equation should only be used in individuals age 25 or older. It has not been validated for the following: pregnant women, patients with serious comorbid conditions,or on certain medications, or persons with extremes of body size, muscle mass, or nutritional status. Calcium 08/01/2022 10.0  8.3 - 10.4 MG/DL Final    Total Bilirubin 08/01/2022 0.2  0.2 - 1.1 MG/DL Final    ALT 08/01/2022 17  12 - 65 U/L Final    AST 08/01/2022 24  15 - 37 U/L Final    Alk Phosphatase 08/01/2022 66  50 - 136 U/L Final    Total Protein 08/01/2022 6.8  6.3 - 8.2 g/dL Final    Albumin 08/01/2022 3.7  3.5 - 5.0 g/dL Final    Globulin 08/01/2022 3.1  2.3 - 3.5 g/dL Final    Albumin/Globulin Ratio 08/01/2022 1.2  1.2 - 3.5   Final    Magnesium 08/01/2022 2.2  1.8 - 2.4 mg/dL Final           Treatment Summary has been discussed and given to patient: n/a        -------------------------------------------------------------------------------------------------------------------  Please call our office at [phone] if you have any  of the following symptoms:   Fever of 100.5 or greater  Chills  Shortness of breath  Swelling or pain in one leg    After office hours an answering service is available and will contact a provider for emergencies or if you are experiencing any of the above symptoms. Patient did express an interest in My Chart.   My Chart log in information explained on the after visit summary printout at the [facility] Samaria Ellison [address] desk. Sandeep Carrion RN  Hematology Navigator  [address] [place] [address]  Cell:  [phone]  Office: [phone]   Fax: [phone]  Email:  [email or web address]     Navigator is available by phone Monday through Thursday 8 am to 5 pm.  If you need assistance after 5pm, on the weekend or on a Friday, please call [phone]. The answering service is available 24 hours a day, 7 days a week.

## 2022-08-17 ENCOUNTER — OFFICE VISIT (OUTPATIENT)
Dept: ONCOLOGY | Age: 52
End: 2022-08-17
Payer: COMMERCIAL

## 2022-08-17 ENCOUNTER — HOSPITAL ENCOUNTER (OUTPATIENT)
Dept: LAB | Age: 52
Discharge: HOME | End: 2022-08-20
Payer: COMMERCIAL

## 2022-08-17 ENCOUNTER — HOSPITAL ENCOUNTER (OUTPATIENT)
Dept: INFUSION THERAPY | Age: 52
Discharge: HOME | End: 2022-08-17
Payer: COMMERCIAL

## 2022-08-17 VITALS
BODY MASS INDEX: 41.02 KG/M2 | RESPIRATION RATE: 16 BRPM | WEIGHT: 231.5 LBS | SYSTOLIC BLOOD PRESSURE: 140 MMHG | OXYGEN SATURATION: 99 % | HEART RATE: 89 BPM | HEIGHT: 63 IN | TEMPERATURE: 98.3 F | DIASTOLIC BLOOD PRESSURE: 76 MMHG

## 2022-08-17 DIAGNOSIS — Z94.81 BONE MARROW TRANSPLANT STATUS (HCC): ICD-10-CM

## 2022-08-17 DIAGNOSIS — C90.00 MULTIPLE MYELOMA NOT HAVING ACHIEVED REMISSION (HCC): Primary | ICD-10-CM

## 2022-08-17 DIAGNOSIS — D84.9 IMMUNOCOMPROMISED (HCC): ICD-10-CM

## 2022-08-17 DIAGNOSIS — C90.00 MULTIPLE MYELOMA NOT HAVING ACHIEVED REMISSION (HCC): ICD-10-CM

## 2022-08-17 LAB
ALBUMIN SERPL-MCNC: 3.6 G/DL (ref 3.5–5)
ALBUMIN/GLOB SERPL: 1.1 {RATIO} (ref 1.2–3.5)
ALP SERPL-CCNC: 70 U/L (ref 50–136)
ALT SERPL-CCNC: 21 U/L (ref 12–65)
ANION GAP SERPL CALC-SCNC: 5 MMOL/L (ref 7–16)
AST SERPL-CCNC: 20 U/L (ref 15–37)
BASOPHILS # BLD: 0 K/UL (ref 0–0.2)
BASOPHILS NFR BLD: 0 % (ref 0–2)
BILIRUB SERPL-MCNC: 0.1 MG/DL (ref 0.2–1.1)
BUN SERPL-MCNC: 11 MG/DL (ref 6–23)
CALCIUM SERPL-MCNC: 9.2 MG/DL (ref 8.3–10.4)
CHLORIDE SERPL-SCNC: 103 MMOL/L (ref 98–107)
CO2 SERPL-SCNC: 29 MMOL/L (ref 21–32)
CREAT SERPL-MCNC: 0.8 MG/DL (ref 0.6–1)
DIFFERENTIAL METHOD BLD: ABNORMAL
EOSINOPHIL # BLD: 0.2 K/UL (ref 0–0.8)
EOSINOPHIL NFR BLD: 3 % (ref 0.5–7.8)
ERYTHROCYTE [DISTWIDTH] IN BLOOD BY AUTOMATED COUNT: 14.8 % (ref 11.9–14.6)
GLOBULIN SER CALC-MCNC: 3.2 G/DL (ref 2.3–3.5)
GLUCOSE SERPL-MCNC: 83 MG/DL (ref 65–100)
HCT VFR BLD AUTO: 35.5 % (ref 35.8–46.3)
HGB BLD-MCNC: 11.6 G/DL (ref 11.7–15.4)
IMM GRANULOCYTES # BLD AUTO: 0 K/UL (ref 0–0.5)
IMM GRANULOCYTES NFR BLD AUTO: 0 % (ref 0–5)
LYMPHOCYTES # BLD: 3.3 K/UL (ref 0.5–4.6)
LYMPHOCYTES NFR BLD: 43 % (ref 13–44)
MAGNESIUM SERPL-MCNC: 2.3 MG/DL (ref 1.8–2.4)
MCH RBC QN AUTO: 30 PG (ref 26.1–32.9)
MCHC RBC AUTO-ENTMCNC: 32.7 G/DL (ref 31.4–35)
MCV RBC AUTO: 91.7 FL (ref 79.6–97.8)
MONOCYTES # BLD: 0.7 K/UL (ref 0.1–1.3)
MONOCYTES NFR BLD: 9 % (ref 4–12)
NEUTS SEG # BLD: 3.5 K/UL (ref 1.7–8.2)
NEUTS SEG NFR BLD: 45 % (ref 43–78)
NRBC # BLD: 0 K/UL (ref 0–0.2)
PLATELET # BLD AUTO: 250 K/UL (ref 150–450)
PMV BLD AUTO: 9.2 FL (ref 9.4–12.3)
POTASSIUM SERPL-SCNC: 3.6 MMOL/L (ref 3.5–5.1)
PROT SERPL-MCNC: 6.8 G/DL (ref 6.3–8.2)
RBC # BLD AUTO: 3.87 M/UL (ref 4.05–5.2)
SODIUM SERPL-SCNC: 137 MMOL/L (ref 136–145)
WBC # BLD AUTO: 7.7 K/UL (ref 4.3–11.1)

## 2022-08-17 PROCEDURE — 99214 OFFICE O/P EST MOD 30 MIN: CPT | Performed by: INTERNAL MEDICINE

## 2022-08-17 PROCEDURE — 6360000002 HC RX W HCPCS: Performed by: INTERNAL MEDICINE

## 2022-08-17 PROCEDURE — 36415 COLL VENOUS BLD VENIPUNCTURE: CPT

## 2022-08-17 PROCEDURE — M0220 HC TIXAGEV AND CILGAV INJ: HCPCS

## 2022-08-17 PROCEDURE — 85025 COMPLETE CBC W/AUTO DIFF WBC: CPT

## 2022-08-17 PROCEDURE — 83735 ASSAY OF MAGNESIUM: CPT

## 2022-08-17 PROCEDURE — 80053 COMPREHEN METABOLIC PANEL: CPT

## 2022-08-17 RX ORDER — EPINEPHRINE 1 MG/ML
0.3 INJECTION, SOLUTION, CONCENTRATE INTRAVENOUS PRN
Status: CANCELLED | OUTPATIENT
Start: 2022-08-17

## 2022-08-17 RX ORDER — SULFAMETHOXAZOLE AND TRIMETHOPRIM 800; 160 MG/1; MG/1
1 TABLET ORAL
Qty: 12 TABLET | Refills: 2 | Status: SHIPPED | OUTPATIENT
Start: 2022-08-17

## 2022-08-17 RX ORDER — FAMOTIDINE 10 MG/ML
20 INJECTION, SOLUTION INTRAVENOUS
Status: CANCELLED | OUTPATIENT
Start: 2022-08-17

## 2022-08-17 RX ORDER — ALBUTEROL SULFATE 90 UG/1
4 AEROSOL, METERED RESPIRATORY (INHALATION) PRN
Status: CANCELLED | OUTPATIENT
Start: 2022-08-17

## 2022-08-17 RX ORDER — ACETAMINOPHEN 325 MG/1
650 TABLET ORAL
OUTPATIENT
Start: 2022-08-17

## 2022-08-17 RX ORDER — SODIUM CHLORIDE 9 MG/ML
INJECTION, SOLUTION INTRAVENOUS CONTINUOUS
Status: CANCELLED | OUTPATIENT
Start: 2022-08-17

## 2022-08-17 RX ORDER — ONDANSETRON 2 MG/ML
8 INJECTION INTRAMUSCULAR; INTRAVENOUS
OUTPATIENT
Start: 2022-08-17

## 2022-08-17 RX ORDER — DIPHENHYDRAMINE HYDROCHLORIDE 50 MG/ML
50 INJECTION INTRAMUSCULAR; INTRAVENOUS
Status: CANCELLED | OUTPATIENT
Start: 2022-08-17

## 2022-08-17 RX ORDER — ACETAMINOPHEN 325 MG/1
650 TABLET ORAL
Status: CANCELLED | OUTPATIENT
Start: 2022-08-17

## 2022-08-17 RX ORDER — DIPHENHYDRAMINE HYDROCHLORIDE 50 MG/ML
50 INJECTION INTRAMUSCULAR; INTRAVENOUS
Status: ACTIVE | OUTPATIENT
Start: 2022-08-17 | End: 2022-08-17

## 2022-08-17 RX ORDER — ALBUTEROL SULFATE 90 UG/1
4 AEROSOL, METERED RESPIRATORY (INHALATION) PRN
OUTPATIENT
Start: 2022-08-17

## 2022-08-17 RX ORDER — SODIUM CHLORIDE 9 MG/ML
INJECTION, SOLUTION INTRAVENOUS CONTINUOUS
OUTPATIENT
Start: 2022-08-17

## 2022-08-17 RX ORDER — EPINEPHRINE 1 MG/ML
0.3 INJECTION, SOLUTION, CONCENTRATE INTRAVENOUS PRN
OUTPATIENT
Start: 2022-08-17

## 2022-08-17 RX ORDER — ONDANSETRON 2 MG/ML
8 INJECTION INTRAMUSCULAR; INTRAVENOUS
Status: CANCELLED | OUTPATIENT
Start: 2022-08-17

## 2022-08-17 RX ADMIN — Medication 300 MG: at 15:35

## 2022-08-17 ASSESSMENT — PATIENT HEALTH QUESTIONNAIRE - PHQ9
SUM OF ALL RESPONSES TO PHQ QUESTIONS 1-9: 0
2. FEELING DOWN, DEPRESSED OR HOPELESS: 0
SUM OF ALL RESPONSES TO PHQ QUESTIONS 1-9: 0
SUM OF ALL RESPONSES TO PHQ9 QUESTIONS 1 & 2: 0
1. LITTLE INTEREST OR PLEASURE IN DOING THINGS: 0
SUM OF ALL RESPONSES TO PHQ QUESTIONS 1-9: 0
SUM OF ALL RESPONSES TO PHQ QUESTIONS 1-9: 0

## 2022-08-17 NOTE — PATIENT INSTRUCTIONS
Patient Instructions from Today's Visit    Reason for Visit:  Follow up visit  D+76 post stem cell transplant    Plan:  -Your labs all look good!  -You should remain on the Acyclovir and Bactrim    Follow Up:   Follow up with Dr. Ryan Stokes Visit on 08/17/2022   Component Date Value Ref Range Status    WBC 08/17/2022 7.7  4.3 - 11.1 K/uL Final    RBC 08/17/2022 3.87 (A) 4.05 - 5.2 M/uL Final    Hemoglobin 08/17/2022 11.6 (A) 11.7 - 15.4 g/dL Final    Hematocrit 08/17/2022 35.5 (A) 35.8 - 46.3 % Final    MCV 08/17/2022 91.7  79.6 - 97.8 FL Final    MCH 08/17/2022 30.0  26.1 - 32.9 PG Final    MCHC 08/17/2022 32.7  31.4 - 35.0 g/dL Final    RDW 08/17/2022 14.8 (A) 11.9 - 14.6 % Final    Platelets 92/34/0831 250  150 - 450 K/uL Final    MPV 08/17/2022 9.2 (A) 9.4 - 12.3 FL Final    nRBC 08/17/2022 0.00  0.0 - 0.2 K/uL Final    **Note: Absolute NRBC parameter is now reported with Hemogram**    Differential Type 08/17/2022 AUTOMATED    Final    Seg Neutrophils 08/17/2022 45  43 - 78 % Final    Lymphocytes 08/17/2022 43  13 - 44 % Final    Monocytes 08/17/2022 9  4.0 - 12.0 % Final    Eosinophils % 08/17/2022 3  0.5 - 7.8 % Final    Basophils 08/17/2022 0  0.0 - 2.0 % Final    Immature Granulocytes 08/17/2022 0  0.0 - 5.0 % Final    Segs Absolute 08/17/2022 3.5  1.7 - 8.2 K/UL Final    Absolute Lymph # 08/17/2022 3.3  0.5 - 4.6 K/UL Final    Absolute Mono # 08/17/2022 0.7  0.1 - 1.3 K/UL Final    Absolute Eos # 08/17/2022 0.2  0.0 - 0.8 K/UL Final    Basophils Absolute 08/17/2022 0.0  0.0 - 0.2 K/UL Final    Absolute Immature Granulocyte 08/17/2022 0.0  0.0 - 0.5 K/UL Final           Treatment Summary has been discussed and given to patient: /a        -------------------------------------------------------------------------------------------------------------------  Please call our office at [phone] if you have any  of the following symptoms:   Fever of 100.5 or greater  Chills  Shortness of breath  Swelling or pain in one leg    After office hours an answering service is available and will contact a provider for emergencies or if you are experiencing any of the above symptoms. Patient did express an interest in My Chart. My Chart log in information explained on the after visit summary printout at the [facility] [employer] desk. Yady Capone RN  Hematology Navigator  [address] [place] [address]  Cell:  [phone]  Office: [phone]   Fax: [phone]  Email:  [email or web address]     Navigator is available by phone Monday through Thursday 8 am to 5 pm.  If you need assistance after 5pm, on the weekend or on a Friday, please call [phone]. The answering service is available 24 hours a day, 7 days a week.

## 2022-08-17 NOTE — LETTER
[facility]  [address] [address]  Phone: [phone]  Fax: [phone]           Milli Singer MD, MD      September 1, 2022     Patient: Katerina Elliott   MR Number: [id number]   YOB: 1970     To Whom It May Concern,     We request that our patient Sanjuana Torres still be allowed to work from home until 9/21/22. At that time, we do not have any issues with her returning to an in person work environment. Should you have any questions please reach out to our office at [phone].       Sincerely,        Milli Singer MD, MD

## 2022-08-17 NOTE — PROGRESS NOTES
Data Source: [facility], [facility]. 8/17/2022    2:31 PM    [address] [id number]    54 y.o. Patient Encounter: [address] Visit    Heme Diagnosis:  MM, transplant evaluation  Heme History (Copied from prior):   54 female, history of prolactinoma, now kindly referred to us by Dr. Glo Fonseca for evaluation of her multiple myeloma and consideration of autologous stem cell transplant. Her hematologic history is as follows:    - Summer 2021: Started noticing back discomfort.  - 10/21: Progressive debility leading to be being dependent on wheelchair. Led to imaging including T and L-spine showing a cortically destructive lytic lesion with significant soft tissue component involving T11 with resulting extradural soft tissue mass compression of underlying spinal cord. Smaller lytic lesions in the right and left iliac wing also noted. Subsequently hospitalized and underwent T10/T11 laminectomies with excision of spinal cord tumor by neurosurgery. A bone marrow biopsy from iliac crest also performed. 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 Bone marrow biopsy from right hip showing scant bone marrow specimen, with 15% cellularity and 70% involvement by lambda light chain restricted plasma cell neoplasm. FISH not available. PET scan 11/9/2021 with markedly hypermetabolic 2.8 cm thyroid nodule, T11 site surgical changes, however no evidence of FDG avid multiple myeloma noted. Labs showing normal counts, creatinine normal, beta-2 microglobulin at 1.72, SPEP with M spike 0.7, however lambda light chain significantly elevated at 44,763. She was felt to have ISS stage I disease, with plans to start RVD in the near future. She has also been referred to radiation oncology for consolidative XRT to spine. Dental evaluation prior to bone directed therapy.   Her thyroid nodule is also being evaluated with ultrasound. Today seen in office, reports doing reasonably. Planning to start RVD tomorrow. Currently in wheelchair however has only recently started working with physical therapy. Reports minimal headaches. Denies incontinence. Reasonable PO intake. Reports back pain improved since surgery. Recently saw radiation oncology and not felt to need consolidative xrt. She is scheduled to see ENT regarding thyroid nodules. Stage:  1  Performance Status:  2  Pain Score (0-10):  2  Pain Medication related Constipation:  Addressed  Interval History:  8/17/2022: Patient seen for her multiple myeloma, recent high-dose therapy with stem cell transplant, today being day +76. Continues to do well, denies any new complaints. Detailed ROS unremarkable. Some fatigue. Regular with her prophylactic medications including acyclovir and Bactrim. Also on gabapentin and Cymbalta for neuropathy. Blood work today with adequate counts, chemistries unremarkable. We will restage her with PET scan and bone marrow biopsy around her D100. REVIEW OF SYSTEMS:  As mentioned above, all other systems were reviewed in full and are negative.     Past Medical History:   Diagnosis Date    Obesity     Prolactin increased        Past Surgical History:   Procedure Laterality Date    IR BIOPSY PERC SUPERF BONE      IR TUNNELED CATHETER PLACEMENT GREATER THAN 5 YEARS  5/9/2022    IR TUNNELED CATHETER PLACEMENT GREATER THAN 5 YEARS  5/9/2022    IR TUNNELED CATHETER PLACEMENT GREATER THAN 5 YEARS 5/9/2022 [facility]OLOG[facility] SPECIALS       Current Outpatient Medications   Medication Sig Dispense Refill    sulfamethoxazole-trimethoprim (BACTRIM DS;SEPTRA DS) 800-160 MG per tablet       Handicap [facility] Supply prescription to [facility] with completed form [id number].      acyclovir (ZOVIRAX) 400 MG tablet Take 400 mg by mouth 2 times daily      DULoxetine (CYMBALTA) 30 MG extended release capsule Take 30 mg by mouth daily gabapentin (NEURONTIN) 300 MG capsule Take 300 mg by mouth. HYDROcodone-acetaminophen (NORCO) 7.5-325 MG per tablet Take 1 tablet by mouth every 6 hours as needed. prochlorperazine (COMPAZINE) 10 MG tablet Take 1 tablet by mouth every 6 hours as needed (nausea) (Patient not taking: No sig reported) 120 tablet 3    diphenoxylate-atropine (LOMOTIL) 2.5-0.025 MG per tablet Take 2 tablets by mouth 4 times daily as needed for Diarrhea. (Patient not taking: Reported on 8/17/2022)       No current facility-administered medications for this visit. Social History     Socioeconomic History    Marital status:      Spouse name: None    Number of children: None    Years of education: None    Highest education level: None   Tobacco Use    Smoking status: Never    Smokeless tobacco: Never   Substance and Sexual Activity    Alcohol use: Never    Drug use: Never       No family history on file. Allergies   Allergen Reactions    Amoxicillin-Pot Clavulanate Other (See Comments)     Yeast infection  Yeast infection  Yeast infection  Yeast infection         PHYSICAL EXAMINATION:  General Appearance: Healthy appearing patient in no acute distress  Vitals reviewed. BP (!) 140/76 (Site: Left Upper Arm, Position: Standing, Cuff Size: Large Adult)   Pulse 89   Temp 98.3 °F (36.8 °C) (Oral)   Resp 16   Ht 5' 2.5\" (1.588 m)   Wt 231 lb 8 oz (105 kg)   SpO2 99%   BMI 41.67 kg/m²   HEENT: No oral or pharyngeal masses, ulceration or thrush noted, no sinus tenderness. Neck is supple with no thyromegaly or JVD noted. Lymph Nodes: No lymphadenopathy noted in the occipital, pre and post auricular, cervical, supra and infraclavicular, axillary, epitrochlear, inguinal, and popliteal region. Breasts: No palpable masses, nipple discharge or skin retraction  Lungs/Thorax: Clear to auscultation, no accessory muscles of respiration being used.   Heart: Regular rate and rhythm, normal S1, S2, no appreciable murmurs, rubs, gallops  Abdomen: Soft, nontender, bowel sounds present, no appreciable hepatosplenomegaly, no palpable masses  Extremeties: Good pulses bilaterally, no peripheral edema. Skin: Normal skin tone with no rash, petechiae, ecchymosis noted. Musculoskeletal: No pain on palpation over bony prominence, no edema, no evidence of gout, no joint or bony deformity  Neurologic: Grossly intact        LABS/IMAGING:    Lab Results   Component Value Date/Time    WBC 7.7 08/17/2022 02:16 PM    HGB 11.6 08/17/2022 02:16 PM    HCT 35.5 08/17/2022 02:16 PM     08/17/2022 02:16 PM    MCV 91.7 08/17/2022 02:16 PM       Lab Results   Component Value Date/Time     08/01/2022 09:44 AM    K 3.5 08/01/2022 09:44 AM     08/01/2022 09:44 AM    CO2 30 08/01/2022 09:44 AM    BUN 10 08/01/2022 09:44 AM    GFRAA >60 08/01/2022 09:44 AM    GLOB 3.1 08/01/2022 09:44 AM    ALT 17 08/01/2022 09:44 AM         Above results reviewed with patient. ASSESSMENT:  54 female, history of prolactinoma, now kindly referred to us by Dr. Zhanna Martinez for evaluation of her multiple myeloma and consideration of autologous stem cell transplant. Her hematologic history is as follows:    - Summer 2021: Started noticing back discomfort.  - 10/21: Progressive debility leading to be being dependent on wheelchair. Led to imaging including T and L-spine showing a cortically destructive lytic lesion with significant soft tissue component involving T11 with resulting extradural soft tissue mass compression of underlying spinal cord. Smaller lytic lesions in the right and left iliac wing also noted. Subsequently hospitalized and underwent T10/T11 laminectomies with excision of spinal cord tumor by neurosurgery. A bone marrow biopsy from iliac crest also performed. 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   Bone marrow biopsy from right hip showing scant bone marrow specimen, with 15% cellularity and 70% involvement by lambda light chain restricted plasma cell neoplasm. FISH not available. PET scan 11/9/2021 with markedly hypermetabolic 2.8 cm thyroid nodule, T11 site surgical changes, however no evidence of FDG avid multiple myeloma noted. Labs showing normal counts, creatinine normal, beta-2 microglobulin at 1.72, SPEP with M spike 0.7, however lambda light chain significantly elevated at 44,763. She was felt to have ISS stage I disease, with plans to start RVD in the near future. She has also been referred to radiation oncology for consolidative XRT to spine. Dental evaluation prior to bone directed therapy. Her thyroid nodule is also being evaluated with ultrasound. Today seen in office, reports doing reasonably. Planning to start RVD tomorrow. Currently in wheelchair however has only recently started working with physical therapy. Reports minimal headaches. Denies incontinence. Reasonable PO intake. Reports back pain improved since surgery. Recently saw radiation oncology and not felt to need consolidative xrt. She is scheduled to see ENT regarding thyroid nodules. We discussed role of autologous stem cell transplant in first remission after induction therapy. Various aspects pertaining to process, risks and side effects discussed. Patient appears interested in proceeding with procedure moving forward. 2/25/2022: Seen in follow-up. Her bone marrow biopsy with cytogenetics showed normal karyotype, FISH panel had shown gain of 1 q., monosomy 11 and 13, and t(11;14), negative for 17p deletion or other IGH rearrangement. She has since been started on daratumumab-RVD. Appears to have responding disease as her lambda LC has dropped from 60,020 down to 39. M spike is now turned negative. She recently completed cycle 4-day 15 on 2/21/2022 (3-week cycle). Due to ongoing neuropathy, patient today using walker.   Describes neuropathy as tingling/discomfort coming up to her calf, her Velcade dose more recently has been decreased to 0.7 mg/m2. We will discuss case with Dr. Dallis Harada. Given excellent response, will be reasonable to remove Velcade altogether and proceed with Daratumumab-RD q. 28 days x 2 cycles followed by plans for mobilization and collection. This will hopefully also give her time for neuropathy and overall performance status improved. We will see her back in 2 months time. 4/18/2022: Patient seen in follow-up. She recently completed cycle 6-day 15 therapy on 4/11/2022, with last Revlimid dose on 4/14/2022. Neuropathy has improved with supportive care including gabapentin 600 mg 3 times daily along with Cymbalta 30 mg daily. Mobility much improved, now freely able to move in the room. Back discomfort described as stable to improved. Denies any recent fevers or chills, good p.o. intake. Routine blood work unremarkable, follow-up SPEP/LC. We will proceed with premobilization studies, as well as restaging including PET scan and bone marrow biopsy. Given responding disease, will plan to proceed with mobilization and collection, currently scheduled for pre-mob evaluation 5/5/2022 and high-dose therapy/ASCT 6/1/22. Given lack of social support, transplant will be inpatient. CD34+ goal will be 6 mil/kg. She will be mobilized with G-CSF +/- Mozobil. Conditioning will be with melphalan 200 mg per metered square. She will need a tunneled catheter during procedure. We will see her back in 2 to 3 weeks with above, navigation following. 5/5/2022: Reports feeling well. Extensive ROS unremarkable. Restaging studies showing lambda LC only borderline high, SPEP with low level M spike, 24-hour UPEP negative, bone marrow biopsy with 20 to 30% cellularity without any evidence of residual disease, low iron stores noted: Serum ferritin also low at 20: We will plan for IV iron x2.   She will benefit from GI evaluation as well as pelvic exam post transplant. Skeletal survey negative, PET scan without evidence of active disease, of note right sided thyroid nodule with increased uptake noted. Patient has seen [facility] Dr. 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 She was advised to continue following with them posttransplant. Pre-mobilization studies unremarkable including chest x-ray, EKG, 2D echo, PFTs as well as infectious panel. She does note some chronic bilateral LE swelling: We will get Dopplers to rule out DVT. We will also plan for Evusheld post collection. We will now see her back prior to ASCT. 5/31/2022: Patient collected CD34 at 6.24 mil/kg w granix alone. Hospitalized for melphalan 200 mg per metered squared followed by stem cell reinfusion following day (CD34 3.12 mil/kg back). Tolerated melphalan and stem cell re-infusion well. D+3 6/5/22. Hydration and IV electrolyte replacement as needed. Some fatigue and loose stools, monitor. Anti-diarrheals helping. Antiemetic as well as antidiarrheal support as needed. Blood transfusion support transfusion as needed. On  prophylactic antibiotics with Levaquin, Augmentin, acyclovir and Diflucan.  G-CSF support with Granix daily starting day +6. She will be hospitalized through engraftment. 6/27/2022: Patient with multiple myeloma, ongoing treatments including high-dose systemic therapy followed by autologous stem cell transplant, seen in infusion center for toxicity management. Patient discharged on 6/14/2022 after she engrafted. She required potassium replacement and Lasix as needed for LE swelling. Today is day +25 of her autologous stem cell transplant. Reports off-and-on loose stools for which Lomotil/Imodium as needed work. Antinausea medications as needed. Neuropathy stable, on gabapentin and Cymbalta. Regular with her acyclovir prophylaxis.   Vitals stable. Labs today with adequate counts and ANC 1.3, mild anemia, unremarkable chemistries. Gets 1 L IV NS along with KCl 20 meq's. We will see her back in 3 days time. 7/18/2022: Patient seen for her multiple myeloma, and recent high-dose therapy followed by autologous stem cell transplant. Today is day +46. Status post tunneled catheter removal.  Site healing well, understands to let us know should she develop erythema/tenderness, or fevers. Good p.o. intake, denies any mucositis or loose stools. Blood work today with adequate counts, chemistries unremarkable. Continue prophylaxis with acyclovir and Diflucan. Neuropathy significant: We will increase gabapentin to 900 mg 3 times daily. We will look into Evusheld every 6 months. We will see her back in 2 weeks. 8/1/2022: Patient seen for her multiple myeloma, and recent high-dose therapy followed by autologous stem cell transplant. Today is day +60. Reports feeling \"great\". Denies mucositis, or GI complaints. Good p.o. intake and mobility. Neuropathy about the same. Increasing gabapentin to 900 mg 3 times daily without significant benefit and increased sleepiness, as such she is back down to 600 mg 3 times a day along with her Cymbalta. Blood work today unremarkable. She remains on acyclovir and Bactrim prophylaxis. We will restage disease around D100.    8/17/2022: Patient seen for her multiple myeloma, recent high-dose therapy with stem cell transplant, today being day +76. Continues to do well, denies any new complaints. Detailed ROS unremarkable. Some fatigue. Regular with her prophylactic medications including acyclovir and Bactrim. Also on gabapentin and Cymbalta for neuropathy. Blood work today with adequate counts, chemistries unremarkable. We will restage her with PET scan and bone marrow biopsy around her D100.        1. Multiple Myeloma, ISS stage 1, high risk disease (gain of 1 q.)  2. Neuropathy. 3. Iron Deficiency  4. 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 PLAN:  - As above. Acj128/ASCT D+76. IV fluids & electrolyte replacement as needed. - S/p Olivia-RVD [id number] x 4 cycles and Olivia-RD [id number] x 2.   - Neuropathy: On Gabapentin 600 mg 3 times daily along with Cymbalta 30 mg daily.     - Prophylactic medication: acyclovir,  Bactrim MWF      - Evusheld [id number]  - Iron deficiency: IV iron x 2. She will benefit from GI evaluation as well as pelvic exam post transplant.  - F/u w ENT as needed. RTC around D100 with labs, D100 studies, PET scan, bone marrow biopsy with MRD testing. I truly appreciate the kind referral from Dr. Hammad Elizabeth.  Please call w/ any questions    Cody Leonard MD  [facility]  Hematology Oncology  [address] [place] [address]  Office : [phone]  Fax : [phone]

## 2022-08-17 NOTE — PROGRESS NOTES
Arrived to the [facility]. Evusheld injections completed. Provided education on Evusheld. Patient instructed to report any side affects to ordering provider. Patient tolerated well. Any issues or concerns during appointment: no.  Patient aware of no upcoming infusions appointments. Patient discharged ambulatory.

## 2022-08-26 DIAGNOSIS — C90.00 MULTIPLE MYELOMA NOT HAVING ACHIEVED REMISSION (HCC): Primary | ICD-10-CM

## 2022-08-26 DIAGNOSIS — Z94.81 BONE MARROW TRANSPLANT STATUS (HCC): ICD-10-CM

## 2022-09-02 ENCOUNTER — HOSPITAL ENCOUNTER (OUTPATIENT)
Dept: CT IMAGING | Age: 52
Discharge: HOME | End: 2022-09-05
Payer: COMMERCIAL

## 2022-09-02 VITALS
SYSTOLIC BLOOD PRESSURE: 116 MMHG | TEMPERATURE: 98.2 F | HEIGHT: 62 IN | OXYGEN SATURATION: 98 % | RESPIRATION RATE: 14 BRPM | WEIGHT: 233 LBS | HEART RATE: 72 BPM | BODY MASS INDEX: 42.88 KG/M2 | DIASTOLIC BLOOD PRESSURE: 64 MMHG

## 2022-09-02 DIAGNOSIS — Z94.81 BONE MARROW TRANSPLANT STATUS (HCC): ICD-10-CM

## 2022-09-02 DIAGNOSIS — C90.00 MULTIPLE MYELOMA NOT HAVING ACHIEVED REMISSION (HCC): ICD-10-CM

## 2022-09-02 LAB
BASOPHILS # BLD: 0 K/UL (ref 0–0.2)
BASOPHILS NFR BLD: 1 % (ref 0–2)
BONE MARROW PREP & WRIGHT STAIN: NORMAL
DIFFERENTIAL METHOD BLD: ABNORMAL
EOSINOPHIL # BLD: 0.2 K/UL (ref 0–0.8)
EOSINOPHIL NFR BLD: 3 % (ref 0.5–7.8)
ERYTHROCYTE [DISTWIDTH] IN BLOOD BY AUTOMATED COUNT: 13.7 % (ref 11.9–14.6)
HCT VFR BLD AUTO: 35.7 % (ref 35.8–46.3)
HGB BLD-MCNC: 11.5 G/DL (ref 11.7–15.4)
IMM GRANULOCYTES # BLD AUTO: 0 K/UL (ref 0–0.5)
IMM GRANULOCYTES NFR BLD AUTO: 0 % (ref 0–5)
LYMPHOCYTES # BLD: 2.7 K/UL (ref 0.5–4.6)
LYMPHOCYTES NFR BLD: 43 % (ref 13–44)
MCH RBC QN AUTO: 30.3 PG (ref 26.1–32.9)
MCHC RBC AUTO-ENTMCNC: 32.2 G/DL (ref 31.4–35)
MCV RBC AUTO: 93.9 FL (ref 79.6–97.8)
MONOCYTES # BLD: 0.6 K/UL (ref 0.1–1.3)
MONOCYTES NFR BLD: 10 % (ref 4–12)
NEUTS SEG # BLD: 2.8 K/UL (ref 1.7–8.2)
NEUTS SEG NFR BLD: 43 % (ref 43–78)
NRBC # BLD: 0 K/UL (ref 0–0.2)
PLATELET # BLD AUTO: 252 K/UL (ref 150–450)
PMV BLD AUTO: 9.8 FL (ref 9.4–12.3)
RBC # BLD AUTO: 3.8 M/UL (ref 4.05–5.2)
WBC # BLD AUTO: 6.4 K/UL (ref 4.3–11.1)

## 2022-09-02 PROCEDURE — 6360000002 HC RX W HCPCS: Performed by: RADIOLOGY

## 2022-09-02 PROCEDURE — 88313 SPECIAL STAINS GROUP 2: CPT

## 2022-09-02 PROCEDURE — 88305 TISSUE EXAM BY PATHOLOGIST: CPT

## 2022-09-02 PROCEDURE — 2500000003 HC RX 250 WO HCPCS: Performed by: RADIOLOGY

## 2022-09-02 PROCEDURE — 88311 DECALCIFY TISSUE: CPT

## 2022-09-02 PROCEDURE — 77012 CT SCAN FOR NEEDLE BIOPSY: CPT

## 2022-09-02 PROCEDURE — 85025 COMPLETE CBC W/AUTO DIFF WBC: CPT

## 2022-09-02 RX ORDER — MIDAZOLAM HYDROCHLORIDE 2 MG/2ML
INJECTION, SOLUTION INTRAMUSCULAR; INTRAVENOUS
Status: COMPLETED | OUTPATIENT
Start: 2022-09-02 | End: 2022-09-02

## 2022-09-02 RX ORDER — FENTANYL CITRATE 50 UG/ML
INJECTION, SOLUTION INTRAMUSCULAR; INTRAVENOUS
Status: COMPLETED | OUTPATIENT
Start: 2022-09-02 | End: 2022-09-02

## 2022-09-02 RX ORDER — ONDANSETRON 2 MG/ML
4 INJECTION INTRAMUSCULAR; INTRAVENOUS EVERY 8 HOURS PRN
Status: DISCONTINUED | OUTPATIENT
Start: 2022-09-02 | End: 2022-09-06 | Stop reason: HOSPADM

## 2022-09-02 RX ORDER — OXYCODONE HYDROCHLORIDE 5 MG/1
10 TABLET ORAL EVERY 4 HOURS PRN
Status: DISCONTINUED | OUTPATIENT
Start: 2022-09-02 | End: 2022-09-06 | Stop reason: HOSPADM

## 2022-09-02 RX ORDER — LIDOCAINE HYDROCHLORIDE 20 MG/ML
INJECTION, SOLUTION INFILTRATION; PERINEURAL
Status: COMPLETED | OUTPATIENT
Start: 2022-09-02 | End: 2022-09-02

## 2022-09-02 RX ORDER — OXYCODONE HYDROCHLORIDE 5 MG/1
5 TABLET ORAL EVERY 4 HOURS PRN
Status: DISCONTINUED | OUTPATIENT
Start: 2022-09-02 | End: 2022-09-06 | Stop reason: HOSPADM

## 2022-09-02 RX ORDER — SODIUM CHLORIDE 9 MG/ML
INJECTION, SOLUTION INTRAVENOUS CONTINUOUS
Status: DISCONTINUED | OUTPATIENT
Start: 2022-09-02 | End: 2022-09-06 | Stop reason: HOSPADM

## 2022-09-02 RX ORDER — DIPHENHYDRAMINE HYDROCHLORIDE 50 MG/ML
50 INJECTION INTRAMUSCULAR; INTRAVENOUS ONCE
Status: COMPLETED | OUTPATIENT
Start: 2022-09-02 | End: 2022-09-02

## 2022-09-02 RX ADMIN — MIDAZOLAM HYDROCHLORIDE 1 MG: 1 INJECTION, SOLUTION INTRAMUSCULAR; INTRAVENOUS at 14:21

## 2022-09-02 RX ADMIN — MIDAZOLAM HYDROCHLORIDE 0.5 MG: 1 INJECTION, SOLUTION INTRAMUSCULAR; INTRAVENOUS at 14:33

## 2022-09-02 RX ADMIN — MIDAZOLAM HYDROCHLORIDE 0.5 MG: 1 INJECTION, SOLUTION INTRAMUSCULAR; INTRAVENOUS at 14:28

## 2022-09-02 RX ADMIN — FENTANYL CITRATE 25 MCG: 50 INJECTION, SOLUTION INTRAMUSCULAR; INTRAVENOUS at 14:33

## 2022-09-02 RX ADMIN — DIPHENHYDRAMINE HYDROCHLORIDE 50 MG: 50 INJECTION, SOLUTION INTRAMUSCULAR; INTRAVENOUS at 14:00

## 2022-09-02 RX ADMIN — FENTANYL CITRATE 25 MCG: 50 INJECTION, SOLUTION INTRAMUSCULAR; INTRAVENOUS at 14:28

## 2022-09-02 RX ADMIN — FENTANYL CITRATE 50 MCG: 50 INJECTION, SOLUTION INTRAMUSCULAR; INTRAVENOUS at 14:21

## 2022-09-02 RX ADMIN — LIDOCAINE HYDROCHLORIDE 5 ML: 20 INJECTION, SOLUTION INFILTRATION; PERINEURAL at 14:28

## 2022-09-02 ASSESSMENT — PAIN - FUNCTIONAL ASSESSMENT: PAIN_FUNCTIONAL_ASSESSMENT: NONE - DENIES PAIN

## 2022-09-02 NOTE — BRIEF OP NOTE
Brief Postoperative Note      Patient: Ethelda Lesches  YOB: 1970  MRN: [id number]    Date of Procedure: 9/2/2022    Pre-Op Diagnosis: * No pre-op diagnosis entered *Multiple Myeloma    Post-Op Diagnosis: Same       * No procedures listed *    * No surgeons listed *    Assistant:  * No surgical staff found *    Anesthesia: * No anesthesia type entered *    Estimated Blood Loss (mL): Minimal    Complications: None    Specimens:   * No specimens in log *    Implants:  * No implants in log *      Drains: * No LDAs found *    Findings: Unremarkable Bone Marrow Aspiration and Biopsy.       Electronically signed by Ailyn Huggins MD on 9/2/2022 at 2:35 PM

## 2022-09-02 NOTE — PROGRESS NOTES
IV removed with cath intact. Pt understood all d/c instructions. All questions answered. Pt from department via wheelchair.

## 2022-09-02 NOTE — DISCHARGE INSTRUCTIONS
[address] [facility]     Department of Interventional Radiology     Phone Number: [phone]     Fax: [phone]         If you have any questions about your procedure, please call the Interventional Radiology department at [phone]. After business hours (5pm) and weekends, call the answering service at [phone] and ask for the Radiologist on call to be paged. [address] [facility]     Department of Interventional Radiology     [facility]     [phone] Office     [phone] Fax     BIOPSY DISCHARGE INSTRUCTIONS           General Instructions:        A biopsy is the removal of a small piece of tissue for microscopic examination or testing. Healthy tissue can be obtained for the purpose of tissue-type matching for transplants. Unhealthy tissues are more commonly biopsied to diagnose disease. Lung Biopsy:        A needle lung biopsy is performed when there is a mass discovered in the lung area. The most serious risk is infection, bleeding, and pneumothorax (a collapsed lung). Signs of pneumothorax include shortness of breath, rapid heart rate, and blueness of the skin. If any of these occur, call [phone]. Liver Biopsy: This test helps detect cancer, infections, and the cause of an enlargement of the liver or elevated liver enzymes. It also helps to diagnose a number of liver diseases. The pain associated with the procedure may be felt in the shoulder. The risks include internal bleeding, pneumothorax, and injury to the surrounding organs. Renal Biopsy: This procedure is sometimes done to evaluate a transplanted kidney. It is also used to evaluate unexplained decrease in kidney function, blood, or protein in the urine. You may see bright red blood in the urine the first 24 hours after the test. If the bleeding lasts longer, you need to call your doctor.  There is a risk of infection and bleeding into the muscle, which may cause soreness. Spinal Biopsy: This test is sometimes done in conjunction with other procedures. Your back will be sore, as we are taking a small sample of bone, which is slightly more difficult to sample than tissue. General Biopsy:        A mass can grow in any area of the body, and we are taking a specimen as ordered by your doctor. The risks are the same. They include bleeding, pain, and infection. Home Care Instructions: You may resume your regular diet and medication regimen. Do not drink alcohol, drive, or make any important legal decisions in the next 24 hours. Do not lift anything heavier than a gallon of milk until the soreness goes away. You may use over the counter acetaminophen or ibuprofen for the soreness. You may apply an ice pack to the affected area for 20-30 minutes at time for the first 24 hours. After that, you may apply a heat pack. Call If:        You should call your Physician and/or the Radiology Nurse if you have any questions or concerns about the biopsy site. Call if you should have increased pain, fever, redness, drainage, or bleeding more than a small spot on the bandage. Follow-Up Instructions: Please see your ordering doctor as he/she has requested.

## 2022-09-02 NOTE — H&P
Department of Interventional Radiology  [phone]    History and Physical    Patient:  Janny Meyers MRN:  [id number]  SSN:  [id number]    YOB: 1970  Age:  54 y.o. Sex:  female      Primary Care Provider:  Azar Chand MD  Referring Physician:  Magi Melendrez MD    Subjective:     Chief Complaint: Multiple myeloma    History of the Present Illness: The patient is a 54 y.o. female who presents for CT guided bone marrow biopsy and aspiration. Referred to us by her oncologist Dr. Danielle Jara. She has a history of prolactinoma and multiple myeloma. She states she had a stem cell transplant approximately months ago. Patient is NPO. She denies taking any blood thinners      Past Medical History:   Diagnosis Date    Obesity     Prolactin increased      Past Surgical History:   Procedure Laterality Date    IR BIOPSY PERC SUPERF BONE      IR TUNNELED CATHETER PLACEMENT GREATER THAN 5 YEARS  5/9/2022    IR TUNNELED CATHETER PLACEMENT GREATER THAN 5 YEARS  5/9/2022    IR TUNNELED CATHETER PLACEMENT GREATER THAN 5 YEARS 5/9/2022 [facility]        Review of Systems:    Pertinent items are noted in HPI. Prior to Admission medications    Medication Sig Start Date End Date Taking? Authorizing Provider   sulfamethoxazole-trimethoprim (BACTRIM DS;SEPTRA DS) 800-160 MG per tablet Take 1 tablet by mouth three times a week 8/17/22   Damaso Coughlin MD   [facility] Supply prescription to [facility] with completed form RG-007A. 10/21/21   Historical Provider, MD   prochlorperazine (COMPAZINE) 10 MG tablet Take 1 tablet by mouth every 6 hours as needed (nausea)  Patient not taking: No sig reported 6/22/22   Damaso Coughlin MD   diphenoxylate-atropine (LOMOTIL) 2.5-0.025 MG per tablet Take 2 tablets by mouth 4 times daily as needed for Diarrhea.   Patient not taking: Reported on 8/17/2022    Historical Provider, MD   acyclovir (ZOVIRAX) 400 MG tablet Take 400 mg by mouth 2 times daily 11/16/21   Ar Automatic Reconciliation   DULoxetine (CYMBALTA) 30 MG extended release capsule Take 30 mg by mouth daily 3/28/22   Ar Automatic Reconciliation   gabapentin (NEURONTIN) 300 MG capsule Take 300 mg by mouth. 1/31/22   Ar Automatic Reconciliation   HYDROcodone-acetaminophen (NORCO) 7.5-325 MG per tablet Take 1 tablet by mouth every 6 hours as needed. Ar Automatic Reconciliation        Allergies   Allergen Reactions    Amoxicillin-Pot Clavulanate Other (See Comments)     Yeast infection  Yeast infection  Yeast infection  Yeast infection         No family history on file. Social History     Tobacco Use    Smoking status: Never    Smokeless tobacco: Never   Substance Use Topics    Alcohol use: Never        Not in a hospital admission.     Objective:       Physical Examination:    Vitals:    09/02/22 1345   Temp: 98.2 °F (36.8 °C)       Pain Assessment           Zero                                          HEART: regular rate and rhythm, S1, S2 normal, no murmur, click, rub or gallop  LUNG: clear to auscultation bilaterally  ABDOMEN: soft, non-tender; bowel sounds normal; no masses,  no organomegaly  EXTREMITIES:   No pedal edema noted    Laboratory:     Lab Results   Component Value Date/Time     08/17/2022 02:16 PM     08/01/2022 09:44 AM    K 3.6 08/17/2022 02:16 PM    K 3.5 08/01/2022 09:44 AM     08/17/2022 02:16 PM     08/01/2022 09:44 AM    CO2 29 08/17/2022 02:16 PM    CO2 30 08/01/2022 09:44 AM    BUN 11 08/17/2022 02:16 PM    BUN 10 08/01/2022 09:44 AM    GFRAA >60 08/17/2022 02:16 PM    GFRAA >60 08/01/2022 09:44 AM    MG 2.3 08/17/2022 02:16 PM    MG 2.2 08/01/2022 09:44 AM    PHOS 4.4 07/05/2022 11:42 AM    PHOS 4.2 06/30/2022 09:39 AM    GLOB 3.2 08/17/2022 02:16 PM    GLOB 3.1 08/01/2022 09:44 AM    ALT 21 08/17/2022 02:16 PM    ALT 17 08/01/2022 09:44 AM     Lab Results   Component Value Date/Time    WBC 7.7 08/17/2022 02:16 PM    WBC 6.7 08/01/2022 09:44 AM    HGB 11.6 08/17/2022 02:16 PM    HGB 11.9 08/01/2022 09:44 AM    HCT 35.5 08/17/2022 02:16 PM    HCT 36.2 08/01/2022 09:44 AM     08/17/2022 02:16 PM     08/01/2022 09:44 AM     Lab Results   Component Value Date/Time    APTT 27.5 05/08/2022 12:36 PM    INR 1.1 05/08/2022 12:36 PM       Assessment:     Multiple myeloma history    [unfilled]    Plan:     Planned Procedure: CT-guided bone marrow biopsy and aspiration status post bone marrow transplant    Risks, benefits, and alternatives reviewed with patient and she agrees to proceed with the procedure.       Signed By: HENRIK Sanders     September 2, 2022

## 2022-09-02 NOTE — PROGRESS NOTES
TRANSFER - OUT REPORT:    Verbal report given to Ramos Glez on [employer]  being transferred to IR prep room 2 for routine post-op       Report consisted of patient's Situation, Background, Assessment and   Recommendations(SBAR). Information from the following report(s) Surgery Report and MAR was reviewed with the receiving nurse. Lines:   Peripheral IV 09/02/22 Left;Posterior Hand (Active)        Opportunity for questions and clarification was provided. Recover patient for 1 hour (15:30).

## 2022-09-02 NOTE — PROGRESS NOTES
Total sedation: 2 mg of Versed and 100 mcg of Fentanyl. 50 mg of Benadryl given in prep. Patient tolerated well.

## 2022-09-02 NOTE — PRE SEDATION
Sedation Pre-Procedure Note    Patient Name: Marielos Jaquez   YOB: 1970  Room/Bed: Room/bed info not found  Medical Record Number: [id number]  Date: 9/2/2022   Time: 1:52 PM       Indication: History of multiple myeloma with recent stem cell transplant    Consent: I have discussed with the patient and/or the patient representative the indication, alternatives, and the possible risks and/or complications of the planned procedure and the anesthesia methods. The patient and/or patient representative appear to understand and agree to proceed. Vital Signs:   Vitals:    09/02/22 1345   BP: (!) 153/73   Pulse: 85   Resp: 16   Temp: 98.2 °F (36.8 °C)   SpO2: 100%       Past Medical History:   has a past medical history of Obesity and Prolactin increased. Past Surgical History:   has a past surgical history that includes IR TUNNELED CVC PLACE WO SQ PORT/PUMP > 5 YEARS (5/9/2022); IR BIOPSY BONE MARROW; and IR TUNNELED CVC PLACE WO SQ PORT/PUMP > 5 YEARS (5/9/2022). Medications:   Scheduled Meds:   Continuous Infusions:   PRN Meds:   Home Meds:   Prior to Admission medications    Medication Sig Start Date End Date Taking? Authorizing Provider   sulfamethoxazole-trimethoprim (BACTRIM DS;SEPTRA DS) 800-160 MG per tablet Take 1 tablet by mouth three times a week 8/17/22   MD Steve Bolanos [facility] Supply prescription to [facility] with completed form [id number]. 10/21/21   Historical Provider, MD   prochlorperazine (COMPAZINE) 10 MG tablet Take 1 tablet by mouth every 6 hours as needed (nausea)  Patient not taking: No sig reported 6/22/22   Jb Mcneal MD   diphenoxylate-atropine (LOMOTIL) 2.5-0.025 MG per tablet Take 2 tablets by mouth 4 times daily as needed for Diarrhea.   Patient not taking: No sig reported    Historical Provider, MD   acyclovir (ZOVIRAX) 400 MG tablet Take 400 mg by mouth 2 times daily 11/16/21   Ar Automatic Reconciliation   DULoxetine (CYMBALTA) 30 MG extended release capsule Take 30 mg by mouth daily 3/28/22   Ar Automatic Reconciliation   gabapentin (NEURONTIN) 300 MG capsule Take 300 mg by mouth. 1/31/22   Ar Automatic Reconciliation   HYDROcodone-acetaminophen (NORCO) 7.5-325 MG per tablet Take 1 tablet by mouth every 6 hours as needed. Ar Automatic Reconciliation     Pre-Sedation Documentation and Exam:   I have personally completed a history, physical exam & review of systems for this patient (see notes).     Mallampati Airway Assessment:  normal, dentition not prohibitive, Mallampati Class III - (soft palate & base of uvula are visible)    Prior History of Anesthesia Complications:   none    ASA Classification:  Class 4 - A patient with an incapacitating systemic disease that is a constant threat to life    Sedation/ Anesthesia Plan:   intravenous sedation    Medications Planned:   midazolam (Versed) intravenously and fentanyl intravenously    Patient is an appropriate candidate for plan of sedation: yes    Electronically signed by HENRIK Georges on 9/2/2022 at 1:52 PM

## 2022-09-08 ENCOUNTER — APPOINTMENT (OUTPATIENT)
Dept: GENERAL RADIOLOGY | Age: 52
End: 2022-09-08
Payer: COMMERCIAL

## 2022-09-08 ENCOUNTER — HOSPITAL ENCOUNTER (OUTPATIENT)
Dept: GENERAL RADIOLOGY | Age: 52
Discharge: HOME | End: 2022-09-11
Payer: COMMERCIAL

## 2022-09-08 ENCOUNTER — HOSPITAL ENCOUNTER (OUTPATIENT)
Dept: PET IMAGING | Age: 52
Discharge: HOME | End: 2022-09-11
Payer: COMMERCIAL

## 2022-09-08 ENCOUNTER — HOSPITAL ENCOUNTER (OUTPATIENT)
Dept: PULMONOLOGY | Age: 52
Discharge: HOME | End: 2022-09-11
Payer: COMMERCIAL

## 2022-09-08 DIAGNOSIS — C90.00 MULTIPLE MYELOMA NOT HAVING ACHIEVED REMISSION (HCC): ICD-10-CM

## 2022-09-08 DIAGNOSIS — Z94.81 BONE MARROW TRANSPLANT STATUS (HCC): ICD-10-CM

## 2022-09-08 LAB
GLUCOSE BLD STRIP.AUTO-MCNC: 87 MG/DL (ref 65–100)
SERVICE CMNT-IMP: NORMAL

## 2022-09-08 PROCEDURE — 94726 PLETHYSMOGRAPHY LUNG VOLUMES: CPT

## 2022-09-08 PROCEDURE — 77075 RADEX OSSEOUS SURVEY COMPL: CPT

## 2022-09-08 PROCEDURE — 2580000003 HC RX 258: Performed by: INTERNAL MEDICINE

## 2022-09-08 PROCEDURE — 78815 PET IMAGE W/CT SKULL-THIGH: CPT

## 2022-09-08 PROCEDURE — 71046 X-RAY EXAM CHEST 2 VIEWS: CPT

## 2022-09-08 PROCEDURE — 82962 GLUCOSE BLOOD TEST: CPT

## 2022-09-08 PROCEDURE — 3430000000 HC RX DIAGNOSTIC RADIOPHARMACEUTICAL: Performed by: INTERNAL MEDICINE

## 2022-09-08 PROCEDURE — 94729 DIFFUSING CAPACITY: CPT

## 2022-09-08 PROCEDURE — A9552 F18 FDG: HCPCS | Performed by: INTERNAL MEDICINE

## 2022-09-08 PROCEDURE — 6360000004 HC RX CONTRAST MEDICATION: Performed by: INTERNAL MEDICINE

## 2022-09-08 PROCEDURE — 94010 BREATHING CAPACITY TEST: CPT

## 2022-09-08 RX ORDER — SODIUM CHLORIDE 0.9 % (FLUSH) 0.9 %
10 SYRINGE (ML) INJECTION ONCE AS NEEDED
Status: COMPLETED | OUTPATIENT
Start: 2022-09-08 | End: 2022-09-08

## 2022-09-08 RX ORDER — FLUDEOXYGLUCOSE F 18 200 MCI/ML
15.85 INJECTION, SOLUTION INTRAVENOUS
Status: COMPLETED | OUTPATIENT
Start: 2022-09-08 | End: 2022-09-08

## 2022-09-08 RX ADMIN — DIATRIZOATE MEGLUMINE AND DIATRIZOATE SODIUM 10 ML: 660; 100 LIQUID ORAL; RECTAL at 12:20

## 2022-09-08 RX ADMIN — FLUDEOXYGLUCOSE F 18 15.85 MILLICURIE: 200 INJECTION, SOLUTION INTRAVENOUS at 12:20

## 2022-09-08 RX ADMIN — SODIUM CHLORIDE, PRESERVATIVE FREE 10 ML: 5 INJECTION INTRAVENOUS at 12:20

## 2022-09-12 LAB
FLOW CYTOMETRY RESULTS: NORMAL
SPECIMEN SOURCE: NORMAL
TEST ORDERED: NORMAL

## 2022-09-19 DIAGNOSIS — C90.00 MULTIPLE MYELOMA NOT HAVING ACHIEVED REMISSION (HCC): Primary | ICD-10-CM

## 2022-09-20 ENCOUNTER — HOSPITAL ENCOUNTER (OUTPATIENT)
Dept: LAB | Age: 52
Discharge: HOME | End: 2022-09-23
Payer: COMMERCIAL

## 2022-09-20 ENCOUNTER — OFFICE VISIT (OUTPATIENT)
Dept: ONCOLOGY | Age: 52
End: 2022-09-20
Payer: COMMERCIAL

## 2022-09-20 VITALS
DIASTOLIC BLOOD PRESSURE: 94 MMHG | HEART RATE: 86 BPM | WEIGHT: 233.6 LBS | HEIGHT: 63 IN | SYSTOLIC BLOOD PRESSURE: 146 MMHG | TEMPERATURE: 98.5 F | OXYGEN SATURATION: 100 % | RESPIRATION RATE: 16 BRPM | BODY MASS INDEX: 41.39 KG/M2

## 2022-09-20 DIAGNOSIS — Z94.81 BONE MARROW TRANSPLANT STATUS (HCC): ICD-10-CM

## 2022-09-20 DIAGNOSIS — C90.00 MULTIPLE MYELOMA NOT HAVING ACHIEVED REMISSION (HCC): ICD-10-CM

## 2022-09-20 DIAGNOSIS — D84.9 IMMUNOCOMPROMISED (HCC): ICD-10-CM

## 2022-09-20 DIAGNOSIS — C90.00 MULTIPLE MYELOMA NOT HAVING ACHIEVED REMISSION (HCC): Primary | ICD-10-CM

## 2022-09-20 LAB
ALBUMIN SERPL-MCNC: 3.9 G/DL (ref 3.5–5)
ALBUMIN/GLOB SERPL: 1.2 {RATIO} (ref 1.2–3.5)
ALP SERPL-CCNC: 74 U/L (ref 50–136)
ALT SERPL-CCNC: 15 U/L (ref 12–65)
ANION GAP SERPL CALC-SCNC: 4 MMOL/L (ref 4–13)
AST SERPL-CCNC: 18 U/L (ref 15–37)
BASOPHILS # BLD: 0 K/UL (ref 0–0.2)
BASOPHILS NFR BLD: 0 % (ref 0–2)
BILIRUB SERPL-MCNC: 0.2 MG/DL (ref 0.2–1.1)
BUN SERPL-MCNC: 9 MG/DL (ref 6–23)
CALCIUM SERPL-MCNC: 9.5 MG/DL (ref 8.3–10.4)
CHLORIDE SERPL-SCNC: 104 MMOL/L (ref 101–110)
CHOLEST SERPL-MCNC: 222 MG/DL
CO2 SERPL-SCNC: 31 MMOL/L (ref 21–32)
CREAT SERPL-MCNC: 0.8 MG/DL (ref 0.6–1)
DIFFERENTIAL METHOD BLD: ABNORMAL
EOSINOPHIL # BLD: 0.2 K/UL (ref 0–0.8)
EOSINOPHIL NFR BLD: 2 % (ref 0.5–7.8)
ERYTHROCYTE [DISTWIDTH] IN BLOOD BY AUTOMATED COUNT: 13.1 % (ref 11.9–14.6)
FSH SERPL-ACNC: 46.5 MIU/ML
GGT SERPL-CCNC: 41 U/L (ref 5–55)
GLOBULIN SER CALC-MCNC: 3.2 G/DL (ref 2.3–3.5)
GLUCOSE SERPL-MCNC: 84 MG/DL (ref 65–100)
HCT VFR BLD AUTO: 37.1 % (ref 35.8–46.3)
HDLC SERPL-MCNC: 54 MG/DL (ref 40–60)
HDLC SERPL: 4.1 {RATIO}
HGB BLD-MCNC: 11.9 G/DL (ref 11.7–15.4)
IMM GRANULOCYTES # BLD AUTO: 0 K/UL (ref 0–0.5)
IMM GRANULOCYTES NFR BLD AUTO: 0 % (ref 0–5)
LDLC SERPL CALC-MCNC: 121.2 MG/DL
LH SERPL-ACNC: 25.6 MIU/ML
LYMPHOCYTES # BLD: 3.2 K/UL (ref 0.5–4.6)
LYMPHOCYTES NFR BLD: 44 % (ref 13–44)
MAGNESIUM SERPL-MCNC: 2.1 MG/DL (ref 1.8–2.4)
MCH RBC QN AUTO: 29.7 PG (ref 26.1–32.9)
MCHC RBC AUTO-ENTMCNC: 32.1 G/DL (ref 31.4–35)
MCV RBC AUTO: 92.5 FL (ref 79.6–97.8)
MONOCYTES # BLD: 0.7 K/UL (ref 0.1–1.3)
MONOCYTES NFR BLD: 9 % (ref 4–12)
NEUTS SEG # BLD: 3.2 K/UL (ref 1.7–8.2)
NEUTS SEG NFR BLD: 45 % (ref 43–78)
NRBC # BLD: 0 K/UL (ref 0–0.2)
PLATELET # BLD AUTO: 277 K/UL (ref 150–450)
PMV BLD AUTO: 9.6 FL (ref 9.4–12.3)
POTASSIUM SERPL-SCNC: 3.8 MMOL/L (ref 3.5–5.1)
PROT SERPL-MCNC: 7.1 G/DL (ref 6.3–8.2)
RBC # BLD AUTO: 4.01 M/UL (ref 4.05–5.2)
SODIUM SERPL-SCNC: 139 MMOL/L (ref 136–145)
T4 FREE SERPL-MCNC: 0.9 NG/DL (ref 0.78–1.4)
TRIGL SERPL-MCNC: 234 MG/DL (ref 35–150)
TSH, 3RD GENERATION: 0.82 UIU/ML (ref 0.36–3)
VLDLC SERPL CALC-MCNC: 46.8 MG/DL (ref 6–23)
WBC # BLD AUTO: 7.2 K/UL (ref 4.3–11.1)

## 2022-09-20 PROCEDURE — 82784 ASSAY IGA/IGD/IGG/IGM EACH: CPT

## 2022-09-20 PROCEDURE — 85025 COMPLETE CBC W/AUTO DIFF WBC: CPT

## 2022-09-20 PROCEDURE — 36415 COLL VENOUS BLD VENIPUNCTURE: CPT

## 2022-09-20 PROCEDURE — 82977 ASSAY OF GGT: CPT

## 2022-09-20 PROCEDURE — 82232 ASSAY OF BETA-2 PROTEIN: CPT

## 2022-09-20 PROCEDURE — 84481 FREE ASSAY (FT-3): CPT

## 2022-09-20 PROCEDURE — 83001 ASSAY OF GONADOTROPIN (FSH): CPT

## 2022-09-20 PROCEDURE — 86360 T CELL ABSOLUTE COUNT/RATIO: CPT

## 2022-09-20 PROCEDURE — 80061 LIPID PANEL: CPT

## 2022-09-20 PROCEDURE — 83521 IG LIGHT CHAINS FREE EACH: CPT

## 2022-09-20 PROCEDURE — 84443 ASSAY THYROID STIM HORMONE: CPT

## 2022-09-20 PROCEDURE — 84439 ASSAY OF FREE THYROXINE: CPT

## 2022-09-20 PROCEDURE — 83002 ASSAY OF GONADOTROPIN (LH): CPT

## 2022-09-20 PROCEDURE — 99215 OFFICE O/P EST HI 40 MIN: CPT | Performed by: INTERNAL MEDICINE

## 2022-09-20 PROCEDURE — 83735 ASSAY OF MAGNESIUM: CPT

## 2022-09-20 ASSESSMENT — PATIENT HEALTH QUESTIONNAIRE - PHQ9
2. FEELING DOWN, DEPRESSED OR HOPELESS: 0
SUM OF ALL RESPONSES TO PHQ9 QUESTIONS 1 & 2: 0
SUM OF ALL RESPONSES TO PHQ QUESTIONS 1-9: 0
1. LITTLE INTEREST OR PLEASURE IN DOING THINGS: 0

## 2022-09-20 NOTE — PROGRESS NOTES
Data Source: [facility], [facility]. 9/20/2022    3:50 PM    [address] [id number]    54 y.o. Patient Encounter: [address] Visit    Heme Diagnosis:  MM, transplant evaluation  Heme History (Copied from prior):   54 female, history of prolactinoma, now kindly referred to us by Dr. David George for evaluation of her multiple myeloma and consideration of autologous stem cell transplant. Her hematologic history is as follows:    - Summer 2021: Started noticing back discomfort.  - 10/21: Progressive debility leading to be being dependent on wheelchair. Led to imaging including T and L-spine showing a cortically destructive lytic lesion with significant soft tissue component involving T11 with resulting extradural soft tissue mass compression of underlying spinal cord. Smaller lytic lesions in the right and left iliac wing also noted. Subsequently hospitalized and underwent T10/T11 laminectomies with excision of spinal cord tumor by neurosurgery. A bone marrow biopsy from iliac crest also performed. 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 Bone marrow biopsy from right hip showing scant bone marrow specimen, with 15% cellularity and 70% involvement by lambda light chain restricted plasma cell neoplasm. FISH not available. PET scan 11/9/2021 with markedly hypermetabolic 2.8 cm thyroid nodule, T11 site surgical changes, however no evidence of FDG avid multiple myeloma noted. Labs showing normal counts, creatinine normal, beta-2 microglobulin at 1.72, SPEP with M spike 0.7, however lambda light chain significantly elevated at 44,763. She was felt to have ISS stage I disease, with plans to start RVD in the near future. She has also been referred to radiation oncology for consolidative XRT to spine. Dental evaluation prior to bone directed therapy.   Her thyroid nodule is also being evaluated with ultrasound. Today seen in office, reports doing reasonably. Planning to start RVD tomorrow. Currently in wheelchair however has only recently started working with physical therapy. Reports minimal headaches. Denies incontinence. Reasonable PO intake. Reports back pain improved since surgery. Recently saw radiation oncology and not felt to need consolidative xrt. She is scheduled to see ENT regarding thyroid nodules. Stage:  1  Performance Status:  2  Pain Score (0-10):  2  Pain Medication related Constipation:  Addressed  Interval History:  9/20/2022: Patient seen for her D100 evaluation. Reports doing well, good p.o. intake and mobility. Routine blood work unremarkable, adequate counts. Dyslipidemia which she will discuss with PCP. SPEP/LC pending from today and will be followed. 2D echo unremarkable, repeat PFTs normal range. Chest x-ray unremarkable. Skeletal survey and PET scan without evidence of disease. Bone marrow biopsy with 20 to 30% cellularity with no morphologic evidence of residual disease. ClonoSeq MRD with 6 clonal cells/million noted. Discussed results. Okay to stop Bactrim. Continue with acyclovir. She will reestablish care with Dr. Sonya Kaur, and restart maintenance, likely daratumumab-Revlimid (daratumumab monthly, Revlimid 10 mg 3 out of 4 weeks). She was advised to follow-up with endocrinology as needed. Given past iron deficiency on bone marrow, she was also advised to follow-up with GI for endoscopies, and get pelvic exam for completion. We will plan to see patient back in 3 months time for her day 200 evaluation. REVIEW OF SYSTEMS:  As mentioned above, all other systems were reviewed in full and are negative.     Past Medical History:   Diagnosis Date    Obesity     Prolactin increased        Past Surgical History:   Procedure Laterality Date    IR BIOPSY PERC SUPERF BONE      IR TUNNELED CATHETER PLACEMENT GREATER THAN 5 YEARS  5/9/2022 IR TUNNELED CATHETER PLACEMENT GREATER THAN 5 YEARS  5/9/2022    IR TUNNELED CATHETER PLACEMENT GREATER THAN 5 YEARS 5/9/2022 [facility] RADIOLOGY SPECIALS       Current Outpatient Medications   Medication Sig Dispense Refill    sulfamethoxazole-trimethoprim (BACTRIM DS;SEPTRA DS) 800-160 MG per tablet Take 1 tablet by mouth three times a week 12 tablet 2    [facility] prescription to [facility] with completed form [id number].      acyclovir (ZOVIRAX) 400 MG tablet Take 400 mg by mouth 2 times daily      DULoxetine (CYMBALTA) 30 MG extended release capsule Take 60 mg by mouth daily      gabapentin (NEURONTIN) 300 MG capsule Take 300 mg by mouth. HYDROcodone-acetaminophen (NORCO) 7.5-325 MG per tablet Take 1 tablet by mouth every 6 hours as needed. prochlorperazine (COMPAZINE) 10 MG tablet Take 1 tablet by mouth every 6 hours as needed (nausea) (Patient not taking: No sig reported) 120 tablet 3    diphenoxylate-atropine (LOMOTIL) 2.5-0.025 MG per tablet Take 2 tablets by mouth 4 times daily as needed for Diarrhea. (Patient not taking: No sig reported)       No current facility-administered medications for this visit. Social History     Socioeconomic History    Marital status:      Spouse name: None    Number of children: None    Years of education: None    Highest education level: None   Tobacco Use    Smoking status: Never    Smokeless tobacco: Never   Substance and Sexual Activity    Alcohol use: Never    Drug use: Never       No family history on file. Allergies   Allergen Reactions    Amoxicillin-Pot Clavulanate Other (See Comments)     Yeast infection  Yeast infection  Yeast infection  Yeast infection         PHYSICAL EXAMINATION:  General Appearance: Healthy appearing patient in no acute distress  Vitals reviewed.    BP (!) 146/94 (Site: Right Upper Arm, Position: Standing, Cuff Size: Large Adult)   Pulse 86   Temp 98.5 °F (36.9 °C) (Oral)   Resp 16   Ht 5' 2.5\" (1.588 m)   Wt 233 lb 9.6 oz (106 kg)   SpO2 100%   BMI 42.05 kg/m²   HEENT: No oral or pharyngeal masses, ulceration or thrush noted, no sinus tenderness. Neck is supple with no thyromegaly or JVD noted. Lymph Nodes: No lymphadenopathy noted in the occipital, pre and post auricular, cervical, supra and infraclavicular, axillary, epitrochlear, inguinal, and popliteal region. Breasts: No palpable masses, nipple discharge or skin retraction  Lungs/Thorax: Clear to auscultation, no accessory muscles of respiration being used. Heart: Regular rate and rhythm, normal S1, S2, no appreciable murmurs, rubs, gallops  Abdomen: Soft, nontender, bowel sounds present, no appreciable hepatosplenomegaly, no palpable masses  Extremeties: Good pulses bilaterally, no peripheral edema. Skin: Normal skin tone with no rash, petechiae, ecchymosis noted. Musculoskeletal: No pain on palpation over bony prominence, no edema, no evidence of gout, no joint or bony deformity  Neurologic: Grossly intact        LABS/IMAGING:    Lab Results   Component Value Date/Time    WBC 7.2 09/20/2022 02:50 PM    HGB 11.9 09/20/2022 02:50 PM    HCT 37.1 09/20/2022 02:50 PM     09/20/2022 02:50 PM    MCV 92.5 09/20/2022 02:50 PM       Lab Results   Component Value Date/Time     09/20/2022 02:50 PM    K 3.8 09/20/2022 02:50 PM     09/20/2022 02:50 PM    CO2 31 09/20/2022 02:50 PM    BUN 9 09/20/2022 02:50 PM    GFRAA >60 09/20/2022 02:50 PM    GLOB 3.2 09/20/2022 02:50 PM    ALT PENDING 09/20/2022 02:50 PM         Above results reviewed with patient. ASSESSMENT:  54 female, history of prolactinoma, now kindly referred to us by Dr. Hollie Caballero for evaluation of her multiple myeloma and consideration of autologous stem cell transplant. Her hematologic history is as follows:    - Summer 2021: Started noticing back discomfort.  - 10/21: Progressive debility leading to be being dependent on wheelchair.  Led to imaging including T and L-spine showing a cortically destructive lytic lesion with significant soft tissue component involving T11 with resulting extradural soft tissue mass compression of underlying spinal cord. Smaller lytic lesions in the right and left iliac wing also noted. Subsequently hospitalized and underwent T10/T11 laminectomies with excision of spinal cord tumor by neurosurgery. A bone marrow biopsy from iliac crest also performed. 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 Bone marrow biopsy from right hip showing scant bone marrow specimen, with 15% cellularity and 70% involvement by lambda light chain restricted plasma cell neoplasm. FISH not available. PET scan 11/9/2021 with markedly hypermetabolic 2.8 cm thyroid nodule, T11 site surgical changes, however no evidence of FDG avid multiple myeloma noted. Labs showing normal counts, creatinine normal, beta-2 microglobulin at 1.72, SPEP with M spike 0.7, however lambda light chain significantly elevated at 44,763. She was felt to have ISS stage I disease, with plans to start RVD in the near future. She has also been referred to radiation oncology for consolidative XRT to spine. Dental evaluation prior to bone directed therapy. Her thyroid nodule is also being evaluated with ultrasound. Today seen in office, reports doing reasonably. Planning to start RVD tomorrow. Currently in wheelchair however has only recently started working with physical therapy. Reports minimal headaches. Denies incontinence. Reasonable PO intake. Reports back pain improved since surgery. Recently saw radiation oncology and not felt to need consolidative xrt. She is scheduled to see ENT regarding thyroid nodules. We discussed role of autologous stem cell transplant in first remission after induction therapy. Various aspects pertaining to process, risks and side effects discussed.   Patient appears Mozobil. Conditioning will be with melphalan 200 mg per metered square. She will need a tunneled catheter during procedure. We will see her back in 2 to 3 weeks with above, navigation following. 5/5/2022: Reports feeling well. Extensive ROS unremarkable. Restaging studies showing lambda LC only borderline high, SPEP with low level M spike, 24-hour UPEP negative, bone marrow biopsy with 20 to 30% cellularity without any evidence of residual disease, low iron stores noted: Serum ferritin also low at 20: We will plan for IV iron x2. She will benefit from GI evaluation as well as pelvic exam post transplant. Skeletal survey negative, PET scan without evidence of active disease, of note right sided thyroid nodule with increased uptake noted. Patient has seen [facility] Dr. 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 She was advised to continue following with them posttransplant. Pre-mobilization studies unremarkable including chest x-ray, EKG, 2D echo, PFTs as well as infectious panel. She does note some chronic bilateral LE swelling: We will get Dopplers to rule out DVT. We will also plan for Evusheld post collection. We will now see her back prior to ASCT. 5/31/2022: Patient collected CD34 at 6.24 mil/kg w granix alone. Hospitalized for melphalan 200 mg per metered squared followed by stem cell reinfusion following day (CD34 3.12 mil/kg back). Tolerated melphalan and stem cell re-infusion well. D+3 6/5/22. Hydration and IV electrolyte replacement as needed. Some fatigue and loose stools, monitor. Anti-diarrheals helping. Antiemetic as well as antidiarrheal support as needed. Blood transfusion support transfusion as needed. On  prophylactic antibiotics with Levaquin, Augmentin, acyclovir and Diflucan.  G-CSF support with Granix daily starting day +6.   She will be hospitalized through engraftment. 6/27/2022: Patient with multiple myeloma, ongoing treatments including high-dose systemic therapy followed by autologous stem cell transplant, seen in [facility] center for toxicity management. Patient discharged on 6/14/2022 after she engrafted. She required potassium replacement and Lasix as needed for LE swelling. Today is day +25 of her autologous stem cell transplant. Reports off-and-on loose stools for which Lomotil/Imodium as needed work. Antinausea medications as needed. Neuropathy stable, on gabapentin and Cymbalta. Regular with her acyclovir prophylaxis. Vitals stable. Labs today with adequate counts and ANC 1.3, mild anemia, unremarkable chemistries. Gets 1 L IV NS along with KCl 20 meq's. We will see her back in 3 days time. 7/18/2022: Patient seen for her multiple myeloma, and recent high-dose therapy followed by autologous stem cell transplant. Today is day +46. Status post tunneled catheter removal.  Site healing well, understands to let us know should she develop erythema/tenderness, or fevers. Good p.o. intake, denies any mucositis or loose stools. Blood work today with adequate counts, chemistries unremarkable. Continue prophylaxis with acyclovir and Diflucan. Neuropathy significant: We will increase gabapentin to 900 mg 3 times daily. We will look into Evusheld every 6 months. We will see her back in 2 weeks. 8/1/2022: Patient seen for her multiple myeloma, and recent high-dose therapy followed by autologous stem cell transplant. Today is day +60. Reports feeling \"great\". Denies mucositis, or GI complaints. Good p.o. intake and mobility. Neuropathy about the same. Increasing gabapentin to 900 mg 3 times daily without significant benefit and increased sleepiness, as such she is back down to 600 mg 3 times a day along with her Cymbalta. Blood work today unremarkable. She remains on acyclovir and Bactrim prophylaxis.   We will restage disease around D100.    8/17/2022: Patient seen for her multiple myeloma, recent high-dose therapy with stem cell transplant, today being day +76. Continues to do well, denies any new complaints. Detailed ROS unremarkable. Some fatigue. Regular with her prophylactic medications including acyclovir and Bactrim. Also on gabapentin and Cymbalta for neuropathy. Blood work today with adequate counts, chemistries unremarkable. We will restage her with PET scan and bone marrow biopsy around her D100.    9/20/2022: Patient seen for her D100 evaluation. Reports doing well, good p.o. intake and mobility. Routine blood work unremarkable, adequate counts. Dyslipidemia which she will discuss with PCP. SPEP/LC pending from today and will be followed. 2D echo unremarkable, repeat PFTs normal range. Chest x-ray unremarkable. Skeletal survey and PET scan without evidence of disease. Bone marrow biopsy with 20 to 30% cellularity with no morphologic evidence of residual disease. ClonoSeq MRD with 6 clonal cells/million noted. Discussed results. Okay to stop Bactrim. Continue with acyclovir. She will reestablish care with Dr. Romi Larios, and restart maintenance, likely daratumumab-Revlimid (daratumumab monthly, Revlimid 10 mg 3 out of 4 weeks). She was advised to follow-up with endocrinology as needed. Given past iron deficiency on bone marrow, she was also advised to follow-up with GI for endoscopies, and get pelvic exam for completion. We will plan to see patient back in 3 months time for her day 200 evaluation. 1. Multiple Myeloma, ISS stage 1, high risk disease (gain of 1 q.)  2. Neuropathy. 3. Iron Deficiency  4. 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 PLAN:  - As above. Jam284/ASCT D100 today. Ok to restart maintenance with Daratumumab-Revlimid (daratumumab monthly, Revlimid 10 mg 3 out of 4 weeks).    - S/p Olivia-RVD [id number] x 4 cycles and Olivia-RD [id number] x 2.   - Neuropathy: On Gabapentin 600 mg 3 times daily along with Cymbalta 30 mg daily.     - Prophylactic medication: acyclovir    - Evusheld [id number]  - Iron deficiency: IV iron x 2. She will benefit from GI evaluation as well as pelvic exam post transplant.  - F/u w ENT as needed. RTC around D200 with labs, SPEP, LC. Repeat BM biopsy at 1 year eval.     I truly appreciate the kind referral from Dr. Sen Valero.  Please call w/ any questions    Vanessa Ro MD  [address] [place] [address],[address]  Hematology Oncology  [address] [place] [address]  Office : [phone]  Fax : [phone]

## 2022-09-20 NOTE — PATIENT INSTRUCTIONS
Patient Instructions from Today's Visit    Reason for Visit:  Follow up visit    Plan:  -Everything we have back so far looks great! We have a few labs still pending today but we will follow up on them as they come in.    -Your heart ultrasound (echo) showed everything looks great. Your lung function tests was great as well.      -Your x ray and PET scan and bone marrow show you are in a deep remission.      -We want you to follow up with Dr. Dallis Harada and restart the kaleigh monthly and then you will do a lower dose of revlimid at 10mg for 3 out of 4 weeks.     -We do want you to get your thyroid checked again since your transplant is over. -You should also have a colonoscopy done and let them know you are iron deficient. They will need to do an EGD as well (scope down your throat)    Follow Up:   Follow up with Dr. Ernie Tinoco Visit on 09/20/2022   Component Date Value Ref Range Status    WBC 09/20/2022 7.2  4.3 - 11.1 K/uL Final    RBC 09/20/2022 4.01 (A) 4.05 - 5.2 M/uL Final    Hemoglobin 09/20/2022 11.9  11.7 - 15.4 g/dL Final    Hematocrit 09/20/2022 37.1  35.8 - 46.3 % Final    MCV 09/20/2022 92.5  79.6 - 97.8 FL Final    MCH 09/20/2022 29.7  26.1 - 32.9 PG Final    MCHC 09/20/2022 32.1  31.4 - 35.0 g/dL Final    RDW 09/20/2022 13.1  11.9 - 14.6 % Final    Platelets 05/19/6145 277  150 - 450 K/uL Final    MPV 09/20/2022 9.6  9.4 - 12.3 FL Final    nRBC 09/20/2022 0.00  0.0 - 0.2 K/uL Final    **Note: Absolute NRBC parameter is now reported with Hemogram**    Seg Neutrophils 09/20/2022 45  43 - 78 % Final    Lymphocytes 09/20/2022 44  13 - 44 % Final    Monocytes 09/20/2022 9  4.0 - 12.0 % Final    Eosinophils % 09/20/2022 2  0.5 - 7.8 % Final    Basophils 09/20/2022 0  0.0 - 2.0 % Final    Immature Granulocytes 09/20/2022 0  0.0 - 5.0 % Final    Segs Absolute 09/20/2022 3.2  1.7 - 8.2 K/UL Final    Absolute Lymph # 09/20/2022 3.2  0.5 - 4.6 K/UL Final    Absolute Mono # 09/20/2022 0.7  0.1 - 1.3 K/UL Final    Absolute Eos # 09/20/2022 0.2  0.0 - 0.8 K/UL Final    Basophils Absolute 09/20/2022 0.0  0.0 - 0.2 K/UL Final    Absolute Immature Granulocyte 09/20/2022 0.0  0.0 - 0.5 K/UL Final    Differential Type 09/20/2022 AUTOMATED    Final    Sodium 09/20/2022 139  136 - 145 mmol/L Final    Potassium 09/20/2022 3.8  3.5 - 5.1 mmol/L Final    Chloride 09/20/2022 104  101 - 110 mmol/L Final    CO2 09/20/2022 31  21 - 32 mmol/L Final    Anion Gap 09/20/2022 4  4 - 13 mmol/L Final    Glucose 09/20/2022 84  65 - 100 mg/dL Final    BUN 09/20/2022 9  6 - 23 MG/DL Final    Creatinine 09/20/2022 0.80  0.6 - 1.0 MG/DL Final    GFR  09/20/2022 >60  >60 ml/min/1.73m2 Final    GFR Non- 09/20/2022 >60  >60 ml/min/1.73m2 Final    Comment:      Estimated GFR is calculated using the Modification of Diet in Renal Disease (MDRD) Study equation, reported for both  Americans (GFRAA) and non- Americans (GFRNA), and normalized to 1.73m2 body surface area. The physician must decide which value applies to the patient. The MDRD study equation should only be used in individuals age 25 or older. It has not been validated for the following: pregnant women, patients with serious comorbid conditions,or on certain medications, or persons with extremes of body size, muscle mass, or nutritional status.       Calcium 09/20/2022 9.5  8.3 - 10.4 MG/DL Final    Total Bilirubin 09/20/2022 PENDING  MG/DL Incomplete    ALT 09/20/2022 PENDING  U/L Incomplete    AST 09/20/2022 18  15 - 37 U/L Final    Alk Phosphatase 09/20/2022 PENDING  U/L Incomplete    Total Protein 09/20/2022 7.1  6.3 - 8.2 g/dL Final    Albumin 09/20/2022 3.9  3.5 - 5.0 g/dL Final    Globulin 09/20/2022 3.2  2.3 - 3.5 g/dL Final    Albumin/Globulin Ratio 09/20/2022 1.2  1.2 - 3.5   Final    GGT 09/20/2022 41  5 - 55 U/L Final    Magnesium 09/20/2022 2.1  1.8 - 2.4 mg/dL Final    Cholesterol, Total 09/20/2022 222 (A) <200 MG/DL Final    Comment: Borderline High: 200-239 mg/dL  High: Greater than or equal to 240 mg/dL      Triglycerides 09/20/2022 234 (A) 35 - 150 MG/DL Final    Comment: Borderline High: 150-199 mg/dL, High: 200-499 mg/dL  Very High: Greater than or equal to 500 mg/dL      HDL 09/20/2022 PENDING  MG/DL Incomplete    LDL Calculated 09/20/2022 PENDING  MG/DL Incomplete    VLDL Cholesterol Calculated 09/20/2022 46.8 (A) 6.0 - 23.0 MG/DL Final    Chol/HDL Ratio 09/20/2022 PENDING   Incomplete           Treatment Summary has been discussed and given to patient: n/a        -------------------------------------------------------------------------------------------------------------------  Please call our office at [phone] if you have any  of the following symptoms:   Fever of 100.5 or greater  Chills  Shortness of breath  Swelling or pain in one leg    After office hours an answering service is available and will contact a provider for emergencies or if you are experiencing any of the above symptoms. Patient did express an interest in My Chart. My Chart log in information explained on the after visit summary printout at the [facility] [employer] desk. Coleman Queen RN  Hematology Navigator  [address] [place] [address]  Cell:  [phone]  Office: [phone]   Fax: [phone]  Email:  [email or web address]     Navigator is available by phone Monday through Thursday 8 am to 5 pm.  If you need assistance after 5pm, on the weekend or on a Friday, please call [phone]. The answering service is available 24 hours a day, 7 days a week.

## 2022-09-21 LAB
BASOPHILS # BLD AUTO: 0 X10E3/UL (ref 0–0.2)
BASOPHILS NFR BLD AUTO: 0 %
CD3+CD4+ CELLS # BLD: 707 /UL (ref 359–1519)
CD3+CD4+ CELLS NFR BLD: 22.1 % (ref 30.8–58.5)
CD3+CD4+ CELLS/CD3+CD8+ CLL BLD: 0.34 % (ref 0.92–3.72)
CD3+CD8+ CELLS # BLD: 2086 /UL (ref 109–897)
CD3+CD8+ CELLS NFR BLD: 65.2 % (ref 12–35.5)
EOSINOPHIL # BLD AUTO: 0.2 X10E3/UL (ref 0–0.4)
EOSINOPHIL NFR BLD AUTO: 2 %
ERYTHROCYTE [DISTWIDTH] IN BLOOD BY AUTOMATED COUNT: 13.2 % (ref 11.7–15.4)
HCT VFR BLD AUTO: 37 % (ref 34–46.6)
HGB BLD-MCNC: 12.1 G/DL (ref 11.1–15.9)
IMM GRANULOCYTES # BLD: 0 X10E3/UL (ref 0–0.1)
IMM GRANULOCYTES NFR BLD: 0 %
KAPPA LC FREE SER-MCNC: 9 MG/L (ref 3.3–19.4)
KAPPA LC FREE/LAMBDA FREE SER: 0.71 {RATIO} (ref 0.26–1.65)
LAMBDA LC FREE SERPL-MCNC: 12.6 MG/L (ref 5.7–26.3)
LYMPHOCYTES # BLD AUTO: 3.2 X10E3/UL (ref 0.7–3.1)
LYMPHOCYTES NFR BLD AUTO: 44 %
MCH RBC QN AUTO: 30 PG (ref 26.6–33)
MCHC RBC AUTO-ENTMCNC: 32.7 G/DL (ref 31.5–35.7)
MCV RBC AUTO: 92 FL (ref 79–97)
MONOCYTES # BLD AUTO: 0.6 X10E3/UL (ref 0.1–0.9)
MONOCYTES NFR BLD AUTO: 9 %
NEUTROPHILS # BLD AUTO: 3.3 X10E3/UL (ref 1.4–7)
NEUTROPHILS NFR BLD AUTO: 45 %
PLATELET # BLD AUTO: 296 X10E3/UL (ref 150–450)
RBC # BLD AUTO: 4.04 X10E6/UL (ref 3.77–5.28)
WBC # BLD AUTO: 7.3 X10E3/UL (ref 3.4–10.8)

## 2022-09-22 LAB — B2 MICROGLOB SERPL-MCNC: 1.6 MG/L (ref 0.6–2.4)

## 2022-09-24 LAB
ALBUMIN SERPL ELPH-MCNC: 4.2 G/DL (ref 2.9–4.4)
ALBUMIN/GLOB SERPL: 1.6 {RATIO} (ref 0.7–1.7)
ALPHA1 GLOB SERPL ELPH-MCNC: 0.1 G/DL (ref 0–0.4)
ALPHA2 GLOB SERPL ELPH-MCNC: 0.8 G/DL (ref 0.4–1)
B-GLOBULIN SERPL ELPH-MCNC: 1 G/DL (ref 0.7–1.3)
GAMMA GLOB SERPL ELPH-MCNC: 0.7 G/DL (ref 0.4–1.8)
GLOBULIN SER-MCNC: 2.7 G/DL (ref 2.2–3.9)
IGA SERPL-MCNC: 52 MG/DL (ref 87–352)
IGG SERPL-MCNC: 790 MG/DL (ref 586–1602)
IGM SERPL-MCNC: 62 MG/DL (ref 26–217)
INTERPRETATION SERPL IEP-IMP: ABNORMAL
M PROTEIN SERPL ELPH-MCNC: ABNORMAL G/DL
PROT SERPL-MCNC: 6.9 G/DL (ref 6–8.5)
T3FREE SERPL-MCNC: 3 PG/ML (ref 2–4.4)

## 2023-01-03 DIAGNOSIS — C90.00 MULTIPLE MYELOMA NOT HAVING ACHIEVED REMISSION (HCC): Primary | ICD-10-CM

## 2023-01-04 ENCOUNTER — OFFICE VISIT (OUTPATIENT)
Dept: ONCOLOGY | Age: 53
End: 2023-01-04
Payer: COMMERCIAL

## 2023-01-04 ENCOUNTER — HOSPITAL ENCOUNTER (OUTPATIENT)
Dept: LAB | Age: 53
Discharge: HOME | End: 2023-01-07
Payer: COMMERCIAL

## 2023-01-04 VITALS
OXYGEN SATURATION: 100 % | DIASTOLIC BLOOD PRESSURE: 79 MMHG | WEIGHT: 242.4 LBS | BODY MASS INDEX: 42.95 KG/M2 | RESPIRATION RATE: 18 BRPM | HEIGHT: 63 IN | SYSTOLIC BLOOD PRESSURE: 126 MMHG | TEMPERATURE: 98.1 F | HEART RATE: 79 BPM

## 2023-01-04 DIAGNOSIS — C90.00 MULTIPLE MYELOMA NOT HAVING ACHIEVED REMISSION (HCC): ICD-10-CM

## 2023-01-04 DIAGNOSIS — Z94.81 BONE MARROW TRANSPLANT STATUS (HCC): ICD-10-CM

## 2023-01-04 DIAGNOSIS — C90.00 MULTIPLE MYELOMA NOT HAVING ACHIEVED REMISSION (HCC): Primary | ICD-10-CM

## 2023-01-04 DIAGNOSIS — D84.9 IMMUNOCOMPROMISED (HCC): ICD-10-CM

## 2023-01-04 LAB
ALBUMIN SERPL-MCNC: 3.7 G/DL (ref 3.5–5)
ALBUMIN/GLOB SERPL: 1.2 {RATIO} (ref 0.4–1.6)
ALP SERPL-CCNC: 73 U/L (ref 50–136)
ALT SERPL-CCNC: 15 U/L (ref 12–65)
ANION GAP SERPL CALC-SCNC: 4 MMOL/L (ref 2–11)
AST SERPL-CCNC: 11 U/L (ref 15–37)
BASOPHILS # BLD: 0 K/UL (ref 0–0.2)
BASOPHILS NFR BLD: 0 % (ref 0–2)
BILIRUB SERPL-MCNC: 0.2 MG/DL (ref 0.2–1.1)
BUN SERPL-MCNC: 8 MG/DL (ref 6–23)
CALCIUM SERPL-MCNC: 9.2 MG/DL (ref 8.3–10.4)
CHLORIDE SERPL-SCNC: 108 MMOL/L (ref 101–110)
CO2 SERPL-SCNC: 27 MMOL/L (ref 21–32)
CREAT SERPL-MCNC: 0.7 MG/DL (ref 0.6–1)
DIFFERENTIAL METHOD BLD: ABNORMAL
EOSINOPHIL # BLD: 0 K/UL (ref 0–0.8)
EOSINOPHIL NFR BLD: 0 % (ref 0.5–7.8)
ERYTHROCYTE [DISTWIDTH] IN BLOOD BY AUTOMATED COUNT: 14.6 % (ref 11.9–14.6)
GGT SERPL-CCNC: 41 U/L (ref 5–55)
GLOBULIN SER CALC-MCNC: 3.2 G/DL (ref 2.8–4.5)
GLUCOSE SERPL-MCNC: 119 MG/DL (ref 65–100)
HCT VFR BLD AUTO: 36.7 % (ref 35.8–46.3)
HGB BLD-MCNC: 12 G/DL (ref 11.7–15.4)
IMM GRANULOCYTES # BLD AUTO: 0.1 K/UL (ref 0–0.5)
IMM GRANULOCYTES NFR BLD AUTO: 1 % (ref 0–5)
LYMPHOCYTES # BLD: 2.6 K/UL (ref 0.5–4.6)
LYMPHOCYTES NFR BLD: 23 % (ref 13–44)
MAGNESIUM SERPL-MCNC: 2.2 MG/DL (ref 1.8–2.4)
MCH RBC QN AUTO: 29.1 PG (ref 26.1–32.9)
MCHC RBC AUTO-ENTMCNC: 32.7 G/DL (ref 31.4–35)
MCV RBC AUTO: 88.9 FL (ref 82–102)
MONOCYTES # BLD: 1.7 K/UL (ref 0.1–1.3)
MONOCYTES NFR BLD: 15 % (ref 4–12)
NEUTS SEG # BLD: 6.7 K/UL (ref 1.7–8.2)
NEUTS SEG NFR BLD: 61 % (ref 43–78)
NRBC # BLD: 0 K/UL (ref 0–0.2)
PLATELET # BLD AUTO: 329 K/UL (ref 150–450)
PMV BLD AUTO: 9.4 FL (ref 9.4–12.3)
POTASSIUM SERPL-SCNC: 4.2 MMOL/L (ref 3.5–5.1)
PROT SERPL-MCNC: 6.9 G/DL (ref 6.3–8.2)
RBC # BLD AUTO: 4.13 M/UL (ref 4.05–5.2)
SODIUM SERPL-SCNC: 139 MMOL/L (ref 133–143)
WBC # BLD AUTO: 11.1 K/UL (ref 4.3–11.1)

## 2023-01-04 PROCEDURE — 82232 ASSAY OF BETA-2 PROTEIN: CPT

## 2023-01-04 PROCEDURE — 86360 T CELL ABSOLUTE COUNT/RATIO: CPT

## 2023-01-04 PROCEDURE — 83735 ASSAY OF MAGNESIUM: CPT

## 2023-01-04 PROCEDURE — 83521 IG LIGHT CHAINS FREE EACH: CPT

## 2023-01-04 PROCEDURE — 99215 OFFICE O/P EST HI 40 MIN: CPT | Performed by: INTERNAL MEDICINE

## 2023-01-04 PROCEDURE — 36415 COLL VENOUS BLD VENIPUNCTURE: CPT

## 2023-01-04 PROCEDURE — 85025 COMPLETE CBC W/AUTO DIFF WBC: CPT

## 2023-01-04 PROCEDURE — 82977 ASSAY OF GGT: CPT

## 2023-01-04 PROCEDURE — 84165 PROTEIN E-PHORESIS SERUM: CPT

## 2023-01-04 RX ORDER — LENALIDOMIDE 10 MG/1
CAPSULE ORAL
COMMUNITY
Start: 2022-12-27

## 2023-01-04 ASSESSMENT — PATIENT HEALTH QUESTIONNAIRE - PHQ9
SUM OF ALL RESPONSES TO PHQ QUESTIONS 1-9: 0
SUM OF ALL RESPONSES TO PHQ QUESTIONS 1-9: 0
9. THOUGHTS THAT YOU WOULD BE BETTER OFF DEAD, OR OF HURTING YOURSELF: 0
SUM OF ALL RESPONSES TO PHQ QUESTIONS 1-9: 0
3. TROUBLE FALLING OR STAYING ASLEEP: 0
4. FEELING TIRED OR HAVING LITTLE ENERGY: 0
1. LITTLE INTEREST OR PLEASURE IN DOING THINGS: 0
6. FEELING BAD ABOUT YOURSELF - OR THAT YOU ARE A FAILURE OR HAVE LET YOURSELF OR YOUR FAMILY DOWN: 0
7. TROUBLE CONCENTRATING ON THINGS, SUCH AS READING THE NEWSPAPER OR WATCHING TELEVISION: 0
2. FEELING DOWN, DEPRESSED OR HOPELESS: 0
8. MOVING OR SPEAKING SO SLOWLY THAT OTHER PEOPLE COULD HAVE NOTICED. OR THE OPPOSITE, BEING SO FIGETY OR RESTLESS THAT YOU HAVE BEEN MOVING AROUND A LOT MORE THAN USUAL: 0
SUM OF ALL RESPONSES TO PHQ9 QUESTIONS 1 & 2: 0
5. POOR APPETITE OR OVEREATING: 0
SUM OF ALL RESPONSES TO PHQ QUESTIONS 1-9: 0

## 2023-01-04 ASSESSMENT — ANXIETY QUESTIONNAIRES
3. WORRYING TOO MUCH ABOUT DIFFERENT THINGS: 0
GAD7 TOTAL SCORE: 0
2. NOT BEING ABLE TO STOP OR CONTROL WORRYING: 0
IF YOU CHECKED OFF ANY PROBLEMS ON THIS QUESTIONNAIRE, HOW DIFFICULT HAVE THESE PROBLEMS MADE IT FOR YOU TO DO YOUR WORK, TAKE CARE OF THINGS AT HOME, OR GET ALONG WITH OTHER PEOPLE: NOT DIFFICULT AT ALL
4. TROUBLE RELAXING: 0
1. FEELING NERVOUS, ANXIOUS, OR ON EDGE: 0
6. BECOMING EASILY ANNOYED OR IRRITABLE: 0
7. FEELING AFRAID AS IF SOMETHING AWFUL MIGHT HAPPEN: 0
5. BEING SO RESTLESS THAT IT IS HARD TO SIT STILL: 0

## 2023-01-04 NOTE — PATIENT INSTRUCTIONS
Patient Instructions from Today's Visit    Reason for Visit:  Follow up visit - 6 months post transplant (6/2/22)      Plan:  -We want you to continue maintenance treatment like you have been. We will do your 1 year studies which includes PET scan, PFT, Echo, Bone marrow biopsy, etc.  We will do these in mid May and then see you back in June to review!    -In June you will also start your post BMT vaccines here    -You can go and repeat your Covid Vaccines - you need to do John Gonzáles or Sheri Fregoso and start them as if you have never had them before (do the first 2 close together like before). Follow Up:   Follow up with Dr. Mo Bess Results:  Hospital Outpatient Visit on 01/04/2023   Component Date Value Ref Range Status    WBC 01/04/2023 11.1  4.3 - 11.1 K/uL Final    RBC 01/04/2023 4.13  4.05 - 5.2 M/uL Final    Hemoglobin 01/04/2023 12.0  11.7 - 15.4 g/dL Final    Hematocrit 01/04/2023 36.7  35.8 - 46.3 % Final    MCV 01/04/2023 88.9  82.0 - 102.0 FL Final    MCH 01/04/2023 29.1  26.1 - 32.9 PG Final    MCHC 01/04/2023 32.7  31.4 - 35.0 g/dL Final    RDW 01/04/2023 14.6  11.9 - 14.6 % Final    Platelets 38/44/9321 329  150 - 450 K/uL Final    MPV 01/04/2023 9.4  9.4 - 12.3 FL Final    nRBC 01/04/2023 0.00  0.0 - 0.2 K/uL Final    **Note: Absolute NRBC parameter is now reported with Hemogram**    Differential Type 01/04/2023 AUTOMATED    Final    Seg Neutrophils 01/04/2023 61  43 - 78 % Final    Lymphocytes 01/04/2023 23  13 - 44 % Final    Monocytes 01/04/2023 15 (A)  4.0 - 12.0 % Final    Eosinophils % 01/04/2023 0 (A)  0.5 - 7.8 % Final    Basophils 01/04/2023 0  0.0 - 2.0 % Final    Immature Granulocytes 01/04/2023 1  0.0 - 5.0 % Final    Segs Absolute 01/04/2023 6.7  1.7 - 8.2 K/UL Final    Absolute Lymph # 01/04/2023 2.6  0.5 - 4.6 K/UL Final    Absolute Mono # 01/04/2023 1.7 (A)  0.1 - 1.3 K/UL Final    Absolute Eos # 01/04/2023 0.0  0.0 - 0.8 K/UL Final    Basophils Absolute 01/04/2023 0.0  0.0 - 0.2 K/UL Final    Absolute Immature Granulocyte 01/04/2023 0.1  0.0 - 0.5 K/UL Final    Sodium 01/04/2023 139  133 - 143 mmol/L Final    Potassium 01/04/2023 4.2  3.5 - 5.1 mmol/L Final    Chloride 01/04/2023 108  101 - 110 mmol/L Final    CO2 01/04/2023 27  21 - 32 mmol/L Final    Anion Gap 01/04/2023 4  2 - 11 mmol/L Final    Glucose 01/04/2023 119 (A)  65 - 100 mg/dL Final    BUN 01/04/2023 8  6 - 23 MG/DL Final    Creatinine 01/04/2023 0.70  0.6 - 1.0 MG/DL Final    Est, Glom Filt Rate 01/04/2023 >60  >60 ml/min/1.73m2 Final    Comment:      Pediatric calculator link: Mikaela.at. org/professionals/kdoqi/gfr_calculatorped       These results are not intended for use in patients <25years of age. eGFR results are calculated without a race factor using  the 2021 CKD-EPI equation. Careful clinical correlation is recommended, particularly when comparing to results calculated using previous equations. The CKD-EPI equation is less accurate in patients with extremes of muscle mass, extra-renal metabolism of creatinine, excessive creatine ingestion, or following therapy that affects renal tubular secretion.       Calcium 01/04/2023 9.2  8.3 - 10.4 MG/DL Final    Total Bilirubin 01/04/2023 0.2  0.2 - 1.1 MG/DL Final    ALT 01/04/2023 15  12 - 65 U/L Final    AST 01/04/2023 11 (A)  15 - 37 U/L Final    Alk Phosphatase 01/04/2023 73  50 - 136 U/L Final    Total Protein 01/04/2023 6.9  6.3 - 8.2 g/dL Final    Albumin 01/04/2023 3.7  3.5 - 5.0 g/dL Final    Globulin 01/04/2023 3.2  2.8 - 4.5 g/dL Final    Albumin/Globulin Ratio 01/04/2023 1.2  0.4 - 1.6   Final    Magnesium 01/04/2023 2.2  1.8 - 2.4 mg/dL Final    GGT 01/04/2023 41  5 - 55 U/L Final           Treatment Summary has been discussed and given to patient: n/a        -------------------------------------------------------------------------------------------------------------------  Please call our office at [phone] if you have any  of the following symptoms:   Fever of 100.5 or greater  Chills  Shortness of breath  Swelling or pain in one leg    After office hours an answering service is available and will contact a provider for emergencies or if you are experiencing any of the above symptoms. Patient did express an interest in My Chart. My Chart log in information explained on the after visit summary printout at the [facility] desk. Coleman Queen RN  Hematology Navigator  [address] [place] [address]  Cell:  [phone]  Office: [phone]   Fax: [phone]  Email:  [email or web address]     Navigator is available by phone Monday through Thursday 8 am to 5 pm.  If you need assistance after 5pm, on the weekend or on a Friday, please call [phone]. The answering service is available 24 hours a day, 7 days a week.

## 2023-01-04 NOTE — PROGRESS NOTES
Data Source: [facility], [facility]. 1/4/2023    11:49 AM    [address] [id number]    50 y.o. Patient Encounter: [address] Visit    Heme Diagnosis:  MM, transplant evaluation  Heme History (Copied from prior):   54 female, history of prolactinoma, now kindly referred to us by Dr. Lata Muniz for evaluation of her multiple myeloma and consideration of autologous stem cell transplant. Her hematologic history is as follows:    - Summer 2021: Started noticing back discomfort.  - 10/21: Progressive debility leading to be being dependent on wheelchair. Led to imaging including T and L-spine showing a cortically destructive lytic lesion with significant soft tissue component involving T11 with resulting extradural soft tissue mass compression of underlying spinal cord. Smaller lytic lesions in the right and left iliac wing also noted. Subsequently hospitalized and underwent T10/T11 laminectomies with excision of spinal cord tumor by neurosurgery. A bone marrow biopsy from iliac crest also performed. 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 Bone marrow biopsy from right hip showing scant bone marrow specimen, with 15% cellularity and 70% involvement by lambda light chain restricted plasma cell neoplasm. FISH not available. PET scan 11/9/2021 with markedly hypermetabolic 2.8 cm thyroid nodule, T11 site surgical changes, however no evidence of FDG avid multiple myeloma noted. Labs showing normal counts, creatinine normal, beta-2 microglobulin at 1.72, SPEP with M spike 0.7, however lambda light chain significantly elevated at 44,763. She was felt to have ISS stage I disease, with plans to start RVD in the near future. She has also been referred to radiation oncology for consolidative XRT to spine. Dental evaluation prior to bone directed therapy.   Her thyroid nodule is also being evaluated with ultrasound. Today seen in office, reports doing reasonably. Planning to start RVD tomorrow. Currently in wheelchair however has only recently started working with physical therapy. Reports minimal headaches. Denies incontinence. Reasonable PO intake. Reports back pain improved since surgery. Recently saw radiation oncology and not felt to need consolidative xrt. She is scheduled to see ENT regarding thyroid nodules. Stage:  1  Performance Status:  2  Pain Score (0-10):  2  Pain Medication related Constipation:  Addressed  Interval History:  1/4/23: Patient seen for her day 200 evaluation of autologous stem cell transplant. Regularly follows with Dr. Daron Rubio, and has been successfully started on maintenance therapy with daratumumab-Revlimid. Tolerating reasonably. Denies significant GI symptoms. Some minimal rash around lips, being monitored. Denies any new aches or pains, lumps or bumps. Routine blood work unremarkable, SPEP/LC pending and will be followed. If responding, then she will continue with current maintenance. She will also continue with acyclovir prophylaxis. Advised to proceed with COVID-19 vaccinations. We will see her back in 6 months with 1 year post transplant evaluation, including restaging studies involving PET scan and bone marrow biopsy (with ClonoSeq MRD testing). REVIEW OF SYSTEMS:  As mentioned above, all other systems were reviewed in full and are negative.     Past Medical History:   Diagnosis Date    Obesity     Prolactin increased        Past Surgical History:   Procedure Laterality Date    IR BIOPSY PERC SUPERF BONE      IR TUNNELED CATHETER PLACEMENT GREATER THAN 5 YEARS  5/9/2022    IR TUNNELED CATHETER PLACEMENT GREATER THAN 5 YEARS  5/9/2022    IR TUNNELED CATHETER PLACEMENT GREATER THAN 5 YEARS 5/9/2022 [facility] SPE[facility]       Current Outpatient Medications   Medication Sig Dispense Refill    acyclovir (ZOVIRAX) 400 MG tablet Take 400 mg by mouth 2 times daily      DULoxetine (CYMBALTA) 30 MG extended release capsule Take 60 mg by mouth daily      gabapentin (NEURONTIN) 300 MG capsule Take 300 mg by mouth. HYDROcodone-acetaminophen (NORCO) 7.5-325 MG per tablet Take 1 tablet by mouth every 6 hours as needed. REVLIMID 10 MG chemo capsule       sulfamethoxazole-trimethoprim (BACTRIM DS;SEPTRA DS) 800-160 MG per tablet Take 1 tablet by mouth three times a week (Patient not taking: Reported on 1/4/2023) 12 tablet 2    Handicap Placard [facility] to [facility] with completed form [id number]. prochlorperazine (COMPAZINE) 10 MG tablet Take 1 tablet by mouth every 6 hours as needed (nausea) (Patient not taking: No sig reported) 120 tablet 3    diphenoxylate-atropine (LOMOTIL) 2.5-0.025 MG per tablet Take 2 tablets by mouth 4 times daily as needed for Diarrhea. (Patient not taking: No sig reported)       No current facility-administered medications for this visit. Social History     Socioeconomic History    Marital status:      Spouse name: None    Number of children: None    Years of education: None    Highest education level: None   Tobacco Use    Smoking status: Never    Smokeless tobacco: Never   Substance and Sexual Activity    Alcohol use: Never    Drug use: Never       No family history on file. Allergies   Allergen Reactions    Amoxicillin-Pot Clavulanate Other (See Comments)     Yeast infection  Yeast infection  Yeast infection  Yeast infection         PHYSICAL EXAMINATION:  General Appearance: Healthy appearing patient in no acute distress  Vitals reviewed. /79   Pulse 79   Temp 98.1 °F (36.7 °C)   Resp 18   Ht 5' 2.5\" (1.588 m)   Wt 242 lb 6.4 oz (110 kg)   SpO2 100%   BMI 43.63 kg/m²   HEENT: No oral or pharyngeal masses, ulceration or thrush noted, no sinus tenderness. Neck is supple with no thyromegaly or JVD noted.   Lymph Nodes: No lymphadenopathy noted in the occipital, pre and post auricular, cervical, supra and infraclavicular, axillary, epitrochlear, inguinal, and popliteal region. Breasts: No palpable masses, nipple discharge or skin retraction  Lungs/Thorax: Clear to auscultation, no accessory muscles of respiration being used. Heart: Regular rate and rhythm, normal S1, S2, no appreciable murmurs, rubs, gallops  Abdomen: Soft, nontender, bowel sounds present, no appreciable hepatosplenomegaly, no palpable masses  Extremeties: Good pulses bilaterally, no peripheral edema. Skin: Normal skin tone with no rash, petechiae, ecchymosis noted. Musculoskeletal: No pain on palpation over bony prominence, no edema, no evidence of gout, no joint or bony deformity  Neurologic: Grossly intact        LABS/IMAGING:    Lab Results   Component Value Date/Time    WBC 11.1 01/04/2023 10:56 AM    HGB 12.0 01/04/2023 10:56 AM    HCT 36.7 01/04/2023 10:56 AM     01/04/2023 10:56 AM    MCV 88.9 01/04/2023 10:56 AM       Lab Results   Component Value Date/Time     01/04/2023 10:56 AM    K 4.2 01/04/2023 10:56 AM     01/04/2023 10:56 AM    CO2 27 01/04/2023 10:56 AM    BUN 8 01/04/2023 10:56 AM    GFRAA >60 09/20/2022 02:50 PM    GLOB 3.2 01/04/2023 10:56 AM    ALT 15 01/04/2023 10:56 AM         Above results reviewed with patient. ASSESSMENT:  54 female, history of prolactinoma, now kindly referred to us by Dr. Lata Muniz for evaluation of her multiple myeloma and consideration of autologous stem cell transplant. Her hematologic history is as follows:    - Summer 2021: Started noticing back discomfort.  - 10/21: Progressive debility leading to be being dependent on wheelchair. Led to imaging including T and L-spine showing a cortically destructive lytic lesion with significant soft tissue component involving T11 with resulting extradural soft tissue mass compression of underlying spinal cord.   Smaller lytic lesions in the right and left iliac wing also noted. Subsequently hospitalized and underwent T10/T11 laminectomies with excision of spinal cord tumor by neurosurgery. A bone marrow biopsy from iliac crest also performed. 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 Bone marrow biopsy from right hip showing scant bone marrow specimen, with 15% cellularity and 70% involvement by lambda light chain restricted plasma cell neoplasm. FISH not available. PET scan 11/9/2021 with markedly hypermetabolic 2.8 cm thyroid nodule, T11 site surgical changes, however no evidence of FDG avid multiple myeloma noted. Labs showing normal counts, creatinine normal, beta-2 microglobulin at 1.72, SPEP with M spike 0.7, however lambda light chain significantly elevated at 44,763. She was felt to have ISS stage I disease, with plans to start RVD in the near future. She has also been referred to radiation oncology for consolidative XRT to spine. Dental evaluation prior to bone directed therapy. Her thyroid nodule is also being evaluated with ultrasound. Today seen in office, reports doing reasonably. Planning to start RVD tomorrow. Currently in wheelchair however has only recently started working with physical therapy. Reports minimal headaches. Denies incontinence. Reasonable PO intake. Reports back pain improved since surgery. Recently saw radiation oncology and not felt to need consolidative xrt. She is scheduled to see ENT regarding thyroid nodules. We discussed role of autologous stem cell transplant in first remission after induction therapy. Various aspects pertaining to process, risks and side effects discussed. Patient appears interested in proceeding with procedure moving forward. 2/25/2022: Seen in follow-up.   Her bone marrow biopsy with cytogenetics showed normal karyotype, FISH panel had shown gain of 1 q., monosomy 11 and 13, and t(11;14), negative for 17p deletion or other IGH rearrangement. She has since been started on daratumumab-RVD. Appears to have responding disease as her lambda LC has dropped from 60,020 down to 39. M spike is now turned negative. She recently completed cycle 4-day 15 on 2/21/2022 (3-week cycle). Due to ongoing neuropathy, patient today using walker. Describes neuropathy as tingling/discomfort coming up to her calf, her Velcade dose more recently has been decreased to 0.7 mg/m2. We will discuss case with Dr. Sen Valero. Given excellent response, will be reasonable to remove Velcade altogether and proceed with Daratumumab-RD q. 28 days x 2 cycles followed by plans for mobilization and collection. This will hopefully also give her time for neuropathy and overall performance status improved. We will see her back in 2 months time. 4/18/2022: Patient seen in follow-up. She recently completed cycle 6-day 15 therapy on 4/11/2022, with last Revlimid dose on 4/14/2022. Neuropathy has improved with supportive care including gabapentin 600 mg 3 times daily along with Cymbalta 30 mg daily. Mobility much improved, now freely able to move in the room. Back discomfort described as stable to improved. Denies any recent fevers or chills, good p.o. intake. Routine blood work unremarkable, follow-up SPEP/LC. We will proceed with premobilization studies, as well as restaging including PET scan and bone marrow biopsy. Given responding disease, will plan to proceed with mobilization and collection, currently scheduled for pre-mob evaluation 5/5/2022 and high-dose therapy/ASCT 6/1/22. Given lack of social support, transplant will be inpatient. CD34+ goal will be 6 mil/kg. She will be mobilized with G-CSF +/- Mozobil. Conditioning will be with melphalan 200 mg per metered square. She will need a tunneled catheter during procedure. We will see her back in 2 to 3 weeks with above, navigation following. 5/5/2022: Reports feeling well. Extensive ROS unremarkable. Restaging studies showing lambda LC only borderline high, SPEP with low level M spike, 24-hour UPEP negative, bone marrow biopsy with 20 to 30% cellularity without any evidence of residual disease, low iron stores noted: Serum ferritin also low at 20: We will plan for IV iron x2. She will benefit from GI evaluation as well as pelvic exam post transplant. Skeletal survey negative, PET scan without evidence of active disease, of note right sided thyroid nodule with increased uptake noted. Patient has seen [facility] ENT Dr. 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 She was advised to continue following with them posttransplant. Pre-mobilization studies unremarkable including chest x-ray, EKG, 2D echo, PFTs as well as infectious panel. She does note some chronic bilateral LE swelling: We will get Dopplers to rule out DVT. We will also plan for Evusheld post collection. We will now see her back prior to ASCT. 5/31/2022: Patient collected CD34 at 6.24 mil/kg w granix alone. Hospitalized for melphalan 200 mg per metered squared followed by stem cell reinfusion following day (CD34 3.12 mil/kg back). Tolerated melphalan and stem cell re-infusion well. D+3 6/5/22. Hydration and IV electrolyte replacement as needed. Some fatigue and loose stools, monitor. Anti-diarrheals helping. Antiemetic as well as antidiarrheal support as needed. Blood transfusion support transfusion as needed. On  prophylactic antibiotics with Levaquin, Augmentin, acyclovir and Diflucan.  G-CSF support with Granix daily starting day +6. She will be hospitalized through engraftment. 6/27/2022: Patient with multiple myeloma, ongoing treatments including high-dose systemic therapy followed by autologous stem cell transplant, seen in infusion center for toxicity management. Patient discharged on 6/14/2022 after she engrafted.   She required potassium replacement and Lasix as needed for LE swelling. Today is day +25 of her autologous stem cell transplant. Reports off-and-on loose stools for which Lomotil/Imodium as needed work. Antinausea medications as needed. Neuropathy stable, on gabapentin and Cymbalta. Regular with her acyclovir prophylaxis. Vitals stable. Labs today with adequate counts and ANC 1.3, mild anemia, unremarkable chemistries. Gets 1 L IV NS along with KCl 20 meq's. We will see her back in 3 days time. 7/18/2022: Patient seen for her multiple myeloma, and recent high-dose therapy followed by autologous stem cell transplant. Today is day +46. Status post tunneled catheter removal.  Site healing well, understands to let us know should she develop erythema/tenderness, or fevers. Good p.o. intake, denies any mucositis or loose stools. Blood work today with adequate counts, chemistries unremarkable. Continue prophylaxis with acyclovir and Diflucan. Neuropathy significant: We will increase gabapentin to 900 mg 3 times daily. We will look into Evusheld every 6 months. We will see her back in 2 weeks. 8/1/2022: Patient seen for her multiple myeloma, and recent high-dose therapy followed by autologous stem cell transplant. Today is day +60. Reports feeling \"great\". Denies mucositis, or GI complaints. Good p.o. intake and mobility. Neuropathy about the same. Increasing gabapentin to 900 mg 3 times daily without significant benefit and increased sleepiness, as such she is back down to 600 mg 3 times a day along with her Cymbalta. Blood work today unremarkable. She remains on acyclovir and Bactrim prophylaxis. We will restage disease around D100.    8/17/2022: Patient seen for her multiple myeloma, recent high-dose therapy with stem cell transplant, today being day +76. Continues to do well, denies any new complaints. Detailed ROS unremarkable. Some fatigue.   Regular with her prophylactic medications including acyclovir and Bactrim. Also on gabapentin and Cymbalta for neuropathy. Blood work today with adequate counts, chemistries unremarkable. We will restage her with PET scan and bone marrow biopsy around her D100.    9/20/2022: Patient seen for her D100 evaluation. Reports doing well, good p.o. intake and mobility. Routine blood work unremarkable, adequate counts. Dyslipidemia which she will discuss with PCP. SPEP/LC pending from today and will be followed. 2D echo unremarkable, repeat PFTs normal range. Chest x-ray unremarkable. Skeletal survey and PET scan without evidence of disease. Bone marrow biopsy with 20 to 30% cellularity with no morphologic evidence of residual disease. ClonoSeq MRD with 6 clonal cells/million noted. Discussed results. Okay to stop Bactrim. Continue with acyclovir. She will reestablish care with Dr. Rodgers Aschoff, and restart maintenance, likely daratumumab-Revlimid (daratumumab monthly, Revlimid 10 mg 3 out of 4 weeks). She was advised to follow-up with endocrinology as needed. Given past iron deficiency on bone marrow, she was also advised to follow-up with GI for endoscopies, and get pelvic exam for completion. We will plan to see patient back in 3 months time for her day 200 evaluation. 1/4/23: Patient seen for her day 200 evaluation of autologous stem cell transplant. Regularly follows with Dr. Rodgers Aschoff, and has been successfully started on maintenance therapy with daratumumab-Revlimid. Tolerating reasonably. Denies significant GI symptoms. Some minimal rash around lips, being monitored. Denies any new aches or pains, lumps or bumps. Routine blood work unremarkable, SPEP/LC pending and will be followed. If responding, then she will continue with current maintenance. She will also continue with acyclovir prophylaxis. Advised to proceed with COVID-19 vaccinations.   We will see her back in 6 months with 1 year post transplant evaluation, including restaging studies involving PET scan and bone marrow biopsy (with ClonoSeq MRD testing). Sooner if needed. 1. Multiple Myeloma, ISS stage 1, high risk disease (gain of 1 q.)  2. Neuropathy. 3. Iron Deficiency  4. 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 PLAN:  - As above. [id number]/ASCT D200 today. Continue maintenance with Daratumumab-Revlimid (daratumumab monthly, Revlimid 10 mg 3 out of 4 weeks). - S/p Olivia-RVD [id number] x 4 cycles and Loivia-RD O2corfvl x 2.   - Neuropathy: On Gabapentin 600 mg 3 times daily along with Cymbalta 30 mg daily.     - Prophylactic medication: acyclovir , daily baby aspirin  - Evusheld [id number]  - Iron deficiency: IV iron x 2. She will benefit from GI evaluation as well as pelvic exam post transplant.  - F/u w ENT as needed. RTC in 6 months for 1 year post transplant eval with labs, SPEP, LC, PFTs, repeat 2D ECHO, PET, BM biopsy. I truly appreciate the kind referral from Dr. Heidi Ayala.  Please call w/ any questions    Ludin Nagel MD  [address] [place] [address],[address]  Hematology Oncology  [address] [place] [address]  Office : [phone]  Fax : [phone]

## 2023-01-05 LAB
B2 MICROGLOB SERPL-MCNC: 1.2 MG/L (ref 0.6–2.4)
BASOPHILS # BLD AUTO: 0 X10E3/UL (ref 0–0.2)
BASOPHILS NFR BLD AUTO: 0 %
CD3+CD4+ CELLS # BLD: 487 /UL (ref 359–1519)
CD3+CD4+ CELLS NFR BLD: 20.3 % (ref 30.8–58.5)
CD3+CD4+ CELLS/CD3+CD8+ CLL BLD: 0.37 % (ref 0.92–3.72)
CD3+CD8+ CELLS # BLD: 1332 /UL (ref 109–897)
CD3+CD8+ CELLS NFR BLD: 55.5 % (ref 12–35.5)
EOSINOPHIL # BLD AUTO: 0 X10E3/UL (ref 0–0.4)
EOSINOPHIL NFR BLD AUTO: 0 %
ERYTHROCYTE [DISTWIDTH] IN BLOOD BY AUTOMATED COUNT: 14.5 % (ref 11.7–15.4)
HCT VFR BLD AUTO: 34.1 % (ref 34–46.6)
HGB BLD-MCNC: 11.9 G/DL (ref 11.1–15.9)
IMM GRANULOCYTES # BLD: 0.1 X10E3/UL (ref 0–0.1)
IMM GRANULOCYTES NFR BLD: 1 %
KAPPA LC FREE SER-MCNC: 7.2 MG/L (ref 3.3–19.4)
KAPPA LC FREE/LAMBDA FREE SER: 0.73 {RATIO} (ref 0.26–1.65)
LAMBDA LC FREE SERPL-MCNC: 9.9 MG/L (ref 5.7–26.3)
LYMPHOCYTES # BLD AUTO: 2.4 X10E3/UL (ref 0.7–3.1)
LYMPHOCYTES NFR BLD AUTO: 22 %
MCH RBC QN AUTO: 29.8 PG (ref 26.6–33)
MCHC RBC AUTO-ENTMCNC: 34.9 G/DL (ref 31.5–35.7)
MCV RBC AUTO: 86 FL (ref 79–97)
MONOCYTES # BLD AUTO: 1.6 X10E3/UL (ref 0.1–0.9)
MONOCYTES NFR BLD AUTO: 15 %
NEUTROPHILS # BLD AUTO: 6.7 X10E3/UL (ref 1.4–7)
NEUTROPHILS NFR BLD AUTO: 62 %
PLATELET # BLD AUTO: 352 X10E3/UL (ref 150–450)
RBC # BLD AUTO: 3.99 X10E6/UL (ref 3.77–5.28)
WBC # BLD AUTO: 10.7 X10E3/UL (ref 3.4–10.8)

## 2023-01-17 DIAGNOSIS — C90.00 MULTIPLE MYELOMA NOT HAVING ACHIEVED REMISSION (HCC): Primary | ICD-10-CM

## 2023-05-08 DIAGNOSIS — C90.00 MULTIPLE MYELOMA NOT HAVING ACHIEVED REMISSION (HCC): Primary | ICD-10-CM

## 2023-05-08 DIAGNOSIS — C90.00 MULTIPLE MYELOMA, REMISSION STATUS UNSPECIFIED (HCC): Primary | ICD-10-CM

## 2023-05-23 ENCOUNTER — HOSPITAL ENCOUNTER (OUTPATIENT)
Dept: GENERAL RADIOLOGY | Age: 53
Discharge: HOME | End: 2023-05-26
Payer: COMMERCIAL

## 2023-05-23 ENCOUNTER — NURSE ONLY (OUTPATIENT)
Age: 53
End: 2023-05-23
Payer: COMMERCIAL

## 2023-05-23 ENCOUNTER — HOSPITAL ENCOUNTER (OUTPATIENT)
Dept: PULMONOLOGY | Age: 53
Discharge: HOME | End: 2023-05-26
Payer: COMMERCIAL

## 2023-05-23 ENCOUNTER — HOSPITAL ENCOUNTER (OUTPATIENT)
Dept: LAB | Age: 53
Discharge: HOME | End: 2023-05-26
Payer: COMMERCIAL

## 2023-05-23 ENCOUNTER — HOSPITAL ENCOUNTER (OUTPATIENT)
Dept: PET IMAGING | Age: 53
Discharge: HOME | End: 2023-05-26
Payer: COMMERCIAL

## 2023-05-23 DIAGNOSIS — C90.00 MULTIPLE MYELOMA NOT HAVING ACHIEVED REMISSION (HCC): ICD-10-CM

## 2023-05-23 DIAGNOSIS — C90.00 MULTIPLE MYELOMA (HCC): Primary | ICD-10-CM

## 2023-05-23 DIAGNOSIS — C90.00 MULTIPLE MYELOMA, REMISSION STATUS UNSPECIFIED (HCC): ICD-10-CM

## 2023-05-23 LAB
ALBUMIN SERPL-MCNC: 3.8 G/DL (ref 3.5–5)
ALBUMIN/GLOB SERPL: 1.2 (ref 0.4–1.6)
ALP SERPL-CCNC: 63 U/L (ref 50–136)
ALT SERPL-CCNC: 20 U/L (ref 12–65)
ANION GAP SERPL CALC-SCNC: 3 MMOL/L (ref 2–11)
AST SERPL-CCNC: 17 U/L (ref 15–37)
BASOPHILS # BLD: 0 K/UL (ref 0–0.2)
BASOPHILS NFR BLD: 0 % (ref 0–2)
BILIRUB SERPL-MCNC: 0.4 MG/DL (ref 0.2–1.1)
BUN SERPL-MCNC: 15 MG/DL (ref 6–23)
CALCIUM SERPL-MCNC: 9.1 MG/DL (ref 8.3–10.4)
CHLORIDE SERPL-SCNC: 105 MMOL/L (ref 101–110)
CHOLEST SERPL-MCNC: 176 MG/DL
CO2 SERPL-SCNC: 31 MMOL/L (ref 21–32)
CREAT SERPL-MCNC: 0.9 MG/DL (ref 0.6–1)
DIFFERENTIAL METHOD BLD: ABNORMAL
EOSINOPHIL # BLD: 0 K/UL (ref 0–0.8)
EOSINOPHIL NFR BLD: 0 % (ref 0.5–7.8)
ERYTHROCYTE [DISTWIDTH] IN BLOOD BY AUTOMATED COUNT: 15.3 % (ref 11.9–14.6)
FSH SERPL-ACNC: 52.5 MIU/ML
GGT SERPL-CCNC: 63 U/L (ref 5–55)
GLOBULIN SER CALC-MCNC: 3.3 G/DL (ref 2.8–4.5)
GLUCOSE BLD STRIP.AUTO-MCNC: 103 MG/DL (ref 65–100)
GLUCOSE SERPL-MCNC: 90 MG/DL (ref 65–100)
HCT VFR BLD AUTO: 41.2 % (ref 35.8–46.3)
HDLC SERPL-MCNC: 71 MG/DL (ref 40–60)
HDLC SERPL: 2.5
HGB BLD-MCNC: 13.3 G/DL (ref 11.7–15.4)
IMM GRANULOCYTES # BLD AUTO: 0 K/UL (ref 0–0.5)
IMM GRANULOCYTES NFR BLD AUTO: 0 % (ref 0–5)
LDLC SERPL CALC-MCNC: 65.4 MG/DL
LH SERPL-ACNC: 30.9 MIU/ML
LYMPHOCYTES # BLD: 3.9 K/UL (ref 0.5–4.6)
LYMPHOCYTES NFR BLD: 46 % (ref 13–44)
MCH RBC QN AUTO: 28.1 PG (ref 26.1–32.9)
MCHC RBC AUTO-ENTMCNC: 32.3 G/DL (ref 31.4–35)
MCV RBC AUTO: 86.9 FL (ref 82–102)
MONOCYTES # BLD: 1.1 K/UL (ref 0.1–1.3)
MONOCYTES NFR BLD: 13 % (ref 4–12)
NEUTS SEG # BLD: 3.5 K/UL (ref 1.7–8.2)
NEUTS SEG NFR BLD: 41 % (ref 43–78)
NRBC # BLD: 0 K/UL (ref 0–0.2)
PLATELET # BLD AUTO: 372 K/UL (ref 150–450)
PMV BLD AUTO: 9.3 FL (ref 9.4–12.3)
POTASSIUM SERPL-SCNC: 3.8 MMOL/L (ref 3.5–5.1)
PROT SERPL-MCNC: 7.1 G/DL (ref 6.3–8.2)
RBC # BLD AUTO: 4.74 M/UL (ref 4.05–5.2)
SERVICE CMNT-IMP: ABNORMAL
SODIUM SERPL-SCNC: 139 MMOL/L (ref 133–143)
T4 FREE SERPL-MCNC: 0.8 NG/DL (ref 0.78–1.4)
TRIGL SERPL-MCNC: 198 MG/DL (ref 35–150)
TSH, 3RD GENERATION: 0.79 UIU/ML (ref 0.36–3)
VLDLC SERPL CALC-MCNC: 39.6 MG/DL (ref 6–23)
WBC # BLD AUTO: 8.5 K/UL (ref 4.3–11.1)

## 2023-05-23 PROCEDURE — 80061 LIPID PANEL: CPT

## 2023-05-23 PROCEDURE — 77075 RADEX OSSEOUS SURVEY COMPL: CPT

## 2023-05-23 PROCEDURE — 86360 T CELL ABSOLUTE COUNT/RATIO: CPT

## 2023-05-23 PROCEDURE — 80053 COMPREHEN METABOLIC PANEL: CPT

## 2023-05-23 PROCEDURE — 83001 ASSAY OF GONADOTROPIN (FSH): CPT

## 2023-05-23 PROCEDURE — A9552 F18 FDG: HCPCS | Performed by: INTERNAL MEDICINE

## 2023-05-23 PROCEDURE — 94729 DIFFUSING CAPACITY: CPT

## 2023-05-23 PROCEDURE — 84481 FREE ASSAY (FT-3): CPT

## 2023-05-23 PROCEDURE — 82962 GLUCOSE BLOOD TEST: CPT

## 2023-05-23 PROCEDURE — 2580000003 HC RX 258: Performed by: INTERNAL MEDICINE

## 2023-05-23 PROCEDURE — 82232 ASSAY OF BETA-2 PROTEIN: CPT

## 2023-05-23 PROCEDURE — 3430000000 HC RX DIAGNOSTIC RADIOPHARMACEUTICAL: Performed by: INTERNAL MEDICINE

## 2023-05-23 PROCEDURE — 6360000004 HC RX CONTRAST MEDICATION: Performed by: INTERNAL MEDICINE

## 2023-05-23 PROCEDURE — 83521 IG LIGHT CHAINS FREE EACH: CPT

## 2023-05-23 PROCEDURE — 93000 ELECTROCARDIOGRAM COMPLETE: CPT | Performed by: INTERNAL MEDICINE

## 2023-05-23 PROCEDURE — 36415 COLL VENOUS BLD VENIPUNCTURE: CPT

## 2023-05-23 PROCEDURE — 86334 IMMUNOFIX E-PHORESIS SERUM: CPT

## 2023-05-23 PROCEDURE — 83002 ASSAY OF GONADOTROPIN (LH): CPT

## 2023-05-23 PROCEDURE — 84439 ASSAY OF FREE THYROXINE: CPT

## 2023-05-23 PROCEDURE — 94726 PLETHYSMOGRAPHY LUNG VOLUMES: CPT

## 2023-05-23 PROCEDURE — 84165 PROTEIN E-PHORESIS SERUM: CPT

## 2023-05-23 PROCEDURE — 82977 ASSAY OF GGT: CPT

## 2023-05-23 PROCEDURE — 78815 PET IMAGE W/CT SKULL-THIGH: CPT

## 2023-05-23 PROCEDURE — 84443 ASSAY THYROID STIM HORMONE: CPT

## 2023-05-23 PROCEDURE — 82784 ASSAY IGA/IGD/IGG/IGM EACH: CPT

## 2023-05-23 PROCEDURE — 85025 COMPLETE CBC W/AUTO DIFF WBC: CPT

## 2023-05-23 PROCEDURE — 94010 BREATHING CAPACITY TEST: CPT

## 2023-05-23 RX ORDER — FLUDEOXYGLUCOSE F 18 200 MCI/ML
14.1 INJECTION, SOLUTION INTRAVENOUS
Status: COMPLETED | OUTPATIENT
Start: 2023-05-23 | End: 2023-05-23

## 2023-05-23 RX ORDER — SODIUM CHLORIDE 0.9 % (FLUSH) 0.9 %
10 SYRINGE (ML) INJECTION ONCE AS NEEDED
Status: COMPLETED | OUTPATIENT
Start: 2023-05-23 | End: 2023-05-23

## 2023-05-23 RX ADMIN — DIATRIZOATE MEGLUMINE AND DIATRIZOATE SODIUM 10 ML: 660; 100 LIQUID ORAL; RECTAL at 11:33

## 2023-05-23 RX ADMIN — FLUDEOXYGLUCOSE F 18 14.1 MILLICURIE: 200 INJECTION, SOLUTION INTRAVENOUS at 11:33

## 2023-05-23 RX ADMIN — SODIUM CHLORIDE, PRESERVATIVE FREE 10 ML: 5 INJECTION INTRAVENOUS at 11:33

## 2023-05-24 ENCOUNTER — HOSPITAL ENCOUNTER (OUTPATIENT)
Dept: CT IMAGING | Age: 53
Discharge: HOME | End: 2023-05-27
Payer: COMMERCIAL

## 2023-05-24 VITALS
HEART RATE: 84 BPM | RESPIRATION RATE: 16 BRPM | DIASTOLIC BLOOD PRESSURE: 84 MMHG | HEIGHT: 62 IN | WEIGHT: 242 LBS | OXYGEN SATURATION: 92 % | BODY MASS INDEX: 44.53 KG/M2 | TEMPERATURE: 98.3 F | SYSTOLIC BLOOD PRESSURE: 155 MMHG

## 2023-05-24 DIAGNOSIS — C90.00 MULTIPLE MYELOMA NOT HAVING ACHIEVED REMISSION (HCC): ICD-10-CM

## 2023-05-24 LAB
B2 MICROGLOB SERPL-MCNC: 1.3 MG/L (ref 0.6–2.4)
BASOPHILS # BLD AUTO: 0 X10E3/UL (ref 0–0.2)
BASOPHILS # BLD: 0.1 K/UL (ref 0–0.2)
BASOPHILS NFR BLD AUTO: 0 %
BASOPHILS NFR BLD: 1 % (ref 0–2)
BONE MARROW PREP & WRIGHT STAIN: NORMAL
CD3+CD4+ CELLS # BLD: 1262 /UL (ref 359–1519)
CD3+CD4+ CELLS NFR BLD: 33.2 % (ref 30.8–58.5)
CD3+CD4+ CELLS/CD3+CD8+ CLL BLD: 0.79 (ref 0.92–3.72)
CD3+CD8+ CELLS # BLD: 1604 /UL (ref 109–897)
CD3+CD8+ CELLS NFR BLD: 42.2 % (ref 12–35.5)
DIFFERENTIAL METHOD BLD: ABNORMAL
EOSINOPHIL # BLD AUTO: 0 X10E3/UL (ref 0–0.4)
EOSINOPHIL # BLD: 0.1 K/UL (ref 0–0.8)
EOSINOPHIL NFR BLD AUTO: 0 %
EOSINOPHIL NFR BLD: 1 % (ref 0.5–7.8)
ERYTHROCYTE [DISTWIDTH] IN BLOOD BY AUTOMATED COUNT: 15.6 % (ref 11.9–14.6)
ERYTHROCYTE [DISTWIDTH] IN BLOOD BY AUTOMATED COUNT: 16 % (ref 11.7–15.4)
HCT VFR BLD AUTO: 40.4 % (ref 34–46.6)
HCT VFR BLD AUTO: 41.3 % (ref 35.8–46.3)
HGB BLD-MCNC: 13.2 G/DL (ref 11.7–15.4)
HGB BLD-MCNC: 13.4 G/DL (ref 11.1–15.9)
IMM GRANULOCYTES # BLD AUTO: 0 K/UL (ref 0–0.5)
IMM GRANULOCYTES # BLD: 0 X10E3/UL (ref 0–0.1)
IMM GRANULOCYTES NFR BLD AUTO: 0 % (ref 0–5)
IMM GRANULOCYTES NFR BLD: 0 %
KAPPA LC FREE SER-MCNC: 9.9 MG/L (ref 3.3–19.4)
KAPPA LC FREE/LAMBDA FREE SER: 1.05 (ref 0.26–1.65)
LAMBDA LC FREE SERPL-MCNC: 9.4 MG/L (ref 5.7–26.3)
LYMPHOCYTES # BLD AUTO: 3.8 X10E3/UL (ref 0.7–3.1)
LYMPHOCYTES # BLD: 3.5 K/UL (ref 0.5–4.6)
LYMPHOCYTES NFR BLD AUTO: 47 %
LYMPHOCYTES NFR BLD: 48 % (ref 13–44)
MCH RBC QN AUTO: 28.5 PG (ref 26.1–32.9)
MCH RBC QN AUTO: 28.6 PG (ref 26.6–33)
MCHC RBC AUTO-ENTMCNC: 32 G/DL (ref 31.4–35)
MCHC RBC AUTO-ENTMCNC: 33.2 G/DL (ref 31.5–35.7)
MCV RBC AUTO: 86 FL (ref 79–97)
MCV RBC AUTO: 89.2 FL (ref 82–102)
MONOCYTES # BLD AUTO: 1 X10E3/UL (ref 0.1–0.9)
MONOCYTES # BLD: 1 K/UL (ref 0.1–1.3)
MONOCYTES NFR BLD AUTO: 12 %
MONOCYTES NFR BLD: 13 % (ref 4–12)
NEUTROPHILS # BLD AUTO: 3.4 X10E3/UL (ref 1.4–7)
NEUTROPHILS NFR BLD AUTO: 41 %
NEUTS SEG # BLD: 2.7 K/UL (ref 1.7–8.2)
NEUTS SEG NFR BLD: 37 % (ref 43–78)
NRBC # BLD: 0 K/UL (ref 0–0.2)
PLATELET # BLD AUTO: 371 K/UL (ref 150–450)
PLATELET # BLD AUTO: 412 X10E3/UL (ref 150–450)
PMV BLD AUTO: 9.1 FL (ref 9.4–12.3)
RBC # BLD AUTO: 4.63 M/UL (ref 4.05–5.2)
RBC # BLD AUTO: 4.68 X10E6/UL (ref 3.77–5.28)
T3FREE SERPL-MCNC: 2.2 PG/ML (ref 2–4.4)
WBC # BLD AUTO: 7.2 K/UL (ref 4.3–11.1)
WBC # BLD AUTO: 8.3 X10E3/UL (ref 3.4–10.8)

## 2023-05-24 PROCEDURE — 85025 COMPLETE CBC W/AUTO DIFF WBC: CPT

## 2023-05-24 PROCEDURE — 2580000003 HC RX 258: Performed by: PHYSICIAN ASSISTANT

## 2023-05-24 PROCEDURE — 88311 DECALCIFY TISSUE: CPT

## 2023-05-24 PROCEDURE — 2500000003 HC RX 250 WO HCPCS: Performed by: PHYSICIAN ASSISTANT

## 2023-05-24 PROCEDURE — 38222 DX BONE MARROW BX & ASPIR: CPT

## 2023-05-24 PROCEDURE — 88305 TISSUE EXAM BY PATHOLOGIST: CPT

## 2023-05-24 PROCEDURE — 88313 SPECIAL STAINS GROUP 2: CPT

## 2023-05-24 PROCEDURE — 6360000002 HC RX W HCPCS: Performed by: PHYSICIAN ASSISTANT

## 2023-05-24 RX ORDER — MIDAZOLAM HYDROCHLORIDE 2 MG/2ML
INJECTION, SOLUTION INTRAMUSCULAR; INTRAVENOUS PRN
Status: COMPLETED | OUTPATIENT
Start: 2023-05-24 | End: 2023-05-24

## 2023-05-24 RX ORDER — FENTANYL CITRATE 50 UG/ML
INJECTION, SOLUTION INTRAMUSCULAR; INTRAVENOUS PRN
Status: COMPLETED | OUTPATIENT
Start: 2023-05-24 | End: 2023-05-24

## 2023-05-24 RX ORDER — LIDOCAINE HYDROCHLORIDE 10 MG/ML
INJECTION, SOLUTION EPIDURAL; INFILTRATION; INTRACAUDAL; PERINEURAL PRN
Status: COMPLETED | OUTPATIENT
Start: 2023-05-24 | End: 2023-05-24

## 2023-05-24 RX ORDER — SODIUM CHLORIDE 9 MG/ML
INJECTION, SOLUTION INTRAVENOUS CONTINUOUS PRN
Status: COMPLETED | OUTPATIENT
Start: 2023-05-24 | End: 2023-05-24

## 2023-05-24 RX ADMIN — FENTANYL CITRATE 50 MCG: 50 INJECTION, SOLUTION INTRAMUSCULAR; INTRAVENOUS at 14:15

## 2023-05-24 RX ADMIN — LIDOCAINE HYDROCHLORIDE 10 ML: 10 INJECTION, SOLUTION EPIDURAL; INFILTRATION; INTRACAUDAL; PERINEURAL at 14:14

## 2023-05-24 RX ADMIN — FENTANYL CITRATE 50 MCG: 50 INJECTION, SOLUTION INTRAMUSCULAR; INTRAVENOUS at 14:10

## 2023-05-24 RX ADMIN — SODIUM CHLORIDE 500 ML: 900 INJECTION, SOLUTION INTRAVENOUS at 14:04

## 2023-05-24 RX ADMIN — FENTANYL CITRATE 50 MCG: 50 INJECTION, SOLUTION INTRAMUSCULAR; INTRAVENOUS at 14:04

## 2023-05-24 RX ADMIN — MIDAZOLAM HYDROCHLORIDE 1 MG: 1 INJECTION, SOLUTION INTRAMUSCULAR; INTRAVENOUS at 14:04

## 2023-05-24 RX ADMIN — MIDAZOLAM HYDROCHLORIDE 1 MG: 1 INJECTION, SOLUTION INTRAMUSCULAR; INTRAVENOUS at 14:15

## 2023-05-24 RX ADMIN — MIDAZOLAM HYDROCHLORIDE 1 MG: 1 INJECTION, SOLUTION INTRAMUSCULAR; INTRAVENOUS at 14:10

## 2023-05-24 ASSESSMENT — PAIN SCALES - GENERAL
PAINLEVEL_OUTOF10: 0

## 2023-05-24 ASSESSMENT — PAIN - FUNCTIONAL ASSESSMENT: PAIN_FUNCTIONAL_ASSESSMENT: NONE - DENIES PAIN

## 2023-05-24 NOTE — DISCHARGE INSTRUCTIONS
If you have any questions about your procedure, please call the Interventional Radiology department at [phone]. After business hours (5pm) and weekends, call the answering service at [phone] and ask for the Radiologist on call to be paged. Si tiene Preguntas acerca del procedimiento, por favor llame al departamento de Radiología Intervencional al [phone]. Después de horas de oficina (5 pm) y los fines de [place], llamar al Ami Matt Iraheta al [phone] y pregunte por el Radiologo de Kindra Baird. Interventional Radiology General Nurse Discharge    After general anesthesia or intravenous sedation, for 24 hours or while taking prescription Narcotics:  Limit your activities  Do not drive and operate hazardous machinery  Do not make important personal or business decisions  Do  not drink alcoholic beverages  If you have not urinated within 8 hours after discharge, please contact your surgeon on call. * Please give a list of your current medications to your Primary Care Provider. * Please update this list whenever your medications are discontinued, doses are     changed, or new medications (including over-the-counter products) are added. * Please carry medication information at all times in case of emergency situations. These are general instructions for a healthy lifestyle:    No smoking/ No tobacco products/ Avoid exposure to second hand smoke  Surgeon General's Warning:  Quitting smoking now greatly reduces serious risk to your health.     Obesity, smoking, and sedentary lifestyle greatly increases your risk for illness  A healthy diet, regular physical exercise & weight monitoring are important for maintaining a healthy lifestyle    You may be retaining fluid if you have a history of heart failure or if you experience any of the following symptoms:  Weight gain of 3 pounds or more overnight or 5 pounds in a week, increased swelling in our hands or feet or shortness of breath

## 2023-05-24 NOTE — BRIEF OP NOTE
Department of Interventional Radiology  [phone]        Interventional Radiology Brief Procedure Note    Patient: Nivia Torrez MRN: [id number]  SSN: [id number]    YOB: 1970  Age: 50 y.o. Sex: female      Date of Procedure: 5/24/2023    Pre-Procedure Diagnosis: multiple myeloma    Post-Procedure Diagnosis: SAME    Procedure(s): Image Guided Biopsy    Brief Description of Procedure: CT guided BMBX    Performed By: Monique Arroyo PA-C     Assistants: None    Anesthesia:Moderate Sedation per LULA Mascorro MD    Estimated Blood Loss: Less than 10ml    Specimens:   to lab    Implants:  None    Findings: no post bx bleeding    Complications: None    Recommendations: routine wound care     Follow Up: prn    Signed By: Monique Arroyo PA-C     May 24, 2023

## 2023-05-24 NOTE — PROGRESS NOTES
Discharge instructions given. Pt verbalized understanding.   Time allowed for questions    To car with  via [facility] [place]

## 2023-05-24 NOTE — PRE SEDATION
Sedation Pre-Procedure Note    Patient Name: Kayden Pineda   YOB: 1970  Room/Bed: Room/bed info not found  Medical Record Number: [id number]  Date: 5/24/2023   Time: 1:54 PM       Indication:  multiple myeloma    Consent: I have discussed with the patient and/or the patient representative the indication, alternatives, and the possible risks and/or complications of the planned procedure and the anesthesia methods. The patient and/or patient representative appear to understand and agree to proceed. Vital Signs:   Vitals:    05/24/23 1311   BP: 133/69   Pulse: 80   Resp: 16   Temp: 98.3 °F (36.8 °C)   SpO2: 97%       Past Medical History:   has a past medical history of Obesity and Prolactin increased. Past Surgical History:   has a past surgical history that includes IR TUNNELED CVC PLACE WO SQ PORT/PUMP > 5 YEARS (5/9/2022); IR BIOPSY BONE MARROW; and IR TUNNELED CVC PLACE WO SQ PORT/PUMP > 5 YEARS (5/9/2022). Medications:   Scheduled Meds:   Continuous Infusions:   PRN Meds:   Home Meds:   Prior to Admission medications    Medication Sig Start Date End Date Taking? Authorizing Provider   REVLIMID 10 MG chemo capsule  12/27/22   Historical Provider, MD   sulfamethoxazole-trimethoprim (BACTRIM DS;SEPTRA DS) 800-160 MG per tablet Take 1 tablet by mouth three times a week  Patient not taking: Reported on 1/4/2023 8/17/22   Tala Cervantes MD   [facility] prescription to [facility] with completed form [id number]. 10/21/21   Historical Provider, MD   prochlorperazine (COMPAZINE) 10 MG tablet Take 1 tablet by mouth every 6 hours as needed (nausea)  Patient not taking: No sig reported 6/22/22   Tala Cervantes MD   diphenoxylate-atropine (LOMOTIL) 2.5-0.025 MG per tablet Take 2 tablets by mouth 4 times daily as needed for Diarrhea.   Patient not taking: No sig reported    Historical Provider, MD   acyclovir (ZOVIRAX) 400 MG tablet Take 400 mg by mouth 2 times daily 11/16/21   Ar Automatic

## 2023-05-24 NOTE — OR NURSING
Patient transported to CT via stretcher. Patient moved to the procedure table without assistance. Patient secured to the procedure table. Pt connected to EKG, SPO2, BP, and ETCO2     A Pre-assessment was conducted and a set of vital signs were completed. The provider was notified of any changes from the assessment and/or vital signs immediately prior to beginning moderate sedation.

## 2023-05-24 NOTE — OR NURSING
TRANSFER - OUT REPORT:           Verbal report given to Linda Moya on [employer]  being transferred to IR Recovery for  routine post-op      Report consisted of patients Situation, Background, Assessment and Recommendations(SBAR). Information from the following report(s) SBAR, Procedure Summary, and MAR was reviewed with the receiving nurse. Opportunity for questions and clarification was provided. Conscious Sedation:    150 Mcg of Fentanyl administered   3 Mg of Versed administered   0 Mg of Benadryl administered        Pt tolerated procedure well.       Peripheral Intravenous Line:   Peripheral IV 05/24/23 Right Antecubital (Active)       Sacrum dry, clean, 4 x 4 gauze, and bandaid-type dressing clean, dry, intact, and nontender    VITALS:  /70   Pulse 80   Temp 98.3 °F (36.8 °C) (Temporal)   Resp 16   Ht 5' 2\" (1.575 m)   Wt 242 lb (109.8 kg)   SpO2 (!) 89%   BMI 44.26 kg/m²   PAIN SCORE:  0/10

## 2023-05-24 NOTE — H&P
Reconciliation   gabapentin (NEURONTIN) 300 MG capsule Take 300 mg by mouth. 1/31/22   Ar Automatic Reconciliation   HYDROcodone-acetaminophen (NORCO) 7.5-325 MG per tablet Take 1 tablet by mouth every 6 hours as needed. Ar Automatic Reconciliation        Allergies   Allergen Reactions    Amoxicillin-Pot Clavulanate Other (See Comments)     Yeast infection  Yeast infection  Yeast infection  Yeast infection         No family history on file. Social History     Tobacco Use    Smoking status: Never    Smokeless tobacco: Never   Substance Use Topics    Alcohol use: Never        Not in a hospital admission.     Objective:       Physical Examination:    Vitals:    05/24/23 1311   BP: 133/69   Pulse: 80   Resp: 16   Temp: 98.3 °F (36.8 °C)   TempSrc: Temporal   SpO2: 97%   Weight: 242 lb (109.8 kg)   Height: 5' 2\" (1.575 m)       Pain Assessment           0                                          HEART: regular rate and rhythm  LUNG: clear to auscultation bilaterally  ABDOMEN: normal findings: soft, non-tender  EXTREMITIES: warm, no edema    Laboratory:     Lab Results   Component Value Date/Time     05/23/2023 01:34 PM     01/04/2023 10:56 AM    K 3.8 05/23/2023 01:34 PM    K 4.2 01/04/2023 10:56 AM     05/23/2023 01:34 PM     01/04/2023 10:56 AM    CO2 31 05/23/2023 01:34 PM    CO2 27 01/04/2023 10:56 AM    BUN 15 05/23/2023 01:34 PM    BUN 8 01/04/2023 10:56 AM    GFRAA >60 09/20/2022 02:50 PM    GFRAA >60 08/17/2022 02:16 PM    MG 2.2 01/04/2023 10:56 AM    MG 2.1 09/20/2022 02:50 PM    PHOS 4.4 07/05/2022 11:42 AM    PHOS 4.2 06/30/2022 09:39 AM    GLOB 3.3 05/23/2023 01:34 PM    GLOB 3.2 01/04/2023 10:56 AM    GLOB 2.5 01/04/2023 10:56 AM    ALT 20 05/23/2023 01:34 PM    ALT 15 01/04/2023 10:56 AM     Lab Results   Component Value Date/Time    WBC 7.2 05/24/2023 01:16 PM    WBC 8.5 05/23/2023 01:34 PM    WBC 8.3 05/23/2023 01:34 PM    HGB 13.2 05/24/2023 01:16 PM    HGB 13.3 05/23/2023

## 2023-05-26 ENCOUNTER — HOSPITAL ENCOUNTER (OUTPATIENT)
Dept: LAB | Age: 53
Discharge: HOME | End: 2023-05-26
Payer: COMMERCIAL

## 2023-05-26 LAB
ALBUMIN SERPL ELPH-MCNC: 3.9 G/DL (ref 2.9–4.4)
ALBUMIN/GLOB SERPL: 1.6 (ref 0.7–1.7)
ALPHA1 GLOB SERPL ELPH-MCNC: 0.1 G/DL (ref 0–0.4)
ALPHA2 GLOB SERPL ELPH-MCNC: 0.9 G/DL (ref 0.4–1)
B-GLOBULIN SERPL ELPH-MCNC: 1 G/DL (ref 0.7–1.3)
GAMMA GLOB SERPL ELPH-MCNC: 0.6 G/DL (ref 0.4–1.8)
GLOBULIN SER-MCNC: 2.6 G/DL (ref 2.2–3.9)
IGA SERPL-MCNC: 43 MG/DL (ref 87–352)
IGG SERPL-MCNC: 739 MG/DL (ref 586–1602)
IGM SERPL-MCNC: 35 MG/DL (ref 26–217)
INTERPRETATION SERPL IEP-IMP: ABNORMAL
M PROTEIN SERPL ELPH-MCNC: ABNORMAL G/DL
PROT SERPL-MCNC: 6.5 G/DL (ref 6–8.5)

## 2023-05-26 PROCEDURE — 84166 PROTEIN E-PHORESIS/URINE/CSF: CPT

## 2023-05-26 PROCEDURE — 84156 ASSAY OF PROTEIN URINE: CPT

## 2023-05-26 PROCEDURE — 86335 IMMUNFIX E-PHORSIS/URINE/CSF: CPT

## 2023-05-30 LAB
ALBUMIN 24H MFR UR ELPH: 24.7 %
ALPHA1 GLOB 24H MFR UR ELPH: 7.2 %
ALPHA2 GLOB 24H MFR UR ELPH: 10.1 %
B-GLOBULIN MFR UR ELPH: 37 %
COLLECT DURATION TIME UR: 24 HR
GAMMA GLOB 24H MFR UR ELPH: 21 %
INTERPRETATION UR IFE-IMP: ABNORMAL
Lab: ABNORMAL
M PROTEIN 24H MFR UR ELPH: ABNORMAL %
PROT 24H UR-MRATE: 224 MG/24 HR (ref 30–150)
PROT UR-MCNC: 11.8 MG/DL
SPECIMEN VOL ?TM UR: 1900 ML

## 2023-06-08 DIAGNOSIS — C90.00 MULTIPLE MYELOMA NOT HAVING ACHIEVED REMISSION (HCC): Primary | ICD-10-CM

## 2023-06-14 ENCOUNTER — HOSPITAL ENCOUNTER (OUTPATIENT)
Dept: INFUSION THERAPY | Age: 53
Discharge: HOME | End: 2023-06-14
Payer: COMMERCIAL

## 2023-06-14 DIAGNOSIS — C90.00 MULTIPLE MYELOMA NOT HAVING ACHIEVED REMISSION (HCC): Primary | ICD-10-CM

## 2023-06-14 PROCEDURE — 90740 HEPB VACC 3 DOSE IMMUNSUP IM: CPT | Performed by: INTERNAL MEDICINE

## 2023-06-14 PROCEDURE — 90648 HIB PRP-T VACCINE 4 DOSE IM: CPT | Performed by: INTERNAL MEDICINE

## 2023-06-14 PROCEDURE — 6360000002 HC RX W HCPCS: Performed by: INTERNAL MEDICINE

## 2023-06-14 PROCEDURE — G0010 ADMIN HEPATITIS B VACCINE: HCPCS | Performed by: INTERNAL MEDICINE

## 2023-06-14 PROCEDURE — 90713 POLIOVIRUS IPV SC/IM: CPT | Performed by: INTERNAL MEDICINE

## 2023-06-14 PROCEDURE — 90670 PCV13 VACCINE IM: CPT | Performed by: INTERNAL MEDICINE

## 2023-06-14 PROCEDURE — 90700 DTAP VACCINE < 7 YRS IM: CPT | Performed by: INTERNAL MEDICINE

## 2023-06-14 PROCEDURE — 90471 IMMUNIZATION ADMIN: CPT | Performed by: INTERNAL MEDICINE

## 2023-06-14 PROCEDURE — G0009 ADMIN PNEUMOCOCCAL VACCINE: HCPCS | Performed by: INTERNAL MEDICINE

## 2023-06-14 PROCEDURE — 90472 IMMUNIZATION ADMIN EACH ADD: CPT

## 2023-06-14 PROCEDURE — 90734 MENACWYD/MENACWYCRM VACC IM: CPT | Performed by: INTERNAL MEDICINE

## 2023-06-14 PROCEDURE — 90472 IMMUNIZATION ADMIN EACH ADD: CPT | Performed by: INTERNAL MEDICINE

## 2023-06-14 RX ADMIN — PNEUMOCOCCAL 13-VALENT CONJUGATE VACCINE 0.5 ML: 2.2; 2.2; 2.2; 2.2; 2.2; 4.4; 2.2; 2.2; 2.2; 2.2; 2.2; 2.2; 2.2 INJECTION, SUSPENSION INTRAMUSCULAR at 16:21

## 2023-06-14 RX ADMIN — HEPATITIS B VACCINE (RECOMBINANT) 1 ML: 20 INJECTION, SUSPENSION INTRAMUSCULAR at 06:23

## 2023-06-14 RX ADMIN — DIPHTHERIA AND TETANUS TOXOIDS AND ACELLULAR PERTUSSIS VACCINE ADSORBED 0.5 ML: 10; 25; 25; 25; 8 SUSPENSION INTRAMUSCULAR at 16:22

## 2023-06-14 RX ADMIN — MENINGOCOCCAL (GROUPS A, C, Y AND W-135) OLIGOSACCHARIDE DIPHTHERIA CRM197 CONJUGATE VACCINE 0.5 ML: KIT at 16:24

## 2023-06-14 RX ADMIN — HAEMOPHILUS B POLYSACCHARIDE CONJUGATE VACCINE FOR INJ 0.5 ML: RECON SOLN at 16:25

## 2023-06-14 RX ADMIN — POLIOVIRUS TYPE 1 ANTIGEN (FORMALDEHYDE INACTIVATED), POLIOVIRUS TYPE 2 ANTIGEN (FORMALDEHYDE INACTIVATED), AND POLIOVIRUS TYPE 3 ANTIGEN (FORMALDEHYDE INACTIVATED) 0.5 ML: 40; 8; 32 INJECTION, SUSPENSION INTRAMUSCULAR at 16:20

## 2023-06-14 NOTE — PROGRESS NOTES
Arrived to the [facility]. Infanrix, ActHIB, Energix-B, Menveo, prevnar, and IPOL completed. Provided education on vaccinations. Patient instructed to report any side affects to ordering provider. Patient tolerated well. Any issues or concerns during appointment: none. Patient aware of next infusion appointment on 8/16/2023 (date) at 1330 (time). Discharged ambualatory.

## 2023-08-18 ENCOUNTER — HOSPITAL ENCOUNTER (OUTPATIENT)
Dept: INFUSION THERAPY | Age: 53
Discharge: HOME | End: 2023-08-18
Payer: COMMERCIAL

## 2023-08-18 VITALS
RESPIRATION RATE: 18 BRPM | HEART RATE: 91 BPM | DIASTOLIC BLOOD PRESSURE: 84 MMHG | TEMPERATURE: 97.6 F | OXYGEN SATURATION: 98 % | SYSTOLIC BLOOD PRESSURE: 147 MMHG

## 2023-08-18 DIAGNOSIS — C90.00 MULTIPLE MYELOMA NOT HAVING ACHIEVED REMISSION (HCC): Primary | ICD-10-CM

## 2023-08-18 PROCEDURE — 90472 IMMUNIZATION ADMIN EACH ADD: CPT

## 2023-08-18 PROCEDURE — 90472 IMMUNIZATION ADMIN EACH ADD: CPT | Performed by: INTERNAL MEDICINE

## 2023-08-18 PROCEDURE — 90713 POLIOVIRUS IPV SC/IM: CPT | Performed by: INTERNAL MEDICINE

## 2023-08-18 PROCEDURE — 90471 IMMUNIZATION ADMIN: CPT | Performed by: INTERNAL MEDICINE

## 2023-08-18 PROCEDURE — 90734 MENACWYD/MENACWYCRM VACC IM: CPT | Performed by: INTERNAL MEDICINE

## 2023-08-18 PROCEDURE — 90648 HIB PRP-T VACCINE 4 DOSE IM: CPT | Performed by: INTERNAL MEDICINE

## 2023-08-18 PROCEDURE — 90740 HEPB VACC 3 DOSE IMMUNSUP IM: CPT | Performed by: INTERNAL MEDICINE

## 2023-08-18 PROCEDURE — G0009 ADMIN PNEUMOCOCCAL VACCINE: HCPCS | Performed by: INTERNAL MEDICINE

## 2023-08-18 PROCEDURE — 90670 PCV13 VACCINE IM: CPT | Performed by: INTERNAL MEDICINE

## 2023-08-18 PROCEDURE — 6360000002 HC RX W HCPCS: Performed by: INTERNAL MEDICINE

## 2023-08-18 PROCEDURE — G0010 ADMIN HEPATITIS B VACCINE: HCPCS | Performed by: INTERNAL MEDICINE

## 2023-08-18 PROCEDURE — 90700 DTAP VACCINE < 7 YRS IM: CPT | Performed by: INTERNAL MEDICINE

## 2023-08-18 RX ADMIN — PNEUMOCOCCAL 13-VALENT CONJUGATE VACCINE 0.5 ML: 2.2; 2.2; 2.2; 2.2; 2.2; 4.4; 2.2; 2.2; 2.2; 2.2; 2.2; 2.2; 2.2 INJECTION, SUSPENSION INTRAMUSCULAR at 13:59

## 2023-08-18 RX ADMIN — MENINGOCOCCAL (GROUPS A, C, Y AND W-135) OLIGOSACCHARIDE DIPHTHERIA CRM197 CONJUGATE VACCINE 0.5 ML: KIT at 14:01

## 2023-08-18 RX ADMIN — HAEMOPHILUS B POLYSACCHARIDE CONJUGATE VACCINE FOR INJ 0.5 ML: RECON SOLN at 13:57

## 2023-08-18 RX ADMIN — POLIOVIRUS TYPE 1 ANTIGEN (FORMALDEHYDE INACTIVATED), POLIOVIRUS TYPE 2 ANTIGEN (FORMALDEHYDE INACTIVATED), AND POLIOVIRUS TYPE 3 ANTIGEN (FORMALDEHYDE INACTIVATED) 0.5 ML: 40; 8; 32 INJECTION, SUSPENSION INTRAMUSCULAR at 13:58

## 2023-08-18 RX ADMIN — DIPHTHERIA AND TETANUS TOXOIDS AND ACELLULAR PERTUSSIS VACCINE ADSORBED 0.5 ML: 10; 25; 25; 25; 8 SUSPENSION INTRAMUSCULAR at 14:01

## 2023-08-18 RX ADMIN — HEPATITIS B VACCINE (RECOMBINANT) 1 ML: 20 INJECTION, SUSPENSION INTRAMUSCULAR at 14:00

## 2023-11-15 ENCOUNTER — CLINICAL DOCUMENTATION (OUTPATIENT)
Dept: CASE MANAGEMENT | Age: 53
End: 2023-11-15

## 2023-12-12 DIAGNOSIS — C90.00 MULTIPLE MYELOMA NOT HAVING ACHIEVED REMISSION (HCC): Primary | ICD-10-CM

## 2023-12-13 ENCOUNTER — HOSPITAL ENCOUNTER (OUTPATIENT)
Dept: LAB | Age: 53
Discharge: HOME | End: 2023-12-16
Payer: COMMERCIAL

## 2023-12-13 ENCOUNTER — OFFICE VISIT (OUTPATIENT)
Dept: ONCOLOGY | Age: 53
End: 2023-12-13
Payer: COMMERCIAL

## 2023-12-13 VITALS
HEIGHT: 63 IN | OXYGEN SATURATION: 99 % | SYSTOLIC BLOOD PRESSURE: 152 MMHG | DIASTOLIC BLOOD PRESSURE: 86 MMHG | HEART RATE: 78 BPM | RESPIRATION RATE: 20 BRPM | BODY MASS INDEX: 45.34 KG/M2 | TEMPERATURE: 98.4 F | WEIGHT: 255.9 LBS

## 2023-12-13 DIAGNOSIS — Z94.81 BONE MARROW TRANSPLANT STATUS (HCC): ICD-10-CM

## 2023-12-13 DIAGNOSIS — C90.00 MULTIPLE MYELOMA NOT HAVING ACHIEVED REMISSION (HCC): ICD-10-CM

## 2023-12-13 DIAGNOSIS — C90.00 MULTIPLE MYELOMA NOT HAVING ACHIEVED REMISSION (HCC): Primary | ICD-10-CM

## 2023-12-13 DIAGNOSIS — D84.9 IMMUNOCOMPROMISED (HCC): ICD-10-CM

## 2023-12-13 LAB
ALBUMIN SERPL-MCNC: 3.6 G/DL (ref 3.5–5)
ALBUMIN/GLOB SERPL: 1.2 (ref 0.4–1.6)
ALP SERPL-CCNC: 77 U/L (ref 50–136)
ALT SERPL-CCNC: 18 U/L (ref 12–65)
ANION GAP SERPL CALC-SCNC: 4 MMOL/L (ref 2–11)
AST SERPL-CCNC: 22 U/L (ref 15–37)
BASOPHILS # BLD: 0.1 K/UL (ref 0–0.2)
BASOPHILS NFR BLD: 2 % (ref 0–2)
BILIRUB SERPL-MCNC: 0.3 MG/DL (ref 0.2–1.1)
BUN SERPL-MCNC: 8 MG/DL (ref 6–23)
CALCIUM SERPL-MCNC: 8.7 MG/DL (ref 8.3–10.4)
CHLORIDE SERPL-SCNC: 106 MMOL/L (ref 103–113)
CO2 SERPL-SCNC: 30 MMOL/L (ref 21–32)
CREAT SERPL-MCNC: 0.7 MG/DL (ref 0.6–1)
DIFFERENTIAL METHOD BLD: ABNORMAL
EOSINOPHIL # BLD: 0.7 K/UL (ref 0–0.8)
EOSINOPHIL NFR BLD: 13 % (ref 0.5–7.8)
ERYTHROCYTE [DISTWIDTH] IN BLOOD BY AUTOMATED COUNT: 14.7 % (ref 11.9–14.6)
GLOBULIN SER CALC-MCNC: 3.1 G/DL (ref 2.8–4.5)
GLUCOSE SERPL-MCNC: 99 MG/DL (ref 65–100)
HCT VFR BLD AUTO: 38.6 % (ref 35.8–46.3)
HGB BLD-MCNC: 12.2 G/DL (ref 11.7–15.4)
IMM GRANULOCYTES # BLD AUTO: 0 K/UL (ref 0–0.5)
IMM GRANULOCYTES NFR BLD AUTO: 0 % (ref 0–5)
LYMPHOCYTES # BLD: 2.2 K/UL (ref 0.5–4.6)
LYMPHOCYTES NFR BLD: 40 % (ref 13–44)
MCH RBC QN AUTO: 28 PG (ref 26.1–32.9)
MCHC RBC AUTO-ENTMCNC: 31.6 G/DL (ref 31.4–35)
MCV RBC AUTO: 88.5 FL (ref 82–102)
MONOCYTES # BLD: 0.7 K/UL (ref 0.1–1.3)
MONOCYTES NFR BLD: 12 % (ref 4–12)
NEUTS SEG # BLD: 1.8 K/UL (ref 1.7–8.2)
NEUTS SEG NFR BLD: 33 % (ref 43–78)
NRBC # BLD: 0 K/UL (ref 0–0.2)
PLATELET # BLD AUTO: 274 K/UL (ref 150–450)
PMV BLD AUTO: 9.2 FL (ref 9.4–12.3)
POTASSIUM SERPL-SCNC: 3.4 MMOL/L (ref 3.5–5.1)
PROT SERPL-MCNC: 6.7 G/DL (ref 6.3–8.2)
RBC # BLD AUTO: 4.36 M/UL (ref 4.05–5.2)
SODIUM SERPL-SCNC: 140 MMOL/L (ref 136–146)
WBC # BLD AUTO: 5.4 K/UL (ref 4.3–11.1)

## 2023-12-13 PROCEDURE — 84165 PROTEIN E-PHORESIS SERUM: CPT

## 2023-12-13 PROCEDURE — 85025 COMPLETE CBC W/AUTO DIFF WBC: CPT

## 2023-12-13 PROCEDURE — 99214 OFFICE O/P EST MOD 30 MIN: CPT | Performed by: INTERNAL MEDICINE

## 2023-12-13 PROCEDURE — 86334 IMMUNOFIX E-PHORESIS SERUM: CPT

## 2023-12-13 PROCEDURE — 83521 IG LIGHT CHAINS FREE EACH: CPT

## 2023-12-13 PROCEDURE — 82784 ASSAY IGA/IGD/IGG/IGM EACH: CPT

## 2023-12-13 PROCEDURE — 80053 COMPREHEN METABOLIC PANEL: CPT

## 2023-12-13 PROCEDURE — 36415 COLL VENOUS BLD VENIPUNCTURE: CPT

## 2023-12-13 RX ORDER — PANTOPRAZOLE SODIUM 20 MG/1
1 TABLET, DELAYED RELEASE ORAL DAILY
COMMUNITY
Start: 2021-10-21

## 2023-12-13 RX ORDER — DULOXETIN HYDROCHLORIDE 60 MG/1
CAPSULE, DELAYED RELEASE ORAL DAILY
COMMUNITY
Start: 2023-10-31

## 2023-12-13 ASSESSMENT — PATIENT HEALTH QUESTIONNAIRE - PHQ9
SUM OF ALL RESPONSES TO PHQ QUESTIONS 1-9: 0
2. FEELING DOWN, DEPRESSED OR HOPELESS: 0
SUM OF ALL RESPONSES TO PHQ QUESTIONS 1-9: 0
1. LITTLE INTEREST OR PLEASURE IN DOING THINGS: 0
SUM OF ALL RESPONSES TO PHQ QUESTIONS 1-9: 0
SUM OF ALL RESPONSES TO PHQ QUESTIONS 1-9: 0
SUM OF ALL RESPONSES TO PHQ9 QUESTIONS 1 & 2: 0

## 2023-12-13 NOTE — PROGRESS NOTES
Data Source: Patient, [facility] record. 12/13/2023    2:46 PM    [address] [id number]    48 y.o. Patient Encounter: [id number] Visit    Heme Diagnosis:  MM, transplant evaluation  Heme History (Copied from prior):   54 female, history of prolactinoma, now kindly referred to us by Dr. Zach Kebede for evaluation of her multiple myeloma and consideration of autologous stem cell transplant. Her hematologic history is as follows:    - Summer 2021: Started noticing back discomfort.  - 10/21: Progressive debility leading to be being dependent on wheelchair. Led to imaging including T and L-spine showing a cortically destructive lytic lesion with significant soft tissue component involving T11 with resulting extradural soft tissue mass compression of underlying spinal cord. Smaller lytic lesions in the right and left iliac wing also noted. Subsequently hospitalized and underwent T10/T11 laminectomies with excision of spinal cord tumor by neurosurgery. A bone marrow biopsy from iliac crest also performed. 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 Bone marrow biopsy from right hip showing scant bone marrow specimen, with 15% cellularity and 70% involvement by lambda light chain restricted plasma cell neoplasm. FISH not available. PET scan 11/9/2021 with markedly hypermetabolic 2.8 cm thyroid nodule, T11 site surgical changes, however no evidence of FDG avid multiple myeloma noted. Labs showing normal counts, creatinine normal, beta-2 microglobulin at 1.72, SPEP with M spike 0.7, however lambda light chain significantly elevated at 44,763. She was felt to have ISS stage I disease, with plans to start RVD in the near future. She has also been referred to radiation oncology for consolidative XRT to spine. Dental evaluation prior to bone directed therapy.   Her thyroid

## 2023-12-13 NOTE — PATIENT INSTRUCTIONS
Patient Instructions from Today's Visit    Reason for Visit:  Follow up    Plan:  Continue maintenance treatment with Dr. Aaron Soto. You will have your last round of vaccines when you come back in 6 months    Follow Up:   Follow up in 6 months with a PET scan and bone marrow biopsy    Recent Lab Results:  Hospital Outpatient Visit on 12/13/2023   Component Date Value Ref Range Status    WBC 12/13/2023 5.4  4.3 - 11.1 K/uL Final    RBC 12/13/2023 4.36  4.05 - 5.2 M/uL Final    Hemoglobin 12/13/2023 12.2  11.7 - 15.4 g/dL Final    Hematocrit 12/13/2023 38.6  35.8 - 46.3 % Final    MCV 12/13/2023 88.5  82.0 - 102.0 FL Final    MCH 12/13/2023 28.0  26.1 - 32.9 PG Final    MCHC 12/13/2023 31.6  31.4 - 35.0 g/dL Final    RDW 12/13/2023 14.7 (H)  11.9 - 14.6 % Final    Platelets 08/49/0333 274  150 - 450 K/uL Final    MPV 12/13/2023 9.2 (L)  9.4 - 12.3 FL Final    nRBC 12/13/2023 0.00  0.0 - 0.2 K/uL Final    **Note: Absolute NRBC parameter is now reported with Hemogram**    Neutrophils % 12/13/2023 33 (L)  43 - 78 % Final    Lymphocytes % 12/13/2023 40  13 - 44 % Final    Monocytes % 12/13/2023 12  4.0 - 12.0 % Final    Eosinophils % 12/13/2023 13 (H)  0.5 - 7.8 % Final    RESULTS CHECKED X 2    Basophils % 12/13/2023 2  0.0 - 2.0 % Final    Immature Granulocytes 12/13/2023 0  0.0 - 5.0 % Final    Neutrophils Absolute 12/13/2023 1.8  1.7 - 8.2 K/UL Final    Lymphocytes Absolute 12/13/2023 2.2  0.5 - 4.6 K/UL Final    Monocytes Absolute 12/13/2023 0.7  0.1 - 1.3 K/UL Final    Eosinophils Absolute 12/13/2023 0.7  0.0 - 0.8 K/UL Final    Basophils Absolute 12/13/2023 0.1  0.0 - 0.2 K/UL Final    Absolute Immature Granulocyte 12/13/2023 0.0  0.0 - 0.5 K/UL Final    Differential Type 12/13/2023 AUTOMATED    Final    Sodium 12/13/2023 140  136 - 146 mmol/L Final    Potassium 12/13/2023 3.4 (L)  3.5 - 5.1 mmol/L Final    Chloride 12/13/2023 106  103 - 113 mmol/L Final    CO2 12/13/2023 30  21 - 32 mmol/L Final    Anion Gap

## 2023-12-14 LAB
KAPPA LC FREE SER-MCNC: 9 MG/L (ref 3.3–19.4)
KAPPA LC FREE/LAMBDA FREE SER: 0.89 (ref 0.26–1.65)
LAMBDA LC FREE SERPL-MCNC: 10.1 MG/L (ref 5.7–26.3)

## 2024-05-28 ENCOUNTER — HOSPITAL ENCOUNTER (OUTPATIENT)
Dept: PET IMAGING | Age: 54
Discharge: HOME | End: 2024-05-31
Payer: COMMERCIAL

## 2024-05-28 ENCOUNTER — HOSPITAL ENCOUNTER (OUTPATIENT)
Dept: CT IMAGING | Age: 54
Discharge: HOME | End: 2024-05-31
Attending: INTERNAL MEDICINE
Payer: COMMERCIAL

## 2024-05-28 ENCOUNTER — HOSPITAL ENCOUNTER (OUTPATIENT)
Dept: LAB | Age: 54
Discharge: HOME | End: 2024-05-31
Payer: COMMERCIAL

## 2024-05-28 VITALS
SYSTOLIC BLOOD PRESSURE: 165 MMHG | HEIGHT: 63 IN | TEMPERATURE: 97.5 F | BODY MASS INDEX: 45.18 KG/M2 | WEIGHT: 255 LBS | HEART RATE: 74 BPM | RESPIRATION RATE: 14 BRPM | OXYGEN SATURATION: 98 % | DIASTOLIC BLOOD PRESSURE: 84 MMHG

## 2024-05-28 DIAGNOSIS — C90.00 MULTIPLE MYELOMA NOT HAVING ACHIEVED REMISSION (HCC): ICD-10-CM

## 2024-05-28 LAB
ALBUMIN SERPL-MCNC: 3.7 G/DL (ref 3.5–5)
ALBUMIN/GLOB SERPL: 1.5 (ref 1–1.9)
ALP SERPL-CCNC: 62 U/L (ref 35–104)
ALT SERPL-CCNC: 15 U/L (ref 12–65)
ANION GAP SERPL CALC-SCNC: 9 MMOL/L (ref 9–18)
APTT PPP: 26.9 SEC (ref 23.3–37.4)
AST SERPL-CCNC: 28 U/L (ref 15–37)
BASOPHILS # BLD: 0.1 K/UL (ref 0–0.2)
BASOPHILS # BLD: 0.1 K/UL (ref 0–0.2)
BASOPHILS NFR BLD: 1 % (ref 0–2)
BASOPHILS NFR BLD: 1 % (ref 0–2)
BILIRUB SERPL-MCNC: 0.3 MG/DL (ref 0–1.2)
BONE MARROW PREP & WRIGHT STAIN: NORMAL
BUN SERPL-MCNC: 10 MG/DL (ref 6–23)
CALCIUM SERPL-MCNC: 9 MG/DL (ref 8.8–10.2)
CHLORIDE SERPL-SCNC: 104 MMOL/L (ref 98–107)
CO2 SERPL-SCNC: 28 MMOL/L (ref 20–28)
CREAT SERPL-MCNC: 0.64 MG/DL (ref 0.6–1.1)
DIFFERENTIAL METHOD BLD: ABNORMAL
DIFFERENTIAL METHOD BLD: NORMAL
EOSINOPHIL # BLD: 0.4 K/UL (ref 0–0.8)
EOSINOPHIL # BLD: 0.4 K/UL (ref 0–0.8)
EOSINOPHIL NFR BLD: 6 % (ref 0.5–7.8)
EOSINOPHIL NFR BLD: 7 % (ref 0.5–7.8)
ERYTHROCYTE [DISTWIDTH] IN BLOOD BY AUTOMATED COUNT: 14.6 % (ref 11.9–14.6)
ERYTHROCYTE [DISTWIDTH] IN BLOOD BY AUTOMATED COUNT: 14.8 % (ref 11.9–14.6)
GLOBULIN SER CALC-MCNC: 2.4 G/DL (ref 2.3–3.5)
GLUCOSE BLD STRIP.AUTO-MCNC: 128 MG/DL (ref 65–100)
GLUCOSE SERPL-MCNC: 109 MG/DL (ref 70–99)
HCT VFR BLD AUTO: 36.3 % (ref 35.8–46.3)
HCT VFR BLD AUTO: 36.9 % (ref 35.8–46.3)
HGB BLD-MCNC: 11.7 G/DL (ref 11.7–15.4)
HGB BLD-MCNC: 12.1 G/DL (ref 11.7–15.4)
IMM GRANULOCYTES # BLD AUTO: 0 K/UL (ref 0–0.5)
IMM GRANULOCYTES # BLD AUTO: 0 K/UL (ref 0–0.5)
IMM GRANULOCYTES NFR BLD AUTO: 0 % (ref 0–5)
IMM GRANULOCYTES NFR BLD AUTO: 0 % (ref 0–5)
INR PPP: 1.1
KAPPA LC FREE SER-MCNC: 8.4 MG/L (ref 2.4–20.7)
KAPPA LC FREE/LAMBDA FREE SER: 0.6 (ref 0.2–0.8)
LAMBDA LC FREE SERPL-MCNC: 13.6 MG/L (ref 4.2–27.7)
LYMPHOCYTES # BLD: 2 K/UL (ref 0.5–4.6)
LYMPHOCYTES # BLD: 2.6 K/UL (ref 0.5–4.6)
LYMPHOCYTES NFR BLD: 35 % (ref 13–44)
LYMPHOCYTES NFR BLD: 43 % (ref 13–44)
MCH RBC QN AUTO: 28.5 PG (ref 26.1–32.9)
MCH RBC QN AUTO: 29 PG (ref 26.1–32.9)
MCHC RBC AUTO-ENTMCNC: 32.2 G/DL (ref 31.4–35)
MCHC RBC AUTO-ENTMCNC: 32.8 G/DL (ref 31.4–35)
MCV RBC AUTO: 88.5 FL (ref 82–102)
MCV RBC AUTO: 88.5 FL (ref 82–102)
MONOCYTES # BLD: 0.6 K/UL (ref 0.1–1.3)
MONOCYTES # BLD: 0.7 K/UL (ref 0.1–1.3)
MONOCYTES NFR BLD: 11 % (ref 4–12)
MONOCYTES NFR BLD: 12 % (ref 4–12)
NEUTS SEG # BLD: 2.3 K/UL (ref 1.7–8.2)
NEUTS SEG # BLD: 2.6 K/UL (ref 1.7–8.2)
NEUTS SEG NFR BLD: 39 % (ref 43–78)
NEUTS SEG NFR BLD: 45 % (ref 43–78)
NRBC # BLD: 0 K/UL (ref 0–0.2)
NRBC # BLD: 0 K/UL (ref 0–0.2)
PLATELET # BLD AUTO: 276 K/UL (ref 150–450)
PLATELET # BLD AUTO: 282 K/UL (ref 150–450)
PMV BLD AUTO: 9.5 FL (ref 9.4–12.3)
PMV BLD AUTO: 9.8 FL (ref 9.4–12.3)
POTASSIUM SERPL-SCNC: 3.6 MMOL/L (ref 3.5–5.1)
PROT SERPL-MCNC: 6.2 G/DL (ref 6.3–8.2)
PROTHROMBIN TIME: 13.7 SEC (ref 11.3–14.9)
RBC # BLD AUTO: 4.1 M/UL (ref 4.05–5.2)
RBC # BLD AUTO: 4.17 M/UL (ref 4.05–5.2)
SERVICE CMNT-IMP: ABNORMAL
SODIUM SERPL-SCNC: 141 MMOL/L (ref 136–145)
WBC # BLD AUTO: 5.8 K/UL (ref 4.3–11.1)
WBC # BLD AUTO: 6 K/UL (ref 4.3–11.1)

## 2024-05-28 PROCEDURE — 82784 ASSAY IGA/IGD/IGG/IGM EACH: CPT

## 2024-05-28 PROCEDURE — 88313 SPECIAL STAINS GROUP 2: CPT

## 2024-05-28 PROCEDURE — 86334 IMMUNOFIX E-PHORESIS SERUM: CPT

## 2024-05-28 PROCEDURE — 6360000002 HC RX W HCPCS: Performed by: PHYSICIAN ASSISTANT

## 2024-05-28 PROCEDURE — 85025 COMPLETE CBC W/AUTO DIFF WBC: CPT

## 2024-05-28 PROCEDURE — 84165 PROTEIN E-PHORESIS SERUM: CPT

## 2024-05-28 PROCEDURE — 2580000003 HC RX 258: Performed by: INTERNAL MEDICINE

## 2024-05-28 PROCEDURE — 82962 GLUCOSE BLOOD TEST: CPT

## 2024-05-28 PROCEDURE — A9609 HC RX DIAGNOSTIC RADIOPHARMACEUTICAL: HCPCS | Performed by: INTERNAL MEDICINE

## 2024-05-28 PROCEDURE — 36415 COLL VENOUS BLD VENIPUNCTURE: CPT

## 2024-05-28 PROCEDURE — 78815 PET IMAGE W/CT SKULL-THIGH: CPT

## 2024-05-28 PROCEDURE — 3430000000 HC RX DIAGNOSTIC RADIOPHARMACEUTICAL: Performed by: INTERNAL MEDICINE

## 2024-05-28 PROCEDURE — 85730 THROMBOPLASTIN TIME PARTIAL: CPT

## 2024-05-28 PROCEDURE — 88305 TISSUE EXAM BY PATHOLOGIST: CPT

## 2024-05-28 PROCEDURE — 99152 MOD SED SAME PHYS/QHP 5/>YRS: CPT | Performed by: RADIOLOGY

## 2024-05-28 PROCEDURE — 85610 PROTHROMBIN TIME: CPT

## 2024-05-28 PROCEDURE — 88311 DECALCIFY TISSUE: CPT

## 2024-05-28 PROCEDURE — 83521 IG LIGHT CHAINS FREE EACH: CPT

## 2024-05-28 PROCEDURE — 80053 COMPREHEN METABOLIC PANEL: CPT

## 2024-05-28 PROCEDURE — 6360000004 HC RX CONTRAST MEDICATION: Performed by: INTERNAL MEDICINE

## 2024-05-28 PROCEDURE — 77012 CT SCAN FOR NEEDLE BIOPSY: CPT | Performed by: PHYSICIAN ASSISTANT

## 2024-05-28 PROCEDURE — 6360000002 HC RX W HCPCS: Performed by: RADIOLOGY

## 2024-05-28 PROCEDURE — 77012 CT SCAN FOR NEEDLE BIOPSY: CPT

## 2024-05-28 PROCEDURE — 38222 DX BONE MARROW BX & ASPIR: CPT | Performed by: PHYSICIAN ASSISTANT

## 2024-05-28 RX ORDER — DIPHENHYDRAMINE HYDROCHLORIDE 50 MG/ML
INJECTION INTRAMUSCULAR; INTRAVENOUS PRN
Status: COMPLETED | OUTPATIENT
Start: 2024-05-28 | End: 2024-05-28

## 2024-05-28 RX ORDER — FLUDEOXYGLUCOSE F 18 200 MCI/ML
12.98 INJECTION, SOLUTION INTRAVENOUS
Status: COMPLETED | OUTPATIENT
Start: 2024-05-28 | End: 2024-05-28

## 2024-05-28 RX ORDER — MIDAZOLAM HYDROCHLORIDE 2 MG/2ML
INJECTION, SOLUTION INTRAMUSCULAR; INTRAVENOUS PRN
Status: COMPLETED | OUTPATIENT
Start: 2024-05-28 | End: 2024-05-28

## 2024-05-28 RX ORDER — FENTANYL CITRATE 50 UG/ML
INJECTION, SOLUTION INTRAMUSCULAR; INTRAVENOUS PRN
Status: COMPLETED | OUTPATIENT
Start: 2024-05-28 | End: 2024-05-28

## 2024-05-28 RX ORDER — SODIUM CHLORIDE 0.9 % (FLUSH) 0.9 %
20 SYRINGE (ML) INJECTION AS NEEDED
Status: DISCONTINUED | OUTPATIENT
Start: 2024-05-28 | End: 2024-06-01 | Stop reason: HOSPADM

## 2024-05-28 RX ADMIN — DIATRIZOATE MEGLUMINE AND DIATRIZOATE SODIUM 10 ML: 660; 100 LIQUID ORAL; RECTAL at 08:57

## 2024-05-28 RX ADMIN — DIPHENHYDRAMINE HYDROCHLORIDE 12.5 MG: 50 INJECTION, SOLUTION INTRAMUSCULAR; INTRAVENOUS at 14:20

## 2024-05-28 RX ADMIN — FENTANYL CITRATE 25 MCG: 50 INJECTION, SOLUTION INTRAMUSCULAR; INTRAVENOUS at 14:30

## 2024-05-28 RX ADMIN — SODIUM CHLORIDE, PRESERVATIVE FREE 20 ML: 5 INJECTION INTRAVENOUS at 08:57

## 2024-05-28 RX ADMIN — MIDAZOLAM HYDROCHLORIDE 0.5 MG: 1 INJECTION, SOLUTION INTRAMUSCULAR; INTRAVENOUS at 14:30

## 2024-05-28 RX ADMIN — FLUDEOXYGLUCOSE F 18 12.98 MILLICURIE: 200 INJECTION, SOLUTION INTRAVENOUS at 08:57

## 2024-05-28 RX ADMIN — FENTANYL CITRATE 50 MCG: 50 INJECTION, SOLUTION INTRAMUSCULAR; INTRAVENOUS at 14:20

## 2024-05-28 RX ADMIN — MIDAZOLAM HYDROCHLORIDE 1 MG: 1 INJECTION, SOLUTION INTRAMUSCULAR; INTRAVENOUS at 14:20

## 2024-05-28 ASSESSMENT — PAIN SCALES - GENERAL
PAINLEVEL_OUTOF10: 0
PAINLEVEL_OUTOF10: 2
PAINLEVEL_OUTOF10: 0

## 2024-05-28 ASSESSMENT — PAIN - FUNCTIONAL ASSESSMENT: PAIN_FUNCTIONAL_ASSESSMENT: NONE - DENIES PAIN

## 2024-05-28 NOTE — OR NURSING
TRANSFER - OUT REPORT:           Verbal report given to MIKE Spencer(name) on Jamaica Leo  being transferred to IR Recovery 2(unit) for  routine post-op            Report consisted of patient’s Situation, Background, Assessment and      Recommendations(SBAR).          Information from the following report(s) SBAR, Procedure Summary, and MAR was reviewed with the receiving nurse.       Opportunity for questions and clarification was provided.          Conscious Sedation:    75 Mcg of Fentanyl administered   1.5 Mg of Versed administered   12.5 Mg of Benadryl administered        Pt tolerated procedure well.      Peripheral Intravenous Line:   Peripheral IV 05/28/24 Left Antecubital (Active)

## 2024-05-28 NOTE — H&P
Results   Component Value Date/Time     05/28/2024 08:21 AM     12/13/2023 02:20 PM    K 3.6 05/28/2024 08:21 AM    K 3.4 12/13/2023 02:20 PM     05/28/2024 08:21 AM     12/13/2023 02:20 PM    CO2 28 05/28/2024 08:21 AM    CO2 30 12/13/2023 02:20 PM    BUN 10 05/28/2024 08:21 AM    BUN 8 12/13/2023 02:20 PM    GFRAA >60 09/20/2022 02:50 PM    GFRAA >60 08/17/2022 02:16 PM    MG 2.2 06/14/2023 01:30 PM    MG 2.2 01/04/2023 10:56 AM    PHOS 4.4 07/05/2022 11:42 AM    PHOS 4.2 06/30/2022 09:39 AM    GLOB 2.4 05/28/2024 08:21 AM    GLOB 3.1 12/13/2023 02:20 PM    GLOB 2.5 12/13/2023 02:20 PM    ALT 15 05/28/2024 08:21 AM    ALT 18 12/13/2023 02:20 PM     Lab Results   Component Value Date/Time    WBC 6.0 05/28/2024 12:50 PM    WBC 5.8 05/28/2024 08:21 AM    HGB 11.7 05/28/2024 12:50 PM    HGB 12.1 05/28/2024 08:21 AM    HCT 36.3 05/28/2024 12:50 PM    HCT 36.9 05/28/2024 08:21 AM     05/28/2024 12:50 PM     05/28/2024 08:21 AM     Lab Results   Component Value Date/Time    APTT 26.9 05/28/2024 08:21 AM    APTT 27.5 05/08/2022 12:36 PM    INR 1.1 05/28/2024 08:21 AM    INR 1.1 05/08/2022 12:36 PM       Assessment:     Very pleasant 53-year-old female with multiple myeloma.  Prior bone marrow biopsy was March 2023        Plan:     Planned Procedure: CT-guided bone marrow aspiration and core bone biopsy under moderate sedation    Risks, benefits, and alternatives reviewed with patient and she agrees to proceed with the procedure.      Signed By: HENRIK Rodriguez     May 28, 2024

## 2024-05-28 NOTE — OR NURSING
Recovery period without difficulty. Pt alert and oriented and denies pain. Dressing is clean, dry, and intact. Reviewed discharge instructions with patient, verbalized understanding. Pt escorted to lobby discharge area via wheelchair. Vital signs and Katie score completed.

## 2024-05-28 NOTE — PROCEDURES
provided  by trained independent observer present.     Findings: No post biopsy hemorrhage.     IMPRESSION:  Uncomplicated CT guided bone marrow aspiration and core bone biopsy.        Plan:  Bedrest observation for minimum 30 minutes.

## 2024-05-28 NOTE — PRE SEDATION
Sedation Pre-Procedure Note    Patient Name: Jamaica Leo   YOB: 1970  Room/Bed: Room/bed info not found  Medical Record Number: [id number]  Date: 5/28/2024   Time: 1:31 PM       Indication:  Multiple myeloma/bone marrow biopsy    Consent: I have discussed with the patient and/or the patient representative the indication, alternatives, and the possible risks and/or complications of the planned procedure and the anesthesia methods. The patient and/or patient representative appear to understand and agree to proceed.    Vital Signs:   Vitals:    05/28/24 1253   BP: 127/64   Pulse: 76   Resp: 18   Temp: 97.5 °F (36.4 °C)   SpO2: 98%       Past Medical History:   has a past medical history of Obesity and Prolactin increased.    Past Surgical History:   has a past surgical history that includes IR TUNNELED CVC PLACE WO SQ PORT/PUMP > 5 YEARS (5/9/2022); IR BIOPSY BONE MARROW; and IR TUNNELED CVC PLACE WO SQ PORT/PUMP > 5 YEARS (5/9/2022).    Medications:   Scheduled Meds:   Continuous Infusions:   PRN Meds:   Home Meds:   Prior to Admission medications    Medication Sig Start Date End Date Taking? Authorizing Provider   pantoprazole (PROTONIX) 20 MG tablet Take 1 tablet by mouth daily 10/21/21   Olivia Goff MD   DULoxetine (CYMBALTA) 60 MG extended release capsule Take by mouth daily 10/31/23   Olivia Goff MD   ondansetron (ZOFRAN) 8 MG tablet Take 1 tablet by mouth every 8 hours as needed 2/2/23   Olivia Goff MD   aspirin 81 MG EC tablet Take 1 tablet by mouth daily    Olivia Goff MD   Rhubarb (ESTROVEN COMPLETE PO) Take by mouth    Olivia Goff MD   REVLIMID 10 MG chemo capsule  12/27/22   Olivia Goff MD   sulfamethoxazole-trimethoprim (BACTRIM DS;SEPTRA DS) 800-160 MG per tablet Take 1 tablet by mouth three times a week  Patient not taking: Reported on 1/4/2023 8/17/22   Shabbir Aj MD Handicap Placard [facility] Supply prescription to

## 2024-05-28 NOTE — DISCHARGE INSTRUCTIONS
If you have any questions about your procedure, please call the Interventional Radiology department at [phone].   After business hours (5pm) and weekends, call the answering service at [phone] and ask for the Radiologist on call to be paged.     Si tiene Preguntas acerca del procedimiento, por favor llame al departamento de Radiología Intervencional al [phone].   Después de horas de oficina (5 pm) y los fines de semana, llamar al servicio de llamadas al [phone] y pregunte por el Radiologo de beatrice.      Interventional Radiology General Nurse Discharge    After general anesthesia or intravenous sedation, for 24 hours or while taking prescription Narcotics:  Limit your activities  Do not drive and operate hazardous machinery  Do not make important personal or business decisions  Do  not drink alcoholic beverages  If you have not urinated within 8 hours after discharge, please contact your surgeon on call.    * Please give a list of your current medications to your Primary Care Provider.  * Please update this list whenever your medications are discontinued, doses are     changed, or new medications (including over-the-counter products) are added.  * Please carry medication information at all times in case of emergency situations.    These are general instructions for a healthy lifestyle:    No smoking/ No tobacco products/ Avoid exposure to second hand smoke  Surgeon General's Warning:  Quitting smoking now greatly reduces serious risk to your health.    Obesity, smoking, and sedentary lifestyle greatly increases your risk for illness  A healthy diet, regular physical exercise & weight monitoring are important for maintaining a healthy lifestyle    You may be retaining fluid if you have a history of heart failure or if you experience any of the following symptoms:  Weight gain of 3 pounds or more overnight or 5 pounds in a week, increased swelling in our hands or feet or shortness of breath 
surgery itself

## 2024-06-05 LAB
ALBUMIN SERPL ELPH-MCNC: 3.6 G/DL (ref 2.9–4.4)
ALBUMIN/GLOB SERPL: 1.6 (ref 0.7–1.7)
ALPHA1 GLOB SERPL ELPH-MCNC: 0.2 G/DL (ref 0–0.4)
ALPHA2 GLOB SERPL ELPH-MCNC: 0.7 G/DL (ref 0.4–1)
B-GLOBULIN SERPL ELPH-MCNC: 1 G/DL (ref 0.7–1.3)
GAMMA GLOB SERPL ELPH-MCNC: 0.4 G/DL (ref 0.4–1.8)
GLOBULIN SER-MCNC: 2.3 G/DL (ref 2.2–3.9)
IGA SERPL-MCNC: 28 MG/DL (ref 87–352)
IGG SERPL-MCNC: 611 MG/DL (ref 586–1602)
IGM SERPL-MCNC: 21 MG/DL (ref 26–217)
INTERPRETATION SERPL IEP-IMP: ABNORMAL
M PROTEIN SERPL ELPH-MCNC: ABNORMAL G/DL
PROT SERPL-MCNC: 5.9 G/DL (ref 6–8.5)

## 2024-06-06 LAB
FLOW CYTOMETRY RESULTS: NORMAL
SPECIMEN SOURCE: NORMAL
TEST ORDERED: NORMAL

## 2024-07-02 ENCOUNTER — HOSPITAL ENCOUNTER (OUTPATIENT)
Dept: INFUSION THERAPY | Age: 54
Setting detail: INFUSION SERIES
Discharge: HOME | End: 2024-07-02
Payer: COMMERCIAL

## 2024-07-02 ENCOUNTER — OFFICE VISIT (OUTPATIENT)
Dept: ONCOLOGY | Age: 54
End: 2024-07-02
Payer: COMMERCIAL

## 2024-07-02 VITALS
WEIGHT: 257.5 LBS | HEART RATE: 85 BPM | SYSTOLIC BLOOD PRESSURE: 131 MMHG | RESPIRATION RATE: 16 BRPM | TEMPERATURE: 98.4 F | HEIGHT: 63 IN | BODY MASS INDEX: 45.62 KG/M2 | OXYGEN SATURATION: 99 % | DIASTOLIC BLOOD PRESSURE: 86 MMHG

## 2024-07-02 DIAGNOSIS — Z94.81 BONE MARROW TRANSPLANT STATUS (HCC): ICD-10-CM

## 2024-07-02 DIAGNOSIS — D84.9 IMMUNOCOMPROMISED (HCC): ICD-10-CM

## 2024-07-02 DIAGNOSIS — Z94.84 H/O AUTOLOGOUS STEM CELL TRANSPLANT (HCC): ICD-10-CM

## 2024-07-02 DIAGNOSIS — C90.00 MULTIPLE MYELOMA NOT HAVING ACHIEVED REMISSION (HCC): Primary | ICD-10-CM

## 2024-07-02 PROCEDURE — 90648 HIB PRP-T VACCINE 4 DOSE IM: CPT | Performed by: INTERNAL MEDICINE

## 2024-07-02 PROCEDURE — 90472 IMMUNIZATION ADMIN EACH ADD: CPT | Performed by: INTERNAL MEDICINE

## 2024-07-02 PROCEDURE — 90707 MMR VACCINE SC: CPT | Performed by: INTERNAL MEDICINE

## 2024-07-02 PROCEDURE — G0010 ADMIN HEPATITIS B VACCINE: HCPCS | Performed by: INTERNAL MEDICINE

## 2024-07-02 PROCEDURE — 90700 DTAP VACCINE < 7 YRS IM: CPT | Performed by: INTERNAL MEDICINE

## 2024-07-02 PROCEDURE — 99214 OFFICE O/P EST MOD 30 MIN: CPT | Performed by: INTERNAL MEDICINE

## 2024-07-02 PROCEDURE — 90472 IMMUNIZATION ADMIN EACH ADD: CPT

## 2024-07-02 PROCEDURE — G0009 ADMIN PNEUMOCOCCAL VACCINE: HCPCS | Performed by: INTERNAL MEDICINE

## 2024-07-02 PROCEDURE — 90677 PCV20 VACCINE IM: CPT | Performed by: INTERNAL MEDICINE

## 2024-07-02 PROCEDURE — 90740 HEPB VACC 3 DOSE IMMUNSUP IM: CPT | Performed by: INTERNAL MEDICINE

## 2024-07-02 PROCEDURE — 6360000002 HC RX W HCPCS: Performed by: INTERNAL MEDICINE

## 2024-07-02 PROCEDURE — 90471 IMMUNIZATION ADMIN: CPT | Performed by: INTERNAL MEDICINE

## 2024-07-02 PROCEDURE — 90713 POLIOVIRUS IPV SC/IM: CPT | Performed by: INTERNAL MEDICINE

## 2024-07-02 RX ORDER — GABAPENTIN 600 MG/1
600 TABLET ORAL 3 TIMES DAILY
COMMUNITY
Start: 2024-06-13

## 2024-07-02 RX ORDER — CEPHRADINE 500 MG
200 CAPSULE ORAL 3 TIMES DAILY
COMMUNITY

## 2024-07-02 RX ORDER — LAMOTRIGINE 100 MG/1
100 TABLET ORAL 2 TIMES DAILY
COMMUNITY
Start: 2024-04-25

## 2024-07-02 RX ADMIN — HEPATITIS B VACCINE (RECOMBINANT) 1 ML: 20 INJECTION, SUSPENSION INTRAMUSCULAR at 16:21

## 2024-07-02 RX ADMIN — MEASLES, MUMPS, AND RUBELLA VIRUS VACCINE LIVE 0.5 ML: 1000; 12500; 1000 INJECTION, POWDER, LYOPHILIZED, FOR SUSPENSION SUBCUTANEOUS at 16:25

## 2024-07-02 RX ADMIN — DIPHTHERIA AND TETANUS TOXOIDS AND ACELLULAR PERTUSSIS VACCINE ADSORBED 0.5 ML: 10; 25; 25; 25; 8 SUSPENSION INTRAMUSCULAR at 16:26

## 2024-07-02 RX ADMIN — HAEMOPHILUS B POLYSACCHARIDE CONJUGATE VACCINE FOR INJ 0.5 ML: RECON SOLN at 16:27

## 2024-07-02 RX ADMIN — PNEUMOCOCCAL 20-VALENT CONJUGATE VACCINE 0.5 ML
2.2; 2.2; 2.2; 2.2; 2.2; 2.2; 2.2; 2.2; 2.2; 2.2; 2.2; 2.2; 2.2; 2.2; 2.2; 2.2; 4.4; 2.2; 2.2; 2.2 INJECTION, SUSPENSION INTRAMUSCULAR at 16:22

## 2024-07-02 RX ADMIN — POLIOVIRUS TYPE 1 ANTIGEN (FORMALDEHYDE INACTIVATED), POLIOVIRUS TYPE 2 ANTIGEN (FORMALDEHYDE INACTIVATED), AND POLIOVIRUS TYPE 3 ANTIGEN (FORMALDEHYDE INACTIVATED) 0.5 ML: 40; 8; 32 INJECTION, SUSPENSION INTRAMUSCULAR at 16:24

## 2024-07-02 ASSESSMENT — PATIENT HEALTH QUESTIONNAIRE - PHQ9
SUM OF ALL RESPONSES TO PHQ QUESTIONS 1-9: 0
SUM OF ALL RESPONSES TO PHQ QUESTIONS 1-9: 0
SUM OF ALL RESPONSES TO PHQ9 QUESTIONS 1 & 2: 0
1. LITTLE INTEREST OR PLEASURE IN DOING THINGS: NOT AT ALL
SUM OF ALL RESPONSES TO PHQ QUESTIONS 1-9: 0
2. FEELING DOWN, DEPRESSED OR HOPELESS: NOT AT ALL
SUM OF ALL RESPONSES TO PHQ QUESTIONS 1-9: 0

## 2024-07-02 NOTE — PROGRESS NOTES
autologous stem cell transplant, ongoing treatments and monitoring.  She remains on daratumumab-RD locally by Dr. Kent.  Tolerating reasonably well.  Also working on weight loss.  Recent blood work unremarkable, SPEP/LC without evidence of disease, PET scan with stable FDG avid right thyroid nodule, no evidence of myeloma recurrence.  Continue following with ENT as needed.  For now we will continue with daratumumab-RD.  We will see her back in 6 months at which point we will consider dropping daratumumab.  She will complete her vaccinations today.        1. Multiple Myeloma, ISS stage 1, high risk disease (gain of 1 q.)  2.  Neuropathy.  3. Iron Deficiency  4. 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     PLAN:  - As above. S/p [id number]/ASCT 6/22. Continue maintenance with Daratumumab-Revlimid (daratumumab monthly, Revlimid 10 mg 3 out of 4 weeks).   - S/p Olivia-RVD [id number] x 4 cycles and Olivia-RD [id number] x 2.   - Neuropathy: On Gabapentin 600 mg 3 times daily along with Cymbalta 60 mg daily.     - Prophylactic medication: acyclovir , daily baby aspirin  - Evusheld [id number] in past   - Iron deficiency: IV iron x 2. Advised GI evaluation as well as pelvic exam post transplant.  - F/u w ENT as needed.     RTC in 6 months with labs, SPEP, LC.  Repeat bone marrow biopsy with MRD testing, and PET scan in 1 year.    I truly appreciate the kind referral from Dr. Kent. Please call w/ any questions    Shabbir Aj MD  [facility]  Hematology Oncology  [address] [place] [address]  Office : [phone]  Fax : [phone]

## 2024-07-02 NOTE — PROGRESS NOTES
Arrived to the Infusion Center.  Vaccines completed.   Patient instructed to report any side affects to ordering provider.  Patient tolerated well.   Any issues or concerns during appointment: none.  Patient aware of next lab/office appointment on 1/6/25 (date) at 1450 (time).  Discharged home ambulatory.

## 2024-07-02 NOTE — PATIENT INSTRUCTIONS
Patient Information from Today's Visit    The members of your Oncology Medical Home are listed below:    Physician Provider: Shabbir Aj Medical Oncologist  Advanced Practice Clinician: Ewa New NP  Registered Nurse: Za BALDERAS RN  Navigator: N/A  Medical Assistant: Korina BALDERAS MA  : Zully HEMPHILL   Supportive Care Services: William MORSE LM    Diagnosis: Multiple Myeloma      Follow Up Instructions:   - Continue with Revlimid as prescribed.  - Post transplant vaccines today  - Follow up with Dr. Aj in 6 months  - PET and bone marrow biopsy will be due in one year    Treatment Summary has been discussed and given to patient:No      Current Labs:   none today            Please refer to After Visit Summary or [employer] for upcoming appointment information. Please call our office for rescheduling needs at least 24 hours before your scheduled appointment time.If you have any questions regarding your upcoming schedule please reach out to your care team through [employer] or call [phone].     Please notify your assigned Nurse Navigator of any unplanned hospital admissions or Emergency Room visits within 24 hours of discharge.    -------------------------------------------------------------------------------------------------------------------  Please call our office at [phone] if you have any  of the following symptoms:   Fever of 100.5 or greater  Chills  Shortness of breath  Swelling or pain in one leg    After office hours an answering service is available and will contact a provider for emergencies or if you are experiencing any of the above symptoms.    MARIANA PERLA RN

## 2024-11-12 NOTE — FLOWSHEET NOTE
Daily Note     Today's date: 2024  Patient name: Saqib Byrnes  : 2012  MRN: [id number]  Referring provider: Smita Aguilar  Dx:   Encounter Diagnosis     ICD-10-CM    1. Congenital diplegia (HCC)  G80.8            Subjective: Saqib returns to day with his Mom. She reports he did not have school today and went to . Mom reported and Saqib complained all session of being tired.   Objective:  ROM of hamstrings, MFR in supine to hamstrings and heelcords  Mat program w AAROM: ankle Dfx/Pfx, knee flexion/heel slides, hip abduction, SLR,prone knee flexion x 20 ea LE   Flex B/L  hip/knee to place feet on mat to perform bridges w PT holding them in place x 10 w cues to push up and hold  Prone with shoulder flexion w one UE to hold x 10 sec the repeat other UE  Push ups with belly on table x 10 x 2 sets  Working on rolling supine to prone and back without sitting up x 5  Prone press ups on his elbows to perform prone knee flexion- placed small under inflated ball at his chest to help him stay upright x 10 ea LE  Ambulation through gym with bilateral wide base quad canes and min/mod  assist +vc to keep canes straight and not in front of him   Nustep x 7 minutes x 4 minutes at resistance 4, then lowered to 1 to use his LE only x 3 minutes   Total gym- TKE x 20, jumps x 10, prone pull ups x 15 with mod assist  Sit to stand from total gym platform x 10 x2 without hands   Stair training  to alternate feet with railing and handhold x 4 steps out front         Assessment:     Saqib returns today  in good spirits and talkative as usual, He was lunging with his walker and moving way too quickly and had to be stopped a few times and then transitioned to his quad canes. PT noting more independent steps and more upright trunk when but more cues for cane placements today- he consistently turned them sideways.  He continues to have more difficulty with AAROm of ankles into Dfx/pfx. He had  Diagnosis: Multiple Myeloma  Auto Transplant Date:  06/02/22  Day: (+/-) +10  Chemo regimen used: Melphalan  Labs not resulted that need follow-up: None at this time  BMT assessments and toxicities completed. 06/12/22  Mouth Care completed 3 times this shift. WBC/ANC= 0.3/0.0     Prophylactic medications started on D+1(06/03/22) PO Diflucan;Levaquin; Augmentin; (on Acyclovir PTA)  Toxicities greater than 2 : None  Patient seen by MD/NP daily while inpt & until engraftment.   New orders received: Transfused 1 unit platelets per BMT protocol    Norco 7.5mg PO given x 1, Lomotil given times 1       06/12/22 1430   Toxicity Assessment   Fatigue Grade 1    Motor Weakness Grade 0   Loss of Appetite Grade 0   Cough None   Nausea Grade 1    Number of Stools per Week 2  (2 documented in last 24 hrs)   Diarrhea Grade 2    Alopecia Grade 1    Insomnia Grade 1    Depression Grade 1    Anxiety Grade 1    Muscle Aches Grade 1    Bone Pain Grade 1    Swelling Grade 1    Oral Assessment Score   Voice 1   Swallow 1   Lips 1   Tongue 1   Saliva 1   Mucous Membranes 1   Gingiva 1   Teeth or Dentures 1   Oral Assessment Total 8     Report given at bedside difficulty with bridges and could just clear the surface and would go too quickly, but was more consistent with pushing his hips into extension today. He continues to lack lumbar flexibility which makes prone press ups and bridges difficult. He lifted his head and shoulders in partial sit up to roll. Practiced laying flat to roll to each side.  He continues to use trunk /hip flexion for stability but is working on increasing extension of his hips to activate his trunk. He required more cueing at terminal knee extension to hold as he quickly returned to start. He is showing improvement with his balance with the quad canes, but pulls them in front of him making a longer step length more difficult as he kicks the canes.  His left leg takes a long stride but his right foot just meets the left instead of passing it for a longer stride. He had some difficulty on stairs, mostly because he was not attending to foot placement and leaned on PT support.  Plan: Will continue PT 2x /week with ROM, strengthening, gait,  balance and coordination activities

## 2025-01-06 ENCOUNTER — OFFICE VISIT (OUTPATIENT)
Dept: ONCOLOGY | Age: 55
End: 2025-01-06
Payer: COMMERCIAL

## 2025-01-06 ENCOUNTER — HOSPITAL ENCOUNTER (OUTPATIENT)
Dept: LAB | Age: 55
Discharge: HOME | End: 2025-01-06
Payer: COMMERCIAL

## 2025-01-06 VITALS
TEMPERATURE: 97.8 F | HEIGHT: 63 IN | DIASTOLIC BLOOD PRESSURE: 81 MMHG | SYSTOLIC BLOOD PRESSURE: 131 MMHG | OXYGEN SATURATION: 100 % | RESPIRATION RATE: 18 BRPM | HEART RATE: 78 BPM | WEIGHT: 245.1 LBS | BODY MASS INDEX: 43.43 KG/M2

## 2025-01-06 DIAGNOSIS — D84.9 IMMUNOCOMPROMISED (HCC): ICD-10-CM

## 2025-01-06 DIAGNOSIS — Z94.81 BONE MARROW TRANSPLANT STATUS (HCC): ICD-10-CM

## 2025-01-06 DIAGNOSIS — Z94.84 H/O AUTOLOGOUS STEM CELL TRANSPLANT (HCC): ICD-10-CM

## 2025-01-06 DIAGNOSIS — C90.00 MULTIPLE MYELOMA NOT HAVING ACHIEVED REMISSION (HCC): Primary | ICD-10-CM

## 2025-01-06 DIAGNOSIS — C90.00 MULTIPLE MYELOMA NOT HAVING ACHIEVED REMISSION (HCC): ICD-10-CM

## 2025-01-06 LAB
ALBUMIN SERPL-MCNC: 3.2 G/DL (ref 3.5–5)
ALBUMIN/GLOB SERPL: 0.9 (ref 1–1.9)
ALP SERPL-CCNC: 84 U/L (ref 35–104)
ALT SERPL-CCNC: 16 U/L (ref 8–45)
ANION GAP SERPL CALC-SCNC: 10 MMOL/L (ref 7–16)
AST SERPL-CCNC: 17 U/L (ref 15–37)
BASOPHILS # BLD: 0 K/UL (ref 0–0.2)
BASOPHILS NFR BLD: 1 % (ref 0–2)
BILIRUB SERPL-MCNC: <0.2 MG/DL (ref 0–1.2)
BUN SERPL-MCNC: 13 MG/DL (ref 6–23)
CALCIUM SERPL-MCNC: 9.4 MG/DL (ref 8.8–10.2)
CHLORIDE SERPL-SCNC: 101 MMOL/L (ref 98–107)
CO2 SERPL-SCNC: 32 MMOL/L (ref 20–29)
CREAT SERPL-MCNC: 0.86 MG/DL (ref 0.6–1.1)
DIFFERENTIAL METHOD BLD: ABNORMAL
EOSINOPHIL # BLD: 0.1 K/UL (ref 0–0.8)
EOSINOPHIL NFR BLD: 1 % (ref 0.5–7.8)
ERYTHROCYTE [DISTWIDTH] IN BLOOD BY AUTOMATED COUNT: 15 % (ref 11.9–14.6)
GLOBULIN SER CALC-MCNC: 3.4 G/DL (ref 2.3–3.5)
GLUCOSE SERPL-MCNC: 80 MG/DL (ref 70–99)
HCT VFR BLD AUTO: 37 % (ref 35.8–46.3)
HGB BLD-MCNC: 11.6 G/DL (ref 11.7–15.4)
IMM GRANULOCYTES # BLD AUTO: 0 K/UL (ref 0–0.5)
IMM GRANULOCYTES NFR BLD AUTO: 0 % (ref 0–5)
KAPPA LC FREE SER-MCNC: 6.8 MG/L (ref 2.4–20.7)
KAPPA LC FREE/LAMBDA FREE SER: 0.7 (ref 0.2–0.8)
LAMBDA LC FREE SERPL-MCNC: 9.1 MG/L (ref 4.2–27.7)
LYMPHOCYTES # BLD: 2.2 K/UL (ref 0.5–4.6)
LYMPHOCYTES NFR BLD: 42 % (ref 13–44)
MCH RBC QN AUTO: 29.1 PG (ref 26.1–32.9)
MCHC RBC AUTO-ENTMCNC: 31.4 G/DL (ref 31.4–35)
MCV RBC AUTO: 92.7 FL (ref 82–102)
MONOCYTES # BLD: 0.9 K/UL (ref 0.1–1.3)
MONOCYTES NFR BLD: 17 % (ref 4–12)
NEUTS SEG # BLD: 2 K/UL (ref 1.7–8.2)
NEUTS SEG NFR BLD: 39 % (ref 43–78)
NRBC # BLD: 0 K/UL (ref 0–0.2)
PLATELET # BLD AUTO: 381 K/UL (ref 150–450)
PMV BLD AUTO: 9.1 FL (ref 9.4–12.3)
POTASSIUM SERPL-SCNC: 3.4 MMOL/L (ref 3.5–5.1)
PROT SERPL-MCNC: 6.6 G/DL (ref 6.3–8.2)
RBC # BLD AUTO: 3.99 M/UL (ref 4.05–5.2)
SODIUM SERPL-SCNC: 143 MMOL/L (ref 136–145)
WBC # BLD AUTO: 5.2 K/UL (ref 4.3–11.1)

## 2025-01-06 PROCEDURE — 80053 COMPREHEN METABOLIC PANEL: CPT

## 2025-01-06 PROCEDURE — 83521 IG LIGHT CHAINS FREE EACH: CPT

## 2025-01-06 PROCEDURE — 36415 COLL VENOUS BLD VENIPUNCTURE: CPT

## 2025-01-06 PROCEDURE — 99214 OFFICE O/P EST MOD 30 MIN: CPT | Performed by: INTERNAL MEDICINE

## 2025-01-06 PROCEDURE — 85025 COMPLETE CBC W/AUTO DIFF WBC: CPT

## 2025-01-06 PROCEDURE — 82784 ASSAY IGA/IGD/IGG/IGM EACH: CPT

## 2025-01-06 PROCEDURE — 0077U IG PARAPROTEIN QUAL BLD/UR: CPT

## 2025-01-06 ASSESSMENT — PATIENT HEALTH QUESTIONNAIRE - PHQ9
SUM OF ALL RESPONSES TO PHQ QUESTIONS 1-9: 0
2. FEELING DOWN, DEPRESSED OR HOPELESS: NOT AT ALL
SUM OF ALL RESPONSES TO PHQ QUESTIONS 1-9: 0
SUM OF ALL RESPONSES TO PHQ9 QUESTIONS 1 & 2: 0
1. LITTLE INTEREST OR PLEASURE IN DOING THINGS: NOT AT ALL

## 2025-01-06 NOTE — PATIENT INSTRUCTIONS
Patient Information from Today's Visit    The members of your [facility] are listed below:    Physician Provider: Shabbir Aj Medical Oncologist  Advanced Practice Clinician: Ewa New NP  Registered Nurse: Za BALDERAS RN  Navigator: N/A  Medical Assistant: Korina BALDERAS MA  : Zully HEMPHILL   Supportive Care Services: William MORSE LMSW    Diagnosis: Multiple myeloma      Follow Up Instructions:   - labs reviewed  - okay to stop the darzalex and continue revlimid alone  - follow up in 6 months with labs, bone marrow biopsy, and PET scan.       Treatment Summary has been discussed and given to patient:No      Current Labs:   Hospital Outpatient Visit on 01/06/2025   Component Date Value Ref Range Status    WBC 01/06/2025 5.2  4.3 - 11.1 K/uL Final    RBC 01/06/2025 3.99 (L)  4.05 - 5.2 M/uL Final    Hemoglobin 01/06/2025 11.6 (L)  11.7 - 15.4 g/dL Final    Hematocrit 01/06/2025 37.0  35.8 - 46.3 % Final    MCV 01/06/2025 92.7  82.0 - 102.0 FL Final    MCH 01/06/2025 29.1  26.1 - 32.9 PG Final    MCHC 01/06/2025 31.4  31.4 - 35.0 g/dL Final    RDW 01/06/2025 15.0 (H)  11.9 - 14.6 % Final    Platelets 01/06/2025 381  150 - 450 K/uL Final    MPV 01/06/2025 9.1 (L)  9.4 - 12.3 FL Final    nRBC 01/06/2025 0.00  0.0 - 0.2 K/uL Final    **Note: Absolute NRBC parameter is now reported with Hemogram**    Neutrophils % 01/06/2025 39 (L)  43 - 78 % Final    Lymphocytes % 01/06/2025 42  13 - 44 % Final    Monocytes % 01/06/2025 17 (H)  4.0 - 12.0 % Final    Eosinophils % 01/06/2025 1  0.5 - 7.8 % Final    Basophils % 01/06/2025 1  0.0 - 2.0 % Final    Immature Granulocytes % 01/06/2025 0  0.0 - 5.0 % Final    Neutrophils Absolute 01/06/2025 2.0  1.7 - 8.2 K/UL Final    Lymphocytes Absolute 01/06/2025 2.2  0.5 - 4.6 K/UL Final    Monocytes Absolute 01/06/2025 0.9  0.1 - 1.3 K/UL Final    Eosinophils Absolute 01/06/2025 0.1  0.0 - 0.8 K/UL Final    Basophils Absolute 01/06/2025 0.0  0.0 - 0.2 K/UL Final

## 2025-01-06 NOTE — PROGRESS NOTES
Labs showing normal counts, creatinine normal, beta-2 microglobulin at 1.72, SPEP with M spike 0.7, however lambda light chain significantly elevated at 44,763.  She was felt to have ISS stage I disease, with plans to start RVD in the near future.  She has also been referred to radiation oncology for consolidative XRT to spine.  Dental evaluation prior to bone directed therapy.  Her thyroid nodule is also being evaluated with ultrasound.     Today seen in office, reports doing reasonably. Planning to start RVD tomorrow. Currently in wheelchair however has only recently started working with physical therapy. Reports minimal headaches. Denies incontinence. Reasonable PO intake. Reports back pain improved since surgery. Recently saw radiation oncology and not felt to need consolidative xrt. She is scheduled to see ENT regarding thyroid nodules.    We discussed role of autologous stem cell transplant in first remission after induction therapy.  Various aspects pertaining to process, risks and side effects discussed.  Patient appears interested in proceeding with procedure moving forward.    2/25/2022: Seen in follow-up.  Her bone marrow biopsy with cytogenetics showed normal karyotype, FISH panel had shown gain of 1 q., monosomy 11 and 13, and t(11;14), negative for 17p deletion or other IGH rearrangement.  She has since been started on daratumumab-RVD.  Appears to have responding disease as her lambda LC has dropped from 60,020 down to 39.  M spike is now turned negative.  She recently completed cycle 4-day 15 on 2/21/2022 (3-week cycle).  Due to ongoing neuropathy, patient today using walker.  Describes neuropathy as tingling/discomfort coming up to her calf, her Velcade dose more recently has been decreased to 0.7 mg/m2.  We will discuss case with Dr. Kent.  Given excellent response, will be reasonable to remove Velcade altogether and proceed with Daratumumab-RD q. 28 days x 2 cycles followed by plans for

## 2025-01-07 ENCOUNTER — TELEPHONE (OUTPATIENT)
Dept: ONCOLOGY | Age: 55
End: 2025-01-07

## 2025-01-07 DIAGNOSIS — C90.00 MULTIPLE MYELOMA NOT HAVING ACHIEVED REMISSION (HCC): Primary | ICD-10-CM

## 2025-01-07 DIAGNOSIS — Z94.84 H/O AUTOLOGOUS STEM CELL TRANSPLANT (HCC): ICD-10-CM

## 2025-01-07 NOTE — TELEPHONE ENCOUNTER
LVM on nurse line with Dr. Kent's office with Dr. Aj recommendation to stop darzalex maintenance and continue revlimid maintenance. Copy of office visit also faxed to their office and confirmation received.

## 2025-01-09 LAB — IMMUNOLOGIST REVIEW: NORMAL

## 2025-06-16 ENCOUNTER — TELEPHONE (OUTPATIENT)
Dept: INTERVENTIONAL RADIOLOGY/VASCULAR | Age: 55
End: 2025-06-16

## 2025-06-23 ENCOUNTER — HOSPITAL ENCOUNTER (OUTPATIENT)
Dept: PET IMAGING | Age: 55
Discharge: HOME | End: 2025-06-26
Payer: COMMERCIAL

## 2025-06-23 ENCOUNTER — HOSPITAL ENCOUNTER (OUTPATIENT)
Dept: CT IMAGING | Age: 55
Discharge: HOME | End: 2025-06-25
Attending: INTERNAL MEDICINE
Payer: COMMERCIAL

## 2025-06-23 ENCOUNTER — HOSPITAL ENCOUNTER (OUTPATIENT)
Dept: LAB | Age: 55
Discharge: HOME | End: 2025-06-23
Payer: COMMERCIAL

## 2025-06-23 VITALS
HEART RATE: 75 BPM | OXYGEN SATURATION: 98 % | RESPIRATION RATE: 16 BRPM | SYSTOLIC BLOOD PRESSURE: 140 MMHG | DIASTOLIC BLOOD PRESSURE: 78 MMHG

## 2025-06-23 DIAGNOSIS — C90.00 MULTIPLE MYELOMA NOT HAVING ACHIEVED REMISSION (HCC): ICD-10-CM

## 2025-06-23 DIAGNOSIS — Z94.84 H/O AUTOLOGOUS STEM CELL TRANSPLANT (HCC): ICD-10-CM

## 2025-06-23 LAB
ALBUMIN SERPL-MCNC: 3.7 G/DL (ref 3.5–5)
ALBUMIN/GLOB SERPL: 1.2 (ref 1–1.9)
ALP SERPL-CCNC: 76 U/L (ref 35–104)
ALT SERPL-CCNC: 15 U/L (ref 8–45)
ANION GAP SERPL CALC-SCNC: 10 MMOL/L (ref 7–16)
AST SERPL-CCNC: 26 U/L (ref 15–37)
BASOPHILS # BLD: 0.04 K/UL (ref 0–0.2)
BASOPHILS # BLD: 0.04 K/UL (ref 0–0.2)
BASOPHILS NFR BLD: 0.7 % (ref 0–2)
BASOPHILS NFR BLD: 0.9 % (ref 0–2)
BILIRUB SERPL-MCNC: 0.2 MG/DL (ref 0–1.2)
BONE MARROW PREP & WRIGHT STAIN: NORMAL
BUN SERPL-MCNC: 11 MG/DL (ref 6–23)
CALCIUM SERPL-MCNC: 9.3 MG/DL (ref 8.8–10.2)
CHLORIDE SERPL-SCNC: 103 MMOL/L (ref 98–107)
CO2 SERPL-SCNC: 28 MMOL/L (ref 20–29)
CREAT SERPL-MCNC: 0.78 MG/DL (ref 0.6–1.1)
DIFFERENTIAL METHOD BLD: ABNORMAL
DIFFERENTIAL METHOD BLD: ABNORMAL
EOSINOPHIL # BLD: 0.1 K/UL (ref 0–0.8)
EOSINOPHIL # BLD: 0.13 K/UL (ref 0–0.8)
EOSINOPHIL NFR BLD: 2.1 % (ref 0.5–7.8)
EOSINOPHIL NFR BLD: 2.4 % (ref 0.5–7.8)
ERYTHROCYTE [DISTWIDTH] IN BLOOD BY AUTOMATED COUNT: 15 % (ref 11.9–14.6)
ERYTHROCYTE [DISTWIDTH] IN BLOOD BY AUTOMATED COUNT: 15 % (ref 11.9–14.6)
GLOBULIN SER CALC-MCNC: 3.1 G/DL (ref 2.3–3.5)
GLUCOSE BLD STRIP.AUTO-MCNC: 84 MG/DL (ref 65–100)
GLUCOSE SERPL-MCNC: 104 MG/DL (ref 70–99)
HCT VFR BLD AUTO: 37.4 % (ref 35.8–46.3)
HCT VFR BLD AUTO: 38.8 % (ref 35.8–46.3)
HGB BLD-MCNC: 11.9 G/DL (ref 11.7–15.4)
HGB BLD-MCNC: 12.1 G/DL (ref 11.7–15.4)
IMM GRANULOCYTES # BLD AUTO: 0.01 K/UL (ref 0–0.5)
IMM GRANULOCYTES # BLD AUTO: 0.01 K/UL (ref 0–0.5)
IMM GRANULOCYTES NFR BLD AUTO: 0.2 % (ref 0–5)
IMM GRANULOCYTES NFR BLD AUTO: 0.2 % (ref 0–5)
INR PPP: 1
KAPPA LC FREE SER-MCNC: 12.1 MG/L (ref 2.4–20.7)
KAPPA LC FREE/LAMBDA FREE SER: 0.6 (ref 0.2–0.8)
LAMBDA LC FREE SERPL-MCNC: 21 MG/L (ref 4.2–27.7)
LYMPHOCYTES # BLD: 1.6 K/UL (ref 0.5–4.6)
LYMPHOCYTES # BLD: 2.4 K/UL (ref 0.5–4.6)
LYMPHOCYTES NFR BLD: 34.3 % (ref 13–44)
LYMPHOCYTES NFR BLD: 43.6 % (ref 13–44)
MCH RBC QN AUTO: 28.1 PG (ref 26.1–32.9)
MCH RBC QN AUTO: 28.4 PG (ref 26.1–32.9)
MCHC RBC AUTO-ENTMCNC: 31.2 G/DL (ref 31.4–35)
MCHC RBC AUTO-ENTMCNC: 31.8 G/DL (ref 31.4–35)
MCV RBC AUTO: 89.3 FL (ref 82–102)
MCV RBC AUTO: 90.2 FL (ref 82–102)
MONOCYTES # BLD: 0.55 K/UL (ref 0.1–1.3)
MONOCYTES # BLD: 0.59 K/UL (ref 0.1–1.3)
MONOCYTES NFR BLD: 10.7 % (ref 4–12)
MONOCYTES NFR BLD: 11.8 % (ref 4–12)
NEUTS SEG # BLD: 2.34 K/UL (ref 1.7–8.2)
NEUTS SEG # BLD: 2.36 K/UL (ref 1.7–8.2)
NEUTS SEG NFR BLD: 42.4 % (ref 43–78)
NEUTS SEG NFR BLD: 50.7 % (ref 43–78)
NRBC # BLD: 0 K/UL (ref 0–0.2)
NRBC # BLD: 0 K/UL (ref 0–0.2)
PLATELET # BLD AUTO: 280 K/UL (ref 150–450)
PLATELET # BLD AUTO: 288 K/UL (ref 150–450)
PMV BLD AUTO: 9 FL (ref 9.4–12.3)
PMV BLD AUTO: 9.3 FL (ref 9.4–12.3)
POTASSIUM SERPL-SCNC: 3.8 MMOL/L (ref 3.5–5.1)
PROT SERPL-MCNC: 6.8 G/DL (ref 6.3–8.2)
PROTHROMBIN TIME: 13 SEC (ref 11.3–14.9)
RBC # BLD AUTO: 4.19 M/UL (ref 4.05–5.2)
RBC # BLD AUTO: 4.3 M/UL (ref 4.05–5.2)
SERVICE CMNT-IMP: NORMAL
SODIUM SERPL-SCNC: 141 MMOL/L (ref 136–145)
WBC # BLD AUTO: 4.7 K/UL (ref 4.3–11.1)
WBC # BLD AUTO: 5.5 K/UL (ref 4.3–11.1)

## 2025-06-23 PROCEDURE — 36415 COLL VENOUS BLD VENIPUNCTURE: CPT

## 2025-06-23 PROCEDURE — 88313 SPECIAL STAINS GROUP 2: CPT

## 2025-06-23 PROCEDURE — 3430000000 HC RX DIAGNOSTIC RADIOPHARMACEUTICAL: Performed by: INTERNAL MEDICINE

## 2025-06-23 PROCEDURE — 2580000003 HC RX 258: Performed by: PHYSICIAN ASSISTANT

## 2025-06-23 PROCEDURE — 85610 PROTHROMBIN TIME: CPT

## 2025-06-23 PROCEDURE — 88305 TISSUE EXAM BY PATHOLOGIST: CPT

## 2025-06-23 PROCEDURE — 0077U IG PARAPROTEIN QUAL BLD/UR: CPT

## 2025-06-23 PROCEDURE — 38222 DX BONE MARROW BX & ASPIR: CPT | Performed by: RADIOLOGY

## 2025-06-23 PROCEDURE — 88365 INSITU HYBRIDIZATION (FISH): CPT

## 2025-06-23 PROCEDURE — 38222 DX BONE MARROW BX & ASPIR: CPT

## 2025-06-23 PROCEDURE — 82962 GLUCOSE BLOOD TEST: CPT

## 2025-06-23 PROCEDURE — 78815 PET IMAGE W/CT SKULL-THIGH: CPT

## 2025-06-23 PROCEDURE — 88342 IMHCHEM/IMCYTCHM 1ST ANTB: CPT

## 2025-06-23 PROCEDURE — 88364 INSITU HYBRIDIZATION (FISH): CPT

## 2025-06-23 PROCEDURE — 83521 IG LIGHT CHAINS FREE EACH: CPT

## 2025-06-23 PROCEDURE — 77012 CT SCAN FOR NEEDLE BIOPSY: CPT | Performed by: RADIOLOGY

## 2025-06-23 PROCEDURE — A9609 HC RX DIAGNOSTIC RADIOPHARMACEUTICAL: HCPCS | Performed by: INTERNAL MEDICINE

## 2025-06-23 PROCEDURE — 6360000002 HC RX W HCPCS: Performed by: RADIOLOGY

## 2025-06-23 PROCEDURE — 82784 ASSAY IGA/IGD/IGG/IGM EACH: CPT

## 2025-06-23 PROCEDURE — 85025 COMPLETE CBC W/AUTO DIFF WBC: CPT

## 2025-06-23 PROCEDURE — 6360000004 HC RX CONTRAST MEDICATION: Performed by: INTERNAL MEDICINE

## 2025-06-23 PROCEDURE — 99152 MOD SED SAME PHYS/QHP 5/>YRS: CPT | Performed by: RADIOLOGY

## 2025-06-23 PROCEDURE — 80053 COMPREHEN METABOLIC PANEL: CPT

## 2025-06-23 PROCEDURE — 2500000003 HC RX 250 WO HCPCS: Performed by: INTERNAL MEDICINE

## 2025-06-23 PROCEDURE — 99152 MOD SED SAME PHYS/QHP 5/>YRS: CPT

## 2025-06-23 PROCEDURE — 88311 DECALCIFY TISSUE: CPT

## 2025-06-23 PROCEDURE — 6360000002 HC RX W HCPCS: Performed by: PHYSICIAN ASSISTANT

## 2025-06-23 RX ORDER — FENTANYL CITRATE 50 UG/ML
INJECTION, SOLUTION INTRAMUSCULAR; INTRAVENOUS PRN
Status: COMPLETED | OUTPATIENT
Start: 2025-06-23 | End: 2025-06-23

## 2025-06-23 RX ORDER — SODIUM CHLORIDE 0.9 % (FLUSH) 0.9 %
20 SYRINGE (ML) INJECTION AS NEEDED
Status: DISCONTINUED | OUTPATIENT
Start: 2025-06-23 | End: 2025-06-27 | Stop reason: HOSPADM

## 2025-06-23 RX ORDER — DIATRIZOATE MEGLUMINE AND DIATRIZOATE SODIUM 660; 100 MG/ML; MG/ML
10 SOLUTION ORAL; RECTAL
Status: DISCONTINUED | OUTPATIENT
Start: 2025-06-23 | End: 2025-06-27 | Stop reason: HOSPADM

## 2025-06-23 RX ORDER — SODIUM CHLORIDE 9 MG/ML
INJECTION, SOLUTION INTRAVENOUS CONTINUOUS PRN
Status: COMPLETED | OUTPATIENT
Start: 2025-06-23 | End: 2025-06-23

## 2025-06-23 RX ORDER — LIDOCAINE HYDROCHLORIDE 10 MG/ML
INJECTION, SOLUTION INFILTRATION; PERINEURAL PRN
Status: COMPLETED | OUTPATIENT
Start: 2025-06-23 | End: 2025-06-23

## 2025-06-23 RX ORDER — FLUDEOXYGLUCOSE F 18 200 MCI/ML
13.05 INJECTION, SOLUTION INTRAVENOUS
Status: COMPLETED | OUTPATIENT
Start: 2025-06-23 | End: 2025-06-23

## 2025-06-23 RX ORDER — MIDAZOLAM HYDROCHLORIDE 1 MG/ML
INJECTION, SOLUTION INTRAMUSCULAR; INTRAVENOUS PRN
Status: COMPLETED | OUTPATIENT
Start: 2025-06-23 | End: 2025-06-23

## 2025-06-23 RX ADMIN — FENTANYL CITRATE 25 MCG: 50 INJECTION, SOLUTION INTRAMUSCULAR; INTRAVENOUS at 15:37

## 2025-06-23 RX ADMIN — SODIUM CHLORIDE, PRESERVATIVE FREE 20 ML: 5 INJECTION INTRAVENOUS at 09:50

## 2025-06-23 RX ADMIN — MIDAZOLAM 1 MG: 1 INJECTION INTRAMUSCULAR; INTRAVENOUS at 15:29

## 2025-06-23 RX ADMIN — SODIUM CHLORIDE 100 ML/HR: 9 INJECTION, SOLUTION INTRAVENOUS at 15:29

## 2025-06-23 RX ADMIN — FENTANYL CITRATE 25 MCG: 50 INJECTION, SOLUTION INTRAMUSCULAR; INTRAVENOUS at 15:42

## 2025-06-23 RX ADMIN — LIDOCAINE HYDROCHLORIDE 10 ML: 10 INJECTION, SOLUTION INFILTRATION; PERINEURAL at 15:41

## 2025-06-23 RX ADMIN — DIATRIZOATE MEGLUMINE AND DIATRIZOATE SODIUM 10 ML: 660; 100 LIQUID ORAL; RECTAL at 09:50

## 2025-06-23 RX ADMIN — FENTANYL CITRATE 50 MCG: 50 INJECTION, SOLUTION INTRAMUSCULAR; INTRAVENOUS at 15:29

## 2025-06-23 RX ADMIN — MIDAZOLAM 0.5 MG: 1 INJECTION INTRAMUSCULAR; INTRAVENOUS at 15:42

## 2025-06-23 RX ADMIN — FLUDEOXYGLUCOSE F 18 13.05 MILLICURIE: 200 INJECTION, SOLUTION INTRAVENOUS at 09:50

## 2025-06-23 RX ADMIN — MIDAZOLAM 0.5 MG: 1 INJECTION INTRAMUSCULAR; INTRAVENOUS at 15:37

## 2025-06-23 NOTE — H&P
[facility]  Department of Interventional Radiology  [phone]    History and Physical    Patient:  Jamaica Leo MRN:  [id number]  SSN:  [id number]    YOB: 1970  Age:  54 y.o.  Sex:  female      Primary Care Provider:  Connor Cates MD  Referring Physician:  Shabbir Aj MD    Subjective:     Chief Complaint: Multiple myeloma    History of the Present Illness:  The patient is a 54 y.o. female who presents for CT-guided bone marrow aspiration and core bone biopsy under moderate sedation.      Pleasant 54-year-old female with multiple myeloma.  Last bone marrow biopsy May 2024.  Patient is n.p.o. and denies taking any blood thinners.      Past Medical History:   Diagnosis Date    Obesity     Prolactin increased      Past Surgical History:   Procedure Laterality Date    IR BIOPSY BONE MARROW      IR TUNNELED CVC PLACE WO SQ PORT/PUMP > 5 YEARS  5/9/2022    IR TUNNELED CVC PLACE WO SQ PORT/PUMP > 5 YEARS  5/9/2022    IR TUNNELED CATHETER PLACEMENT GREATER THAN 5 YEARS 5/9/2022 [facility] RADIOLOGY SPECIALS        Review of Systems:    Pertinent items are noted in HPI.    Prior to Admission medications    Medication Sig Start Date End Date Taking? Authorizing Provider   Alpha-Lipoic Acid 200 MG CAPS Take 200 mg by mouth 3 times daily    Olivia Goff MD   lamoTRIgine (LAMICTAL) 100 MG tablet Take 1 tablet by mouth 2 times daily  Patient not taking: Reported on 1/6/2025 4/25/24   Olivia Goff MD   gabapentin (NEURONTIN) 600 MG tablet Take 1 tablet by mouth 3 times daily.  Patient not taking: Reported on 1/6/2025 6/13/24   Olivia Goff MD   L-Methylfolate-Algae-B12-B6 (METANX PO) Take by mouth    Olivia Goff MD   pantoprazole (PROTONIX) 20 MG tablet Take 1 tablet by mouth daily 10/21/21   Olivia Goff MD   DULoxetine (CYMBALTA) 60 MG extended release capsule Take by mouth daily 10/31/23   Olivia Goff MD

## 2025-06-23 NOTE — OR NURSING
.TRANSFER - OUT REPORT:           Verbal report given to MIKE Spencer on Jamaica Leo  being transferred to IR Recovery for routine post-op      Report consisted of patient’s Situation, Background, Assessment and Recommendations(SBAR).          Information from the following report(s) SBAR, Procedure Summary, and MAR was reviewed with the receiving nurse.       Opportunity for questions and clarification was provided.          Conscious Sedation:    100 Mcg of Fentanyl administered   2 Mg of Versed administered   0 Mg of Benadryl administered        Pt tolerated procedure well.          Sacrum 4 x 4 gauze and Other: bordered gauze clean, dry, intact, and nontender    VITALS:  BP (!) 152/84   Pulse 80   Resp 16   SpO2 100%

## 2025-06-23 NOTE — BRIEF OP NOTE
[facility]  Department of Interventional Radiology  [phone]        Interventional Radiology Brief Procedure Note    Patient: Jamaica Leo MRN: [id number]  SSN: [id number]    YOB: 1970  Age: 54 y.o.  Sex: female      Date of Procedure: 6/23/2025    Pre-Procedure Diagnosis: Multiple myeloma    Post-Procedure Diagnosis: SAME    Procedure(s): Image Guided Biopsy    Brief Description of Procedure: CT guided bone marrow biopsy and aspiration of the right posterior iliac bone    Performed By: HENRIK Rodriguez     Assistants: None    Anesthesia:Moderate Sedation    Estimated Blood Loss: Less than 10ml    Specimens:  Pathology    Implants:  None    Findings: Uncomplicated CT guided bone marrow biopsy and aspiration    Complications: None    Recommendations: Bed rest for minimum 30 minutes     Follow Up: prn    Signed By: HENRIK Rodriguez     June 23, 2025

## 2025-06-23 NOTE — DISCHARGE INSTRUCTIONS
If you have any questions about your procedure, please call the Interventional Radiology department at [phone].      After business hours (5pm) and weekends, call the answering service at [phone] and ask for the Interventional Radiologist on call to be paged.

## 2025-06-23 NOTE — PRE SEDATION
Sedation Pre-Procedure Note    Patient Name: Jamaica Leo   YOB: 1970  Room/Bed: Room/bed info not found  Medical Record Number: [id number]  Date: 6/23/2025   Time: 3:15 PM       Indication: CT-guided bone marrow biopsy and aspiration    Consent: I have discussed with the patient and/or the patient representative the indication, alternatives, and the possible risks and/or complications of the planned procedure and the anesthesia methods. The patient and/or patient representative appear to understand and agree to proceed.    Vital Signs:   Vitals:    06/23/25 1503   BP: 135/82   SpO2: 100%       Past Medical History:   has a past medical history of Obesity and Prolactin increased.    Past Surgical History:   has a past surgical history that includes IR TUNNELED CVC PLACE WO SQ PORT/PUMP > 5 YEARS (5/9/2022); IR BIOPSY BONE MARROW; and IR TUNNELED CVC PLACE WO SQ PORT/PUMP > 5 YEARS (5/9/2022).    Medications:   Scheduled Meds:   Continuous Infusions:   PRN Meds:   Home Meds:   Prior to Admission medications    Medication Sig Start Date End Date Taking? Authorizing Provider   Alpha-Lipoic Acid 200 MG CAPS Take 200 mg by mouth 3 times daily    Olivia Goff MD   lamoTRIgine (LAMICTAL) 100 MG tablet Take 1 tablet by mouth 2 times daily  Patient not taking: Reported on 1/6/2025 4/25/24   Olivia Goff MD   gabapentin (NEURONTIN) 600 MG tablet Take 1 tablet by mouth 3 times daily.  Patient not taking: Reported on 1/6/2025 6/13/24   Olivia Goff MD   L-Methylfolate-Algae-B12-B6 (METANX PO) Take by mouth    Olivia Goff MD   pantoprazole (PROTONIX) 20 MG tablet Take 1 tablet by mouth daily 10/21/21   Olivia Goff MD   DULoxetine (CYMBALTA) 60 MG extended release capsule Take by mouth daily 10/31/23   Olivia Goff MD   ondansetron (ZOFRAN) 8 MG tablet Take 1 tablet by mouth every 8 hours as needed 2/2/23   Olivia Goff MD   aspirin 81 MG EC

## 2025-06-26 LAB — IMMUNOLOGIST REVIEW: NORMAL

## 2025-07-01 LAB
FLOW CYTOMETRY RESULTS: NORMAL
SPECIMEN SOURCE: NORMAL
TEST ORDERED: NORMAL

## 2025-07-14 ENCOUNTER — OFFICE VISIT (OUTPATIENT)
Dept: ONCOLOGY | Age: 55
End: 2025-07-14
Payer: COMMERCIAL

## 2025-07-14 VITALS
WEIGHT: 255 LBS | HEART RATE: 82 BPM | SYSTOLIC BLOOD PRESSURE: 135 MMHG | RESPIRATION RATE: 16 BRPM | DIASTOLIC BLOOD PRESSURE: 73 MMHG | BODY MASS INDEX: 45.18 KG/M2 | HEIGHT: 63 IN | OXYGEN SATURATION: 97 % | TEMPERATURE: 98.5 F

## 2025-07-14 DIAGNOSIS — C90.00 MULTIPLE MYELOMA NOT HAVING ACHIEVED REMISSION (HCC): Primary | ICD-10-CM

## 2025-07-14 DIAGNOSIS — D84.9 IMMUNOCOMPROMISED: ICD-10-CM

## 2025-07-14 DIAGNOSIS — Z94.84 H/O AUTOLOGOUS STEM CELL TRANSPLANT (HCC): ICD-10-CM

## 2025-07-14 PROCEDURE — 99214 OFFICE O/P EST MOD 30 MIN: CPT | Performed by: INTERNAL MEDICINE

## 2025-07-14 RX ORDER — CAPSAICIN 8 %
KIT TOPICAL
COMMUNITY
Start: 2025-05-23

## 2025-07-14 ASSESSMENT — PATIENT HEALTH QUESTIONNAIRE - PHQ9
1. LITTLE INTEREST OR PLEASURE IN DOING THINGS: NOT AT ALL
SUM OF ALL RESPONSES TO PHQ QUESTIONS 1-9: 0
SUM OF ALL RESPONSES TO PHQ QUESTIONS 1-9: 0
2. FEELING DOWN, DEPRESSED OR HOPELESS: NOT AT ALL
SUM OF ALL RESPONSES TO PHQ QUESTIONS 1-9: 0
SUM OF ALL RESPONSES TO PHQ QUESTIONS 1-9: 0

## 2025-07-14 NOTE — PROGRESS NOTES
cellularity with no morphologic evidence of residual disease.  ClonoSeq MRD with 6 clonal cells/million noted.  Discussed results.  Okay to stop Bactrim.  Continue with acyclovir.  She will reestablish care with Dr. Kent, and restart maintenance, likely daratumumab-Revlimid (daratumumab monthly, Revlimid 10 mg 3 out of 4 weeks).  She was advised to follow-up with endocrinology as needed.  Given past iron deficiency on bone marrow, she was also advised to follow-up with GI for endoscopies, and get pelvic exam for completion.  We will plan to see patient back in 3 months time for her day 200 evaluation.    1/4/23: Patient seen for her day 200 evaluation of autologous stem cell transplant.  Regularly follows with Dr. Kent, and has been successfully started on maintenance therapy with daratumumab-Revlimid.  Tolerating reasonably.  Denies significant GI symptoms.  Some minimal rash around lips, being monitored.  Denies any new aches or pains, lumps or bumps.  Routine blood work unremarkable, SPEP/LC pending and will be followed.  If responding, then she will continue with current maintenance.  She will also continue with acyclovir prophylaxis.  Advised to proceed with COVID-19 vaccinations.  We will see her back in 6 months with 1 year post transplant evaluation, including restaging studies involving PET scan and bone marrow biopsy (with ClonoSeq MRD testing).  Sooner if needed.    6/14/2023: Patient seen for her 1 year post transplant evaluation.  She regularly follows with Dr. Kent and remains on Daratumumab-Revlimid.  Tolerating reasonably.  Recent blood work with adequate counts, SIFE CR/LC staying low.  PET scan with CR, persistent hypermetabolic right thyroid nodule and is scheduled to follow-up with ENT.  Skeletal survey without new lesions.  Bone marrow biopsy with CR, MRD flow negative.  2D echo with normal EF.  PFTs normal.  As such she will continue with her current maintenance therapy with

## 2025-07-14 NOTE — PATIENT INSTRUCTIONS
4.0 - 12.0 % Final    Eosinophils % 06/23/2025 2.4  0.5 - 7.8 % Final    Basophils % 06/23/2025 0.7  0.0 - 2.0 % Final    Immature Granulocytes % 06/23/2025 0.2  0.0 - 5.0 % Final    Neutrophils Absolute 06/23/2025 2.34  1.70 - 8.20 K/UL Final    Lymphocytes Absolute 06/23/2025 2.40  0.50 - 4.60 K/UL Final    Monocytes Absolute 06/23/2025 0.59  0.10 - 1.30 K/UL Final    Eosinophils Absolute 06/23/2025 0.13  0.00 - 0.80 K/UL Final    Basophils Absolute 06/23/2025 0.04  0.00 - 0.20 K/UL Final    Immature Granulocytes Absolute 06/23/2025 0.01  0.0 - 0.5 K/UL Final    Test Ordered 06/23/2025 FLOW CYTOMETRY MS    Final    Source 06/23/2025 BONE MARROW    Final    Flow Cyometry Results 06/23/2025 Test results scanned into EPIC    Final    Bone Marrow Prep & Valentine Stain 06/23/2025 FOR HEMATOLOGY SERVICES RENDERED    Final    [facility] GI[facility] STAIN   Hospital Outpatient Visit on 06/23/2025   Component Date Value Ref Range Status    WBC 06/23/2025 4.7  4.3 - 11.1 K/uL Final    RBC 06/23/2025 4.30  4.05 - 5.2 M/uL Final    Hemoglobin 06/23/2025 12.1  11.7 - 15.4 g/dL Final    Hematocrit 06/23/2025 38.8  35.8 - 46.3 % Final    MCV 06/23/2025 90.2  82.0 - 102.0 FL Final    MCH 06/23/2025 28.1  26.1 - 32.9 PG Final    MCHC 06/23/2025 31.2 (L)  31.4 - 35.0 g/dL Final    RDW 06/23/2025 15.0 (H)  11.9 - 14.6 % Final    Platelets 06/23/2025 280  150 - 450 K/uL Final    MPV 06/23/2025 9.0 (L)  9.4 - 12.3 FL Final    nRBC 06/23/2025 0.00  0.0 - 0.2 K/uL Final    **Note: Absolute NRBC parameter is now reported with Hemogram**    Neutrophils % 06/23/2025 50.7  43.0 - 78.0 % Final    Lymphocytes % 06/23/2025 34.3  13.0 - 44.0 % Final    Monocytes % 06/23/2025 11.8  4.0 - 12.0 % Final    Eosinophils % 06/23/2025 2.1  0.5 - 7.8 % Final    Basophils % 06/23/2025 0.9  0.0 - 2.0 % Final    Immature Granulocytes % 06/23/2025 0.2  0.0 - 5.0 % Final    Neutrophils Absolute 06/23/2025 2.36  1.70 - 8.20 K/UL Final    Lymphocytes Absolute 06/23/2025

## 2025-07-25 ENCOUNTER — TELEPHONE (OUTPATIENT)
Dept: RADIATION ONCOLOGY | Age: 55
End: 2025-07-25

## 2025-07-25 NOTE — TELEPHONE ENCOUNTER
Physician provider: Dr. Aj  Reason for today's call (Please detail here patients chief complaint): pt wants to be referred to to a [facility] or even [facility] anyone but V[facility] as soon as possible, she has sent a mychart message she has been having trouble with the [facility] she has seen.  She had a CT on 6/23, and it showed a nasel cavity spot, and she is very worried and wants to get this taken care of as soon as possible.     Last office visit:n/a  Patient Callback Number: [phone]  Was callback number verified?: Yes  Preferred pharmacy (If refill request): n/a  Veriified that patient confirmed no refills left at pharmacy? :No  Has the patient called the office for this concern within the last 48 hours?:No    Red Word Mentioned  Warm Transfer Phone Call to (Name): n/a    Patient notified that their information will be routed to the appropriate team for review. Patient is advised that they will receive a phone call from the appropriate department. If awaiting a call from the triage department and symptoms worsen before receiving a call back, the patient has been advised to proceed to the nearest ED.

## 2025-07-25 NOTE — TELEPHONE ENCOUNTER
RN returned call to patient. RN informed patient that Dr. Aj is out of the office. His RN will be back on Monday morning, 7/28, and will return her call to discuss ENT referral at that time. Patient VU, thankful for callback. Message forwarded to Dr. Aj's team.

## 2025-07-28 DIAGNOSIS — R94.02 ABNORMAL POSITRON EMISSION TOMOGRAPHY (PET) SCAN OF HEAD: Primary | ICD-10-CM

## 2025-07-28 NOTE — PROGRESS NOTES
Verbal order for ent referral received from Dr. Aj with verbal read back to confirm. Order signed and routed to provider for co signature.

## 2025-08-12 LAB
Lab: NORMAL
Lab: NORMAL
REFERENCE LAB: NORMAL